# Patient Record
Sex: FEMALE | HISPANIC OR LATINO | Employment: FULL TIME | URBAN - NONMETROPOLITAN AREA
[De-identification: names, ages, dates, MRNs, and addresses within clinical notes are randomized per-mention and may not be internally consistent; named-entity substitution may affect disease eponyms.]

---

## 2019-01-24 ENCOUNTER — APPOINTMENT (OUTPATIENT)
Dept: LAB | Facility: HOSPITAL | Age: 29
End: 2019-01-24
Attending: OBSTETRICS & GYNECOLOGY

## 2019-01-24 ENCOUNTER — TRANSCRIBE ORDERS (OUTPATIENT)
Dept: LAB | Facility: HOSPITAL | Age: 29
End: 2019-01-24

## 2019-01-24 DIAGNOSIS — Z34.90 PREGNANCY, UNSPECIFIED GESTATIONAL AGE: Primary | ICD-10-CM

## 2019-01-24 DIAGNOSIS — O24.919 DIABETES MELLITUS DURING PREGNANCY, ANTEPARTUM, UNSPECIFIED DIABETES MELLITUS TYPE: ICD-10-CM

## 2019-01-24 LAB
ABO GROUP BLD: NORMAL
BASOPHILS # BLD AUTO: 0.04 10*3/MM3 (ref 0–0.2)
BASOPHILS NFR BLD AUTO: 0.3 % (ref 0–2)
BLD GP AB SCN SERPL QL: NEGATIVE
DEPRECATED RDW RBC AUTO: 40.7 FL (ref 40–54)
EOSINOPHIL # BLD AUTO: 0.15 10*3/MM3 (ref 0–0.7)
EOSINOPHIL NFR BLD AUTO: 1.2 % (ref 0–4)
ERYTHROCYTE [DISTWIDTH] IN BLOOD BY AUTOMATED COUNT: 13.2 % (ref 12–15)
HBA1C MFR BLD: 5.1 %
HBV SURFACE AG SERPL QL IA: NEGATIVE
HCG INTACT+B SERPL-ACNC: 3017.1 MIU/ML (ref 0–5)
HCT VFR BLD AUTO: 42.4 % (ref 37–47)
HCV AB SER DONR QL: NEGATIVE
HCV S/C RATIO: 0.03 (ref 0–0.99)
HGB BLD-MCNC: 13.9 G/DL (ref 12–16)
HIV1+2 AB SER QL: NEGATIVE
IMM GRANULOCYTES # BLD AUTO: 0.04 10*3/MM3 (ref 0–0.03)
IMM GRANULOCYTES NFR BLD AUTO: 0.3 % (ref 0–5)
LYMPHOCYTES # BLD AUTO: 3.75 10*3/MM3 (ref 0.72–4.86)
LYMPHOCYTES NFR BLD AUTO: 30.5 % (ref 15–45)
MCH RBC QN AUTO: 27.6 PG (ref 28–32)
MCHC RBC AUTO-ENTMCNC: 32.8 G/DL (ref 33–36)
MCV RBC AUTO: 84.1 FL (ref 82–98)
MONOCYTES # BLD AUTO: 0.73 10*3/MM3 (ref 0.19–1.3)
MONOCYTES NFR BLD AUTO: 5.9 % (ref 4–12)
NEUTROPHILS # BLD AUTO: 7.57 10*3/MM3 (ref 1.87–8.4)
NEUTROPHILS NFR BLD AUTO: 61.8 % (ref 39–78)
NRBC BLD AUTO-RTO: 0 /100 WBC (ref 0–0)
PLATELET # BLD AUTO: 275 10*3/MM3 (ref 130–400)
PMV BLD AUTO: 10.9 FL (ref 6–12)
PROGEST SERPL-MCNC: 27.6 NG/ML
RBC # BLD AUTO: 5.04 10*6/MM3 (ref 4.2–5.4)
RH BLD: POSITIVE
WBC NRBC COR # BLD: 12.28 10*3/MM3 (ref 4.8–10.8)

## 2019-01-24 PROCEDURE — 84702 CHORIONIC GONADOTROPIN TEST: CPT | Performed by: OBSTETRICS & GYNECOLOGY

## 2019-01-24 PROCEDURE — 87591 N.GONORRHOEAE DNA AMP PROB: CPT | Performed by: OBSTETRICS & GYNECOLOGY

## 2019-01-24 PROCEDURE — G0432 EIA HIV-1/HIV-2 SCREEN: HCPCS | Performed by: OBSTETRICS & GYNECOLOGY

## 2019-01-24 PROCEDURE — 84144 ASSAY OF PROGESTERONE: CPT | Performed by: OBSTETRICS & GYNECOLOGY

## 2019-01-24 PROCEDURE — 36415 COLL VENOUS BLD VENIPUNCTURE: CPT

## 2019-01-24 PROCEDURE — 86803 HEPATITIS C AB TEST: CPT | Performed by: OBSTETRICS & GYNECOLOGY

## 2019-01-24 PROCEDURE — G0123 SCREEN CERV/VAG THIN LAYER: HCPCS | Performed by: OBSTETRICS & GYNECOLOGY

## 2019-01-24 PROCEDURE — 87491 CHLMYD TRACH DNA AMP PROBE: CPT | Performed by: OBSTETRICS & GYNECOLOGY

## 2019-01-24 PROCEDURE — 83036 HEMOGLOBIN GLYCOSYLATED A1C: CPT | Performed by: OBSTETRICS & GYNECOLOGY

## 2019-01-25 ENCOUNTER — LAB REQUISITION (OUTPATIENT)
Dept: LAB | Facility: HOSPITAL | Age: 29
End: 2019-01-25

## 2019-01-25 DIAGNOSIS — Z12.72 ENCOUNTER FOR SCREENING FOR MALIGNANT NEOPLASM OF VAGINA: ICD-10-CM

## 2019-01-26 LAB
RPR SER QL: NON REACTIVE
RUBV IGG SERPL IA-ACNC: 30.7 INDEX

## 2019-01-31 LAB
GEN CATEG CVX/VAG CYTO-IMP: NORMAL
LAB AP CASE REPORT: NORMAL
LAB AP GYN ADDITIONAL INFORMATION: NORMAL
LAB AP GYN OTHER FINDINGS: NORMAL
PATH INTERP SPEC-IMP: NORMAL
STAT OF ADQ CVX/VAG CYTO-IMP: NORMAL

## 2019-03-03 ENCOUNTER — HOSPITAL ENCOUNTER (EMERGENCY)
Facility: HOSPITAL | Age: 29
Discharge: HOME | End: 2019-03-03
Admitting: EMERGENCY MEDICINE

## 2019-03-03 ENCOUNTER — APPOINTMENT (OUTPATIENT)
Dept: ULTRASOUND IMAGING | Facility: HOSPITAL | Age: 29
End: 2019-03-03

## 2019-03-03 VITALS
HEIGHT: 60 IN | BODY MASS INDEX: 40.17 KG/M2 | TEMPERATURE: 99.9 F | HEART RATE: 107 BPM | SYSTOLIC BLOOD PRESSURE: 110 MMHG | DIASTOLIC BLOOD PRESSURE: 60 MMHG | RESPIRATION RATE: 14 BRPM | WEIGHT: 204.6 LBS | OXYGEN SATURATION: 98 %

## 2019-03-03 DIAGNOSIS — R10.9 ABDOMINAL PAIN DURING PREGNANCY IN FIRST TRIMESTER: Primary | ICD-10-CM

## 2019-03-03 DIAGNOSIS — J02.0 STREP PHARYNGITIS: ICD-10-CM

## 2019-03-03 DIAGNOSIS — O26.891 ABDOMINAL PAIN DURING PREGNANCY IN FIRST TRIMESTER: Primary | ICD-10-CM

## 2019-03-03 LAB
ALBUMIN SERPL-MCNC: 4.4 G/DL (ref 3.5–5)
ALBUMIN/GLOB SERPL: 1.3 G/DL (ref 1.1–2.5)
ALP SERPL-CCNC: 105 U/L (ref 24–120)
ALT SERPL W P-5'-P-CCNC: 24 U/L (ref 0–54)
ANION GAP SERPL CALCULATED.3IONS-SCNC: 10 MMOL/L (ref 4–13)
AST SERPL-CCNC: 26 U/L (ref 7–45)
BASOPHILS # BLD AUTO: 0.05 10*3/MM3 (ref 0–0.2)
BASOPHILS NFR BLD AUTO: 0.2 % (ref 0–2)
BILIRUB SERPL-MCNC: 0.4 MG/DL (ref 0.1–1)
BILIRUB UR QL STRIP: NEGATIVE
BUN BLD-MCNC: 6 MG/DL (ref 5–21)
BUN/CREAT SERPL: 17.1 (ref 7–25)
CALCIUM SPEC-SCNC: 9.2 MG/DL (ref 8.4–10.4)
CHLORIDE SERPL-SCNC: 99 MMOL/L (ref 98–110)
CLARITY UR: ABNORMAL
CO2 SERPL-SCNC: 23 MMOL/L (ref 24–31)
COLOR UR: YELLOW
CREAT BLD-MCNC: 0.35 MG/DL (ref 0.5–1.4)
DEPRECATED RDW RBC AUTO: 38.6 FL (ref 40–54)
EOSINOPHIL # BLD AUTO: 0 10*3/MM3 (ref 0–0.7)
EOSINOPHIL NFR BLD AUTO: 0 % (ref 0–4)
ERYTHROCYTE [DISTWIDTH] IN BLOOD BY AUTOMATED COUNT: 13.3 % (ref 12–15)
FLUAV AG NPH QL: NEGATIVE
FLUBV AG NPH QL IA: NEGATIVE
GFR SERPL CREATININE-BSD FRML MDRD: >150 ML/MIN/1.73
GFR SERPL CREATININE-BSD FRML MDRD: >150 ML/MIN/1.73
GLOBULIN UR ELPH-MCNC: 3.5 GM/DL
GLUCOSE BLD-MCNC: 91 MG/DL (ref 70–100)
GLUCOSE UR STRIP-MCNC: NEGATIVE MG/DL
HCT VFR BLD AUTO: 38.5 % (ref 37–47)
HGB BLD-MCNC: 13 G/DL (ref 12–16)
HGB UR QL STRIP.AUTO: NEGATIVE
IMM GRANULOCYTES # BLD AUTO: 0.1 10*3/MM3 (ref 0–0.05)
IMM GRANULOCYTES NFR BLD AUTO: 0.5 % (ref 0–5)
KETONES UR QL STRIP: ABNORMAL
LEUKOCYTE ESTERASE UR QL STRIP.AUTO: NEGATIVE
LYMPHOCYTES # BLD AUTO: 1.37 10*3/MM3 (ref 0.72–4.86)
LYMPHOCYTES NFR BLD AUTO: 6.8 % (ref 15–45)
MCH RBC QN AUTO: 27.2 PG (ref 28–32)
MCHC RBC AUTO-ENTMCNC: 33.8 G/DL (ref 33–36)
MCV RBC AUTO: 80.5 FL (ref 82–98)
MONOCYTES # BLD AUTO: 0.81 10*3/MM3 (ref 0.19–1.3)
MONOCYTES NFR BLD AUTO: 4 % (ref 4–12)
NEUTROPHILS # BLD AUTO: 17.94 10*3/MM3 (ref 1.87–8.4)
NEUTROPHILS NFR BLD AUTO: 88.5 % (ref 39–78)
NITRITE UR QL STRIP: NEGATIVE
NRBC BLD AUTO-RTO: 0 /100 WBC (ref 0–0)
PH UR STRIP.AUTO: <=5 [PH] (ref 5–8)
PLATELET # BLD AUTO: 211 10*3/MM3 (ref 130–400)
PMV BLD AUTO: 10.7 FL (ref 6–12)
POTASSIUM BLD-SCNC: 3.8 MMOL/L (ref 3.5–5.3)
PROT SERPL-MCNC: 7.9 G/DL (ref 6.3–8.7)
PROT UR QL STRIP: NEGATIVE
RBC # BLD AUTO: 4.78 10*6/MM3 (ref 4.2–5.4)
S PYO AG THROAT QL: POSITIVE
SODIUM BLD-SCNC: 132 MMOL/L (ref 135–145)
SP GR UR STRIP: 1.02 (ref 1–1.03)
UROBILINOGEN UR QL STRIP: ABNORMAL
WBC NRBC COR # BLD: 20.27 10*3/MM3 (ref 4.8–10.8)

## 2019-03-03 PROCEDURE — 87804 INFLUENZA ASSAY W/OPTIC: CPT | Performed by: NURSE PRACTITIONER

## 2019-03-03 PROCEDURE — 87086 URINE CULTURE/COLONY COUNT: CPT | Performed by: NURSE PRACTITIONER

## 2019-03-03 PROCEDURE — 96372 THER/PROPH/DIAG INJ SC/IM: CPT

## 2019-03-03 PROCEDURE — 81003 URINALYSIS AUTO W/O SCOPE: CPT | Performed by: NURSE PRACTITIONER

## 2019-03-03 PROCEDURE — 99283 EMERGENCY DEPT VISIT LOW MDM: CPT

## 2019-03-03 PROCEDURE — 87880 STREP A ASSAY W/OPTIC: CPT | Performed by: NURSE PRACTITIONER

## 2019-03-03 PROCEDURE — 85025 COMPLETE CBC W/AUTO DIFF WBC: CPT | Performed by: NURSE PRACTITIONER

## 2019-03-03 PROCEDURE — 25010000002 CEFTRIAXONE PER 250 MG: Performed by: NURSE PRACTITIONER

## 2019-03-03 PROCEDURE — 80053 COMPREHEN METABOLIC PANEL: CPT | Performed by: NURSE PRACTITIONER

## 2019-03-03 PROCEDURE — 76801 OB US < 14 WKS SINGLE FETUS: CPT

## 2019-03-03 RX ORDER — ACETAMINOPHEN 500 MG
1000 TABLET ORAL ONCE
Status: COMPLETED | OUTPATIENT
Start: 2019-03-03 | End: 2019-03-03

## 2019-03-03 RX ORDER — FAMOTIDINE 20 MG/1
20 TABLET, FILM COATED ORAL NIGHTLY
Qty: 5 TABLET | Refills: 0 | Status: SHIPPED | OUTPATIENT
Start: 2019-03-03 | End: 2019-03-03

## 2019-03-03 RX ORDER — METHYLPREDNISOLONE 4 MG/1
TABLET ORAL
Qty: 1 EACH | Refills: 0 | Status: SHIPPED | OUTPATIENT
Start: 2019-03-03 | End: 2019-03-03

## 2019-03-03 RX ORDER — CEFDINIR 300 MG/1
300 CAPSULE ORAL 2 TIMES DAILY
Qty: 14 CAPSULE | Refills: 0 | Status: SHIPPED | OUTPATIENT
Start: 2019-03-03 | End: 2019-03-10

## 2019-03-03 RX ADMIN — CEFTRIAXONE SODIUM 1000 MG: 1 INJECTION, POWDER, FOR SOLUTION INTRAMUSCULAR; INTRAVENOUS at 14:40

## 2019-03-03 RX ADMIN — ACETAMINOPHEN 1000 MG: 500 TABLET, FILM COATED ORAL at 14:41

## 2019-03-03 NOTE — ED PROVIDER NOTES
Subjective   Patient is a 28-year-old  female that presents to the ER today with complaint of headache, body aches, sore throat, and abdominal pain.  Patient reports that last evening she developed a mild headache.  She denies any neck stiffness or pain.  She denies fever.  She states that she has had the chills intermittently since then.  The patient reports that this morning she had body aches and a sore throat.  Patient denies any difficulty swallowing her secretions.  She denies any respiratory distress or stridor.  Patient denies any earache or cough.  She denies any ill contacts.  Patient also reports that she is having suprapubic pain and cramping.  She reports that she is 11 weeks pregnant.  She is a  3 para 2 that follows with Dr. Rodgers.  The patient denies any vaginal bleeding or discharge, she denies dysuria.  Patient states that she has had abdominal pain throughout the pregnancy and was told that she needed to take it easy.  She is requesting an evaluation for her baby today as well.  She presents to the ER today for further evaluation.  A  phone service was used as the patient does not speak English.  Patient did give permission to do this.        History provided by:  Patient   used: Yes ( phone service)    Flu Symptoms   Presenting symptoms: headache, myalgias and sore throat    Presenting symptoms: no cough, no diarrhea, no fatigue, no fever, no nausea, no rhinorrhea, no shortness of breath and no vomiting    Severity:  Mild  Onset quality:  Sudden  Duration:  1 day  Progression:  Unchanged  Chronicity:  New  Relieved by:  Nothing  Worsened by:  Nothing  Ineffective treatments:  None tried  Associated symptoms: chills    Associated symptoms: no decreased appetite, no decrease in physical activity, no ear pain, no mental status change, no congestion, no neck stiffness and no syncope    Risk factors: pregnancy    Risk factors: not elderly, no  diabetes problem, no heart disease, no immunocompromised state, no kidney disease, no liver disease and no sick contacts    Abdominal Pain   Pain location:  Suprapubic  Pain quality: aching and cramping    Pain radiates to:  Does not radiate  Pain severity:  Mild  Onset quality:  Sudden  Duration:  1 day  Timing:  Constant  Progression:  Unchanged  Chronicity:  New  Context: not alcohol use, not awakening from sleep, not diet changes, not eating, not laxative use, not medication withdrawal, not previous surgeries, not recent illness, not recent sexual activity, not recent travel, not retching, not sick contacts, not suspicious food intake and not trauma    Relieved by:  Nothing  Worsened by:  Nothing  Ineffective treatments:  None tried  Associated symptoms: chills and sore throat    Associated symptoms: no anorexia, no belching, no chest pain, no constipation, no cough, no diarrhea, no dysuria, no fatigue, no fever, no flatus, no hematemesis, no hematochezia, no hematuria, no melena, no nausea, no shortness of breath, no vaginal bleeding, no vaginal discharge and no vomiting    Risk factors: pregnancy    Risk factors: no alcohol abuse, no aspirin use, not elderly, has not had multiple surgeries, no NSAID use, not obese and no recent hospitalization        Review of Systems   Constitutional: Positive for chills. Negative for decreased appetite, fatigue and fever.   HENT: Positive for sore throat. Negative for congestion, ear pain and rhinorrhea.    Respiratory: Negative for cough and shortness of breath.    Cardiovascular: Negative for chest pain.   Gastrointestinal: Positive for abdominal pain. Negative for anorexia, constipation, diarrhea, flatus, hematemesis, hematochezia, melena, nausea and vomiting.   Genitourinary: Negative for dysuria, hematuria, vaginal bleeding and vaginal discharge.   Musculoskeletal: Positive for myalgias. Negative for neck stiffness.   Neurological: Positive for headaches.   All other  systems reviewed and are negative.      History reviewed. No pertinent past medical history.    No Known Allergies    History reviewed. No pertinent surgical history.    History reviewed. No pertinent family history.    Social History     Socioeconomic History   • Marital status: Single     Spouse name: Not on file   • Number of children: Not on file   • Years of education: Not on file   • Highest education level: Not on file   Tobacco Use   • Smoking status: Never Smoker   Substance and Sexual Activity   • Alcohol use: No     Frequency: Never   • Drug use: No   • Sexual activity: Defer           Objective   Physical Exam   Constitutional: She is oriented to person, place, and time. She appears well-developed and well-nourished.   HENT:   Head: Normocephalic and atraumatic.   Mouth/Throat: Uvula is midline, oropharynx is clear and moist and mucous membranes are normal. Tonsils are 2+ on the right. Tonsils are 2+ on the left. Tonsillar exudate.   Eyes: Conjunctivae are normal. Pupils are equal, round, and reactive to light.   Cardiovascular: Normal rate, regular rhythm and normal heart sounds.   Pulmonary/Chest: Effort normal and breath sounds normal.   Abdominal: Soft. Bowel sounds are normal. There is tenderness in the suprapubic area.   Neurological: She is alert and oriented to person, place, and time.   Skin: Skin is warm and dry. Capillary refill takes less than 2 seconds.   Psychiatric: She has a normal mood and affect.   Nursing note and vitals reviewed.      Procedures           ED Course  ED Course as of Mar 03 1527   Sun Mar 03, 2019   1323 Nursing staff attempted to obtain FHT; difficulty locating, US ordered.   [LF]   1440 Pt case discussed with Dr. Dhillon who agrees with plan of care. CMP shows Na 132, stable renal and liver functions. CBC shows a WBC 20; pt is pregnant which can cause a leukocytosis and is also strept A positive. UA shows trace ketones, neg hematuria and infection. Flu swab negative.    [LF]   1442 OB US shows IUP 10 weeks 3 days with , no abnormalities seen.   [LF]   1442 I did discuss this with the pt in depth using translation services. The pt voices understanding of labs and radiology reports. Pt given Rocephin IM and Tylenol Po. Advised bedrest/pelvic rest until cleared by OBGYN. Advised to f/u with OBGYN tomorrow. Pt advised push PO fluids, return to the ER as needed. Pt will be DC home at this time in stable cond with family. All questions answered. Pt tolerating PO fluids without difficulty at discharge.   [LF]      ED Course User Index  [LF] Jennifer Brennan, APRN          US Ob < 14 Weeks Single or First Gestation   Final Result        Labs Reviewed   RAPID STREP A SCREEN - Abnormal; Notable for the following components:       Result Value    Strep A Ag Positive (*)     All other components within normal limits   COMPREHENSIVE METABOLIC PANEL - Abnormal; Notable for the following components:    Creatinine 0.35 (*)     Sodium 132 (*)     CO2 23.0 (*)     All other components within normal limits   URINALYSIS W/ CULTURE IF INDICATED - Abnormal; Notable for the following components:    Appearance, UA Cloudy (*)     Ketones, UA Trace (*)     All other components within normal limits    Narrative:     Urine microscopic not indicated.   CBC WITH AUTO DIFFERENTIAL - Abnormal; Notable for the following components:    WBC 20.27 (*)     MCV 80.5 (*)     MCH 27.2 (*)     RDW-SD 38.6 (*)     Neutrophil % 88.5 (*)     Lymphocyte % 6.8 (*)     Neutrophils, Absolute 17.94 (*)     Immature Grans, Absolute 0.10 (*)     All other components within normal limits   INFLUENZA ANTIGEN, RAPID - Normal    Narrative:     Recommend confirmation of negative results by viral culture or molecular assay.   URINE CULTURE   CBC AND DIFFERENTIAL    Narrative:     The following orders were created for panel order CBC & Differential.  Procedure                               Abnormality         Status                      ---------                               -----------         ------                     CBC Auto Differential[[id number]]        Abnormal            Final result                 Please view results for these tests on the individual orders.             MDM  Number of Diagnoses or Management Options  Abdominal pain during pregnancy in first trimester: new and requires workup  Strep pharyngitis: new and requires workup     Amount and/or Complexity of Data Reviewed  Clinical lab tests: ordered and reviewed  Tests in the radiology section of CPT®: ordered and reviewed  Discuss the patient with other providers: yes    Patient Progress  Patient progress: stable        Final diagnoses:   Abdominal pain during pregnancy in first trimester   Strep pharyngitis            Jennifer Brennan, APRN  03/03/19 1527

## 2019-03-05 LAB — BACTERIA SPEC AEROBE CULT: NORMAL

## 2019-04-03 ENCOUNTER — LAB (OUTPATIENT)
Dept: LAB | Facility: HOSPITAL | Age: 29
End: 2019-04-03

## 2019-04-03 ENCOUNTER — TRANSCRIBE ORDERS (OUTPATIENT)
Dept: LABOR AND DELIVERY | Facility: HOSPITAL | Age: 29
End: 2019-04-03

## 2019-04-03 DIAGNOSIS — Q05.9 SPINA BIFIDA, UNSPECIFIED HYDROCEPHALUS PRESENCE, UNSPECIFIED SPINAL REGION (HCC): Primary | ICD-10-CM

## 2019-04-03 PROCEDURE — 82677 ASSAY OF ESTRIOL: CPT | Performed by: OBSTETRICS & GYNECOLOGY

## 2019-04-03 PROCEDURE — 86336 INHIBIN A: CPT | Performed by: OBSTETRICS & GYNECOLOGY

## 2019-04-03 PROCEDURE — 36415 COLL VENOUS BLD VENIPUNCTURE: CPT

## 2019-04-03 PROCEDURE — 84702 CHORIONIC GONADOTROPIN TEST: CPT | Performed by: OBSTETRICS & GYNECOLOGY

## 2019-04-03 PROCEDURE — 82105 ALPHA-FETOPROTEIN SERUM: CPT | Performed by: OBSTETRICS & GYNECOLOGY

## 2019-04-05 LAB
2ND TRIMESTER 4 SCREEN SERPL-IMP: NORMAL
2ND TRIMESTER 4 SCREEN SERPL-IMP: NORMAL
AFP ADJ MOM SERPL: 1.86
AFP SERPL-MCNC: 39 NG/ML
AGE AT DELIVERY: 28.9 YR
FET TS 18 RISK FROM MAT AGE: NORMAL
FET TS 21 RISK FROM MAT AGE: 789
GA METHOD: NORMAL
GA: 15 WEEKS
HCG ADJ MOM SERPL: 0.54
HCG SERPL-ACNC: NORMAL MIU/ML
IDDM PATIENT QL: NO
INHIBIN A ADJ MOM SERPL: 0.67
INHIBIN A SERPL-MCNC: 103.11 PG/ML
LABORATORY COMMENT REPORT: NORMAL
MULTIPLE PREGNANCY: NO
NEURAL TUBE DEFECT RISK FETUS: 1109 %
RESULT: NORMAL
TS 18 RISK FETUS: NORMAL
TS 21 RISK FETUS: NORMAL
U ESTRIOL ADJ MOM SERPL: 1.39
U ESTRIOL SERPL-MCNC: 0.7 NG/ML

## 2019-07-10 ENCOUNTER — TRANSCRIBE ORDERS (OUTPATIENT)
Dept: ADMINISTRATIVE | Facility: HOSPITAL | Age: 29
End: 2019-07-10

## 2019-07-10 ENCOUNTER — APPOINTMENT (OUTPATIENT)
Dept: LAB | Facility: HOSPITAL | Age: 29
End: 2019-07-10

## 2019-07-10 DIAGNOSIS — O24.419 GESTATIONAL DIABETES MELLITUS (GDM), ANTEPARTUM, GESTATIONAL DIABETES METHOD OF CONTROL UNSPECIFIED: Primary | ICD-10-CM

## 2019-07-10 LAB
GLUCOSE 1H P 100 G GLC PO SERPL-MCNC: 166 MG/DL (ref 50–140)
GLUCOSE 2H P 100 G GLC PO SERPL-MCNC: 169 MG/DL (ref 50–140)
GLUCOSE 3H P 100 G GLC PO SERPL-MCNC: 105 MG/DL (ref 50–140)
GLUCOSE P FAST SERPL-MCNC: 74 MG/DL (ref 70–100)

## 2019-07-10 PROCEDURE — 36415 COLL VENOUS BLD VENIPUNCTURE: CPT | Performed by: OBSTETRICS & GYNECOLOGY

## 2019-07-10 PROCEDURE — 82952 GTT-ADDED SAMPLES: CPT | Performed by: OBSTETRICS & GYNECOLOGY

## 2019-07-10 PROCEDURE — 82951 GLUCOSE TOLERANCE TEST (GTT): CPT | Performed by: OBSTETRICS & GYNECOLOGY

## 2019-08-15 ENCOUNTER — LAB REQUISITION (OUTPATIENT)
Dept: LAB | Facility: HOSPITAL | Age: 29
End: 2019-08-15

## 2019-08-15 DIAGNOSIS — Z00.00 ENCOUNTER FOR GENERAL ADULT MEDICAL EXAMINATION WITHOUT ABNORMAL FINDINGS: ICD-10-CM

## 2019-08-15 PROCEDURE — 87081 CULTURE SCREEN ONLY: CPT | Performed by: OBSTETRICS & GYNECOLOGY

## 2019-08-18 LAB — BACTERIA SPEC AEROBE CULT: NORMAL

## 2019-08-30 ENCOUNTER — HOSPITAL ENCOUNTER (INPATIENT)
Facility: HOSPITAL | Age: 29
LOS: 3 days | Discharge: HOME | End: 2019-09-02
Attending: OBSTETRICS & GYNECOLOGY | Admitting: OBSTETRICS & GYNECOLOGY

## 2019-08-30 ENCOUNTER — HOSPITAL ENCOUNTER (OUTPATIENT)
Facility: HOSPITAL | Age: 29
Setting detail: OBSERVATION
Discharge: HOME | End: 2019-08-30
Attending: OBSTETRICS & GYNECOLOGY | Admitting: OBSTETRICS & GYNECOLOGY

## 2019-08-30 VITALS
SYSTOLIC BLOOD PRESSURE: 119 MMHG | HEART RATE: 81 BPM | TEMPERATURE: 98.1 F | WEIGHT: 216.2 LBS | DIASTOLIC BLOOD PRESSURE: 83 MMHG | HEIGHT: 59 IN | RESPIRATION RATE: 18 BRPM | BODY MASS INDEX: 43.59 KG/M2

## 2019-08-30 PROBLEM — Z34.90 PREGNANCY: Status: ACTIVE | Noted: 2019-08-30

## 2019-08-30 LAB
A1 MICROGLOB PLACENTAL VAG QL: NEGATIVE
BASOPHILS # BLD AUTO: 0.03 10*3/MM3 (ref 0–0.2)
BASOPHILS NFR BLD AUTO: 0.3 % (ref 0–1.5)
DEPRECATED RDW RBC AUTO: 39.6 FL (ref 37–54)
EOSINOPHIL # BLD AUTO: 0.04 10*3/MM3 (ref 0–0.4)
EOSINOPHIL NFR BLD AUTO: 0.4 % (ref 0.3–6.2)
ERYTHROCYTE [DISTWIDTH] IN BLOOD BY AUTOMATED COUNT: 13.8 % (ref 12.3–15.4)
GLUCOSE BLDC GLUCOMTR-MCNC: 85 MG/DL (ref 70–130)
HCT VFR BLD AUTO: 36.2 % (ref 34–46.6)
HGB BLD-MCNC: 12.3 G/DL (ref 12–15.9)
IMM GRANULOCYTES # BLD AUTO: 0.05 10*3/MM3 (ref 0–0.05)
IMM GRANULOCYTES NFR BLD AUTO: 0.5 % (ref 0–0.5)
LYMPHOCYTES # BLD AUTO: 2.99 10*3/MM3 (ref 0.7–3.1)
LYMPHOCYTES NFR BLD AUTO: 27.2 % (ref 19.6–45.3)
MCH RBC QN AUTO: 27.4 PG (ref 26.6–33)
MCHC RBC AUTO-ENTMCNC: 34 G/DL (ref 31.5–35.7)
MCV RBC AUTO: 80.6 FL (ref 79–97)
MONOCYTES # BLD AUTO: 0.67 10*3/MM3 (ref 0.1–0.9)
MONOCYTES NFR BLD AUTO: 6.1 % (ref 5–12)
NEUTROPHILS # BLD AUTO: 7.21 10*3/MM3 (ref 1.7–7)
NEUTROPHILS NFR BLD AUTO: 65.5 % (ref 42.7–76)
NRBC BLD AUTO-RTO: 0 /100 WBC (ref 0–0.2)
PLATELET # BLD AUTO: 231 10*3/MM3 (ref 140–450)
PMV BLD AUTO: 10.9 FL (ref 6–12)
RBC # BLD AUTO: 4.49 10*6/MM3 (ref 3.77–5.28)
WBC NRBC COR # BLD: 10.99 10*3/MM3 (ref 3.4–10.8)

## 2019-08-30 PROCEDURE — G0378 HOSPITAL OBSERVATION PER HR: HCPCS

## 2019-08-30 PROCEDURE — G0463 HOSPITAL OUTPT CLINIC VISIT: HCPCS

## 2019-08-30 PROCEDURE — 86900 BLOOD TYPING SEROLOGIC ABO: CPT | Performed by: OBSTETRICS & GYNECOLOGY

## 2019-08-30 PROCEDURE — 84112 EVAL AMNIOTIC FLUID PROTEIN: CPT | Performed by: OBSTETRICS & GYNECOLOGY

## 2019-08-30 PROCEDURE — 82962 GLUCOSE BLOOD TEST: CPT

## 2019-08-30 PROCEDURE — 85025 COMPLETE CBC W/AUTO DIFF WBC: CPT | Performed by: OBSTETRICS & GYNECOLOGY

## 2019-08-30 PROCEDURE — 86850 RBC ANTIBODY SCREEN: CPT | Performed by: OBSTETRICS & GYNECOLOGY

## 2019-08-30 PROCEDURE — 86901 BLOOD TYPING SEROLOGIC RH(D): CPT | Performed by: OBSTETRICS & GYNECOLOGY

## 2019-08-30 RX ORDER — SODIUM CHLORIDE 0.9 % (FLUSH) 0.9 %
10 SYRINGE (ML) INJECTION EVERY 12 HOURS SCHEDULED
Status: DISCONTINUED | OUTPATIENT
Start: 2019-08-31 | End: 2019-09-02 | Stop reason: HOSPADM

## 2019-08-30 RX ORDER — SODIUM CHLORIDE 0.9 % (FLUSH) 0.9 %
10 SYRINGE (ML) INJECTION AS NEEDED
Status: DISCONTINUED | OUTPATIENT
Start: 2019-08-30 | End: 2019-09-02 | Stop reason: HOSPADM

## 2019-08-30 RX ORDER — PRENATAL VIT/IRON FUM/FOLIC AC 27MG-0.8MG
TABLET ORAL DAILY
COMMUNITY
End: 2021-08-01

## 2019-08-30 RX ADMIN — SODIUM CHLORIDE, POTASSIUM CHLORIDE, SODIUM LACTATE AND CALCIUM CHLORIDE 1000 ML: 600; 310; 30; 20 INJECTION, SOLUTION INTRAVENOUS at 23:30

## 2019-08-31 ENCOUNTER — ANESTHESIA EVENT (OUTPATIENT)
Dept: LABOR AND DELIVERY | Facility: HOSPITAL | Age: 29
End: 2019-08-31

## 2019-08-31 ENCOUNTER — ANESTHESIA (OUTPATIENT)
Dept: LABOR AND DELIVERY | Facility: HOSPITAL | Age: 29
End: 2019-08-31

## 2019-08-31 LAB
ABO GROUP BLD: NORMAL
BLD GP AB SCN SERPL QL: NEGATIVE
GLUCOSE BLDC GLUCOMTR-MCNC: 100 MG/DL (ref 70–130)
GLUCOSE BLDC GLUCOMTR-MCNC: 81 MG/DL (ref 70–130)
GLUCOSE BLDC GLUCOMTR-MCNC: 94 MG/DL (ref 70–130)
GLUCOSE BLDC GLUCOMTR-MCNC: 99 MG/DL (ref 70–130)
RH BLD: POSITIVE
T&S EXPIRATION DATE: NORMAL

## 2019-08-31 PROCEDURE — 25010000002 ROPIVACAINE PER 1 MG: Performed by: NURSE ANESTHETIST, CERTIFIED REGISTERED

## 2019-08-31 PROCEDURE — 25010000002 FENTANYL CITRATE (PF) 250 MCG/5ML SOLUTION: Performed by: NURSE ANESTHETIST, CERTIFIED REGISTERED

## 2019-08-31 PROCEDURE — 88313 SPECIAL STAINS GROUP 2: CPT | Performed by: OBSTETRICS & GYNECOLOGY

## 2019-08-31 PROCEDURE — 82962 GLUCOSE BLOOD TEST: CPT

## 2019-08-31 PROCEDURE — 88307 TISSUE EXAM BY PATHOLOGIST: CPT | Performed by: OBSTETRICS & GYNECOLOGY

## 2019-08-31 PROCEDURE — 51702 INSERT TEMP BLADDER CATH: CPT

## 2019-08-31 PROCEDURE — C1755 CATHETER, INTRASPINAL: HCPCS | Performed by: NURSE ANESTHETIST, CERTIFIED REGISTERED

## 2019-08-31 RX ORDER — DOCUSATE SODIUM 100 MG/1
100 CAPSULE, LIQUID FILLED ORAL 2 TIMES DAILY
Status: DISCONTINUED | OUTPATIENT
Start: 2019-09-01 | End: 2019-09-02 | Stop reason: HOSPADM

## 2019-08-31 RX ORDER — PRENATAL VIT/IRON FUM/FOLIC AC 27MG-0.8MG
1 TABLET ORAL DAILY
Status: DISCONTINUED | OUTPATIENT
Start: 2019-08-31 | End: 2019-09-02 | Stop reason: HOSPADM

## 2019-08-31 RX ORDER — METHYLERGONOVINE MALEATE 0.2 MG/ML
200 INJECTION INTRAVENOUS ONCE AS NEEDED
Status: DISCONTINUED | OUTPATIENT
Start: 2019-08-31 | End: 2019-08-31 | Stop reason: HOSPADM

## 2019-08-31 RX ORDER — IBUPROFEN 600 MG/1
600 TABLET ORAL EVERY 8 HOURS PRN
Status: DISCONTINUED | OUTPATIENT
Start: 2019-08-31 | End: 2019-09-02 | Stop reason: HOSPADM

## 2019-08-31 RX ORDER — SODIUM CHLORIDE 0.9 % (FLUSH) 0.9 %
3 SYRINGE (ML) INJECTION EVERY 12 HOURS SCHEDULED
Status: DISCONTINUED | OUTPATIENT
Start: 2019-08-31 | End: 2019-08-31 | Stop reason: HOSPADM

## 2019-08-31 RX ORDER — ONDANSETRON 4 MG/1
4 TABLET, FILM COATED ORAL EVERY 8 HOURS PRN
Status: DISCONTINUED | OUTPATIENT
Start: 2019-08-31 | End: 2019-09-02 | Stop reason: HOSPADM

## 2019-08-31 RX ORDER — PROMETHAZINE HYDROCHLORIDE 12.5 MG/1
12.5 SUPPOSITORY RECTAL EVERY 6 HOURS PRN
Status: DISCONTINUED | OUTPATIENT
Start: 2019-08-31 | End: 2019-09-02 | Stop reason: HOSPADM

## 2019-08-31 RX ORDER — MORPHINE SULFATE 2 MG/ML
1 INJECTION, SOLUTION INTRAMUSCULAR; INTRAVENOUS EVERY 4 HOURS PRN
Status: DISCONTINUED | OUTPATIENT
Start: 2019-08-31 | End: 2019-09-02 | Stop reason: HOSPADM

## 2019-08-31 RX ORDER — MISOPROSTOL 200 UG/1
600 TABLET ORAL ONCE
Status: DISCONTINUED | OUTPATIENT
Start: 2019-08-31 | End: 2019-09-02 | Stop reason: HOSPADM

## 2019-08-31 RX ORDER — SODIUM CHLORIDE, SODIUM LACTATE, POTASSIUM CHLORIDE, CALCIUM CHLORIDE 600; 310; 30; 20 MG/100ML; MG/100ML; MG/100ML; MG/100ML
125 INJECTION, SOLUTION INTRAVENOUS CONTINUOUS
Status: DISCONTINUED | OUTPATIENT
Start: 2019-08-31 | End: 2019-09-02 | Stop reason: HOSPADM

## 2019-08-31 RX ORDER — MISOPROSTOL 200 UG/1
800 TABLET ORAL AS NEEDED
Status: DISCONTINUED | OUTPATIENT
Start: 2019-08-31 | End: 2019-08-31 | Stop reason: HOSPADM

## 2019-08-31 RX ORDER — BUTORPHANOL TARTRATE 1 MG/ML
1 INJECTION, SOLUTION INTRAMUSCULAR; INTRAVENOUS
Status: DISCONTINUED | OUTPATIENT
Start: 2019-08-31 | End: 2019-08-31 | Stop reason: HOSPADM

## 2019-08-31 RX ORDER — LIDOCAINE HYDROCHLORIDE AND EPINEPHRINE 15; 5 MG/ML; UG/ML
INJECTION, SOLUTION EPIDURAL AS NEEDED
Status: DISCONTINUED | OUTPATIENT
Start: 2019-08-31 | End: 2019-09-01 | Stop reason: SURG

## 2019-08-31 RX ORDER — ZOLPIDEM TARTRATE 5 MG/1
5 TABLET ORAL NIGHTLY PRN
Status: DISCONTINUED | OUTPATIENT
Start: 2019-08-31 | End: 2019-09-02 | Stop reason: HOSPADM

## 2019-08-31 RX ORDER — LIDOCAINE HYDROCHLORIDE 10 MG/ML
5 INJECTION, SOLUTION EPIDURAL; INFILTRATION; INTRACAUDAL; PERINEURAL AS NEEDED
Status: DISCONTINUED | OUTPATIENT
Start: 2019-08-31 | End: 2019-08-31 | Stop reason: HOSPADM

## 2019-08-31 RX ORDER — ONDANSETRON 4 MG/1
4 TABLET, FILM COATED ORAL EVERY 6 HOURS PRN
Status: DISCONTINUED | OUTPATIENT
Start: 2019-08-31 | End: 2019-08-31 | Stop reason: HOSPADM

## 2019-08-31 RX ORDER — CARBOPROST TROMETHAMINE 250 UG/ML
250 INJECTION, SOLUTION INTRAMUSCULAR AS NEEDED
Status: DISCONTINUED | OUTPATIENT
Start: 2019-08-31 | End: 2019-08-31 | Stop reason: HOSPADM

## 2019-08-31 RX ORDER — OXYTOCIN/0.9 % SODIUM CHLORIDE 30/500 ML
250 PLASTIC BAG, INJECTION (ML) INTRAVENOUS CONTINUOUS
Status: ACTIVE | OUTPATIENT
Start: 2019-08-31 | End: 2019-08-31

## 2019-08-31 RX ORDER — FENTANYL CITRATE 50 UG/ML
INJECTION, SOLUTION INTRAMUSCULAR; INTRAVENOUS AS NEEDED
Status: DISCONTINUED | OUTPATIENT
Start: 2019-08-31 | End: 2019-09-01 | Stop reason: SURG

## 2019-08-31 RX ORDER — PROMETHAZINE HYDROCHLORIDE 25 MG/ML
12.5 INJECTION, SOLUTION INTRAMUSCULAR; INTRAVENOUS EVERY 6 HOURS PRN
Status: DISCONTINUED | OUTPATIENT
Start: 2019-08-31 | End: 2019-09-02 | Stop reason: HOSPADM

## 2019-08-31 RX ORDER — CALCIUM CARBONATE 200(500)MG
2 TABLET,CHEWABLE ORAL 3 TIMES DAILY PRN
Status: DISCONTINUED | OUTPATIENT
Start: 2019-08-31 | End: 2019-09-02 | Stop reason: HOSPADM

## 2019-08-31 RX ORDER — BISACODYL 10 MG
10 SUPPOSITORY, RECTAL RECTAL DAILY PRN
Status: DISCONTINUED | OUTPATIENT
Start: 2019-09-01 | End: 2019-09-02 | Stop reason: HOSPADM

## 2019-08-31 RX ORDER — OXYTOCIN/0.9 % SODIUM CHLORIDE 30/500 ML
125 PLASTIC BAG, INJECTION (ML) INTRAVENOUS CONTINUOUS PRN
Status: COMPLETED | OUTPATIENT
Start: 2019-08-31 | End: 2019-08-31

## 2019-08-31 RX ORDER — SODIUM CHLORIDE 0.9 % (FLUSH) 0.9 %
10 SYRINGE (ML) INJECTION AS NEEDED
Status: DISCONTINUED | OUTPATIENT
Start: 2019-08-31 | End: 2019-08-31 | Stop reason: HOSPADM

## 2019-08-31 RX ORDER — OXYTOCIN/0.9 % SODIUM CHLORIDE 30/500 ML
2-30 PLASTIC BAG, INJECTION (ML) INTRAVENOUS
Status: DISCONTINUED | OUTPATIENT
Start: 2019-08-31 | End: 2019-08-31 | Stop reason: HOSPADM

## 2019-08-31 RX ORDER — CARBOPROST TROMETHAMINE 250 UG/ML
250 INJECTION, SOLUTION INTRAMUSCULAR ONCE
Status: DISCONTINUED | OUTPATIENT
Start: 2019-08-31 | End: 2019-09-02 | Stop reason: HOSPADM

## 2019-08-31 RX ORDER — ONDANSETRON 2 MG/ML
4 INJECTION INTRAMUSCULAR; INTRAVENOUS EVERY 6 HOURS PRN
Status: DISCONTINUED | OUTPATIENT
Start: 2019-08-31 | End: 2019-08-31 | Stop reason: HOSPADM

## 2019-08-31 RX ORDER — PROMETHAZINE HYDROCHLORIDE 25 MG/1
25 TABLET ORAL EVERY 6 HOURS PRN
Status: DISCONTINUED | OUTPATIENT
Start: 2019-08-31 | End: 2019-09-02 | Stop reason: HOSPADM

## 2019-08-31 RX ORDER — OXYTOCIN/0.9 % SODIUM CHLORIDE 30/500 ML
999 PLASTIC BAG, INJECTION (ML) INTRAVENOUS ONCE
Status: COMPLETED | OUTPATIENT
Start: 2019-08-31 | End: 2019-08-31

## 2019-08-31 RX ORDER — NALOXONE HCL 0.4 MG/ML
0.4 VIAL (ML) INJECTION
Status: DISCONTINUED | OUTPATIENT
Start: 2019-08-31 | End: 2019-09-02 | Stop reason: HOSPADM

## 2019-08-31 RX ORDER — SODIUM CHLORIDE 0.9 % (FLUSH) 0.9 %
1-10 SYRINGE (ML) INJECTION AS NEEDED
Status: DISCONTINUED | OUTPATIENT
Start: 2019-08-31 | End: 2019-09-02 | Stop reason: HOSPADM

## 2019-08-31 RX ORDER — METHYLERGONOVINE MALEATE 0.2 MG/ML
200 INJECTION INTRAVENOUS ONCE
Status: DISCONTINUED | OUTPATIENT
Start: 2019-08-31 | End: 2019-09-02 | Stop reason: HOSPADM

## 2019-08-31 RX ORDER — ONDANSETRON 2 MG/ML
4 INJECTION INTRAMUSCULAR; INTRAVENOUS EVERY 6 HOURS PRN
Status: DISCONTINUED | OUTPATIENT
Start: 2019-08-31 | End: 2019-09-02 | Stop reason: HOSPADM

## 2019-08-31 RX ADMIN — SODIUM CHLORIDE, POTASSIUM CHLORIDE, SODIUM LACTATE AND CALCIUM CHLORIDE 1000 ML: 600; 310; 30; 20 INJECTION, SOLUTION INTRAVENOUS at 01:56

## 2019-08-31 RX ADMIN — LIDOCAINE HYDROCHLORIDE AND EPINEPHRINE 3 ML: 15; 5 INJECTION, SOLUTION EPIDURAL at 02:45

## 2019-08-31 RX ADMIN — ROPIVACAINE HYDROCHLORIDE 2 ML: 2 INJECTION, SOLUTION EPIDURAL; INFILTRATION at 02:54

## 2019-08-31 RX ADMIN — OXYTOCIN-SODIUM CHLORIDE 0.9% IV SOLN 30 UNIT/500ML 125 ML/HR: 30-0.9/5 SOLUTION at 08:03

## 2019-08-31 RX ADMIN — ROPIVACAINE HYDROCHLORIDE 4 ML: 2 INJECTION, SOLUTION EPIDURAL; INFILTRATION at 02:49

## 2019-08-31 RX ADMIN — OXYTOCIN-SODIUM CHLORIDE 0.9% IV SOLN 30 UNIT/500ML 2 MILLI-UNITS/MIN: 30-0.9/5 SOLUTION at 05:58

## 2019-08-31 RX ADMIN — IBUPROFEN 600 MG: 600 TABLET ORAL at 12:30

## 2019-08-31 RX ADMIN — OXYTOCIN-SODIUM CHLORIDE 0.9% IV SOLN 30 UNIT/500ML 999 ML/HR: 30-0.9/5 SOLUTION at 07:05

## 2019-08-31 RX ADMIN — FENTANYL CITRATE 50 MCG: 50 INJECTION INTRAMUSCULAR; INTRAVENOUS at 02:49

## 2019-08-31 RX ADMIN — OXYTOCIN-SODIUM CHLORIDE 0.9% IV SOLN 30 UNIT/500ML 250 ML/HR: 30-0.9/5 SOLUTION at 07:30

## 2019-08-31 RX ADMIN — SODIUM CHLORIDE, POTASSIUM CHLORIDE, SODIUM LACTATE AND CALCIUM CHLORIDE 125 ML/HR: 600; 310; 30; 20 INJECTION, SOLUTION INTRAVENOUS at 01:57

## 2019-08-31 RX ADMIN — FENTANYL CITRATE 200 MCG: 50 INJECTION INTRAMUSCULAR; INTRAVENOUS at 02:55

## 2019-09-01 LAB
BASOPHILS # BLD AUTO: 0.03 10*3/MM3 (ref 0–0.2)
BASOPHILS NFR BLD AUTO: 0.3 % (ref 0–1.5)
DEPRECATED RDW RBC AUTO: 41.3 FL (ref 37–54)
EOSINOPHIL # BLD AUTO: 0.07 10*3/MM3 (ref 0–0.4)
EOSINOPHIL NFR BLD AUTO: 0.7 % (ref 0.3–6.2)
ERYTHROCYTE [DISTWIDTH] IN BLOOD BY AUTOMATED COUNT: 14.2 % (ref 12.3–15.4)
GLUCOSE BLDC GLUCOMTR-MCNC: 84 MG/DL (ref 70–130)
HCT VFR BLD AUTO: 31.7 % (ref 34–46.6)
HGB BLD-MCNC: 10.5 G/DL (ref 12–15.9)
IMM GRANULOCYTES # BLD AUTO: 0.03 10*3/MM3 (ref 0–0.05)
IMM GRANULOCYTES NFR BLD AUTO: 0.3 % (ref 0–0.5)
LYMPHOCYTES # BLD AUTO: 3.1 10*3/MM3 (ref 0.7–3.1)
LYMPHOCYTES NFR BLD AUTO: 30.7 % (ref 19.6–45.3)
MCH RBC QN AUTO: 27.1 PG (ref 26.6–33)
MCHC RBC AUTO-ENTMCNC: 33.1 G/DL (ref 31.5–35.7)
MCV RBC AUTO: 81.9 FL (ref 79–97)
MONOCYTES # BLD AUTO: 0.62 10*3/MM3 (ref 0.1–0.9)
MONOCYTES NFR BLD AUTO: 6.1 % (ref 5–12)
NEUTROPHILS # BLD AUTO: 6.26 10*3/MM3 (ref 1.7–7)
NEUTROPHILS NFR BLD AUTO: 61.9 % (ref 42.7–76)
NRBC BLD AUTO-RTO: 0 /100 WBC (ref 0–0.2)
PLATELET # BLD AUTO: 182 10*3/MM3 (ref 140–450)
PMV BLD AUTO: 11.1 FL (ref 6–12)
RBC # BLD AUTO: 3.87 10*6/MM3 (ref 3.77–5.28)
WBC NRBC COR # BLD: 10.11 10*3/MM3 (ref 3.4–10.8)

## 2019-09-01 PROCEDURE — 82962 GLUCOSE BLOOD TEST: CPT

## 2019-09-01 PROCEDURE — 85025 COMPLETE CBC W/AUTO DIFF WBC: CPT | Performed by: OBSTETRICS & GYNECOLOGY

## 2019-09-01 RX ADMIN — IBUPROFEN 600 MG: 600 TABLET ORAL at 23:45

## 2019-09-01 RX ADMIN — IBUPROFEN 600 MG: 600 TABLET ORAL at 09:45

## 2019-09-01 RX ADMIN — IBUPROFEN 600 MG: 600 TABLET ORAL at 00:38

## 2019-09-01 RX ADMIN — PRENATAL VIT W/ FE FUMARATE-FA TAB 27-0.8 MG 1 TABLET: 27-0.8 TAB at 09:38

## 2019-09-01 RX ADMIN — DOCUSATE SODIUM 100 MG: 100 CAPSULE, LIQUID FILLED ORAL at 09:38

## 2019-09-01 RX ADMIN — POLYETHYLENE GLYCOL 3350 17 G: 17 POWDER, FOR SOLUTION ORAL at 09:38

## 2019-09-01 NOTE — PLAN OF CARE
Problem: Patient Care Overview  Goal: Plan of Care Review   09/01/19 9571   Coping/Psychosocial   Plan of Care Reviewed With patient;spouse   Plan of Care Review   Progress improving   OTHER   Outcome Summary VSS. fundus and lochia wnl. Motrin x1 for pain today. EPDS 3. BF occasionally. Using language line to communicate.      Goal: Individualization and Mutuality  Outcome: Ongoing (interventions implemented as appropriate)    Goal: Discharge Needs Assessment  Outcome: Ongoing (interventions implemented as appropriate)    Goal: Interprofessional Rounds/Family Conf  Outcome: Ongoing (interventions implemented as appropriate)      Problem: Postpartum (Vaginal Delivery) (Adult,Obstetrics,Pediatric)  Goal: Signs and Symptoms of Listed Potential Problems Will be Absent, Minimized or Managed (Postpartum)  Outcome: Ongoing (interventions implemented as appropriate)      Problem: Breastfeeding (Adult,Obstetrics,Pediatric)  Goal: Signs and Symptoms of Listed Potential Problems Will be Absent, Minimized or Managed (Breastfeeding)  Outcome: Ongoing (interventions implemented as appropriate)

## 2019-09-01 NOTE — PLAN OF CARE
Problem: Patient Care Overview  Goal: Plan of Care Review  Outcome: Ongoing (interventions implemented as appropriate)   09/01/19 0300   Coping/Psychosocial   Plan of Care Reviewed With patient;significant other   Plan of Care Review   Progress improving   OTHER   Outcome Summary VSS, FFM1U, scant rubra. Up ad vannessa, voiding. PRN motrin x1 for pain, breasfeeding, pumping, and supplementing per choice. Self-care teaching reinforced, questions answered. Bonding with infant.        Problem: Postpartum (Vaginal Delivery) (Adult,Obstetrics,Pediatric)  Goal: Signs and Symptoms of Listed Potential Problems Will be Absent, Minimized or Managed (Postpartum)  Outcome: Ongoing (interventions implemented as appropriate)   09/01/19 0300   Goal/Outcome Evaluation   Problems Assessed (Postpartum Vaginal Delivery) all   Problems Present (Postpartum Vag Deliv) none       Problem: Breastfeeding (Adult,Obstetrics,Pediatric)  Goal: Signs and Symptoms of Listed Potential Problems Will be Absent, Minimized or Managed (Breastfeeding)  Outcome: Ongoing (interventions implemented as appropriate)   09/01/19 0300   Goal/Outcome Evaluation   Problems Assessed (Breastfeeding) all   Problems Present (Breastfeeding) none

## 2019-09-01 NOTE — PROGRESS NOTES
"[facility]  Vaginal Delivery Progress Note    Subjective   Postpartum Day 1: Vaginal Delivery    The patient feels well.  Her pain is well controlled with nonsteroidal anti-inflammatory drugs and opioid analgesics.   She is ambulating well.  Patient describes her bleeding as thin lochia.    Breastfeeding: infant latching.    Objective     Vital Signs Range for the last 24 hours  Temperature: Temp:  [97.9 °F (36.6 °C)-98.5 °F (36.9 °C)] 97.9 °F (36.6 °C)   Temp Source: Temp src: Temporal   BP: BP: (103-123)/(51-85) 123/65   Pulse: Heart Rate:  [71-94] 71   Respirations: Resp:  [16-18] 18   SPO2: SpO2:  [98 %] 98 %   O2 Amount (l/min):     O2 Devices Device (Oxygen Therapy): room air   Weight:       Admit Height:  Height: 149.9 cm (59\")      Physical Exam:  General:  no acute distresss.  Abdomen: abdomen is soft without significant tenderness, masses, organomegaly or guarding. Fundus: appropriate, firm, non tender  Breast: soft  Episiotomy: none       Lab results reviewed:  Yes     WBC WBC   Date Value Ref Range Status   2019 10.11 3.40 - 10.80 10*3/mm3 Final   2019 10.99 (H) 3.40 - 10.80 10*3/mm3 Final      HGB Hemoglobin   Date Value Ref Range Status   2019 10.5 (L) 12.0 - 15.9 g/dL Final   2019 12.3 12.0 - 15.9 g/dL Final      HCT Hematocrit   Date Value Ref Range Status   2019 31.7 (L) 34.0 - 46.6 % Final   2019 36.2 34.0 - 46.6 % Final      Platlets Platelets   Date Value Ref Range Status   2019 182 140 - 450 10*3/mm3 Final   2019 231 140 - 450 10*3/mm3 Final          Rubella:  No results found for: RUBELLAIGGIN Nurse Transcribed from prenatal record --  No components found for: EXTRUBELQUAL  Rh Status:    RH type   Date Value Ref Range Status   2019 Positive  Final     Immunizations: There is no immunization history for the selected administration types on file for this patient.    Assessment/Plan       Pregnancy     (normal spontaneous vaginal " delivery)      Zina Armando is Day 1  post-partum  Vaginal, Spontaneous  nuchal cord x1 .      Plan:  Continue current care.      Jose Daniel Rodgers MD  9/1/2019  7:17 AM

## 2019-09-01 NOTE — ANESTHESIA POSTPROCEDURE EVALUATION
Patient: Zina Armando    Procedure Summary     Date:  08/31/19 Room / Location:      Anesthesia Start:  0231 Anesthesia Stop:  0703    Procedure:  LABOR ANALGESIA Diagnosis:      Scheduled Providers:   Provider:  Domitila Mei CRNA    Anesthesia Type:  epidural ASA Status:  2          Anesthesia Type: epidural  Last vitals  BP   99/64 (09/01/19 0730)   Temp   98 °F (36.7 °C) (09/01/19 0730)   Pulse   83 (09/01/19 0730)   Resp   16 (09/01/19 0730)     SpO2   97 % (09/01/19 0730)     Post Anesthesia Care and Evaluation    Patient location during evaluation: floor  Patient participation: complete - patient participated  Level of consciousness: awake and alert  Pain score: 1  Pain management: adequate  Airway patency: patent  Anesthetic complications: No anesthetic complications    Cardiovascular status: acceptable  Respiratory status: room air  Hydration status: acceptable  Post Neuraxial Block status: Motor and sensory function returned to baseline and No signs or symptoms of PDPH  Comments: Post op visit with help of pt daughter as

## 2019-09-02 VITALS
SYSTOLIC BLOOD PRESSURE: 116 MMHG | RESPIRATION RATE: 16 BRPM | HEIGHT: 59 IN | DIASTOLIC BLOOD PRESSURE: 78 MMHG | OXYGEN SATURATION: 98 % | TEMPERATURE: 98.2 F | WEIGHT: 213.2 LBS | HEART RATE: 87 BPM | BODY MASS INDEX: 42.98 KG/M2

## 2019-09-02 RX ORDER — IBUPROFEN 600 MG/1
600 TABLET ORAL EVERY 8 HOURS PRN
Qty: 30 TABLET | Refills: 0 | Status: SHIPPED | OUTPATIENT
Start: 2019-09-02 | End: 2021-08-01

## 2019-09-02 RX ADMIN — IBUPROFEN 600 MG: 600 TABLET ORAL at 08:32

## 2019-09-02 RX ADMIN — POLYETHYLENE GLYCOL 3350 17 G: 17 POWDER, FOR SOLUTION ORAL at 09:23

## 2019-09-02 RX ADMIN — DOCUSATE SODIUM 100 MG: 100 CAPSULE, LIQUID FILLED ORAL at 09:23

## 2019-09-02 RX ADMIN — PRENATAL VIT W/ FE FUMARATE-FA TAB 27-0.8 MG 1 TABLET: 27-0.8 TAB at 09:23

## 2019-09-02 NOTE — DISCHARGE SUMMARY
Discharge Summary     Loy Armando  : 1990  MRN: [id number]  [facility]: [id number]    Date of Admission: 2019   Date of Discharge:  2019   Delivering Physician: Jose Daniel Rodgers        Admission Diagnosis: 1. Pregnancy [Z34.90]  2. Pregnancy [Z34.90]   Discharge Diagnosis: 1. Pregnancy at 36w3d - delivered       Procedures: 2019  - Vaginal, Spontaneous       Hospital Course  Patient is a 28 y.o.  who at 36w3d had a vaginal birth.  Her postpartum course was without complications.  On PPD #2 she was ready for discharge.  She had normal lochia and pain well controlled with oral medications.    Infant  female  fetus weighing 2180 g (4 lb 12.9 oz)   Apgars -  7  @ 1 minute /  9  @ 5 minutes.    Discharge labs  Lab Results   Component Value Date    WBC 10.11 2019    HGB 10.5 (L) 2019    HCT 31.7 (L) 2019     2019       Discharge Medications     Discharge Medications      ASK your doctor about these medications      Instructions Start Date   prenatal vitamin 27-0.8 27-0.8 MG tablet tablet   Oral, Daily             Discharge Disposition Home or Self Care   Condition on Discharge: good   Follow-up: 4 weeks with Dr Ana Maria Rodgers MD  2019

## 2019-09-02 NOTE — PLAN OF CARE
Problem: Patient Care Overview  Goal: Plan of Care Review  Outcome: Ongoing (interventions implemented as appropriate)   09/02/19 3144   Coping/Psychosocial   Plan of Care Reviewed With patient   Plan of Care Review   Progress improving   OTHER   Outcome Summary vss, voiding, ambulating, motrin for pain, BF and supplementing. Discharge teaching started.

## 2019-09-02 NOTE — NURSING NOTE
1200  Discharged per w/c with baby after id bands checked and matched teaching done per JOSE Caban rn

## 2019-09-02 NOTE — PROGRESS NOTES
"[facility]  Vaginal Delivery Progress Note    Subjective   Postpartum Day 2: Vaginal Delivery    The patient feels well.  Her pain is well controlled with nonsteroidal anti-inflammatory drugs and opioid analgesics.   She is ambulating well.  Patient describes her bleeding as thin lochia.    Breastfeeding: infant latching.    Objective     Vital Signs Range for the last 24 hours  Temperature: Temp:  [98.4 °F (36.9 °C)] 98.4 °F (36.9 °C)   Temp Source: Temp src: Temporal   BP: BP: (128)/(74) 128/74   Pulse: Heart Rate:  [84] 84   Respirations: Resp:  [18] 18   SPO2: SpO2:  [98 %] 98 %   O2 Amount (l/min):     O2 Devices Device (Oxygen Therapy): room air   Weight:       Admit Height:  Height: 149.9 cm (59\")      Physical Exam:  General:  no acute distresss.  Abdomen: Fundus: appropriate, firm, non tender  Breast: soft    Episiotomy:          Lab results reviewed:  Yes     WBC WBC   Date Value Ref Range Status   2019 10.11 3.40 - 10.80 10*3/mm3 Final   2019 10.99 (H) 3.40 - 10.80 10*3/mm3 Final      HGB Hemoglobin   Date Value Ref Range Status   2019 10.5 (L) 12.0 - 15.9 g/dL Final   2019 12.3 12.0 - 15.9 g/dL Final      HCT Hematocrit   Date Value Ref Range Status   2019 31.7 (L) 34.0 - 46.6 % Final   2019 36.2 34.0 - 46.6 % Final      Platlets Platelets   Date Value Ref Range Status   2019 182 140 - 450 10*3/mm3 Final   2019 231 140 - 450 10*3/mm3 Final          Rubella:  No results found for: RUBELLAIGGIN Nurse Transcribed from prenatal record --  No components found for: EXTRUBELQUAL  Rh Status:    RH type   Date Value Ref Range Status   2019 Positive  Final     Immunizations: There is no immunization history for the selected administration types on file for this patient.    Assessment/Plan       Pregnancy     (normal spontaneous vaginal delivery)      Zina Armando is Day 2  post-partum  Vaginal, Spontaneous  nuchal cord x1 .      Plan:  Discharge home with " standard precautions and return to clinic in 4-6 weeks.      Jose Daniel Rodgers MD  9/2/2019  7:42 AM

## 2019-09-09 LAB
CYTO UR: NORMAL
LAB AP CASE REPORT: NORMAL
LAB AP CLINICAL INFORMATION: NORMAL
PATH REPORT.FINAL DX SPEC: NORMAL
PATH REPORT.GROSS SPEC: NORMAL

## 2021-02-07 ENCOUNTER — OFFICE VISIT (OUTPATIENT)
Age: 31
End: 2021-02-07

## 2021-02-07 DIAGNOSIS — Z11.59 SCREENING FOR VIRAL DISEASE: Primary | ICD-10-CM

## 2021-02-07 PROCEDURE — 99999 PR OFFICE/OUTPT VISIT,PROCEDURE ONLY: CPT | Performed by: NURSE PRACTITIONER

## 2021-02-07 NOTE — LETTER
[address] [place] [address]  Phone: [phone]  Fax: [phone]    MATT Hathaway CNP        February 12, 2021    Maria Elena Kelley  [address] [place] [address]      Dear Gloria Knapp:    Our office has been trying to contact you in regards to your most recent test results. Please contact us at your earliest convenience. Thank you. If you have any questions or concerns, please don't hesitate to call.     Sincerely,        MATT Hathaway CNP

## 2021-02-09 LAB — SARS-COV-2, NAA: DETECTED

## 2021-11-13 ENCOUNTER — OFFICE VISIT (OUTPATIENT)
Dept: URGENT CARE | Age: 31
End: 2021-11-13

## 2021-11-13 VITALS
OXYGEN SATURATION: 97 % | RESPIRATION RATE: 20 BRPM | SYSTOLIC BLOOD PRESSURE: 113 MMHG | HEART RATE: 83 BPM | HEIGHT: 59 IN | WEIGHT: 193 LBS | DIASTOLIC BLOOD PRESSURE: 78 MMHG | TEMPERATURE: 98 F | BODY MASS INDEX: 38.91 KG/M2

## 2021-11-13 DIAGNOSIS — G56.03 BILATERAL CARPAL TUNNEL SYNDROME: Primary | ICD-10-CM

## 2021-11-13 PROCEDURE — 99203 OFFICE O/P NEW LOW 30 MIN: CPT | Performed by: NURSE PRACTITIONER

## 2021-11-13 ASSESSMENT — ENCOUNTER SYMPTOMS
SORE THROAT: 0
SHORTNESS OF BREATH: 0
WHEEZING: 0
CHEST TIGHTNESS: 0
COLOR CHANGE: 0
CONSTIPATION: 0
DIARRHEA: 0
EYES NEGATIVE: 1
NAUSEA: 0
VOMITING: 0

## 2021-11-13 NOTE — PROGRESS NOTES
[facility]  [address] [place] [address] [address] [id number]  Dept: [phone]  Dept Fax: [phone]  Loc: [phone]    Lucretia Trinh is a 32 y.o. female who presents today for her medical conditions/complaintsas noted below. Lucretia Trinh is c/o of Numbness (both hands)        HPI:     Hand Pain   Incident onset: 4 months. There was no injury mechanism. Pain location: bilateral wrists and hands. The quality of the pain is described as shooting (numbness, tingling). The pain is moderate. The pain has been fluctuating since the incident. Associated symptoms include muscle weakness, numbness and tingling. Pertinent negatives include no chest pain. Exacerbated by: works at [employer]. She has tried nothing for the symptoms. The treatment provided no relief. History reviewed. No pertinent past medical history. History reviewed. No pertinent surgical history. History reviewed. No pertinent family history. Social History     Tobacco Use    Smoking status: Never Smoker    Smokeless tobacco: Never Used   Substance Use Topics    Alcohol use: Never      No current outpatient medications on file. No current facility-administered medications for this visit. No Known Allergies    Health Maintenance   Topic Date Due    Hepatitis C screen  Never done    Varicella vaccine (1 of 2 - 2-dose childhood series) Never done    COVID-19 Vaccine (1) Never done    HIV screen  Never done    DTaP/Tdap/Td vaccine (1 - Tdap) Never done    Cervical cancer screen  Never done    Flu vaccine (1) Never done    Hepatitis A vaccine  Aged Out    Hepatitis B vaccine  Aged Out    Hib vaccine  Aged Out    Meningococcal (ACWY) vaccine  Aged Out    Pneumococcal 0-64 years Vaccine  Aged Out       Subjective:     Review of Systems   Constitutional: Negative for fever. HENT: Negative for congestion and sore throat. Eyes: Negative.     Respiratory: Negative for chest tightness, shortness of breath and wheezing. Cardiovascular: Negative for chest pain and palpitations. Gastrointestinal: Negative for constipation, diarrhea, nausea and vomiting. Endocrine: Negative. Genitourinary: Negative. Musculoskeletal: Positive for arthralgias. Skin: Negative. Negative for color change. Neurological: Positive for tingling and numbness. Negative for dizziness, speech difficulty and weakness. Psychiatric/Behavioral: Negative for confusion. All other systems reviewed and are negative. Objective:     Physical Exam  Vitals and nursing note reviewed. Constitutional:       General: She is not in acute distress. Appearance: Normal appearance. She is not ill-appearing or toxic-appearing. HENT:      Head: Normocephalic and atraumatic. Right Ear: External ear normal.      Left Ear: External ear normal.   Eyes:      Extraocular Movements: Extraocular movements intact. Conjunctiva/sclera: Conjunctivae normal.      Pupils: Pupils are equal, round, and reactive to light. Cardiovascular:      Rate and Rhythm: Normal rate. Pulmonary:      Effort: Pulmonary effort is normal.   Musculoskeletal:         General: No swelling or signs of injury. Right wrist: Tenderness present. No swelling. Normal range of motion. Normal pulse. Left wrist: Tenderness present. No swelling. Normal range of motion. Normal pulse. Right hand: No swelling or tenderness. Normal range of motion. Normal strength. Normal capillary refill. Left hand: No swelling or tenderness. Normal range of motion. Normal strength. Normal capillary refill. Comments: Numbness to tips of all fingers bilaterally. Failed Phalen test.      Skin:     General: Skin is warm and dry. Findings: No rash. Neurological:      General: No focal deficit present. Mental Status: She is alert and oriented to person, place, and time. Motor: No weakness.    Psychiatric:         Mood and Affect: Mood normal.       /78   Pulse 83   Temp 98 °F (36.7 °C)   Resp 20   Ht 4' 11\" (1.499 m)   Wt 193 lb (87.5 kg)   LMP 10/20/2021   SpO2 97%   BMI 38.98 kg/m²     Assessment:       Diagnosis Orders   1. Bilateral carpal tunnel syndrome         Plan:    No orders of the defined types were placed in this encounter. No follow-ups on file. No orders of the defined types were placed in this encounter. Patient given educational materials- see patient instructions. Discussed use, benefit, and side effects of prescribedmedications. All patient questions answered. Pt voiced understanding. Reviewedhealth maintenance. Instructed to continue current medications, diet and exercise. Patient agreed with treatment plan. Follow up as directed. Patient Instructions     1. Wear wrist braces at night, can also wear during the day. 2. Ibuprofen for pain control. 3. Establish with [facility] on [facility] for further investigation into dizziness and continued wrist pain. Patient Education        Síndrome del túnel carpiano: Instrucciones de cuidado  Carpal Tunnel Syndrome: Care Instructions  Instrucciones de cuidado     El síndrome del túnel carpiano es un problema nervioso. Puede causar hormigueo, entumecimiento, debilidad o [employer] dedos, el pulgar y la [place]. El nervio mediano y varios tejidos fibrosos, llamados tendones, atraviesan la divine por un espacio denominado túnel carpiano. El movimiento repetido de las chery al trabajar o practicar ciertos pasatiempos y deportes puede hacer presión sobre el nervio. El embarazo y ciertas afecciones médicas, vega la diabetes, la artritis y [place] tiroides hipoactiva, también pueden causar el síndrome del túnel carpiano. Para reducir los síntomas, usted podría limitar [facility] o [facility]. También puede tres otras medidas para sentirse mejor.  Si los síntomas son leves, es probable que pueda aliviar el dolor con 1 o 2 semanas de tratamiento en el [facility]. La cirugía es necesaria solo si otros tratamientos no funcionan. La atención de seguimiento es yelitza parte clave de delgado tratamiento y seguridad. Asegúrese de hacer y acudir a todas las citas, y llame a delgado médico si está teniendo problemas. También es yelitza buena idea saber los resultados de desiree exámenes y mantener yelitza lista de los medicamentos que marline. ¿Cómo puede cuidarse en el [facility]? · Si es posible, interrumpa o disminuya la actividad que causa los síntomas. Si no puede suspenderla, kody pausas frecuentes para descansar y estirarse o cambie la posición de las chery para hacer yelitza tarea. Trate de [facility], vega cuando [place] el ratón de yelitza computadora. · Trate de evitar doblar o rotar las muñecas. · Pregúntele a delgado médico si puede tres un analgésico (medicamento para el dolor) de venta cosme, vega acetaminofén (Tylenol), ibuprofeno (Advil, Motrin) o naproxeno (Aleve). Sea santos con los medicamentos. Julia y siga todas las instrucciones de la [place]. · Si delgado médico le receta corticosteroides para ayudar a aliviar el dolor y reducir la hinchazón, tómelos exactamente vega le fueron recetados. Llame a delgado médico si xavier estar teniendo problemas con delgado medicamento. · Colóquese hielo o yelitza compresa fría sobre la [place] 1 de 10 a 20 minutos cada vez para aliviar el dolor. Póngase un paño hector entre el hielo y la piel. · Si delgado médico o delgado fisioterapeuta o terapeuta ocupacional le indica que use yelitza tablilla (férula) para la [place] 1, [place] según las indicaciones para mantener la [place] 1 en yelitza posición neutra. [place] también reduce la presión sobre delgado nervio mediano. · Pregúntele a delgado médico si debería hacer sesiones de fisioterapia o de terapia ocupacional para aprender a hacer las tareas de [employer]. · [place] clases de yoga para estirar los músculos y fortalecer las chery y las Elaine. El yoga ha [employer] Net síntomas del aurora jaeger.   [address] síndrome del túnel carpiano  · Cuando trabaje en la computadora, [address] y las muñecas alineadas con los antebrazos. Mantenga los codos cerca de desiree costados. [place] un descanso con yelitza frecuencia de 10 a 15 minutos. · Trate de hacer los siguientes ejercicios:  ? Calentamiento: Gire la [place], Leanora Lose y de un lado a otro. Repita esto 4 veces. Estire [employer] dedos, relájelos y vuelva a estirarlos. Repita 4 veces. Estire el pulgar doblándolo levemente hacia atrás, manténgalo en yossi posición y relájelo. Repita 4 veces. ? Estiramiento con los [employer] en posición de orar: Comience colocando las mali de las chery juntas juaquin del [place], south debajo del [place]. Baje las chery lentamente hacia la línea de la cintura y manténgalas cerca del estómago con las mali juntas hasta que sienta un estiramiento leve a moderado debajo de los antebrazos. Mantenga la posición entre 10 y 21 segundos. Repita 4 veces. ? Estiramiento del flexor de la divine: Extienda el Sindy [place] frente a usted, con la meier [place] arriba. Doble la divine y apunte con la mano hacia el piso. Con la [employer], doble con suavidad la divine aún más hasta que sienta un estiramiento entre leve y moderado en el antebrazo. Mantenga la posición entre 10 y 21 segundos. Repita 4 veces. ? Estiramiento del extensor de la divine: Repita los pasos para el estiramiento del flexor de la divine nelly comience con la meier extendida Leanora Lose. · Apriete yelitza pelota de goma varias veces al día para mantener nigel las chery y los dedos. · Evite sostener objetos (vega un libro) en la misma posición portia mucho tiempo. Siempre que sea posible, utilice toda la mano para tres un objeto. Si [place] solo el pulgar y el dedo índice puede tensionar la [place] 1. · No fume. Puede empeorar esta afección ya que reduce el flujo de medina hacia el nervio El paso.  Si necesita ayuda para dejar de fumar, hable con delgado médico sobre programas y medicamentos para dejar de fumar. Estos pueden aumentar desiree probabilidades de dejar el hábito para siempre. ¿Cuándo debe pedir ayuda? Preste especial atención a los cambios de gonzales javier y asegúrese de comunicarse con gonzales médico si:    · El dolor u otros problemas no mejoran con los cuidados en el hogar.     · Desea más información sobre la fisioterapia o la terapia ocupacional.     · Tiene efectos secundarios producidos por los corticosteroides, tales vega:  ? Aumento de [place]. ? Cambios en el estado de ánimo. ? Problemas para dormir. ? Fácil formación de moretones.     · Tiene otros problemas con los medicamentos. ¿Dónde puede encontrar más información en inglés? Matilde Figueroa a [email or web address] e ingrese a gonzales cuenta de MyChart. Marlo Medico B700 en el April John \"Search Health Information\" para más información (en inglés) sobre \"Síndrome del aurora jaeger: Instrucciones de cuidado. \"     Si no tiene yelitza cuenta, kody bridger en el enlace \"Sign Up Now\". Revisado: 1 julio, 2021               Versión del contenido: 13.0  © 4378-9689 Healthwise, Incorporated. Las instrucciones de cuidado fueron adaptadas bajo licencia por [facility] ([facility]). Si usted tiene [place] afección médica o sobre estas instrucciones, siempre pregunte a gonzales profesional de javier. Healthwise, Incorporated niega toda garantía o responsabilidad por gonzales uso de esta información. Patient Education        Síndrome del aurora jaeger: Ejercicios  Carpal Tunnel Syndrome: Exercises  Instrucciones de cuidado  Aquí se presentan algunos ejemplos de ejercicios típicos de rehabilitación para tratar gonzales afección. Empiece cada ejercicio lentamente. Reduzca la intensidad del ejercicio si Arnoldo Shon a tener dolor. Gonzales médico o gonzales fisioterapeuta o terapeuta ocupacional le dirá cuándo puede comenzar con estos ejercicios y cuáles funcionarán mejor para usted.   Estiramientos de calentamiento  Nota: Cuando ya no tenga dolor o entumecimiento, puede hacer ejercicios para ayudar a prevenir que vuelva el síndrome del túnel carpiano. No kody ningún estiramiento o movimiento que sea incómodo o doloroso. Estiramientos de calentamiento  · Gire la divine hacia arriba, [place] y de un lado a otro. Repita 4 veces. · Estire los dedos separándolos. Relájelos y luego vuelva a estirarlos. Repita 4 veces. · Estire el pulgar doblándolo levemente hacia atrás, manténgalo en yossi posición y relájelo. Repita 4 veces. Cómo hacer los ejercicios  Estiramiento con los brazos en posición de orar    1. Comience colocando las mali de las chery juntas juaquin del [place], south debajo del [place]. 2. Baje las chery lentamente hacia la línea de la cintura y manténgalas cerca del estómago con las mali juntas hasta que sienta un estiramiento leve a moderado debajo de los antebrazos. 3. Sosténgalo por lo menos de 15 a 30 segundos. Repita de 2 a 4 veces. Estiramiento del flexor de la divine    1. Extienda el brazo juaquin de usted con la meier [place] arriba. 2. Doble la divine y apunte con la mano hacia el piso. 3. Con la otra mano, doble con suavidad la divine aún más hasta que sienta un estiramiento entre leve y moderado en el antebrazo. 4. Sosténgalo por lo menos de 15 a 30 segundos. Repita de 2 a 4 veces. Estiramiento del extensor de la divine    1. Repita los pasos del 1 al 4 del estiramiento anterior, nelly comience con la meier de la mano extendida [place]. La atención de seguimiento es yelitza parte clave de delgado tratamiento y seguridad. Asegúrese de hacer y acudir a todas las citas, y llame a delgado médico si está teniendo problemas. También es yelitza buena idea saber los resultados de desiree exámenes y mantener yelitza lista de los medicamentos que marline. ¿Dónde puede encontrar más información en inglés? Clois Fill a [email or web address] e ingrese a delgado cuenta de MyChart.  Hiro Valera X232 en el Gregory Kris \"Search Health Information\" para más información (en inglés) sobre \"Síndrome del túnel heide: Ejercicios. \"     Si no tiene yelizta cuenta, kody clic en el enlace \"Sign Up Now\". Revisado: 1 julio, 2021               Versión del contenido: 13.0  © 7137-5262 Healthwise, Incorporated. Las instrucciones de cuidado fueron adaptadas bajo licencia por [facility] ([facility]). Si usted tiene [place] afección médica o sobre estas instrucciones, siempre pregunte a delgado profesional de javier. Healthwise, Incorporated niega toda garantía o responsabilidad por delgado uso de esta información.                Electronically signed by MATT Berry CNP on 11/13/2021 at 1:57 PM

## 2021-11-13 NOTE — PATIENT INSTRUCTIONS
1. Wear wrist braces at night, can also wear during the day. 2. Ibuprofen for pain control. 3. Establish with [facility] on [facility] for further investigation into dizziness and continued wrist pain. Patient Education        Síndrome del aurora jaeger: Instrucciones de cuidado  Carpal Tunnel Syndrome: Care Instructions  Instrucciones de cuidado     El síndrome del aurora jaeger es un problema nervioso. Puede causar hormigueo, entumecimiento, debilidad o [employer] dedos, el pulgar y la [place]. El nervio mediano y varios tejidos fibrosos, llamados tendones, atraviesan la divine por un espacio denominado aurora jaeger. El movimiento repetido de las chery al trabajar o practicar ciertos pasatiempos y deportes puede hacer presión sobre el nervio. El embarazo y ciertas afecciones médicas, vega la diabetes, la artritis y Meggan Rummer tiroides hipoactiva, también pueden causar el síndrome del aurora jaeger. Para reducir los síntomas, usted podría limitar [facility] o [facility]. También puede tres otras medidas para sentirse mejor. Si los síntomas son leves, es probable que pueda aliviar el dolor con 1 o 2 semanas de tratamiento en el hogar. La cirugía es necesaria solo si otros tratamientos no funcionan. La atención de seguimiento es yelitza parte clave de delgado tratamiento y seguridad. Asegúrese de hacer y acudir a todas las citas, y llame a delgado médico si está teniendo problemas. También es yelitza buena idea saber los resultados de desiree exámenes y mantener yelitza lista de los medicamentos que marline. ¿Cómo puede cuidarse en el hogar? · Si es posible, interrumpa o disminuya la actividad que causa los síntomas. Si no puede suspenderla, kody pausas frecuentes para descansar y estirarse o cambie la posición de las chery para hacer yelizta tarea. Trate de [facility], vega cuando [place] el ratón de yelitza computadora. · Trate de evitar doblar o rotar las muñecas.   · Pregúntele a delgado médico si puede tres un analgésico (medicamento para el dolor) de venta cosme, vega acetaminofén (Tylenol), ibuprofeno (Advil, Motrin) o naproxeno (Aleve). Sea santos con los medicamentos. Julia y siga todas las instrucciones de la [place]. · Si delgado médico le receta corticosteroides para ayudar a aliviar el dolor y reducir la hinchazón, tómelos exactamente vega le fueron recetados. Llame a delgado médico si xavier estar teniendo problemas con delgado medicamento. · Colóquese hielo o yelitza compresa fría sobre la Ka[place] 1 de 10 a 20 minutos cada vez para aliviar el dolor. Póngase un paño hector entre el hielo y la piel. · Si delgado médico o delgado fisioterapeuta o terapeuta ocupacional le indica que use yelitza tablilla (férula) para la [place] 1, [place] según las indicaciones para mantener la Ka[place]ice 1 en yelitza posición neutra. [place] también reduce la presión sobre delgado nervio mediano. · Pregúntele a delgado médico si debería hacer sesiones de fisioterapia o de terapia ocupacional para aprender a hacer las tareas de [employer]. · [place] clases de yoga para estirar los músculos y fortalecer las chery y las Elaine. El yoga ha [employer] síntomas del túnel carpiano. Cómo prevenir el síndrome del túnel carpiano  · Cuando trabaje en la computadora, [address] y las muñecas alineadas con los antebrazos. Mantenga los codos cerca de desiree costados. [place] un descanso con yelitza frecuencia de 10 a 15 minutos. · Trate de hacer los siguientes ejercicios:  ? Calentamiento: Gire la [place], Igor Cai y de un lado a otro. Repita esto 4 veces. Estire [employer] dedos, relájelos y vuelva a estirarlos. Repita 4 veces. Estire el pulgar doblándolo levemente hacia atrás, manténgalo en yossi posición y relájelo. Repita 4 veces. ? Estiramiento con los [employer] en posición de orar: Comience colocando las mali de las chery juntas juaquin del [place], south debajo del [place].  Baje las chery lentamente hacia la línea de Sherrilee Lunch y manténgalas cerca del estómago con Suzy Oka más información (en inglés) sobre \"Síndrome del aurora jaeger: Instrucciones de cuidado. \"     Si no tiene yelitza cuenta, kody clic en el enlace \"Sign Up Now\". Revisado: 1 julio, 2021               Versión del contenido: 13.0  © 2483-1946 Healthwise, Incorporated. Las instrucciones de cuidado fueron adaptadas bajo licencia por [facility] CARE ([facility]). Si usted tiene [place] afección médica o sobre estas instrucciones, siempre pregunte a gonzales profesional de javier. Healthwise, Incorporated niega toda garantía o responsabilidad por gonzales uso de esta información. Patient Education        Síndrome del aurora jaeger: Ejercicios  Carpal Tunnel Syndrome: Exercises  Instrucciones de cuidado  Aquí se presentan algunos ejemplos de ejercicios típicos de rehabilitación para tratar gonzales afección. Empiece cada ejercicio lentamente. Reduzca la intensidad del ejercicio si Thomasene Fulling a tener dolor. Gonzales médico o gonzales fisioterapeuta o terapeuta ocupacional le dirá cuándo puede comenzar con estos ejercicios y cuáles funcionarán mejor para usted. Estiramientos de calentamiento  Nota: Cuando ya no tenga dolor o entumecimiento, puede hacer ejercicios para ayudar a prevenir que vuelva el síndrome del aurora jaeger. No kody ningún estiramiento o movimiento que sea incómodo o doloroso. Estiramientos de calentamiento  · Gire la divine hacia arriba, [place] y de un lado a otro. Repita 4 veces. · Estire los dedos separándolos. Relájelos y luego vuelva a estirarlos. Repita 4 veces. · Estire el pulgar doblándolo levemente hacia atrás, manténgalo en yossi posición y relájelo. Repita 4 veces. Cómo hacer los ejercicios  Estiramiento con los brazos en posición de orar    1. Comience colocando las mali de las chery juntas juaquin del [place], south debajo del [place].   2. Baje las chery lentamente hacia la línea de la cintura y manténgalas cerca del estómago con las mali juntas hasta que sienta un estiramiento leve a moderado debajo de los antebrazos. 3. Sosténgalo por lo menos de 15 a 30 segundos. Repita de 2 a 4 veces. Estiramiento del flexor de la divine    1. Extienda el brazo juaquin de usted con la meier [place] arriba. 2. Doble la divine y apunte con la mano hacia el piso. 3. Con la otra mano, doble con suavidad la divine aún más hasta que sienta un estiramiento entre leve y moderado en el antebrazo. 4. Sosténgalo por lo menos de 15 a 30 segundos. Repita de 2 a 4 veces. Estiramiento del extensor de la divine    1. Repita los pasos del 1 al 4 del estiramiento anterior, nelly comience con la meier de la mano extendida [place]. La atención de seguimiento es yelitza parte clave de delgado tratamiento y seguridad. Asegúrese de hacer y acudir a todas las citas, y llame a delgado médico si está teniendo problemas. También es yelitza buena idea saber los resultados de desiree exámenes y mantener yelitza lista de los medicamentos que marline. ¿Dónde puede encontrar más información en inglés? Jaja Mckeon a [email or web address] e ingrese a delgado cuenta de Alvarez Celeste B911 en el cartmi [employer] \"Search Health Information\" para más información (en inglés) sobre \"Síndrome del túnel heide: Ejercicios. \"     Si no tiene yelitza cuenta, kody bridger en el enlace \"Sign Up Now\". Revisado: 1 julio, 2021               Versión del contenido: 13.0  © 9938-2589 Healthwise, [place]. Las instrucciones de cuidado fueron adaptadas bajo licencia por [facility] CARE ([facility]). Si usted tiene [place] afección médica o sobre estas instrucciones, siempre pregunte a delgado profesional de javier. [facility], Incorporated niega toda garantía o responsabilidad por delgado uso de esta información.

## 2023-02-01 ENCOUNTER — APPOINTMENT (OUTPATIENT)
Dept: ULTRASOUND IMAGING | Facility: HOSPITAL | Age: 33
End: 2023-02-01
Payer: MEDICAID

## 2023-02-01 ENCOUNTER — HOSPITAL ENCOUNTER (EMERGENCY)
Facility: HOSPITAL | Age: 33
Discharge: HOME | End: 2023-02-01
Admitting: STUDENT IN AN ORGANIZED HEALTH CARE EDUCATION/TRAINING PROGRAM
Payer: MEDICAID

## 2023-02-01 VITALS
HEART RATE: 78 BPM | RESPIRATION RATE: 16 BRPM | DIASTOLIC BLOOD PRESSURE: 76 MMHG | HEIGHT: 59 IN | TEMPERATURE: 98.6 F | SYSTOLIC BLOOD PRESSURE: 115 MMHG | BODY MASS INDEX: 40.32 KG/M2 | OXYGEN SATURATION: 98 % | WEIGHT: 200 LBS

## 2023-02-01 DIAGNOSIS — N39.0 URINARY TRACT INFECTION WITH HEMATURIA, SITE UNSPECIFIED: Primary | ICD-10-CM

## 2023-02-01 DIAGNOSIS — N83.201 CYST OF RIGHT OVARY: ICD-10-CM

## 2023-02-01 DIAGNOSIS — R31.9 URINARY TRACT INFECTION WITH HEMATURIA, SITE UNSPECIFIED: Primary | ICD-10-CM

## 2023-02-01 LAB
ABO GROUP BLD: NORMAL
ALBUMIN SERPL-MCNC: 4.3 G/DL (ref 3.5–5.2)
ALBUMIN/GLOB SERPL: 1.2 G/DL
ALP SERPL-CCNC: 124 U/L (ref 39–117)
ALT SERPL W P-5'-P-CCNC: 20 U/L (ref 1–33)
ANION GAP SERPL CALCULATED.3IONS-SCNC: 12 MMOL/L (ref 5–15)
AST SERPL-CCNC: 17 U/L (ref 1–32)
BACTERIA UR QL AUTO: ABNORMAL /HPF
BASOPHILS # BLD AUTO: 0.04 10*3/MM3 (ref 0–0.2)
BASOPHILS NFR BLD AUTO: 0.4 % (ref 0–1.5)
BILIRUB SERPL-MCNC: <0.2 MG/DL (ref 0–1.2)
BILIRUB UR QL STRIP: NEGATIVE
BLD GP AB SCN SERPL QL: NEGATIVE
BUN SERPL-MCNC: 17 MG/DL (ref 6–20)
BUN/CREAT SERPL: 30.4 (ref 7–25)
CALCIUM SPEC-SCNC: 8.9 MG/DL (ref 8.6–10.5)
CHLORIDE SERPL-SCNC: 101 MMOL/L (ref 98–107)
CLARITY UR: CLEAR
CO2 SERPL-SCNC: 25 MMOL/L (ref 22–29)
COLOR UR: YELLOW
CREAT SERPL-MCNC: 0.56 MG/DL (ref 0.57–1)
DEPRECATED RDW RBC AUTO: 39.8 FL (ref 37–54)
EGFRCR SERPLBLD CKD-EPI 2021: 124.5 ML/MIN/1.73
EOSINOPHIL # BLD AUTO: 0.12 10*3/MM3 (ref 0–0.4)
EOSINOPHIL NFR BLD AUTO: 1.2 % (ref 0.3–6.2)
ERYTHROCYTE [DISTWIDTH] IN BLOOD BY AUTOMATED COUNT: 12.9 % (ref 12.3–15.4)
GLOBULIN UR ELPH-MCNC: 3.5 GM/DL
GLUCOSE SERPL-MCNC: 120 MG/DL (ref 65–99)
GLUCOSE UR STRIP-MCNC: NEGATIVE MG/DL
HCG INTACT+B SERPL-ACNC: 236.8 MIU/ML
HCT VFR BLD AUTO: 42.4 % (ref 34–46.6)
HGB BLD-MCNC: 13.6 G/DL (ref 12–15.9)
HGB UR QL STRIP.AUTO: ABNORMAL
HYALINE CASTS UR QL AUTO: ABNORMAL /LPF
IMM GRANULOCYTES # BLD AUTO: 0.04 10*3/MM3 (ref 0–0.05)
IMM GRANULOCYTES NFR BLD AUTO: 0.4 % (ref 0–0.5)
KETONES UR QL STRIP: ABNORMAL
LEUKOCYTE ESTERASE UR QL STRIP.AUTO: NEGATIVE
LYMPHOCYTES # BLD AUTO: 3.13 10*3/MM3 (ref 0.7–3.1)
LYMPHOCYTES NFR BLD AUTO: 32.3 % (ref 19.6–45.3)
MCH RBC QN AUTO: 27.4 PG (ref 26.6–33)
MCHC RBC AUTO-ENTMCNC: 32.1 G/DL (ref 31.5–35.7)
MCV RBC AUTO: 85.5 FL (ref 79–97)
MONOCYTES # BLD AUTO: 0.47 10*3/MM3 (ref 0.1–0.9)
MONOCYTES NFR BLD AUTO: 4.9 % (ref 5–12)
NEUTROPHILS NFR BLD AUTO: 5.88 10*3/MM3 (ref 1.7–7)
NEUTROPHILS NFR BLD AUTO: 60.8 % (ref 42.7–76)
NITRITE UR QL STRIP: NEGATIVE
NRBC BLD AUTO-RTO: 0 /100 WBC (ref 0–0.2)
NUMBER OF DOSES: NORMAL
PH UR STRIP.AUTO: 7 [PH] (ref 5–8)
PLATELET # BLD AUTO: 256 10*3/MM3 (ref 140–450)
PMV BLD AUTO: 10.7 FL (ref 6–12)
POTASSIUM SERPL-SCNC: 3.7 MMOL/L (ref 3.5–5.2)
PROT SERPL-MCNC: 7.8 G/DL (ref 6–8.5)
PROT UR QL STRIP: NEGATIVE
RBC # BLD AUTO: 4.96 10*6/MM3 (ref 3.77–5.28)
RBC # UR STRIP: ABNORMAL /HPF
REF LAB TEST METHOD: ABNORMAL
RH BLD: POSITIVE
SODIUM SERPL-SCNC: 138 MMOL/L (ref 136–145)
SP GR UR STRIP: 1.03 (ref 1–1.03)
SQUAMOUS #/AREA URNS HPF: ABNORMAL /HPF
UROBILINOGEN UR QL STRIP: ABNORMAL
WBC # UR STRIP: ABNORMAL /HPF
WBC NRBC COR # BLD: 9.68 10*3/MM3 (ref 3.4–10.8)

## 2023-02-01 PROCEDURE — 36415 COLL VENOUS BLD VENIPUNCTURE: CPT

## 2023-02-01 PROCEDURE — 76817 TRANSVAGINAL US OBSTETRIC: CPT

## 2023-02-01 PROCEDURE — 99283 EMERGENCY DEPT VISIT LOW MDM: CPT

## 2023-02-01 PROCEDURE — 84702 CHORIONIC GONADOTROPIN TEST: CPT

## 2023-02-01 PROCEDURE — 85025 COMPLETE CBC W/AUTO DIFF WBC: CPT

## 2023-02-01 PROCEDURE — 86901 BLOOD TYPING SEROLOGIC RH(D): CPT

## 2023-02-01 PROCEDURE — 86850 RBC ANTIBODY SCREEN: CPT

## 2023-02-01 PROCEDURE — 86900 BLOOD TYPING SEROLOGIC ABO: CPT

## 2023-02-01 PROCEDURE — 81001 URINALYSIS AUTO W/SCOPE: CPT

## 2023-02-01 PROCEDURE — 80053 COMPREHEN METABOLIC PANEL: CPT

## 2023-02-01 RX ORDER — NITROFURANTOIN 25; 75 MG/1; MG/1
100 CAPSULE ORAL 2 TIMES DAILY
Qty: 14 CAPSULE | Refills: 0 | Status: SHIPPED | OUTPATIENT
Start: 2023-02-01 | End: 2023-02-08

## 2023-02-02 NOTE — DISCHARGE INSTRUCTIONS
Your urine shows that you have a UTI, I am sending you an antibiotic for this.  Your ultrasound does not show an intrauterine pregnancy, however I would like you to follow-up with your OB/GYN in 2 days and have another blood pregnancy test drawn.  Please also follow-up with a primary care doctor in the coming days and return to the ED with any new, worsening, or persistent symptoms.  Please abstain from vaginal intercourse until you are cleared by your OB/GYN.

## 2023-02-02 NOTE — ED PROVIDER NOTES
Subjective   History of Present Illness  Patient is a 32-year-old female who presents to the ED today complaining of vaginal bleeding during pregnancy.  She reports she had a positive pregnancy test 2 days ago, believes she is about 6 weeks and 5 days pregnant.  Reports the bleeding is scant and only noted when she wipes with her toilet paper.  She is complaining of pelvic and back pain/cramping.  She has had 4 pregnancies, no miscarriages.  Dr. Rodgers is her OB/GYN.  Patient denies passing any clots, she denies any fever, body aches, or chills.  There is no dysuria or flank pain.  She denies any chest pain, shortness of breath, dizziness, syncope, or palpitations. She declines pelvic exam. History is significant for gestational DM. Differential diagnoses: miscarriage, ectopic pregnancy, UTI, ovarian cyst, and other.      History provided by:  Patient and relative      Review of Systems   Constitutional: Negative for chills, diaphoresis, fatigue and fever.   HENT: Negative.    Eyes: Negative.    Respiratory: Negative for cough, shortness of breath and wheezing.    Cardiovascular: Negative for chest pain, palpitations and leg swelling.   Gastrointestinal: Negative for abdominal distention, abdominal pain, blood in stool, constipation, diarrhea, nausea and vomiting.   Genitourinary: Positive for pelvic pain and vaginal bleeding. Negative for dysuria and flank pain.   Musculoskeletal: Positive for back pain.   Skin: Negative.    Neurological: Negative for dizziness, syncope, weakness, numbness and headaches.   Psychiatric/Behavioral: Negative.        Past Medical History:   Diagnosis Date   • Gestational diabetes        No Known Allergies    History reviewed. No pertinent surgical history.    History reviewed. No pertinent family history.    Social History     Socioeconomic History   • Marital status: Single   Tobacco Use   • Smoking status: Never   • Smokeless tobacco: Never   Vaping Use   • Vaping Use: Never used    Substance and Sexual Activity   • Alcohol use: No   • Drug use: No   • Sexual activity: Defer           Objective   Physical Exam  Vitals and nursing note reviewed.   Constitutional:       General: She is not in acute distress.     Appearance: Normal appearance. She is not ill-appearing, toxic-appearing or diaphoretic.   HENT:      Head: Normocephalic and atraumatic.      Right Ear: External ear normal.      Left Ear: External ear normal.      Nose: Nose normal.   Eyes:      Extraocular Movements: Extraocular movements intact.      Conjunctiva/sclera: Conjunctivae normal.      Pupils: Pupils are equal, round, and reactive to light.   Cardiovascular:      Rate and Rhythm: Normal rate and regular rhythm.      Pulses: Normal pulses.      Heart sounds: Normal heart sounds.   Pulmonary:      Effort: Pulmonary effort is normal. No respiratory distress.      Breath sounds: Normal breath sounds. No stridor. No wheezing, rhonchi or rales.   Abdominal:      General: Bowel sounds are normal. There is no distension.      Palpations: Abdomen is soft.      Tenderness: There is no abdominal tenderness. There is no guarding or rebound.   Genitourinary:     Comments: Pelvic and back pain reported  Musculoskeletal:      Cervical back: Normal range of motion.   Skin:     General: Skin is warm and dry.   Neurological:      Mental Status: She is alert and oriented to person, place, and time. Mental status is at baseline.      GCS: GCS eye subscore is 4. GCS verbal subscore is 5. GCS motor subscore is 6.   Psychiatric:         Mood and Affect: Mood normal.         Behavior: Behavior normal.         Thought Content: Thought content normal.         Judgment: Judgment normal.       Labs Reviewed   COMPREHENSIVE METABOLIC PANEL - Abnormal; Notable for the following components:       Result Value    Glucose 120 (*)     Creatinine 0.56 (*)     Alkaline Phosphatase 124 (*)     BUN/Creatinine Ratio 30.4 (*)     All other components within  normal limits    Narrative:     GFR Normal >60  Chronic Kidney Disease <60  Kidney Failure <15     URINALYSIS W/ CULTURE IF INDICATED - Abnormal; Notable for the following components:    Ketones, UA Trace (*)     Blood, UA Trace (*)     All other components within normal limits    Narrative:     In absence of clinical symptoms, the presence of pyuria, bacteria, and/or nitrites on the urinalysis result does not correlate with infection.   CBC WITH AUTO DIFFERENTIAL - Abnormal; Notable for the following components:    Monocyte % 4.9 (*)     Lymphocytes, Absolute 3.13 (*)     All other components within normal limits   URINALYSIS, MICROSCOPIC ONLY - Abnormal; Notable for the following components:    RBC, UA 6-12 (*)     WBC, UA 3-5 (*)     Bacteria, UA 2+ (*)     Squamous Epithelial Cells, UA 7-12 (*)     All other components within normal limits   HCG, QUANTITATIVE, PREGNANCY    Narrative:     HCG Ranges by Gestational Age    Females - non-pregnant premenopausal   </= 1mIU/mL HCG  Females - postmenopausal               </= 7mIU/mL HCG    3 Weeks         5.8 -    71.2 mIU/mL  4 Weeks         9.5 -     750 mIU/mL  5 Weeks         217 -   7,138 mIU/mL  6 Weeks         158 -  31,795 mIU/mL  7 Weeks       3,697 - 163,563 mIU/mL  8 Weeks      32,065 - 149,571 mIU/mL  9 Weeks      63,803 - 151,410 mIU/mL  10 Weeks     46,509 - 186,977 mIU/mL  12 Weeks     27,832 - 210,612 mIU/mL  14 Weeks     13,950 -  62,530 mIU/mL  15 Weeks     12,039 -  70,971 mIU/mL  16 Weeks      9,040 -  56,451 mIU/mL  17 Weeks      8,175 -  55,868 mIU/mL  18 Weeks      8,099 -  58,176 mIU/mL  Results may be falsely decreased if patient taking Biotin.      RHIG EVALUATION   DOSES OF RH IMMUNE GLOBULIN   CBC AND DIFFERENTIAL    Narrative:     The following orders were created for panel order CBC & Differential.  Procedure                               Abnormality         Status                     ---------                                -----------         ------                     CBC Auto Differential[[id number]]        Abnormal            Final result                 Please view results for these tests on the individual orders.       Procedures           ED Course  ED Course as of 02/04/23 0037 Wed Feb 01, 2023 2014 US Transvaginal Summary:  1. No intrauterine pregnancy is seen.  2. Small right ovarian cysts or follicles.  3. Serial hCG recommended. Follow-up ultrasound may be needed.     This report was finalized on 02/01/2023 20:05 by Dr. David Negron MD. [HE]   2104 I discussed the patient's lab and imaging results with her at the bedside at this time which includes UTI shown on UA - I am sending in an antibiotic for this, hCG quant is low positive, ultrasound shows no intrauterine pregnancy, there is a small right ovarian cyst.  I discussed with the patient that she will need to follow-up with her OB/GYN in the next 2 days to have a serial beta hCG drawn.  She should also follow-up with her primary care doctor in the coming days and return to the ED with any new, worsening, or persistent symptoms.  Patient voiced understanding and agrees with this plan of care.  Vital signs reassuring at this time.  I discussed this patient's case with Dr. Turner who agrees patient is safe for discharge. [HE]      ED Course User Index  [HE] Maggy Swift APRN                                           Medical Decision Making  Patient is a 32-year-old female who presents to the ED today complaining of vaginal bleeding during pregnancy. She reports she had a positive pregnancy test 2 days ago, believes she is about 6 weeks and 5 days pregnant.  Reports the bleeding is scant and only noted when she wipes with her toilet paper.  She is complaining of pelvic and back pain/cramping.  She has had 4 pregnancies, no miscarriages.  Dr. Rodgers is her OB/GYN.  Patient denies passing any clots, she denies any fever, body aches, or chills.  There is no dysuria or  flank pain.  She denies any chest pain, shortness of breath, dizziness, syncope, or palpitations. She declines pelvic exam. History is significant for gestational DM. Differential diagnoses: miscarriage, ectopic pregnancy, UTI, ovarian cyst, and other.      Hcg is 236.  UA reveals UTI.  Blood type O pos, no need for rhogam.  Hgb and WBC normal. Creatinine slightly decreased.   US reveals no intrauterine pregnancy is seen. Small right ovarian cysts or follicles noted.  I have discussed the pt's results with her at the bedside.   I will send her home on abx for UTI.   We discussed she will need to follow up with her OBGYN in 2 days for a repeat hcg level. No intercourse until cleared by OB. She will also want to follow up with her PCP in the coming days and return to the ED with any new, worsening, or persistent symptoms. She voiced understanding and agrees with plan of care. VS reassuring. I discussed the pt's case with Dr. Turner who agrees pt is safe for discharge.     Cyst of right ovary: acute illness or injury  Urinary tract infection with hematuria, site unspecified: acute illness or injury  Amount and/or Complexity of Data Reviewed  Labs: ordered.  Radiology: ordered.      Risk  Prescription drug management.          Final diagnoses:   Urinary tract infection with hematuria, site unspecified   Cyst of right ovary       ED Disposition  ED Disposition     ED Disposition   Discharge    Condition   Stable    Comment   --             PATIENT CONNECTION - [facility] [id number]  [phone]  In 3 days      Jose Daniel Rodgers MD  [address] [facility]   [address]  [facility] [address]  [phone]    In 2 days           Medication List      New Prescriptions    nitrofurantoin (macrocrystal-monohydrate) 100 MG capsule  Commonly known as: MACROBID  Take 1 capsule by mouth 2 (Two) Times a Day for 7 days.           Where to Get Your Medications      These medications were sent to [facility] -  [place], [place] - [address] - [phone]  - [phone] FX  [address], [place] [address]    Phone: [phone]   · nitrofurantoin (macrocrystal-monohydrate) 100 MG capsule          Maggy Swift, TERRENCE  02/04/23 0035       Maggy Swift, TERRENCE  02/04/23 0037

## 2023-02-03 ENCOUNTER — TELEPHONE (OUTPATIENT)
Dept: OBSTETRICS AND GYNECOLOGY | Facility: CLINIC | Age: 33
End: 2023-02-03

## 2023-02-03 NOTE — TELEPHONE ENCOUNTER
Pt came to office to request a sooner appt than 2/17/23.  Pt is reporting light spotting and no pain.  Pt seen in ER.   approx 5 weeks with no pregnancy visualized on ultrasound at this time.  hcg level 236.  Please advise if you want pt to be seen sooner than scheduled 1st OB visit. Pt blood type O positive.  Pt was advised to return to ER with worsening symptoms.  Pt voiced understanding.

## 2023-02-10 ENCOUNTER — OFFICE VISIT (OUTPATIENT)
Dept: OBSTETRICS AND GYNECOLOGY | Facility: CLINIC | Age: 33
End: 2023-02-10
Payer: MEDICAID

## 2023-02-10 VITALS
SYSTOLIC BLOOD PRESSURE: 128 MMHG | WEIGHT: 200 LBS | HEIGHT: 59 IN | DIASTOLIC BLOOD PRESSURE: 80 MMHG | BODY MASS INDEX: 40.32 KG/M2

## 2023-02-10 DIAGNOSIS — Z34.90 PREGNANCY AT EARLY STAGE: Primary | ICD-10-CM

## 2023-02-10 PROCEDURE — 99214 OFFICE O/P EST MOD 30 MIN: CPT | Performed by: ADVANCED PRACTICE MIDWIFE

## 2023-02-10 NOTE — PROGRESS NOTES
"Sosa Armando is a 32 y.o. female    History of Present Illness  Patient arrived for a gyne appointment to establish care with an ultrasound for confirmation of pregnancy after a positive pregnancy test followed by vaginal bleeding. She has her daughter with her and desires to have her interprete. Declined phone  services. Her positive pregnancy test at home was approximately 01/30/2023. LMP of 12/21/2022 for approximate gestation of 7 weeks. Menstrual cycles are regular from 25-31 days. She reports spotting last week but denies vaginal bleeding at this time. She does report she is having some random, mild cramping.         /80   Ht 149.9 cm (59\")   Wt 90.7 kg (200 lb)   LMP 12/21/2022 (Approximate)   BMI 40.40 kg/m²     Outpatient Encounter Medications as of 2/10/2023   Medication Sig Dispense Refill   • Prenatal MV-Min-Fe Fum-FA-DHA (PRENATAL 1 PO) Take  by mouth.       No facility-administered encounter medications on file as of 2/10/2023.       Surgical History  History reviewed. No pertinent surgical history.    Family History  History reviewed. No pertinent family history.    The following portions of the patient's history were reviewed and updated as appropriate: allergies, current medications, past family history, past medical history, past social history, past surgical history, and problem list.    Review of Systems   Constitutional: Positive for fatigue. Negative for chills and fever.   Respiratory: Negative for chest tightness and shortness of breath.    Cardiovascular: Negative for chest pain.   Gastrointestinal: Negative for abdominal pain, constipation, diarrhea, nausea and vomiting.   Genitourinary: Positive for breast pain and pelvic pain (mild cramping). Negative for difficulty urinating, dysuria, flank pain, frequency, pelvic pressure, urgency, vaginal bleeding, vaginal discharge and vaginal pain.   Musculoskeletal: Negative for gait problem.   Skin: Negative for " pallor and rash.   Allergic/Immunologic: Negative for environmental allergies, food allergies and immunocompromised state.   Neurological: Negative for dizziness, light-headedness and headache.   Hematological: Negative for adenopathy.   Psychiatric/Behavioral: Negative for dysphoric mood and depressed mood. The patient is not nervous/anxious.        Objective   Physical Exam  Vitals and nursing note reviewed.   Constitutional:       General: She is not in acute distress.     Appearance: Normal appearance. She is well-developed and well-groomed. She is morbidly obese. She is not ill-appearing or diaphoretic.   HENT:      Head: Normocephalic and atraumatic.      Right Ear: External ear normal.      Left Ear: External ear normal.   Eyes:      General: Lids are normal. No scleral icterus.        Right eye: No discharge.         Left eye: No discharge.      Extraocular Movements: Extraocular movements intact.      Conjunctiva/sclera: Conjunctivae normal.   Neck:      Trachea: Phonation normal.   Cardiovascular:      Rate and Rhythm: Normal rate and regular rhythm.      Heart sounds: Normal heart sounds. No murmur heard.  Pulmonary:      Effort: Pulmonary effort is normal. No accessory muscle usage or respiratory distress.      Breath sounds: Normal breath sounds and air entry. No wheezing.   Chest:      Chest wall: No tenderness.   Abdominal:      General: There is no distension.      Palpations: Abdomen is soft.      Tenderness: There is no abdominal tenderness. There is no right CVA tenderness, left CVA tenderness, guarding or rebound.   Musculoskeletal:         General: No tenderness. Normal range of motion.      Cervical back: Normal range of motion and neck supple. No rigidity or tenderness. No pain with movement. Normal range of motion.      Right lower leg: No edema.      Left lower leg: No edema.   Skin:     General: Skin is warm and dry.      Coloration: Skin is not ashen, cyanotic, jaundiced, mottled, pale or  obey.      Findings: No bruising, erythema, lesion or rash.   Neurological:      Mental Status: She is alert and oriented to person, place, and time.      Gait: Gait normal.   Psychiatric:         Attention and Perception: Attention and perception normal.         Mood and Affect: Mood and affect normal.         Speech: Speech normal.         Behavior: Behavior normal. Behavior is cooperative.         Thought Content: Thought content normal.         Cognition and Memory: Cognition and memory normal.         Judgment: Judgment normal.         Hcg 02/01/2023: 236.80 mIU/mL        TVUS today: Anteverted uterus with possible 4.3 mm gestational sac seen in the uterus. No yolk sac or fetal pole visualized. Endometrial lining measures 17.5 mm. 3.79 cm anterior uterine fibroid. Nabothian cyst seen in cervix. Normal appearing ovaries with no cysts or masses noted.         Chart reviewed for screenings  Last Pap Smear: 01/24/2019 NILM (ECTZ absent)    Last Mammogram: Not yet indicated. No family history of breast cancer noted.    Last Colonoscopy: Not yet indicated. No family history of colon cancer noted.   Last Bone Density Scan: Not yet indicated.      Assessment & Plan   Diagnoses and all orders for this visit:    1. Pregnancy at early stage (Primary)  - TVUS today and reviewed. Also noted 02/01/2023 Hcg level. Discussed early pregnancy versus early pregnancy loss/anembryonic pregnancy. Encouraged to continue with prenatal vitamin. Patient has reported vaginal bleeding/spotting since positive pregnancy test. Noted blood type in chart is O+, so Rhogam not indicated at this time. Discussed plan at this time is to draw blood type, antibody screen, and quantitative Hcg level. Discussed signs to report including vaginal bleeding or pelvic pain/cramping. Patient encouraged to go to the emergency department if bleeding is heavy or pain is severe. Will notify patient of labs and repeat Hcg if indicated.   -     ABO / Rh  -      Antibody Screen  -     HCG, B-subunit, Quantitative  - Return to the office in 1-2 weeks with ultrasound for viability, sooner with vaginal bleeding or pelvic pain, and as needed with any concerns.         This note has been signed electronically.    Nichol Nova DNP, APRN, CNM, RNC-OB  02/10/2023

## 2023-02-11 LAB
ABO GROUP BLD: NORMAL
BLD GP AB SCN SERPL QL: NEGATIVE
HCG INTACT+B SERPL-ACNC: 617 MIU/ML
RH BLD: POSITIVE

## 2023-02-13 DIAGNOSIS — Z34.90 EARLY STAGE OF PREGNANCY: ICD-10-CM

## 2023-02-13 DIAGNOSIS — O02.81 INAPPROPRIATE CHANGE IN QUANTITATIVE HCG IN EARLY PREGNANCY: Primary | ICD-10-CM

## 2023-02-16 ENCOUNTER — TELEPHONE (OUTPATIENT)
Dept: OBSTETRICS AND GYNECOLOGY | Facility: CLINIC | Age: 33
End: 2023-02-16

## 2023-02-16 DIAGNOSIS — O02.81 INAPPROPRIATE CHANGE IN QUANTITATIVE HCG IN EARLY PREGNANCY: Primary | ICD-10-CM

## 2023-02-16 DIAGNOSIS — Z34.90 EARLY STAGE OF PREGNANCY: ICD-10-CM

## 2023-02-16 LAB — HCG INTACT+B SERPL-ACNC: 703 MIU/ML

## 2023-02-16 NOTE — TELEPHONE ENCOUNTER
Called pt to update her.  No answer, I left a VM with instructions and asked pt to return my call to the office.

## 2023-02-16 NOTE — TELEPHONE ENCOUNTER
----- Message from TERRENCE Cunningham sent at 2/16/2023  8:38 AM CST -----  Patient's serum Hcg level is rising slowly. Please follow-up with the patient for bleeding or pain. If heavy bleeding or severe pain, she should be seen in the ER. Repeat serum Hcg ordered for tomorrow. She should also return this week with an ultrasound if experiencing pain or bleeding.

## 2023-02-18 ENCOUNTER — TELEPHONE (OUTPATIENT)
Dept: OBSTETRICS AND GYNECOLOGY | Facility: CLINIC | Age: 33
End: 2023-02-18

## 2023-02-18 LAB — HCG INTACT+B SERPL-ACNC: 992 MIU/ML

## 2023-02-18 NOTE — TELEPHONE ENCOUNTER
"Phoned patient to discuss Hcg level and follow-up with patient. She had her daughter as her . Serum Hcg is rising slowly, which may be suspicious for an extrauterine pregnancy. She denies pain at present, states she may have a \"light\" cramp every once in awhile and has had some random spotting. Denies pain or bleeding at present. Encouraged to go to the emergency department with pain or bleeding. Understanding verbalized. Also planned to follow-up with patient in the office with an ultrasound on Monday, but patient desires to come in on Tuesday. Will have staff schedule an appointment for her.     Nichol Nova, DNP, APRN, CNM, RNC-OB    "

## 2023-02-19 ENCOUNTER — HOSPITAL ENCOUNTER (EMERGENCY)
Facility: HOSPITAL | Age: 33
Discharge: HOME | End: 2023-02-19
Admitting: EMERGENCY MEDICINE
Payer: MEDICAID

## 2023-02-19 ENCOUNTER — APPOINTMENT (OUTPATIENT)
Dept: ULTRASOUND IMAGING | Facility: HOSPITAL | Age: 33
End: 2023-02-19
Payer: MEDICAID

## 2023-02-19 VITALS
HEIGHT: 59 IN | RESPIRATION RATE: 18 BRPM | OXYGEN SATURATION: 100 % | BODY MASS INDEX: 41.12 KG/M2 | DIASTOLIC BLOOD PRESSURE: 67 MMHG | HEART RATE: 72 BPM | TEMPERATURE: 98.3 F | WEIGHT: 204 LBS | SYSTOLIC BLOOD PRESSURE: 116 MMHG

## 2023-02-19 DIAGNOSIS — O20.9 VAGINAL BLEEDING AFFECTING EARLY PREGNANCY: Primary | ICD-10-CM

## 2023-02-19 DIAGNOSIS — R93.89 ABNORMAL ULTRASOUND: ICD-10-CM

## 2023-02-19 LAB
ALBUMIN SERPL-MCNC: 3.9 G/DL (ref 3.5–5.2)
ALBUMIN/GLOB SERPL: 1.2 G/DL
ALP SERPL-CCNC: 113 U/L (ref 39–117)
ALT SERPL W P-5'-P-CCNC: 18 U/L (ref 1–33)
ANION GAP SERPL CALCULATED.3IONS-SCNC: 8 MMOL/L (ref 5–15)
AST SERPL-CCNC: 16 U/L (ref 1–32)
BASOPHILS # BLD AUTO: 0.03 10*3/MM3 (ref 0–0.2)
BASOPHILS NFR BLD AUTO: 0.3 % (ref 0–1.5)
BILIRUB SERPL-MCNC: 0.2 MG/DL (ref 0–1.2)
BUN SERPL-MCNC: 13 MG/DL (ref 6–20)
BUN/CREAT SERPL: 25.5 (ref 7–25)
CALCIUM SPEC-SCNC: 9.1 MG/DL (ref 8.6–10.5)
CHLORIDE SERPL-SCNC: 105 MMOL/L (ref 98–107)
CO2 SERPL-SCNC: 25 MMOL/L (ref 22–29)
CREAT SERPL-MCNC: 0.51 MG/DL (ref 0.57–1)
DEPRECATED RDW RBC AUTO: 40.9 FL (ref 37–54)
EGFRCR SERPLBLD CKD-EPI 2021: 127.4 ML/MIN/1.73
EOSINOPHIL # BLD AUTO: 0.11 10*3/MM3 (ref 0–0.4)
EOSINOPHIL NFR BLD AUTO: 1.2 % (ref 0.3–6.2)
ERYTHROCYTE [DISTWIDTH] IN BLOOD BY AUTOMATED COUNT: 13.1 % (ref 12.3–15.4)
GLOBULIN UR ELPH-MCNC: 3.3 GM/DL
GLUCOSE SERPL-MCNC: 93 MG/DL (ref 65–99)
HCG INTACT+B SERPL-ACNC: 1265 MIU/ML
HCT VFR BLD AUTO: 41.8 % (ref 34–46.6)
HGB BLD-MCNC: 13.1 G/DL (ref 12–15.9)
IMM GRANULOCYTES # BLD AUTO: 0.02 10*3/MM3 (ref 0–0.05)
IMM GRANULOCYTES NFR BLD AUTO: 0.2 % (ref 0–0.5)
LYMPHOCYTES # BLD AUTO: 2.7 10*3/MM3 (ref 0.7–3.1)
LYMPHOCYTES NFR BLD AUTO: 28.5 % (ref 19.6–45.3)
MCH RBC QN AUTO: 27 PG (ref 26.6–33)
MCHC RBC AUTO-ENTMCNC: 31.3 G/DL (ref 31.5–35.7)
MCV RBC AUTO: 86 FL (ref 79–97)
MONOCYTES # BLD AUTO: 0.44 10*3/MM3 (ref 0.1–0.9)
MONOCYTES NFR BLD AUTO: 4.6 % (ref 5–12)
NEUTROPHILS NFR BLD AUTO: 6.18 10*3/MM3 (ref 1.7–7)
NEUTROPHILS NFR BLD AUTO: 65.2 % (ref 42.7–76)
NRBC BLD AUTO-RTO: 0 /100 WBC (ref 0–0.2)
PLATELET # BLD AUTO: 233 10*3/MM3 (ref 140–450)
PMV BLD AUTO: 10.4 FL (ref 6–12)
POTASSIUM SERPL-SCNC: 4.2 MMOL/L (ref 3.5–5.2)
PROT SERPL-MCNC: 7.2 G/DL (ref 6–8.5)
RBC # BLD AUTO: 4.86 10*6/MM3 (ref 3.77–5.28)
SODIUM SERPL-SCNC: 138 MMOL/L (ref 136–145)
WBC NRBC COR # BLD: 9.48 10*3/MM3 (ref 3.4–10.8)

## 2023-02-19 PROCEDURE — 99283 EMERGENCY DEPT VISIT LOW MDM: CPT

## 2023-02-19 PROCEDURE — 84702 CHORIONIC GONADOTROPIN TEST: CPT | Performed by: NURSE PRACTITIONER

## 2023-02-19 PROCEDURE — 80053 COMPREHEN METABOLIC PANEL: CPT | Performed by: NURSE PRACTITIONER

## 2023-02-19 PROCEDURE — 85025 COMPLETE CBC W/AUTO DIFF WBC: CPT | Performed by: NURSE PRACTITIONER

## 2023-02-19 PROCEDURE — 76817 TRANSVAGINAL US OBSTETRIC: CPT

## 2023-02-19 RX ORDER — SODIUM CHLORIDE 0.9 % (FLUSH) 0.9 %
10 SYRINGE (ML) INJECTION AS NEEDED
Status: DISCONTINUED | OUTPATIENT
Start: 2023-02-19 | End: 2023-02-19 | Stop reason: HOSPADM

## 2023-02-19 NOTE — ED PROVIDER NOTES
Subjective   History of Present Illness  Patient is a 32-year-old  female presents emergency department with vaginal bleeding.  She is pregnant but she is not for sure how far along she is.  Her last menstrual period was .  She has been seeing OB.  Evidently she has been having a slow rising hCG level and has been monitored closely.  She has had some intermittent vaginal bleeding throughout this pregnancy.  Her last hCG 2 days ago was 992.  She has an appointment to see TERRENCE Vicente on .  She was advised yesterday by their office if she had increased pain or bleeding over the weekend to come the emergency department.  Patient states that she has some increased bleeding today.  She denies any clotting.  She states she noticed the bleeding when she went to the bathroom.  She is  4 para 3.  Patient is Upper sorbian-speaking and her daughter is the .  She states she is having some cramping today.    History provided by:  Patient and relative   used: Yes (pt's daughter )        Review of Systems   Constitutional: Negative.    HENT: Negative.    Eyes: Negative.    Respiratory: Negative.    Cardiovascular: Negative.    Gastrointestinal: Negative.    Endocrine: Negative.    Genitourinary:        Patient is a 32-year-old  female presents emergency department with vaginal bleeding.  She is pregnant but she is not for sure how far along she is.  Her last menstrual period was .  She has been seeing OB.  Evidently she has been having a slow rising hCG level and has been monitored closely.  She has had some intermittent vaginal bleeding throughout this pregnancy.  Her last hCG 2 days ago was 992.  She has an appointment to see TERRENCE Vicente on .  She was advised yesterday by their office if she had increased pain or bleeding over the weekend to come the emergency department.  Patient states that she has some increased bleeding  "today.  She denies any clotting.  She states she noticed the bleeding when she went to the bathroom.  She is  4 para 3.  Patient is Burkinan-speaking and her daughter is the .  She states she is having some cramping today.   Musculoskeletal: Negative.    Skin: Negative.    Allergic/Immunologic: Negative.    Neurological: Negative.    Hematological: Negative.    Psychiatric/Behavioral: Negative.    All other systems reviewed and are negative.      Past Medical History:   Diagnosis Date   • Gestational diabetes        No Known Allergies    History reviewed. No pertinent surgical history.    History reviewed. No pertinent family history.    Social History     Socioeconomic History   • Marital status: Single   Tobacco Use   • Smoking status: Never   • Smokeless tobacco: Never   Vaping Use   • Vaping Use: Never used   Substance and Sexual Activity   • Alcohol use: No   • Drug use: No   • Sexual activity: Yes     Partners: Male     Birth control/protection: None       Prior to Admission medications    Medication Sig Start Date End Date Taking? Authorizing Provider   Prenatal MV-Min-Fe Fum-FA-DHA (PRENATAL 1 PO) Take  by mouth.    Provider, MD Flora       /67 (BP Location: Right arm, Patient Position: Sitting)   Pulse 72   Temp 98.3 °F (36.8 °C) (Oral)   Resp 18   Ht 149.9 cm (59\")   Wt 92.5 kg (204 lb)   LMP 2022 (Approximate)   SpO2 100%   BMI 41.20 kg/m²     Objective   Physical Exam  Vitals and nursing note reviewed.   Constitutional:       Appearance: She is well-developed.      Comments: Non toxic appearing. No acute distress. Daughter is interpretor as pt is Chilean-speaking    HENT:      Head: Normocephalic and atraumatic.   Eyes:      Conjunctiva/sclera: Conjunctivae normal.      Pupils: Pupils are equal, round, and reactive to light.   Neck:      Thyroid: No thyromegaly.      Trachea: No tracheal deviation.   Cardiovascular:      Rate and Rhythm: Normal rate and regular " rhythm.      Heart sounds: Normal heart sounds.   Pulmonary:      Effort: Pulmonary effort is normal. No respiratory distress.      Breath sounds: Normal breath sounds. No wheezing or rales.   Chest:      Chest wall: No tenderness.   Abdominal:      General: Bowel sounds are normal.      Palpations: Abdomen is soft.      Comments: Mild abd tenderness on palpation. No guarding or rebound,    Musculoskeletal:         General: Normal range of motion.      Cervical back: Normal range of motion and neck supple.   Skin:     General: Skin is warm and dry.   Neurological:      Mental Status: She is alert and oriented to person, place, and time.      Cranial Nerves: No cranial nerve deficit.      Deep Tendon Reflexes: Reflexes are normal and symmetric.   Psychiatric:         Behavior: Behavior normal.         Thought Content: Thought content normal.         Judgment: Judgment normal.         Procedures         Lab Results (last 24 hours)     Procedure Component Value Units Date/Time    CBC & Differential [[id number]]  (Abnormal) Collected: 02/19/23 1126    Specimen: Blood Updated: 02/19/23 1134    Narrative:      The following orders were created for panel order CBC & Differential.  Procedure                               Abnormality         Status                     ---------                               -----------         ------                     CBC Auto Differential[[id number]]        Abnormal            Final result                 Please view results for these tests on the individual orders.    Comprehensive Metabolic Panel [[id number]]  (Abnormal) Collected: 02/19/23 1126    Specimen: Blood Updated: 02/19/23 1154     Glucose 93 mg/dL      BUN 13 mg/dL      Creatinine 0.51 mg/dL      Sodium 138 mmol/L      Potassium 4.2 mmol/L      Chloride 105 mmol/L      CO2 25.0 mmol/L      Calcium 9.1 mg/dL      Total Protein 7.2 g/dL      Albumin 3.9 g/dL      ALT (SGPT) 18 U/L      AST (SGOT) 16 U/L      Alkaline Phosphatase  113 U/L      Total Bilirubin 0.2 mg/dL      Globulin 3.3 gm/dL      A/G Ratio 1.2 g/dL      BUN/Creatinine Ratio 25.5     Anion Gap 8.0 mmol/L      eGFR 127.4 mL/min/1.73     Narrative:      GFR Normal >60  Chronic Kidney Disease <60  Kidney Failure <15      hCG, Quantitative, Pregnancy [[id number]] Collected: 02/19/23 1126    Specimen: Blood Updated: 02/19/23 1200     HCG Quantitative 1,265.00 mIU/mL     Narrative:      HCG Ranges by Gestational Age    Females - non-pregnant premenopausal   </= 1mIU/mL HCG  Females - postmenopausal               </= 7mIU/mL HCG    3 Weeks         5.8 -    71.2 mIU/mL  4 Weeks         9.5 -     750 mIU/mL  5 Weeks         217 -   7,138 mIU/mL  6 Weeks         158 -  31,795 mIU/mL  7 Weeks       3,697 - 163,563 mIU/mL  8 Weeks      32,065 - 149,571 mIU/mL  9 Weeks      63,803 - 151,410 mIU/mL  10 Weeks     46,509 - 186,977 mIU/mL  12 Weeks     27,832 - 210,612 mIU/mL  14 Weeks     13,950 -  62,530 mIU/mL  15 Weeks     12,039 -  70,971 mIU/mL  16 Weeks      9,040 -  56,451 mIU/mL  17 Weeks      8,175 -  55,868 mIU/mL  18 Weeks      8,099 -  58,176 mIU/mL  Results may be falsely decreased if patient taking Biotin.      CBC Auto Differential [[id number]]  (Abnormal) Collected: 02/19/23 1126    Specimen: Blood Updated: 02/19/23 1134     WBC 9.48 10*3/mm3      RBC 4.86 10*6/mm3      Hemoglobin 13.1 g/dL      Hematocrit 41.8 %      MCV 86.0 fL      MCH 27.0 pg      MCHC 31.3 g/dL      RDW 13.1 %      RDW-SD 40.9 fl      MPV 10.4 fL      Platelets 233 10*3/mm3      Neutrophil % 65.2 %      Lymphocyte % 28.5 %      Monocyte % 4.6 %      Eosinophil % 1.2 %      Basophil % 0.3 %      Immature Grans % 0.2 %      Neutrophils, Absolute 6.18 10*3/mm3      Lymphocytes, Absolute 2.70 10*3/mm3      Monocytes, Absolute 0.44 10*3/mm3      Eosinophils, Absolute 0.11 10*3/mm3      Basophils, Absolute 0.03 10*3/mm3      Immature Grans, Absolute 0.02 10*3/mm3      nRBC 0.0 /100 WBC           US Ob  Transvaginal   Final Result   1. 5 mm heterogeneous cystic intrauterine structure which could reflect   a gestational sac although the sonographic appearance is abnormal.   Possible fetal pole measured at 3.5 mm without discernible fetal heart   tones. Considering the recent positive quantitative hCG's my primary   consideration is a failed early pregnancy. I would recommend correlation   for downtrending quantitative hCG.   This report was finalized on 02/19/2023 11:26 by Dr Ho Burleson, .          ED Course  ED Course as of 02/19/23 1727   Sun Feb 19, 2023   1323 IMPRESSION:  1. 5 mm heterogeneous cystic intrauterine structure which could reflect  a gestational sac although the sonographic appearance is abnormal.  Possible fetal pole measured at 3.5 mm without discernible fetal heart  tones. Considering the recent positive quantitative hCG's my primary  consideration is a failed early pregnancy. I would recommend correlation  for downtrending quantitative hCG.  This report was finalized on 02/19/2023 11:26 by Dr Ho Burleson, .        Specimen Collected: 02/19/23 11:20 CST         [CW]   1323 Patient's hCG today is 1265 2 days ago was 992.  Patient had a Rh done on February 1 which was O+ therefore she does not require RhoGAM.  Her ultrasound was a little abnormal indicated a cystic intrauterine structure which could reflect a gestational sac although the sonographic appearance is abnormal.  The possible fetal pole was measured at 3.5 mm without fetal heart tones.  Radiology recommended correlation for downtrending quantitative hCG and follow-up ultrasound.  Patient has an appointment with TERRENCE Vicente this week.  These instructions and results were discussed with the daughter who interpreted the results to the patient.  Asked if there was any questions that could be answered before their discharge.  They advised they had verbalized understanding and would follow-up with TERRENCE Vicente this week.   Patient be discharged shortly in stable condition. [CW]      ED Course User Index  [CW] NicoleAlma APRN        Medical Decision Making  Patient is a 32-year-old  female presents emergency department with vaginal bleeding.  She is pregnant but she is not for sure how far along she is.  Her last menstrual period was .  She has been seeing OB.  Evidently she has been having a slow rising hCG level and has been monitored closely.  She has had some intermittent vaginal bleeding throughout this pregnancy.  Her last hCG 2 days ago was 992.  She has an appointment to see TERRENCE Vicente on .  She was advised yesterday by their office if she had increased pain or bleeding over the weekend to come the emergency department.  Patient states that she has some increased bleeding today.  She denies any clotting.  She states she noticed the bleeding when she went to the bathroom.  She is  4 para 3.  Patient is Nepali-speaking and her daughter is the .  She states she is having some cramping today  Course of treatment in the ED: Labs Reviewed  COMPREHENSIVE METABOLIC PANEL - Abnormal; Notable for the following components:     Creatinine                    0.51 (*)               BUN/Creatinine Ratio          25.5 (*)            All other components within normal limits         Narrative: GFR Normal >60                  Chronic Kidney Disease <60                  Kidney Failure <15                    CBC WITH AUTO DIFFERENTIAL - Abnormal; Notable for the following components:     MCHC                          31.3 (*)               Monocyte %                    4.6 (*)             All other components within normal limits  HCG, QUANTITATIVE, PREGNANCY         Narrative: HCG Ranges by Gestational Age                                    Females - non-pregnant premenopausal   </= 1mIU/mL HCG                  Females - postmenopausal               </= 7mIU/mL HCG                                     3 Weeks         5.8 -    71.2 mIU/mL                  4 Weeks         9.5 -     750 mIU/mL                  5 Weeks         217 -   7,138 mIU/mL                  6 Weeks         158 -  31,795 mIU/mL                  7 Weeks       3,697 - 163,563 mIU/mL                  8 Weeks      32,065 - 149,571 mIU/mL                  9 Weeks      63,803 - 151,410 mIU/mL                  10 Weeks     46,509 - 186,977 mIU/mL                  12 Weeks     27,832 - 210,612 mIU/mL                  14 Weeks     13,950 -  62,530 mIU/mL                  15 Weeks     12,039 -  70,971 mIU/mL                  16 Weeks      9,040 -  56,451 mIU/mL                  17 Weeks      8,175 -  55,868 mIU/mL                  18 Weeks      8,099 -  58,176 mIU/mL                  Results may be falsely decreased if patient taking Biotin.                    CBC AND DIFFERENTIAL  US Ob Transvaginal   Final Result    1. 5 mm heterogeneous cystic intrauterine structure which could reflect    a gestational sac although the sonographic appearance is abnormal.    Possible fetal pole measured at 3.5 mm without discernible fetal heart    tones. Considering the recent positive quantitative hCG's my primary    consideration is a failed early pregnancy. I would recommend correlation    for downtrending quantitative hCG.    This report was finalized on 02/19/2023 11:26 by Dr Ho Burleson, .     Reviewed these findings with the patient and her daughter who is the  for her.  Advised that her hCG level had increased since her last one 2 days ago.  Advised that her OB ultrasound was abnormal and there was no fetal heart tones although there was a fetal sac in the uterus.  She has an appointment with OB tomorrow for follow-up.  Advised him to follow-up with OB tomorrow as scheduled return the emergency department for pad increased bleeding or pain  Differential dx: miscarriage, ectopic pregnancy,     Abnormal ultrasound: acute illness or  injury  Vaginal bleeding affecting early pregnancy: acute illness or injury  Amount and/or Complexity of Data Reviewed  Independent Historian:      Details: daughter- interpretor - pt is [place]araguan speaking does not speak english   Labs: ordered. Decision-making details documented in ED Course.  Radiology: ordered. Decision-making details documented in ED Course.           Final diagnoses:   Vaginal bleeding affecting early pregnancy   Abnormal ultrasound          Alma Rivera, APRN  02/19/23 0050

## 2023-02-22 ENCOUNTER — OFFICE VISIT (OUTPATIENT)
Dept: OBSTETRICS AND GYNECOLOGY | Facility: CLINIC | Age: 33
End: 2023-02-22
Payer: MEDICAID

## 2023-02-22 VITALS
DIASTOLIC BLOOD PRESSURE: 70 MMHG | SYSTOLIC BLOOD PRESSURE: 108 MMHG | BODY MASS INDEX: 40.52 KG/M2 | HEIGHT: 59 IN | WEIGHT: 201 LBS

## 2023-02-22 DIAGNOSIS — O20.9 VAGINAL BLEEDING AFFECTING EARLY PREGNANCY: ICD-10-CM

## 2023-02-22 DIAGNOSIS — O02.81 INAPPROPRIATE CHANGE IN QUANTITATIVE HCG IN EARLY PREGNANCY: Primary | ICD-10-CM

## 2023-02-22 PROCEDURE — 99214 OFFICE O/P EST MOD 30 MIN: CPT | Performed by: ADVANCED PRACTICE MIDWIFE

## 2023-02-22 NOTE — PROGRESS NOTES
"Sosa Armando is a 32 y.o. female    History of Present Illness  Telephone  services utilized during this visit (ID: [id number]). Patient arrived for follow-up of viability with an ultrasound. She has experienced bleeding in early pregnancy and was seen through the emergency department on 03/19/2023. She started to bleed more on Tuesday. She describes the bleeding as mild, just enough as to where she needs to wear a pad and notices it when she wipes. Sometimes she has more cramping, but then sometimes she has no cramping. She denies cramping at present.        /70   Ht 149.9 cm (59.02\")   Wt 91.2 kg (201 lb)   LMP 12/21/2022 (Approximate)   BMI 40.58 kg/m²     Outpatient Encounter Medications as of 2/22/2023   Medication Sig Dispense Refill   • Prenatal MV-Min-Fe Fum-FA-DHA (PRENATAL 1 PO) Take  by mouth.       No facility-administered encounter medications on file as of 2/22/2023.       Surgical History  No past surgical history on file.    Family History  No family history on file.    The following portions of the patient's history were reviewed and updated as appropriate: allergies, current medications, past family history, past medical history, past social history, past surgical history, and problem list.    Review of Systems   Constitutional: Negative for chills, fatigue and fever.   Respiratory: Negative for chest tightness and shortness of breath.    Cardiovascular: Negative for chest pain.   Gastrointestinal: Negative for abdominal pain, constipation, diarrhea and vomiting.   Genitourinary: Positive for breast pain (tenderness), pelvic pain (episodes of cramping) and vaginal bleeding (mild). Negative for difficulty urinating, dysuria, frequency, vaginal discharge and vaginal pain.   Musculoskeletal: Negative for gait problem.   Skin: Negative for pallor and rash.   Neurological: Negative for dizziness, light-headedness and headache.   Psychiatric/Behavioral: Negative for " dysphoric mood and depressed mood. The patient is not nervous/anxious.        Objective   Physical Exam  Vitals and nursing note reviewed.   Constitutional:       General: She is not in acute distress.     Appearance: Normal appearance. She is well-developed and well-groomed. She is morbidly obese. She is not ill-appearing or diaphoretic.   HENT:      Head: Normocephalic and atraumatic.      Right Ear: External ear normal.      Left Ear: External ear normal.   Eyes:      General: Lids are normal. No scleral icterus.        Right eye: No discharge.         Left eye: No discharge.      Extraocular Movements: Extraocular movements intact.      Conjunctiva/sclera: Conjunctivae normal.   Neck:      Trachea: Phonation normal.   Cardiovascular:      Rate and Rhythm: Normal rate and regular rhythm.      Heart sounds: Normal heart sounds. No murmur heard.  Pulmonary:      Effort: Pulmonary effort is normal. No accessory muscle usage or respiratory distress.      Breath sounds: Normal breath sounds and air entry. No wheezing.   Chest:      Chest wall: No tenderness.   Abdominal:      General: There is no distension.      Palpations: Abdomen is soft.      Tenderness: There is no abdominal tenderness. There is no right CVA tenderness, left CVA tenderness, guarding or rebound.   Musculoskeletal:         General: No tenderness. Normal range of motion.      Cervical back: Normal range of motion and neck supple. No rigidity or tenderness. No pain with movement. Normal range of motion.      Right lower leg: No edema.      Left lower leg: No edema.   Skin:     General: Skin is warm and dry.      Coloration: Skin is not ashen, cyanotic, jaundiced, mottled, pale or sallow.      Findings: No bruising, erythema, lesion or rash.   Neurological:      Mental Status: She is alert and oriented to person, place, and time.      Gait: Gait normal.   Psychiatric:         Attention and Perception: Attention and perception normal.         Mood and  Affect: Mood and affect normal.         Speech: Speech normal.         Behavior: Behavior normal. Behavior is cooperative.         Thought Content: Thought content normal.         Cognition and Memory: Cognition and memory normal.         Judgment: Judgment normal.         HC2023: 1265 mIU/mL  2023: 992 mIU/mL  02/15/2023:703 mIU/mL  02/10/2023: 617 mIU/mL  2023: 236.80 mIU/mL        TVUS today: There appears to be a 10 mm gestational sac within the endometrium at the uterine fundus.  The endometrium is somewhat thickened at 13 mm.  There is no evidence of yolk sac or fetal pole.  Normal-appearing ovaries.  No free fluid.     TVUS 2023: 5 mm heterogeneous cystic intrauterine structure, which could reflect a gestational sac. Possible fetal pole measuring 3.5 mm without discernible fetal heart tones.     TVUS 02/10/2023: Anteverted uterus with possible 4.3 mm gestational sac seen in the uterus. No yolk sac or fetal pole visualized. Endometrial lining measures 17.5 mm. 3.79 cm anterior uterine fibroid. Nabothian cyst seen in cervix. Normal appearing ovaries with no cysts or masses noted.     TVUS 2023: No intrauterine pregnancy noted. Complex appearing cyst or follicle measuring 19 x 18 mm.    Chart reviewed for screenings  Last Pap Smear: 2019 NILM (ECTZ absent)    Last Mammogram: Not yet indicated. No family history of breast cancer noted.    Last Colonoscopy: Not yet indicated. No family history of colon cancer noted.   Last Bone Density Scan: Not yet indicated.       Assessment & Plan   Diagnoses and all orders for this visit:    1. Inappropriate change in quantitative hCG in early pregnancy (Primary)  - TVUS today and reviewed. Also reviewed with Dr. Jade for collaboration with care. Discussed with patient the slowly increasing Hcg level and subtle changes in ultrasound findings possibly suggestive of early pregnancy loss. Patient is Rh positive so Rhogam not  indicated. Questions encouraged and answered. Support offered. Discussed signs to report or come to the emergency department, including heavy vaginal bleeding or increased pelvic pain or cramping. Will repeat serum Hcg level today. Plan for patient to return in 1-2 weeks with an ultrasound for surveillance.  -     HCG, B-subunit, Quantitative    2. Vaginal bleeding affecting early pregnancy  - Reviewed signs to report or come to the hospital, including heavy vaginal bleeding or increased pelvic pain or cramping. Understanding verbalized.  -     HCG, B-subunit, Quantitative        This note has been signed electronically.    Nichol Nova, DNP, APRN, CNM, RNC-OB  02/22/2023

## 2023-02-23 LAB — HCG INTACT+B SERPL-ACNC: NORMAL MIU/ML

## 2023-02-25 ENCOUNTER — HOSPITAL ENCOUNTER (EMERGENCY)
Facility: HOSPITAL | Age: 33
Discharge: HOME | End: 2023-02-25
Attending: STUDENT IN AN ORGANIZED HEALTH CARE EDUCATION/TRAINING PROGRAM | Admitting: STUDENT IN AN ORGANIZED HEALTH CARE EDUCATION/TRAINING PROGRAM
Payer: MEDICAID

## 2023-02-25 ENCOUNTER — APPOINTMENT (OUTPATIENT)
Dept: ULTRASOUND IMAGING | Facility: HOSPITAL | Age: 33
End: 2023-02-25
Payer: MEDICAID

## 2023-02-25 VITALS
HEIGHT: 59 IN | OXYGEN SATURATION: 100 % | BODY MASS INDEX: 41.12 KG/M2 | WEIGHT: 204 LBS | HEART RATE: 79 BPM | RESPIRATION RATE: 18 BRPM | SYSTOLIC BLOOD PRESSURE: 102 MMHG | DIASTOLIC BLOOD PRESSURE: 76 MMHG | TEMPERATURE: 97.7 F

## 2023-02-25 DIAGNOSIS — O03.4 INEVITABLE ABORTION: Primary | ICD-10-CM

## 2023-02-25 LAB
ALBUMIN SERPL-MCNC: 4.1 G/DL (ref 3.5–5.2)
ALBUMIN/GLOB SERPL: 1.2 G/DL
ALP SERPL-CCNC: 91 U/L (ref 39–117)
ALT SERPL W P-5'-P-CCNC: 22 U/L (ref 1–33)
ANION GAP SERPL CALCULATED.3IONS-SCNC: 12 MMOL/L (ref 5–15)
AST SERPL-CCNC: 22 U/L (ref 1–32)
BASOPHILS # BLD AUTO: 0.04 10*3/MM3 (ref 0–0.2)
BASOPHILS NFR BLD AUTO: 0.3 % (ref 0–1.5)
BILIRUB SERPL-MCNC: 0.3 MG/DL (ref 0–1.2)
BUN SERPL-MCNC: 9 MG/DL (ref 6–20)
BUN/CREAT SERPL: 20.5 (ref 7–25)
CALCIUM SPEC-SCNC: 9.2 MG/DL (ref 8.6–10.5)
CHLORIDE SERPL-SCNC: 101 MMOL/L (ref 98–107)
CO2 SERPL-SCNC: 22 MMOL/L (ref 22–29)
CREAT SERPL-MCNC: 0.44 MG/DL (ref 0.57–1)
DEPRECATED RDW RBC AUTO: 41.4 FL (ref 37–54)
EGFRCR SERPLBLD CKD-EPI 2021: 132 ML/MIN/1.73
EOSINOPHIL # BLD AUTO: 0.12 10*3/MM3 (ref 0–0.4)
EOSINOPHIL NFR BLD AUTO: 1 % (ref 0.3–6.2)
ERYTHROCYTE [DISTWIDTH] IN BLOOD BY AUTOMATED COUNT: 13.3 % (ref 12.3–15.4)
GLOBULIN UR ELPH-MCNC: 3.4 GM/DL
GLUCOSE SERPL-MCNC: 103 MG/DL (ref 65–99)
HCG INTACT+B SERPL-ACNC: 971.4 MIU/ML
HCT VFR BLD AUTO: 39.2 % (ref 34–46.6)
HGB BLD-MCNC: 12.4 G/DL (ref 12–15.9)
IMM GRANULOCYTES # BLD AUTO: 0.05 10*3/MM3 (ref 0–0.05)
IMM GRANULOCYTES NFR BLD AUTO: 0.4 % (ref 0–0.5)
LYMPHOCYTES # BLD AUTO: 3.62 10*3/MM3 (ref 0.7–3.1)
LYMPHOCYTES NFR BLD AUTO: 30.9 % (ref 19.6–45.3)
MCH RBC QN AUTO: 27.2 PG (ref 26.6–33)
MCHC RBC AUTO-ENTMCNC: 31.6 G/DL (ref 31.5–35.7)
MCV RBC AUTO: 86 FL (ref 79–97)
MONOCYTES # BLD AUTO: 0.72 10*3/MM3 (ref 0.1–0.9)
MONOCYTES NFR BLD AUTO: 6.2 % (ref 5–12)
NEUTROPHILS NFR BLD AUTO: 61.2 % (ref 42.7–76)
NEUTROPHILS NFR BLD AUTO: 7.15 10*3/MM3 (ref 1.7–7)
NRBC BLD AUTO-RTO: 0 /100 WBC (ref 0–0.2)
PLATELET # BLD AUTO: 244 10*3/MM3 (ref 140–450)
PMV BLD AUTO: 10.3 FL (ref 6–12)
POTASSIUM SERPL-SCNC: 3.5 MMOL/L (ref 3.5–5.2)
PROT SERPL-MCNC: 7.5 G/DL (ref 6–8.5)
RBC # BLD AUTO: 4.56 10*6/MM3 (ref 3.77–5.28)
SODIUM SERPL-SCNC: 135 MMOL/L (ref 136–145)
WBC NRBC COR # BLD: 11.7 10*3/MM3 (ref 3.4–10.8)

## 2023-02-25 PROCEDURE — 84702 CHORIONIC GONADOTROPIN TEST: CPT | Performed by: STUDENT IN AN ORGANIZED HEALTH CARE EDUCATION/TRAINING PROGRAM

## 2023-02-25 PROCEDURE — 80053 COMPREHEN METABOLIC PANEL: CPT | Performed by: STUDENT IN AN ORGANIZED HEALTH CARE EDUCATION/TRAINING PROGRAM

## 2023-02-25 PROCEDURE — 85025 COMPLETE CBC W/AUTO DIFF WBC: CPT | Performed by: STUDENT IN AN ORGANIZED HEALTH CARE EDUCATION/TRAINING PROGRAM

## 2023-02-25 PROCEDURE — 99282 EMERGENCY DEPT VISIT SF MDM: CPT

## 2023-02-25 PROCEDURE — 36415 COLL VENOUS BLD VENIPUNCTURE: CPT

## 2023-02-25 PROCEDURE — 76817 TRANSVAGINAL US OBSTETRIC: CPT

## 2023-02-27 ENCOUNTER — TELEPHONE (OUTPATIENT)
Dept: OBSTETRICS AND GYNECOLOGY | Facility: CLINIC | Age: 33
End: 2023-02-27
Payer: MEDICAID

## 2023-02-27 NOTE — TELEPHONE ENCOUNTER
Pt came into office today needing an appointment.  called.  name: Mildred #[id number]. Pt reports that she had heavy vaginal bleeding Friday afternoon and went to ER, Er dr told her that she had miscarriage and to come by office Monday to make an appointment. Pt reports less bleeding today, just has some tenderness in stomach that is manageable with tylenol. I walked with pt to  and still had  on line to help assist with making appointment. Pt scheduled with Nichol Nova tomorrow with US starting at 3 pm. Pt agrees to appt times. Pt voiced that she is ok and will see us tomorrow. I walked pt out of office.

## 2023-02-28 ENCOUNTER — OFFICE VISIT (OUTPATIENT)
Dept: OBSTETRICS AND GYNECOLOGY | Facility: CLINIC | Age: 33
End: 2023-02-28
Payer: MEDICAID

## 2023-02-28 VITALS
SYSTOLIC BLOOD PRESSURE: 124 MMHG | HEIGHT: 59 IN | DIASTOLIC BLOOD PRESSURE: 78 MMHG | BODY MASS INDEX: 41.12 KG/M2 | WEIGHT: 204 LBS

## 2023-02-28 DIAGNOSIS — O03.9 COMPLETE SPONTANEOUS ABORTION: Primary | ICD-10-CM

## 2023-02-28 PROCEDURE — 99214 OFFICE O/P EST MOD 30 MIN: CPT | Performed by: ADVANCED PRACTICE MIDWIFE

## 2023-02-28 NOTE — PROGRESS NOTES
"Sosa Armando is a 32 y.o. female    History of Present Illness  Patient arrived with her daughter for a gyne appointment with an ultrasound for surveillance of a missed .  Services (Jesús #[id number]) utilized for this visit. She was seen in the emergency department for increased bleeding and cramping on 2023. She rated cramping yesterday as being an 8 on a 0/10 scale. Today, she rates the cramping at a 1 on a 0/10 scale. She relates only scant bleeding at this time.         /78   Ht 149.9 cm (59\")   Wt 92.5 kg (204 lb)   Breastfeeding No   BMI 41.20 kg/m²     Outpatient Encounter Medications as of 2023   Medication Sig Dispense Refill   • Prenatal MV-Min-Fe Fum-FA-DHA (PRENATAL 1 PO) Take  by mouth.       No facility-administered encounter medications on file as of 2023.       Surgical History  No past surgical history on file.    Family History  No family history on file.    The following portions of the patient's history were reviewed and updated as appropriate: allergies, current medications, past family history, past medical history, past social history, past surgical history, and problem list.    Review of Systems   Constitutional: Positive for fatigue. Negative for chills and fever.   Respiratory: Negative for chest tightness and shortness of breath.    Cardiovascular: Negative for chest pain.   Gastrointestinal: Negative for abdominal pain, nausea and vomiting.   Genitourinary: Positive for pelvic pain (cramping) and vaginal bleeding (scant). Negative for difficulty urinating and dysuria.   Musculoskeletal: Negative for gait problem.   Skin: Negative for pallor and rash.   Neurological: Negative for dizziness, light-headedness and headache.   Psychiatric/Behavioral: Negative for dysphoric mood and depressed mood. The patient is not nervous/anxious.        Objective   Physical Exam  Vitals and nursing note reviewed.   Constitutional:       General: She " is not in acute distress.     Appearance: Normal appearance. She is well-developed and well-groomed. She is morbidly obese. She is not ill-appearing or diaphoretic.   HENT:      Head: Normocephalic and atraumatic.      Right Ear: External ear normal.      Left Ear: External ear normal.   Eyes:      General: Lids are normal. No scleral icterus.        Right eye: No discharge.         Left eye: No discharge.      Extraocular Movements: Extraocular movements intact.      Conjunctiva/sclera: Conjunctivae normal.   Neck:      Trachea: Phonation normal.   Cardiovascular:      Rate and Rhythm: Normal rate and regular rhythm.      Heart sounds: Normal heart sounds. No murmur heard.  Pulmonary:      Effort: Pulmonary effort is normal. No accessory muscle usage or respiratory distress.      Breath sounds: Normal breath sounds and air entry. No wheezing.   Chest:      Chest wall: No tenderness.   Abdominal:      General: There is no distension.      Palpations: Abdomen is soft.      Tenderness: There is no abdominal tenderness. There is no right CVA tenderness, left CVA tenderness, guarding or rebound.   Musculoskeletal:         General: No tenderness. Normal range of motion.      Cervical back: Normal range of motion and neck supple. No rigidity or tenderness. No pain with movement. Normal range of motion.      Right lower leg: No edema.      Left lower leg: No edema.   Skin:     General: Skin is warm and dry.      Coloration: Skin is not ashen, cyanotic, jaundiced, mottled, pale or sallow.      Findings: No bruising, erythema, lesion or rash.   Neurological:      Mental Status: She is alert and oriented to person, place, and time.      Gait: Gait normal.   Psychiatric:         Attention and Perception: Attention and perception normal.         Mood and Affect: Mood and affect normal.         Speech: Speech normal.         Behavior: Behavior normal. Behavior is cooperative.         Thought Content: Thought content normal.          Cognition and Memory: Cognition and memory normal.         Judgment: Judgment normal.         HC2023: 971.40 mIU/mL  2023: 1616 mIU/mL  2023: 1265 mIU/mL  2023: 992 mIU/mL  02/15/2023:703 mIU/mL  02/10/2023: 617 mIU/mL  2023: 236.80 mIU/mL       TVUS today: Uterus measuring 8.26 cm in length. Endometrial lining measures 4.4 mm. There is no gestational sac visualized. There is no free fluid. Two small simple cysts (2.25 cm) arising from right ovary.     TVUS 2023: Tiny oval fluid collection within the lower uterine segment with centrally increased echogenicity, may represent a small irregular gestational sac, no distinct fetal pole or yolk sac is identified.      TVUS 2023: There appears to be a 10 mm gestational sac within the endometrium at the uterine fundus.  The endometrium is somewhat thickened at 13 mm.  There is no evidence of yolk sac or fetal pole. Normal-appearing ovaries.  No free fluid.      TVUS 2023: 5 mm heterogeneous cystic intrauterine structure, which could reflect a gestational sac. Possible fetal pole measuring 3.5 mm without discernible fetal heart tones.      TVUS 02/10/2023: Anteverted uterus with possible 4.3 mm gestational sac seen in the uterus. No yolk sac or fetal pole visualized. Endometrial lining measures 17.5 mm. 3.79 cm anterior uterine fibroid. Nabothian cyst seen in cervix. Normal appearing ovaries with no cysts or masses noted.      TVUS 2023: No intrauterine pregnancy noted. Complex appearing cyst or follicle measuring 19 x 18 mm.     Chart reviewed for screenings  Last Pap Smear: 2019 NILM (ECTZ absent)    Last Mammogram: Not yet indicated. No family history of breast cancer noted.    Last Colonoscopy: Not yet indicated. No family history of colon cancer noted.   Last Bone Density Scan: Not yet indicated.         Assessment & Plan   Diagnoses and all orders for this visit:    1. Complete spontaneous   (Primary)  - TVUS today and reviewed. Also requested Dr. Correa review the ultrasound for collaboration of care/confirmation of findings. Support offered to patient. Discussed findings from previous ultrasounds and today's ultrasound and also serum Hcg levels. Discussed measures of support, including use of NSAIDs and heat for discomfort. Discussed signs to report, including heavy bleeding, uncontrolled pain, or signs of infection. Education on plan of care for pelvic rest and encouraged to wait at least two cycles prior to future attempt to conceive. Patient does plan to attempt future pregnancy and agrees to start a prenatal vitamin and/or folic acid while trying to conceive. Discussed plan for repeat Hcg level today and monitoring levels to negative, either through repeat Hcg levels or by patient completing home pregnancy tests. Miscarriage education included in the AVS.  -     HCG, B-subunit, Quantitative  - Return to the office in 6 weeks for follow-up after complete spontaneous  and as needed with any concerns.          This note has been signed electronically.    Nichol Nova, RAFA, APRN, CNM, RNC-OB  2023

## 2023-03-01 LAB — HCG INTACT+B SERPL-ACNC: 97.8 MIU/ML

## 2023-03-02 NOTE — ED PROVIDER NOTES
Subjective   History of Present Illness  Patient states that she is approximately 2 weeks pregnant.  States that she has been having bleeding for the past few days.  States she has had ultrasounds recently that showed a small sac but no active heartbeat.  States that she is concerned she is having miscarriage.  States that she has passed 2 large clots in the past few hours.  States she does not have any dysuria or hematuria hematochezia.  She states that she is not having any fevers or chills.  Denies any discharge.  Denies any dysuria.    Encounter was performed with the use of a certified .        Review of Systems   All other systems reviewed and are negative.      Past Medical History:   Diagnosis Date   • Gestational diabetes        No Known Allergies    History reviewed. No pertinent surgical history.    History reviewed. No pertinent family history.    Social History     Socioeconomic History   • Marital status: Single   Tobacco Use   • Smoking status: Never   • Smokeless tobacco: Never   Vaping Use   • Vaping Use: Never used   Substance and Sexual Activity   • Alcohol use: No   • Drug use: No   • Sexual activity: Yes     Partners: Male     Birth control/protection: None           Objective   Physical Exam  Vitals and nursing note reviewed.   Constitutional:       General: She is not in acute distress.     Appearance: Normal appearance. She is not toxic-appearing or diaphoretic.   HENT:      Head: Normocephalic and atraumatic.      Nose: Nose normal.   Eyes:      General:         Right eye: No discharge.         Left eye: No discharge.      Extraocular Movements: Extraocular movements intact.      Conjunctiva/sclera: Conjunctivae normal.   Cardiovascular:      Rate and Rhythm: Normal rate.   Pulmonary:      Effort: Pulmonary effort is normal. No respiratory distress.      Breath sounds: No rhonchi.   Abdominal:      General: Abdomen is flat.      Tenderness: There is no abdominal  tenderness.   Musculoskeletal:         General: Normal range of motion.      Cervical back: Normal range of motion.   Skin:     General: Skin is warm.   Neurological:      General: No focal deficit present.      Mental Status: She is alert and oriented to person, place, and time. Mental status is at baseline.   Psychiatric:         Mood and Affect: Mood normal.         Behavior: Behavior normal.         Thought Content: Thought content normal.         Judgment: Judgment normal.         Procedures           ED Course                                           Medical Decision Making  Patient's ultrasound does show likely interval .  She is Rh+. I went over the workup and results so far with the patient.     I told her that she is to follow-up with her primary care provider or OB/GYN or return to the ER to have a repeat ultrasound and hCG level drawn or return if things worsen in the meantime.     I answered all her questions regarding the emergency department evaluation, diagnosis, and treatment plan in plain and simple language that she was able to understand.     I told her that there is always some diagnostic uncertainty in the ER and the fact that her symptoms may change or reveal themselves further after being discharged. Because of this, I told her that it is VERY IMPORTANT that she follows up (by calling to set up an appointment) with her primary care doctor within the next few days or as soon as reasonably possible so that she can be re-evaluated for improvement in her symptoms or for any other questions.     I also gave her common sense return precautions and told her to return to the emergency department within 24 - 48hrs if she has any new, worsening, or concerning symptoms.    she verbalized understanding of the discharge instructions and agreed with them.     she was discharged in stable condition and was observed ambulating out of the ER.      Inevitable : complicated acute illness or  injury  Amount and/or Complexity of Data Reviewed  Labs: ordered.  Radiology: ordered.          Final diagnoses:   Inevitable        ED Disposition  ED Disposition     ED Disposition   Discharge    Condition   Stable    Comment   --             Provider, No Known  [facility]  [place] [address]  [phone]    Call in 1 day  As needed, If symptoms worsen         Medication List      No changes were made to your prescriptions during this visit.          Sendy Ramachandran MD  [phone]

## 2023-04-14 ENCOUNTER — OFFICE VISIT (OUTPATIENT)
Dept: OBSTETRICS AND GYNECOLOGY | Facility: CLINIC | Age: 33
End: 2023-04-14

## 2023-04-14 VITALS
BODY MASS INDEX: 40.72 KG/M2 | SYSTOLIC BLOOD PRESSURE: 120 MMHG | HEIGHT: 59 IN | WEIGHT: 202 LBS | DIASTOLIC BLOOD PRESSURE: 74 MMHG

## 2023-04-14 DIAGNOSIS — O03.9 FOLLOW-UP VISIT AFTER MISCARRIAGE: Primary | ICD-10-CM

## 2023-04-14 DIAGNOSIS — Z51.89 FOLLOW-UP VISIT AFTER MISCARRIAGE: Primary | ICD-10-CM

## 2023-04-14 DIAGNOSIS — Z31.69 ENCOUNTER FOR PRECONCEPTION CONSULTATION: ICD-10-CM

## 2023-04-14 PROCEDURE — 99213 OFFICE O/P EST LOW 20 MIN: CPT | Performed by: ADVANCED PRACTICE MIDWIFE

## 2023-04-14 NOTE — PROGRESS NOTES
"Sosa Armando is a 32 y.o. female    History of Present Illness  Patient arrived for a gyne appointment for follow-up after a miscarriage.   services utilized for this visit.  was Megan #[id number].  Patient denies any GYN related concerns at this time.  She has resumed her menses as of March 28, 2023.  She experienced 4 days of bleeding, which was described as a little heavier than previous cycles.  Also relates she had little cramping with this left onset of menses.        /74   Ht 149.9 cm (59\")   Wt 91.6 kg (202 lb)   LMP 03/28/2023 (Approximate)   BMI 40.80 kg/m²     Outpatient Encounter Medications as of 4/14/2023   Medication Sig Dispense Refill   • Prenatal MV-Min-Fe Fum-FA-DHA (PRENATAL 1 PO) Take  by mouth.       No facility-administered encounter medications on file as of 4/14/2023.       Surgical History  History reviewed. No pertinent surgical history.    Family History  History reviewed. No pertinent family history.    The following portions of the patient's history were reviewed and updated as appropriate: allergies, current medications, past family history, past medical history, past social history, past surgical history, and problem list.    Review of Systems   Constitutional: Negative for chills, fatigue and fever.   Respiratory: Negative for chest tightness and shortness of breath.    Cardiovascular: Negative for chest pain and leg swelling.   Gastrointestinal: Negative for abdominal pain, nausea and vomiting.   Endocrine: Negative for cold intolerance and heat intolerance.   Genitourinary: Negative for difficulty urinating, dysuria, flank pain, frequency, hematuria, menstrual problem, pelvic pain, pelvic pressure, urgency, urinary incontinence, vaginal bleeding, vaginal discharge and vaginal pain.   Musculoskeletal: Negative for gait problem.   Skin: Negative for pallor, rash and skin lesions.   Allergic/Immunologic: Negative for environmental " allergies, food allergies and immunocompromised state.   Neurological: Negative for dizziness, light-headedness and headache.   Hematological: Negative for adenopathy. Does not bruise/bleed easily.   Psychiatric/Behavioral: Negative for dysphoric mood, self-injury, suicidal ideas and depressed mood. The patient is not nervous/anxious.        Objective   Physical Exam  Vitals and nursing note reviewed.   Constitutional:       General: She is not in acute distress.     Appearance: Normal appearance. She is well-developed and well-groomed. She is morbidly obese. She is not ill-appearing or diaphoretic.   HENT:      Head: Normocephalic and atraumatic.      Right Ear: External ear normal.      Left Ear: External ear normal.   Eyes:      General: Lids are normal. No scleral icterus.        Right eye: No discharge.         Left eye: No discharge.      Extraocular Movements: Extraocular movements intact.      Conjunctiva/sclera: Conjunctivae normal.   Neck:      Trachea: Phonation normal.   Cardiovascular:      Rate and Rhythm: Normal rate and regular rhythm.      Heart sounds: Normal heart sounds. No murmur heard.  Pulmonary:      Effort: Pulmonary effort is normal. No accessory muscle usage or respiratory distress.      Breath sounds: Normal breath sounds and air entry. No wheezing.   Chest:      Chest wall: No tenderness.   Abdominal:      General: There is no distension.      Palpations: Abdomen is soft.      Tenderness: There is no abdominal tenderness. There is no right CVA tenderness, left CVA tenderness, guarding or rebound.   Musculoskeletal:         General: No tenderness. Normal range of motion.      Cervical back: Normal range of motion and neck supple. No rigidity or tenderness. No pain with movement. Normal range of motion.      Right lower leg: No edema.      Left lower leg: No edema.   Skin:     General: Skin is warm and dry.      Coloration: Skin is not ashen, cyanotic, jaundiced, mottled, pale or sallow.       Findings: No bruising, erythema, lesion or rash.   Neurological:      Mental Status: She is alert and oriented to person, place, and time.      Gait: Gait normal.   Psychiatric:         Attention and Perception: Attention and perception normal.         Mood and Affect: Mood and affect normal.         Speech: Speech normal.         Behavior: Behavior normal. Behavior is cooperative.         Thought Content: Thought content normal.         Cognition and Memory: Cognition and memory normal.         Judgment: Judgment normal.          Chart reviewed for screenings  Last Pap Smear: 01/24/2019 NILM (ECTZ absent)    Last Mammogram: Not yet indicated. No family history of breast cancer noted.    Last Colonoscopy: Not yet indicated. No family history of colon cancer noted.   Last Bone Density Scan: Not yet indicated.          Assessment & Plan   Diagnoses and all orders for this visit:    1. Follow-up visit after miscarriage (Primary)  - General physical exam. Discussed healthy lifestyle measures, including healthy diet, adequate hydration, exercise, and sleep hygiene measures.    - PHQ-2 screening today, score of 0. Discussed on healthy lifestyle measures to improve or support mental well-being, including taking time for self, accessing healthy support system, relaxation measures, rest as indicated, exercise, warm epsom salt bath or shower, aromatherapy, massage therapy, and counseling.    - Return to office 3 months for an well woman exam and as needed with concerns.     2. Encounter for preconception consultation  - Discussed healthy lifestyle, including promoting a healthy BMI, healthy dietary selections, portion control, adequate hydration, regular exercise, and healthy support system. Promoted personal well-being physically and mentally/emotionally. Discussed taking a prenatal vitamin at the onset of attempts to conceive. Discussed also folic acid for the prevention of neural tube defects.  At least 0.4 mg should  be taken preconceptionally to reduce the risk.  Preparing for Pregnancy video included in the AVS.  - Return to the office with positive pregnancy test or with concerns as needed.        This note has been signed electronically.    Nichol Nova DNP, APRN, CNM, RNC-OB  04/14/2023

## 2023-04-16 PROBLEM — Z34.90 PREGNANCY: Status: RESOLVED | Noted: 2019-08-30 | Resolved: 2023-04-16

## 2023-11-08 ENCOUNTER — OFFICE VISIT (OUTPATIENT)
Dept: OBSTETRICS AND GYNECOLOGY | Facility: CLINIC | Age: 33
End: 2023-11-08
Payer: MEDICAID

## 2023-11-08 VITALS
SYSTOLIC BLOOD PRESSURE: 110 MMHG | BODY MASS INDEX: 42.74 KG/M2 | HEIGHT: 59 IN | WEIGHT: 212 LBS | DIASTOLIC BLOOD PRESSURE: 68 MMHG

## 2023-11-08 DIAGNOSIS — Z11.3 SCREEN FOR STD (SEXUALLY TRANSMITTED DISEASE): ICD-10-CM

## 2023-11-08 DIAGNOSIS — Z31.61 COUNSELING ABOUT NATURAL FAMILY PLANNING: ICD-10-CM

## 2023-11-08 DIAGNOSIS — Z01.419 ENCOUNTER FOR GYNECOLOGICAL EXAMINATION: Primary | ICD-10-CM

## 2023-11-08 PROCEDURE — 87661 TRICHOMONAS VAGINALIS AMPLIF: CPT | Performed by: NURSE PRACTITIONER

## 2023-11-08 PROCEDURE — 87491 CHLMYD TRACH DNA AMP PROBE: CPT | Performed by: NURSE PRACTITIONER

## 2023-11-08 PROCEDURE — G0123 SCREEN CERV/VAG THIN LAYER: HCPCS | Performed by: NURSE PRACTITIONER

## 2023-11-08 PROCEDURE — 87591 N.GONORRHOEAE DNA AMP PROB: CPT | Performed by: NURSE PRACTITIONER

## 2023-11-08 PROCEDURE — 87624 HPV HI-RISK TYP POOLED RSLT: CPT | Performed by: NURSE PRACTITIONER

## 2023-11-08 NOTE — PATIENT INSTRUCTIONS

## 2023-11-08 NOTE — PROGRESS NOTES
"Chief Complaint  Annual Exam (Pt is here for an annual exam. /Last pap 1/24/19 negative/Pt has no complaints today/Interpretor #[id number])    Sosa Armando presents to [facility]  History of Present Illness  She asks why she has not become pregnant since miscarriage.         Review of Systems   Constitutional:  Negative for activity change, appetite change, fatigue and fever.   HENT:  Negative for congestion, sore throat and trouble swallowing.    Eyes:  Negative for pain, discharge and visual disturbance.   Respiratory:  Negative for apnea, shortness of breath and wheezing.    Cardiovascular:  Negative for chest pain, palpitations and leg swelling.   Gastrointestinal:  Negative for abdominal pain, constipation and diarrhea.   Genitourinary:  Negative for frequency, pelvic pain, urgency and vaginal discharge.        Periods restarted in April     Musculoskeletal:  Negative for back pain and gait problem.   Skin:  Negative for color change and rash.   Neurological:  Negative for dizziness, weakness and numbness.   Psychiatric/Behavioral:  Negative for confusion and sleep disturbance.         Objective   Vital Signs:   /68   Ht 149.9 cm (59\")   Wt 96.2 kg (212 lb)   BMI 42.82 kg/m²     Physical Exam  Vitals and nursing note reviewed. Exam conducted with a chaperone present.   Constitutional:       General: She is not in acute distress.     Appearance: She is well-developed. She is not diaphoretic.   HENT:      Head: Normocephalic.      Right Ear: External ear normal.      Left Ear: External ear normal.      Nose: Nose normal.   Eyes:      General: No scleral icterus.        Right eye: No discharge.         Left eye: No discharge.      Conjunctiva/sclera: Conjunctivae normal.      Pupils: Pupils are equal, round, and reactive to light.   Neck:      Thyroid: No thyromegaly.      Vascular: No carotid bruit.      Trachea: No tracheal deviation.   Cardiovascular:      Rate " and Rhythm: Normal rate and regular rhythm.      Heart sounds: Normal heart sounds. No murmur heard.  Pulmonary:      Effort: Pulmonary effort is normal. No respiratory distress.      Breath sounds: Normal breath sounds. No wheezing.   Chest:   Breasts:     Breasts are symmetrical.      Right: Normal. No swelling, bleeding, inverted nipple, mass, nipple discharge, skin change or tenderness.      Left: Normal. No swelling, bleeding, inverted nipple, mass, nipple discharge, skin change or tenderness.   Abdominal:      General: There is no distension.      Palpations: Abdomen is soft. There is no mass.      Tenderness: There is no abdominal tenderness. There is no right CVA tenderness, left CVA tenderness or guarding.      Hernia: No hernia is present. There is no hernia in the left inguinal area or right inguinal area.   Genitourinary:     General: Normal vulva.      Exam position: Lithotomy position.      Labia:         Right: No rash, tenderness, lesion or injury.         Left: No rash, tenderness, lesion or injury.       Vagina: Normal. No signs of injury and foreign body. No vaginal discharge, erythema, tenderness or bleeding.      Cervix: Normal.      Uterus: Normal. Not enlarged, not fixed and not tender.       Adnexa: Right adnexa normal and left adnexa normal.        Right: No mass, tenderness or fullness.          Left: No mass, tenderness or fullness.        Rectum: Normal. No mass.      Comments:   BSU normal  Urethral meatus  Normal  Perineum  Normal  Musculoskeletal:         General: No tenderness. Normal range of motion.      Cervical back: Normal range of motion and neck supple.   Lymphadenopathy:      Head:      Right side of head: No submental, submandibular, tonsillar, preauricular, posterior auricular or occipital adenopathy.      Left side of head: No submental, submandibular, tonsillar, preauricular, posterior auricular or occipital adenopathy.      Cervical: No cervical adenopathy.      Right  cervical: No superficial, deep or posterior cervical adenopathy.     Left cervical: No superficial, deep or posterior cervical adenopathy.      Upper Body:      Right upper body: No supraclavicular, axillary or pectoral adenopathy.      Left upper body: No supraclavicular, axillary or pectoral adenopathy.      Lower Body: No right inguinal adenopathy. No left inguinal adenopathy.   Skin:     General: Skin is warm and dry.      Findings: No bruising, erythema or rash.   Neurological:      Mental Status: She is alert and oriented to person, place, and time.      Coordination: Coordination normal.   Psychiatric:         Mood and Affect: Mood normal.         Behavior: Behavior normal.         Thought Content: Thought content normal.         Judgment: Judgment normal.         Result Review :                     Assessment and Plan      Well woman GYN exam.   Pap smear done per ASCCP guidelines.     Discussed STD prevention and testing.   Chlamydia/Gonorrhea/Trichomonas added to pap.   RPR, Hep panel and HIV ordered.     Pt desires pregnancy.   Labs ordered.     Educational material provided related to breast self awareness, exercising to stay healthy, calcium/vitamin D, and Kegel exercises.       Diagnoses and all orders for this visit:    1. Encounter for gynecological examination (Primary)  -     Liquid-based Pap Smear, Screening  -     Comprehensive Metabolic Panel  -     Estradiol  -     Follicle Stimulating Hormone  -     Insulin, Total  -     Luteinizing Hormone  -     Progesterone  -     Testosterone  -     DHEA-Sulfate  -     Cortisol  -     Hemoglobin A1c  -     T4, Free  -     TSH  -     HIV-1 / O / 2 Ag / Antibody  -     Hepatitis Panel, Acute  -     RPR  -     Trichomonas vaginalis, PCR - ThinPrep Vial, Cervix  -     HPV DNA Probe, Direct - ThinPrep Vial, Cervix  -     Chlamydia trachomatis, Neisseria gonorrhoeae, PCR - ThinPrep Vial, Cervix    2. Screen for STD (sexually transmitted disease)    3. Counseling  about natural family planning          Class 3 Severe Obesity (BMI >=40). Obesity-related health conditions include the following: none. Obesity is unchanged. BMI is is above average; no BMI management plan is appropriate. We discussed increasing exercise.       Follow Up   Return in 4 weeks (on 12/6/2023).    Patient was given instructions and counseling regarding her condition or for health maintenance advice. Please see specific information pulled into the AVS if appropriate.

## 2023-11-09 LAB
C TRACH RRNA CVX QL NAA+PROBE: NOT DETECTED
GEN CATEG CVX/VAG CYTO-IMP: NORMAL
HPV I/H RISK 4 DNA CVX QL PROBE+SIG AMP: NOT DETECTED
LAB AP CASE REPORT: NORMAL
LAB AP GYN ADDITIONAL INFORMATION: NORMAL
Lab: NORMAL
N GONORRHOEA RRNA SPEC QL NAA+PROBE: NOT DETECTED
PATH INTERP SPEC-IMP: NORMAL
STAT OF ADQ CVX/VAG CYTO-IMP: NORMAL
TRICHOMONAS VAGINALIS PCR: NOT DETECTED

## 2023-11-18 LAB
ALBUMIN SERPL-MCNC: 4 G/DL (ref 3.9–4.9)
ALBUMIN/GLOB SERPL: 1.3 {RATIO} (ref 1.2–2.2)
ALP SERPL-CCNC: 102 IU/L (ref 44–121)
ALT SERPL-CCNC: 31 IU/L (ref 0–32)
AST SERPL-CCNC: 23 IU/L (ref 0–40)
BILIRUB SERPL-MCNC: 0.2 MG/DL (ref 0–1.2)
BUN SERPL-MCNC: 11 MG/DL (ref 6–20)
BUN/CREAT SERPL: 17 (ref 9–23)
CALCIUM SERPL-MCNC: 9.2 MG/DL (ref 8.7–10.2)
CHLORIDE SERPL-SCNC: 101 MMOL/L (ref 96–106)
CO2 SERPL-SCNC: 23 MMOL/L (ref 20–29)
CORTIS SERPL-MCNC: 10.2 UG/DL (ref 6.2–19.4)
CREAT SERPL-MCNC: 0.64 MG/DL (ref 0.57–1)
DHEA-S SERPL-MCNC: 268 UG/DL (ref 84.8–378)
EGFRCR SERPLBLD CKD-EPI 2021: 120 ML/MIN/1.73
ESTRADIOL SERPL-MCNC: 100 PG/ML
FSH SERPL-ACNC: 4.9 MIU/ML
GLOBULIN SER CALC-MCNC: 3.1 G/DL (ref 1.5–4.5)
GLUCOSE SERPL-MCNC: 86 MG/DL (ref 70–99)
HAV IGM SERPL QL IA: NEGATIVE
HBA1C MFR BLD: 5.8 % (ref 4.8–5.6)
HBV CORE IGM SERPL QL IA: NEGATIVE
HBV SURFACE AG SERPL QL IA: NEGATIVE
HCV AB SERPL QL IA: NORMAL
HCV IGG SERPL QL IA: NON REACTIVE
HIV 1+2 AB+HIV1 P24 AG SERPL QL IA: NON REACTIVE
INSULIN SERPL-ACNC: 35.3 UIU/ML (ref 2.6–24.9)
LH SERPL-ACNC: 5.5 MIU/ML
POTASSIUM SERPL-SCNC: 4.3 MMOL/L (ref 3.5–5.2)
PROGEST SERPL-MCNC: 0.3 NG/ML
PROT SERPL-MCNC: 7.1 G/DL (ref 6–8.5)
RPR SER QL: NON REACTIVE
SODIUM SERPL-SCNC: 138 MMOL/L (ref 134–144)
T4 FREE SERPL-MCNC: 0.91 NG/DL (ref 0.82–1.77)
TESTOST SERPL-MCNC: 48 NG/DL (ref 8–60)
TSH SERPL DL<=0.005 MIU/L-ACNC: 1.63 UIU/ML (ref 0.45–4.5)

## 2023-12-15 ENCOUNTER — OFFICE VISIT (OUTPATIENT)
Dept: OBSTETRICS AND GYNECOLOGY | Facility: CLINIC | Age: 33
End: 2023-12-15

## 2023-12-15 VITALS
HEIGHT: 59 IN | DIASTOLIC BLOOD PRESSURE: 78 MMHG | WEIGHT: 210 LBS | BODY MASS INDEX: 42.33 KG/M2 | SYSTOLIC BLOOD PRESSURE: 122 MMHG

## 2023-12-15 DIAGNOSIS — Z71.2 ENCOUNTER TO DISCUSS TEST RESULTS: Primary | ICD-10-CM

## 2023-12-15 DIAGNOSIS — Z76.89 ENCOUNTER TO ESTABLISH CARE: ICD-10-CM

## 2023-12-15 NOTE — PROGRESS NOTES
"Chief Complaint  Gynecologic Exam (Pt is here for a four week follow up and to discuss lab results.  Pt has no complaints today.  Interpretor ID: [id number]/)    Sosa Armando presents to [facility]  Gynecologic Exam  The patient's pertinent negatives include no pelvic pain. Pertinent negatives include no frequency.     The patient presents for follow-up appointment to discuss her lab results. She is accompanied by an .    Her goal is to become pregnant. She does not have a primary care provider.    Review of Systems   Genitourinary:  Negative for dyspareunia, frequency and pelvic pain.         Objective   Vital Signs:   /78   Ht 149.9 cm (59\")   Wt 95.3 kg (210 lb)   BMI 42.41 kg/m²     Physical Exam  Pulmonary:      Effort: Pulmonary effort is normal.   Neurological:      Mental Status: She is alert.         Result Review :                       Assessment and Plan      Reviewed results with elevated Hgb A1C and insulin level.   Pt opts to have referral to [facility].   Advised pt it would be best to be in good health with controlled blood sugar prior to pregnancy.   Discussed menses and ovulation.       Diagnoses and all orders for this visit:    1. Encounter to discuss test results (Primary)    2. Encounter to establish care  -     Cancel: Ambulatory Referral to Family Practice  -     Ambulatory Referral to Family Practice                  Follow Up   Return in about 3 months (around 3/15/2024).    Patient was given instructions and counseling regarding her condition or for health maintenance advice. Please see specific information pulled into the AVS if appropriate.     Transcribed from ambient dictation for TERRENCE Jensen by Kelvin Lara.  12/15/23   18:25 CST    Patient or patient representative verbalized consent to the visit recording.  I have personally performed the services described in this document as transcribed by the above " individual, and it is both accurate and complete.  Staci Amanda, APRN  12/27/2023  22:54 CST

## 2023-12-28 NOTE — PATIENT INSTRUCTIONS

## 2024-03-15 ENCOUNTER — OFFICE VISIT (OUTPATIENT)
Dept: OBSTETRICS AND GYNECOLOGY | Age: 34
End: 2024-03-15
Payer: MEDICAID

## 2024-03-15 VITALS
WEIGHT: 218 LBS | HEIGHT: 59 IN | SYSTOLIC BLOOD PRESSURE: 116 MMHG | BODY MASS INDEX: 43.95 KG/M2 | DIASTOLIC BLOOD PRESSURE: 84 MMHG

## 2024-03-15 DIAGNOSIS — N92.6 MISSED PERIOD: Primary | ICD-10-CM

## 2024-03-15 DIAGNOSIS — Z32.01 POSITIVE PREGNANCY TEST: ICD-10-CM

## 2024-03-15 NOTE — PROGRESS NOTES
"Chief Complaint  Amenorrhea (Pt is here for a possible pregnancy.  Last period was Feb 7th, pt had a positive home pregnancy test on Tues or Wed this week.  Denies any bleeding, does have a little bit of stomach pain. //Interpretor ID: [id number])    Sosa Armando presents to [facility]  History of Present Illness  The patient presents for evaluation of multiple medical concerns. She is accompanied by a .    The patient's urine pregnancy test is positive.  The patient recently had a miscarriage.  She denies any vaginal bleeding.  The patient has no questions today.  The patient is 5 weeks and 2 days gestation based on last menstrual cycle.    Review of Systems   Constitutional:  Negative for activity change and fever.   Genitourinary:  Negative for pelvic pain and vaginal bleeding.         Objective   Vital Signs:   /84   Ht 149.9 cm (59\")   Wt 98.9 kg (218 lb)   BMI 44.03 kg/m²     Physical Exam  Cardiovascular:      Rate and Rhythm: Normal rate.   Pulmonary:      Effort: Pulmonary effort is normal.   Neurological:      Mental Status: She is alert and oriented to person, place, and time.         Result Review :   The following data was reviewed by: TERRENCE Jensen on 03/15/2024:    Data reviewed : Radiologic studies transvaginal US    Narrative & Impression   Indication: Amenorrhea, positive pregnancy test  No comparison made  Interpretation: The uterus is of normal size and structure, 9.5 cm in length.  The endometrium measures 15.6 mm.  There is no evidence of intrauterine pregnancy on today's exam.  The ovaries appear normal without cysts or masses.  There is no free fluid.     Electronically signed by Felix Leong MD, 03/18/24, 7:22 AM CDT.                 Assessment and Plan      Early pregnancy.   Pt has hx of SAB  Will obtain hCG quant and will repeat.     Diagnoses and all orders for this visit:    1. Missed period " (Primary)  -     POC Pregnancy, Urine  -     HCG, B-subunit, Quantitative  -     ABO / Rh  -     Antibody Screen    2. Positive pregnancy test  -     HCG, B-subunit, Quantitative  -     ABO / Rh  -     Antibody Screen                  Follow Up   Return in about 2 weeks (around 3/29/2024) for New OB with US.    Patient was given instructions and counseling regarding her condition or for health maintenance advice. Please see specific information pulled into the AVS if appropriate.

## 2024-03-16 LAB
ABO GROUP BLD: NORMAL
BLD GP AB SCN SERPL QL: NEGATIVE
HCG INTACT+B SERPL-ACNC: 68 MIU/ML
RH BLD: POSITIVE

## 2024-03-20 DIAGNOSIS — Z32.01 POSITIVE PREGNANCY TEST: Primary | ICD-10-CM

## 2024-03-23 LAB — HCG INTACT+B SERPL-ACNC: 260 MIU/ML

## 2024-04-02 ENCOUNTER — OFFICE VISIT (OUTPATIENT)
Age: 34
End: 2024-04-02
Payer: MEDICAID

## 2024-04-02 VITALS
WEIGHT: 219 LBS | DIASTOLIC BLOOD PRESSURE: 74 MMHG | SYSTOLIC BLOOD PRESSURE: 126 MMHG | BODY MASS INDEX: 44.15 KG/M2 | HEIGHT: 59 IN

## 2024-04-02 DIAGNOSIS — N92.6 MISSED PERIOD: Primary | ICD-10-CM

## 2024-04-02 DIAGNOSIS — O36.80X0 PREGNANCY WITH INCONCLUSIVE FETAL VIABILITY, SINGLE OR UNSPECIFIED FETUS: ICD-10-CM

## 2024-04-02 LAB
B-HCG UR QL: POSITIVE
EXPIRATION DATE: ABNORMAL
INTERNAL NEGATIVE CONTROL: NEGATIVE
INTERNAL POSITIVE CONTROL: POSITIVE
Lab: ABNORMAL

## 2024-04-02 RX ORDER — PRENATAL VIT NO.126/IRON/FOLIC 28MG-0.8MG
TABLET ORAL DAILY
COMMUNITY

## 2024-04-02 NOTE — PROGRESS NOTES
"Sosa Armando is a 33 y.o. female    History of Present Illness  Patient arrived for GYN appointment for conformation of pregnancy after a positive home pregnancy test. She reports LMP as 2/7/24 for a possible estimated gestational age of 7/6. She is feeling pain in her right side on occasions. Describes the pain like when she is going to start her period and notices the planning when \"I walk too much.\" Denies vaginal bleeding.         /74   Ht 149.9 cm (59\")   Wt 99.3 kg (219 lb)   LMP 02/07/2024   BMI 44.23 kg/m²     Outpatient Encounter Medications as of 4/2/2024   Medication Sig Dispense Refill    prenatal vitamin (prenatal, CLASSIC, vitamin) tablet Take  by mouth Daily.       No facility-administered encounter medications on file as of 4/2/2024.       Surgical History  History reviewed. No pertinent surgical history.    Family History  History reviewed. No pertinent family history.    The following portions of the patient's history were reviewed and updated as appropriate: allergies, current medications, past family history, past medical history, past social history, past surgical history, and problem list.    Review of Systems   Constitutional:  Negative for fatigue.   Respiratory:  Negative for chest tightness and shortness of breath.    Cardiovascular:  Negative for chest pain, palpitations and leg swelling.   Gastrointestinal:  Negative for constipation, diarrhea, nausea and vomiting.   Endocrine: Negative for cold intolerance and heat intolerance.   Genitourinary:  Positive for amenorrhea and pelvic pain. Negative for breast pain, difficulty urinating, dysuria, frequency, urgency, vaginal bleeding and vaginal discharge.   Musculoskeletal:  Negative for arthralgias, back pain and myalgias.   Skin:  Negative for pallor and rash.   Allergic/Immunologic: Negative for environmental allergies and food allergies.   Neurological:  Positive for headache (sometimes). Negative for dizziness. "   Psychiatric/Behavioral:  Negative for self-injury, suicidal ideas and depressed mood. The patient is not nervous/anxious.        Objective   Physical Exam  Vitals and nursing note reviewed.   Constitutional:       General: She is not in acute distress.     Appearance: Normal appearance. She is well-developed and well-groomed. She is obese. She is not ill-appearing or diaphoretic.   HENT:      Head: Normocephalic and atraumatic.   Eyes:      General: Lids are normal. No scleral icterus.  Neck:      Trachea: Phonation normal.   Cardiovascular:      Rate and Rhythm: Normal rate and regular rhythm.      Heart sounds: Normal heart sounds. No murmur heard.  Pulmonary:      Effort: Pulmonary effort is normal. No accessory muscle usage or respiratory distress.      Breath sounds: Normal breath sounds.   Abdominal:      Palpations: Abdomen is soft.      Tenderness: There is no abdominal tenderness. There is no right CVA tenderness or left CVA tenderness.   Musculoskeletal:         General: No tenderness. Normal range of motion.      Cervical back: Normal range of motion and neck supple. No rigidity or tenderness. No pain with movement. Normal range of motion.      Right lower leg: No edema.      Left lower leg: No edema.   Lymphadenopathy:      Cervical: No cervical adenopathy.   Skin:     General: Skin is warm and dry.      Coloration: Skin is not cyanotic, jaundiced or pale.      Findings: No lesion or rash.   Neurological:      Mental Status: She is alert and oriented to person, place, and time.      Gait: Gait normal.   Psychiatric:         Attention and Perception: Attention normal.         Mood and Affect: Mood and affect normal.         Speech: Speech normal.         Behavior: Behavior is cooperative.           U/S today: Uterus of appropriate size with thickened endometrium. Possible intrauterine gestational sac or fluid collection measures 6.1 cm for estimated gestational age of 5 weeks, 2 days. Right ovary  appears larger than left ovary.     U/S 03/15/2024: Uterus of normal size and structure, 9.5 cm in length. Endometrial lining measures 15.6 mm. No evidence of IUP on today's exam. Normal-appearing ovaries. No free fluid.         Hc2024: 328 mIU/mL  2024: 260 mIU/mL  2024: 200 mIU/mL  03/15/2024: 68 mIU/mL        Chart reviewed for screenings  Last Pap Smear: 23 negative ECTZ present. HPV not detected.   Last Mammogram: Screening to begin at age 40, no family history of breast cancer noted.   Last Colonoscopy: Screening to begin at age 45, no family history of colon cancer noted.    Last Bone Density Scan: Screening to begin by 5 years after the onset of menopause.          Assessment & Plan   Diagnoses and all orders for this visit:    1. Missed period (Primary)  Urine pregnancy test today and reviewed: positive. Ultrasound today and reviewed.   -     POC Pregnancy, Urine    2. Pregnancy with inconclusive fetal viability, single or unspecified fetus  Ultrasound today and reviewed. Discussed findings of early pregnancy vs anembryonic pregnancy vs ectopic pregnancy. Plan for serial Hcg levels and also for follow-up with repeat ultrasound later this week. Discussed signs to report, including vaginal bleeding and unmanaged or increased pelvic pain. Questions answered to patient satisfaction. Preferred for her daughter to be the  during today's assessment.   -     HCG, B-subunit, Quantitative  -     ABO / Rh  -     Antibody Screen  -     CBC & Differential         Return to the office in 2-3 days for a follow-up visit with an ultrasound for surveillance of pregnancy with inconclusive viability and right adnexal pain and as needed with concerns.         This note has been signed electronically.    Nichol Nova, DNP, APRN, CNM, RNC-OB  2024

## 2024-04-03 LAB
ABO GROUP BLD: NORMAL
BASOPHILS # BLD AUTO: 0.04 10*3/MM3 (ref 0–0.2)
BASOPHILS NFR BLD AUTO: 0.4 % (ref 0–1.5)
BLD GP AB SCN SERPL QL: NEGATIVE
EOSINOPHIL # BLD AUTO: 0.14 10*3/MM3 (ref 0–0.4)
EOSINOPHIL NFR BLD AUTO: 1.3 % (ref 0.3–6.2)
ERYTHROCYTE [DISTWIDTH] IN BLOOD BY AUTOMATED COUNT: 13.2 % (ref 12.3–15.4)
HCG INTACT+B SERPL-ACNC: 583 MIU/ML
HCT VFR BLD AUTO: 40.8 % (ref 34–46.6)
HGB BLD-MCNC: 12.9 G/DL (ref 12–15.9)
IMM GRANULOCYTES # BLD AUTO: 0.06 10*3/MM3 (ref 0–0.05)
IMM GRANULOCYTES NFR BLD AUTO: 0.6 % (ref 0–0.5)
LYMPHOCYTES # BLD AUTO: 3.35 10*3/MM3 (ref 0.7–3.1)
LYMPHOCYTES NFR BLD AUTO: 31.1 % (ref 19.6–45.3)
MCH RBC QN AUTO: 26.5 PG (ref 26.6–33)
MCHC RBC AUTO-ENTMCNC: 31.6 G/DL (ref 31.5–35.7)
MCV RBC AUTO: 84 FL (ref 79–97)
MONOCYTES # BLD AUTO: 0.69 10*3/MM3 (ref 0.1–0.9)
MONOCYTES NFR BLD AUTO: 6.4 % (ref 5–12)
NEUTROPHILS # BLD AUTO: 6.48 10*3/MM3 (ref 1.7–7)
NEUTROPHILS NFR BLD AUTO: 60.2 % (ref 42.7–76)
NRBC BLD AUTO-RTO: 0 /100 WBC (ref 0–0.2)
PLATELET # BLD AUTO: 314 10*3/MM3 (ref 140–450)
RBC # BLD AUTO: 4.86 10*6/MM3 (ref 3.77–5.28)
RH BLD: POSITIVE
WBC # BLD AUTO: 10.76 10*3/MM3 (ref 3.4–10.8)

## 2024-04-04 ENCOUNTER — OFFICE VISIT (OUTPATIENT)
Age: 34
End: 2024-04-04
Payer: MEDICAID

## 2024-04-04 VITALS
DIASTOLIC BLOOD PRESSURE: 74 MMHG | BODY MASS INDEX: 43.55 KG/M2 | WEIGHT: 216 LBS | HEIGHT: 59 IN | SYSTOLIC BLOOD PRESSURE: 122 MMHG

## 2024-04-04 DIAGNOSIS — O02.1 MISSED ABORTION: ICD-10-CM

## 2024-04-04 DIAGNOSIS — O02.0 ANEMBRYONIC PREGNANCY: Primary | ICD-10-CM

## 2024-04-04 NOTE — PROGRESS NOTES
"Sosa Armando is a 33 y.o. female    History of Present Illness  Patient arrived for a follow-up gyne visit with an ultrasound to determine viability of early pregnancy. She has her daughter here to act as  and declines phone  service. Denies vaginal bleeding. Reports pain as more to the middle of lower abdomen or upper pelvic area. It is intermittent and tolerable. She notices the discomfort more when she is experiencing constipation. Denies vaginal bleeding, fever, chills, dysuria.         /74   Ht 149.9 cm (59\")   Wt 98 kg (216 lb)   LMP 02/07/2024   BMI 43.63 kg/m²     Outpatient Encounter Medications as of 4/4/2024   Medication Sig Dispense Refill    prenatal vitamin (prenatal, CLASSIC, vitamin) tablet Take  by mouth Daily.       No facility-administered encounter medications on file as of 4/4/2024.       Surgical History  History reviewed. No pertinent surgical history.    Family History  History reviewed. No pertinent family history.    The following portions of the patient's history were reviewed and updated as appropriate: allergies, current medications, past family history, past medical history, past social history, past surgical history, and problem list.    Review of Systems   Constitutional:  Positive for fatigue. Negative for chills and fever.   Respiratory:  Negative for chest tightness and shortness of breath.    Cardiovascular:  Negative for chest pain and leg swelling.   Gastrointestinal:  Negative for abdominal pain, constipation, diarrhea, nausea and vomiting.   Genitourinary:  Positive for amenorrhea and pelvic pain. Negative for breast pain, difficulty urinating, dysuria, flank pain, frequency, menstrual problem, pelvic pressure, urgency, urinary incontinence, vaginal bleeding, vaginal discharge and vaginal pain.   Musculoskeletal:  Negative for gait problem.   Skin:  Negative for pallor and rash.   Allergic/Immunologic: Negative for environmental " allergies, food allergies and immunocompromised state.   Neurological:  Negative for dizziness and headache.   Hematological:  Negative for adenopathy.   Psychiatric/Behavioral:  Negative for dysphoric mood, self-injury, suicidal ideas and depressed mood. The patient is not nervous/anxious.        Objective   Physical Exam  Vitals and nursing note reviewed.   Constitutional:       General: She is not in acute distress.     Appearance: Normal appearance. She is well-developed and well-groomed. She is morbidly obese. She is not ill-appearing or diaphoretic.   HENT:      Head: Normocephalic and atraumatic.   Eyes:      General: Lids are normal. No scleral icterus.  Neck:      Trachea: Phonation normal.   Cardiovascular:      Rate and Rhythm: Normal rate and regular rhythm.      Heart sounds: Normal heart sounds. No murmur heard.  Pulmonary:      Effort: Pulmonary effort is normal. No accessory muscle usage or respiratory distress.      Breath sounds: Normal breath sounds.   Abdominal:      Palpations: Abdomen is soft.      Tenderness: There is no abdominal tenderness. There is no right CVA tenderness or left CVA tenderness.   Musculoskeletal:         General: No tenderness. Normal range of motion.      Cervical back: Normal range of motion and neck supple. No rigidity or tenderness. No pain with movement. Normal range of motion.      Right lower leg: No edema.      Left lower leg: No edema.   Lymphadenopathy:      Cervical: No cervical adenopathy.   Skin:     General: Skin is warm and dry.      Coloration: Skin is not cyanotic, jaundiced or pale.      Findings: No lesion or rash.   Neurological:      Mental Status: She is alert and oriented to person, place, and time.      Gait: Gait normal.   Psychiatric:         Attention and Perception: Attention normal.         Mood and Affect: Mood and affect normal.         Speech: Speech normal.         Behavior: Behavior is cooperative.           U/S today: Anteverted uterus  measures 9.97 cm in length. Endometrial lining measures 24.5 mm. Intrauterine fluid collection still noted and measuring 6.3 mm in diameter. No yolk sac or fetal pole evidenced. Normal-appearing left ovary. Right ovary with 2 simple cysts (1.9 x 1.4 cm and 1.2 x 1.2 cm).    U/S 2024: The uterus appears normal size and contains a small fluid collection within the endometrium which may represent gestational sac. There is no yolk sac or fetal pole visible. Gestational sac diameter is 6.1 mm.. The ovaries appear normal without cysts or masses. There is no free fluid.     U/S 03/15/2024: Uterus of normal size and structure, 9.5 cm in length. Endometrial lining measures 15.6 mm. No evidence of IUP on today's exam. Normal-appearing ovaries. No free fluid.      Hc2024: 583 mIU/mL  2024: 328 mIU/mL  2024: 260 mIU/mL  2024: 200 mIU/mL  03/15/2024: 68 mIU/mL     Chart reviewed for screenings  Last Pap Smear: 23 negative ECTZ present. HPV not detected.   Last Mammogram: Screening to begin at age 40, no family history of breast cancer noted.   Last Colonoscopy: Screening to begin at age 45, no family history of colon cancer noted.    Last Bone Density Scan: Screening to begin by 5 years after the onset of menopause.      Assessment & Plan   Diagnoses and all orders for this visit:    1. Anembryonic pregnancy (Primary)  Ultrasound today and reviewed with Dr. Leong, along with patient concerns, physical assessment findings, and labs for collaboration with care. Plan to include offering methotrexate and then also possibility of methotrexate after D&C if patient would choose surgical intervention dependent upon findings.     Early pregnancy loss management options discussed including expectant management, medical management with Methotrexate, and surgical management. Each discussed in detail including time frames and rates for completion without other intervention, follow-up. Complications  of each discussed including possible need for surgical intervention following expectant or medical management. Explained that she would have a consultation with a gynecologist if she chose surgical intervention. Also discussed the possibility of the need for Methotrexate after surgical management dependent upon pathology findings. Risks of bleeding, pain, and infection discussed. Discussed with patient, she can discuss options with her family, grieve the loss, and notify provider of her choice. She expressed understanding of the above but plans to proceed with expectant management. Her blood type is O+, so rhogam is not indicated. Discussed plan to continue to monitor Hcg level at least twice weekly and follow-up with ultrasounds at least weekly. Anembryonic Pregnancy, Methotrexate Treatment for an Ectopic Pregnancy, Recurrent Pregnancy Loss, and Managing pregnancy loss.     2. Missed          Return to the office in 1 week for a gyne visit with an ultrasound for follow-up of anembryonic pregnancy/missed  and as needed with concerns.         This note has been signed electronically.    Nichol Nova, RAFA, APRN, CNM, RNC-OB  2024

## 2024-04-05 DIAGNOSIS — O36.80X0 PREGNANCY WITH INCONCLUSIVE FETAL VIABILITY, SINGLE OR UNSPECIFIED FETUS: Primary | ICD-10-CM

## 2024-04-06 LAB — HCG INTACT+B SERPL-ACNC: 625 MIU/ML

## 2024-04-09 ENCOUNTER — OFFICE VISIT (OUTPATIENT)
Age: 34
End: 2024-04-09
Payer: MEDICAID

## 2024-04-09 VITALS
SYSTOLIC BLOOD PRESSURE: 118 MMHG | BODY MASS INDEX: 43.95 KG/M2 | HEIGHT: 59 IN | DIASTOLIC BLOOD PRESSURE: 76 MMHG | WEIGHT: 218 LBS

## 2024-04-09 DIAGNOSIS — O02.0 ANEMBRYONIC PREGNANCY: Primary | ICD-10-CM

## 2024-04-09 DIAGNOSIS — O02.1 MISSED ABORTION: ICD-10-CM

## 2024-04-09 NOTE — PROGRESS NOTES
"Sosa Armando is a 33 y.o. female    History of Present Illness  Patient arrived for gyne appointment for surveillance of an early pregnancy test, which appears to be an anembryonic pregnancy versus ectopic pregnancy. She denies vaginal bleeding at this time. She has some random, mild cramping, more to just right of midline when it occurs.         /76 (BP Location: Left arm, Patient Position: Sitting, Cuff Size: Large Adult)   Ht 149.9 cm (59\")   Wt 98.9 kg (218 lb)   LMP 02/07/2024   Breastfeeding No   BMI 44.03 kg/m²     Outpatient Encounter Medications as of 4/9/2024   Medication Sig Dispense Refill    prenatal vitamin (prenatal, CLASSIC, vitamin) tablet Take  by mouth Daily.       No facility-administered encounter medications on file as of 4/9/2024.       Surgical History  History reviewed. No pertinent surgical history.    Family History  History reviewed. No pertinent family history.    The following portions of the patient's history were reviewed and updated as appropriate: allergies, current medications, past family history, past medical history, past social history, past surgical history, and problem list.    Review of Systems   Constitutional:  Negative for fatigue.   Respiratory:  Negative for chest tightness.    Cardiovascular:  Positive for leg swelling (feet and leg swelling after walking). Negative for chest pain.   Gastrointestinal:  Negative for abdominal pain, nausea and vomiting.   Endocrine: Negative for cold intolerance and heat intolerance.   Genitourinary:  Positive for breast pain and pelvic pain (occasional - mild). Negative for breast discharge, breast lump, difficulty urinating, dysuria, frequency, urgency, vaginal bleeding and vaginal discharge.   Musculoskeletal:  Positive for joint swelling (feet and ankles with walking). Negative for arthralgias, back pain and myalgias.   Skin:  Negative for rash and wound.   Allergic/Immunologic: Negative for environmental " allergies.   Neurological:  Negative for dizziness and headache.   Hematological:  Negative for adenopathy.   Psychiatric/Behavioral:  Negative for self-injury, suicidal ideas and depressed mood. The patient is not nervous/anxious.        Objective   Physical Exam  Vitals and nursing note reviewed.   Constitutional:       General: She is not in acute distress.     Appearance: Normal appearance. She is well-developed and well-groomed. She is morbidly obese. She is not ill-appearing or diaphoretic.   HENT:      Head: Normocephalic and atraumatic.   Eyes:      General: Lids are normal. No scleral icterus.  Neck:      Trachea: Phonation normal.   Cardiovascular:      Rate and Rhythm: Normal rate and regular rhythm.      Heart sounds: Normal heart sounds. No murmur heard.  Pulmonary:      Effort: Pulmonary effort is normal. No accessory muscle usage or respiratory distress.      Breath sounds: Normal breath sounds.   Abdominal:      Palpations: Abdomen is soft.      Tenderness: There is no abdominal tenderness. There is no right CVA tenderness or left CVA tenderness.   Musculoskeletal:         General: No tenderness. Normal range of motion.      Cervical back: Normal range of motion and neck supple. No rigidity or tenderness. No pain with movement. Normal range of motion.      Right lower leg: No edema.      Left lower leg: No edema.   Lymphadenopathy:      Cervical: No cervical adenopathy.   Skin:     General: Skin is warm and dry.      Coloration: Skin is not cyanotic, jaundiced or pale.      Findings: No lesion or rash.   Neurological:      Mental Status: She is alert and oriented to person, place, and time.      Gait: Gait normal.   Psychiatric:         Attention and Perception: Attention normal.         Mood and Affect: Mood and affect normal.         Speech: Speech normal.         Behavior: Behavior is cooperative.         U/S TVUS and TAUS today: Uterus measures 9.72 cm in length. Endometrial lining measures 22.2  mm. Intrauterine fluid collection appears the same only slight decreased in size with a 6.2 mm diameter. No yolk sac or fetal pole evidenced. Normal-appearing left ovary. Right ovary with cyst measures 2.2 x 1.3 cm.     U/S 2024: Anteverted uterus measures 9.97 cm in length. Endometrial lining measures 24.5 mm. Intrauterine fluid collection still noted and measuring 6.3 mm in diameter. No yolk sac or fetal pole evidenced. Normal-appearing left ovary. Right ovary with 2 simple cysts (1.9 x 1.4 cm and 1.2 x 1.2 cm).     U/S 2024: The uterus appears normal size and contains a small fluid collection within the endometrium which may represent gestational sac. There is no yolk sac or fetal pole visible. Gestational sac diameter is 6.1 mm.. The ovaries appear normal without cysts or masses. There is no free fluid.      U/S 03/15/2024: Uterus of normal size and structure, 9.5 cm in length. Endometrial lining measures 15.6 mm. No evidence of IUP on today's exam. Normal-appearing ovaries. No free fluid.      Hc2024: 625 mIU/mL  2024: 583 mIU/mL  2024: 328 mIU/mL  2024: 260 mIU/mL  2024: 200 mIU/mL  03/15/2024: 68 mIU/mL         Chart reviewed for screenings  Last Pap Smear: 23 negative ECTZ present. HPV not detected.   Last Mammogram: Screening to begin at age 40, no family history of breast cancer noted.   Last Colonoscopy: Screening to begin at age 45, no family history of colon cancer noted.    Last Bone Density Scan: Screening to begin by 5 years after the onset of menopause.     Assessment & Plan   Diagnoses and all orders for this visit:    1. Anembryonic pregnancy (Primary)  Ultrasound today and reviewed. Also reviewed Hcg labs. Discussed the fetus has not formed and this is a non-viable pregnancy. Verbalized understanding. Discussed again the measures for managing an early pregnancy loss, the risks of the measures, and follow-up. She desires to continue with expectant  management. Will plan to continue with following Hcg levels, preferably twice a week, and a weekly ultrasound and visit with provider for follow-up. Also she may notify provider if she would choose another management option. Support offered. Reviewed signs to report or come to the emergency department, including sudden, severe pain, fever, or heavy bleeding. Anembryonic Pregnancy and Methotrexate Treatment for an Ectopic Pregnancy and Pregnancy Loss: Surgical Treatment and Procedures video included in the AVS.  -     HCG, B-subunit, Quantitative ([facility] Only)    2. Missed   -     HCG, B-subunit, Quantitative ([facility] Only)         Return to the office either Friday or next Tuesday for a gyne visit for surveillance of missed  with an ultrasound and as needed with concerns.        This note has been signed electronically.    Nichol Nova DNP, APRN, CNM, RNC-OB  2024

## 2024-04-10 LAB — HCG INTACT+B SERPL-ACNC: 538 MIU/ML

## 2024-04-12 ENCOUNTER — OFFICE VISIT (OUTPATIENT)
Age: 34
End: 2024-04-12
Payer: MEDICAID

## 2024-04-12 VITALS
BODY MASS INDEX: 43.95 KG/M2 | DIASTOLIC BLOOD PRESSURE: 78 MMHG | SYSTOLIC BLOOD PRESSURE: 126 MMHG | HEIGHT: 59 IN | WEIGHT: 218 LBS

## 2024-04-12 DIAGNOSIS — Z75.8 TELEPHONE LANGUAGE INTERPRETER SERVICE REQUIRED: ICD-10-CM

## 2024-04-12 DIAGNOSIS — O02.1 MISSED ABORTION: Primary | ICD-10-CM

## 2024-04-12 DIAGNOSIS — O02.0 ANEMBRYONIC PREGNANCY: ICD-10-CM

## 2024-04-12 NOTE — PROGRESS NOTES
"Sosa Armando is a 33 y.o. female    History of Present Illness  Patient arrived for a gyne visit with an ultrasound for surveillance of an anembryonic pregnancy or missed . She denies pelvic pain or cramping at this time. She did have some slight vaginal bleeding yesterday, but she has noticed none today.         /78   Ht 149.9 cm (59\")   Wt 98.9 kg (218 lb)   LMP 2024   BMI 44.03 kg/m²     Outpatient Encounter Medications as of 2024   Medication Sig Dispense Refill    prenatal vitamin (prenatal, CLASSIC, vitamin) tablet Take  by mouth Daily.       No facility-administered encounter medications on file as of 2024.       Surgical History  History reviewed. No pertinent surgical history.    Family History  History reviewed. No pertinent family history.    The following portions of the patient's history were reviewed and updated as appropriate: allergies, current medications, past family history, past medical history, past social history, past surgical history, and problem list.    Review of Systems   Constitutional:  Positive for fatigue. Negative for chills and fever.   Respiratory:  Negative for chest tightness and shortness of breath.    Cardiovascular:  Negative for chest pain and leg swelling.   Gastrointestinal:  Negative for abdominal pain, nausea and vomiting.   Genitourinary:  Positive for pelvic pain (occasional) and vaginal bleeding (yesterday but not today). Negative for breast pain, difficulty urinating, dysuria, flank pain, frequency, menstrual problem, pelvic pressure, urgency, urinary incontinence and vaginal discharge.   Musculoskeletal:  Negative for arthralgias, back pain and myalgias.   Skin:  Negative for pallor and rash.   Allergic/Immunologic: Negative for environmental allergies.   Neurological:  Negative for dizziness and headache.   Hematological:  Negative for adenopathy.   Psychiatric/Behavioral:  Negative for dysphoric mood, self-injury, " suicidal ideas and depressed mood. The patient is not nervous/anxious.        Objective   Physical Exam  Vitals and nursing note reviewed.   Constitutional:       General: She is not in acute distress.     Appearance: Normal appearance. She is well-developed and well-groomed. She is morbidly obese. She is not ill-appearing or diaphoretic.   HENT:      Head: Normocephalic and atraumatic.   Eyes:      General: Lids are normal. No scleral icterus.  Neck:      Trachea: Phonation normal.   Cardiovascular:      Rate and Rhythm: Normal rate and regular rhythm.      Heart sounds: Normal heart sounds. No murmur heard.  Pulmonary:      Effort: Pulmonary effort is normal. No accessory muscle usage or respiratory distress.      Breath sounds: Normal breath sounds.   Abdominal:      Palpations: Abdomen is soft.      Tenderness: There is no abdominal tenderness. There is no right CVA tenderness or left CVA tenderness.   Musculoskeletal:         General: No tenderness. Normal range of motion.      Cervical back: Normal range of motion and neck supple. No rigidity or tenderness. No pain with movement. Normal range of motion.      Right lower leg: No edema.      Left lower leg: No edema.   Lymphadenopathy:      Cervical: No cervical adenopathy.   Skin:     General: Skin is warm and dry.      Coloration: Skin is not cyanotic, jaundiced or pale.      Findings: No lesion or rash.   Neurological:      Mental Status: She is alert and oriented to person, place, and time.      Gait: Gait normal.   Psychiatric:         Attention and Perception: Attention normal.         Mood and Affect: Mood and affect normal.         Speech: Speech normal.         Behavior: Behavior is cooperative.             U/S TVUS and TAUS today: Uterus with thickened endometrium with an intrauterine fluid collection measuring 7.4 mm in diameter. No yolk sack or fetal pole evidenced. Normal-appearing ovaries.     U/S TVUS and TAUS 4/12/2024: Uterus measures 9.72 cm in  length. Endometrial lining measures 22.2 mm. Intrauterine fluid collection appears the same only slight decreased in size with a 6.2 mm diameter. No yolk sac or fetal pole evidenced. Normal-appearing left ovary. Right ovary with cyst measures 2.2 x 1.3 cm.      U/S 2024: Anteverted uterus measures 9.97 cm in length. Endometrial lining measures 24.5 mm. Intrauterine fluid collection still noted and measuring 6.3 mm in diameter. No yolk sac or fetal pole evidenced. Normal-appearing left ovary. Right ovary with 2 simple cysts (1.9 x 1.4 cm and 1.2 x 1.2 cm).     U/S 2024: The uterus appears normal size and contains a small fluid collection within the endometrium which may represent gestational sac. There is no yolk sac or fetal pole visible. Gestational sac diameter is 6.1 mm.. The ovaries appear normal without cysts or masses. There is no free fluid.      U/S 03/15/2024: Uterus of normal size and structure, 9.5 cm in length. Endometrial lining measures 15.6 mm. No evidence of IUP on today's exam. Normal-appearing ovaries. No free fluid.          Hc2024: 538 mIU/mL  2024: 625 mIU/mL  2024: 583 mIU/mL  2024: 328 mIU/mL  2024: 260 mIU/mL  2024: 200 mIU/mL  03/15/2024: 68 mIU/mL           Chart reviewed for screenings  Last Pap Smear: 23 negative ECTZ present. HPV not detected.   Last Mammogram: Screening to begin at age 40, no family history of breast cancer noted.   Last Colonoscopy: Screening to begin at age 45, no family history of colon cancer noted.    Last Bone Density Scan: Screening to begin by 5 years after the onset of menopause.    Assessment & Plan   Diagnoses and all orders for this visit:    1. Missed  (Primary)  Ultrasound today and reviewed, along with serial Hcg labs. Patient has experienced slight bleeding and last Hcg decreased from previous results. Reviewed this is a non-viable pregnancy. Support offered. Reviewed the measures of  management for an early pregnancy loss, and she desires to continue with expectant management. She will have a repeat Hcg level drawn today. Discussed signs to report or come to the emergency department such as signs of infection, heavy bleeding, or severe pain. Discussed also a plan for follow-up 6 weeks after pregnancy loss to include labs related to repeated miscarriage.  -     HCG, B-subunit, Quantitative    2. Anembryonic pregnancy  -     HCG, B-subunit, Quantitative    3. Telephone  service required         Return to the office in 1 week for a gyne visit with an ultrasound for anembryonic pregnancy/missed  and as needed with concerns.         This note has been signed electronically.    Nichol Nova, RAFA, APRN, CNM, RNC-OB  2024

## 2024-04-13 LAB — HCG INTACT+B SERPL-ACNC: 386 MIU/ML

## 2024-04-18 ENCOUNTER — OFFICE VISIT (OUTPATIENT)
Age: 34
End: 2024-04-18
Payer: MEDICAID

## 2024-04-18 VITALS
DIASTOLIC BLOOD PRESSURE: 74 MMHG | BODY MASS INDEX: 44.15 KG/M2 | WEIGHT: 219 LBS | SYSTOLIC BLOOD PRESSURE: 110 MMHG | HEIGHT: 59 IN

## 2024-04-18 DIAGNOSIS — O03.9 SPONTANEOUS MISCARRIAGE: Primary | ICD-10-CM

## 2024-04-18 DIAGNOSIS — Z75.8 TELEPHONE LANGUAGE INTERPRETER SERVICE REQUIRED: ICD-10-CM

## 2024-04-18 NOTE — PROGRESS NOTES
"Sosa Armando is a 33 y.o. female    History of Present Illness  Patient arrived for gyne visit with an ultrasound for surveillance of missed  being managed through expectant management. This last Saturday, she experienced some bleeding and thought she also noticed tissue. She presently denies bleeding or pelvic pain.         /74   Ht 149.9 cm (59\")   Wt 99.3 kg (219 lb)   BMI 44.23 kg/m²     Outpatient Encounter Medications as of 2024   Medication Sig Dispense Refill    prenatal vitamin (prenatal, CLASSIC, vitamin) tablet Take  by mouth Daily.       No facility-administered encounter medications on file as of 2024.       Surgical History  History reviewed. No pertinent surgical history.    Family History  History reviewed. No pertinent family history.    The following portions of the patient's history were reviewed and updated as appropriate: allergies, current medications, past family history, past medical history, past social history, past surgical history, and problem list.    Review of Systems   Constitutional:  Positive for fatigue. Negative for chills and fever.   Eyes:  Negative for visual disturbance.   Respiratory:  Negative for chest tightness and shortness of breath.    Cardiovascular:  Negative for chest pain and leg swelling.   Gastrointestinal:  Negative for abdominal pain, constipation, diarrhea and nausea.   Endocrine: Negative for cold intolerance and heat intolerance.   Genitourinary:  Negative for breast pain, difficulty urinating, dysuria, flank pain, frequency, menstrual problem, pelvic pain, pelvic pressure, urgency, urinary incontinence, vaginal bleeding and vaginal discharge.   Musculoskeletal:  Negative for arthralgias, back pain and myalgias.   Skin:  Negative for pallor and rash.   Allergic/Immunologic: Negative for environmental allergies.   Neurological:  Negative for dizziness and headache.   Hematological:  Negative for adenopathy. "   Psychiatric/Behavioral:  Positive for dysphoric mood (related to repeat miscarriage). Negative for self-injury, suicidal ideas and depressed mood. The patient is not nervous/anxious.        Objective   Physical Exam  Vitals and nursing note reviewed.   Constitutional:       General: She is not in acute distress.     Appearance: Normal appearance. She is well-developed and well-groomed. She is morbidly obese. She is not ill-appearing or diaphoretic.   HENT:      Head: Normocephalic and atraumatic.   Eyes:      General: Lids are normal. No scleral icterus.  Neck:      Trachea: Phonation normal.   Cardiovascular:      Rate and Rhythm: Normal rate and regular rhythm.      Heart sounds: Normal heart sounds. No murmur heard.  Pulmonary:      Effort: Pulmonary effort is normal. No accessory muscle usage or respiratory distress.      Breath sounds: Normal breath sounds.   Abdominal:      Palpations: Abdomen is soft.      Tenderness: There is no abdominal tenderness. There is no right CVA tenderness or left CVA tenderness.   Musculoskeletal:         General: No tenderness. Normal range of motion.      Cervical back: Normal range of motion and neck supple. No rigidity or tenderness. No pain with movement. Normal range of motion.      Right lower leg: No edema.      Left lower leg: No edema.   Lymphadenopathy:      Cervical: No cervical adenopathy.   Skin:     General: Skin is warm and dry.      Coloration: Skin is not cyanotic, jaundiced or pale.      Findings: No lesion or rash.   Neurological:      Mental Status: She is alert and oriented to person, place, and time.      Gait: Gait normal.   Psychiatric:         Attention and Perception: Attention normal.         Mood and Affect: Mood and affect normal.         Speech: Speech normal.         Behavior: Behavior is cooperative.           U/S today: Enlarged anteverted uterus measures 10.05 cm in length. Endometrial lining measures 3.5 mm. Previously seen intrauterine fluid  collection/gestational sac no longer visualized. Normal-appearing ovaries.       Hc2024: 386 mIU/mL  2024: 538 mIU/mL   2024: 625 mIU/mL  2024: 583 mIU/mL  2024: 328 mIU/mL  2024: 260 mIU/mL  2024: 200 mIU/mL  03/15/2024: 68 mIU/mL         Chart reviewed for screenings  Last Pap Smear: 23 negative ECTZ present. HPV not detected.   Last Mammogram: Screening to begin at age 40, no family history of breast cancer noted.   Last Colonoscopy: Screening to begin at age 45, no family history of colon cancer noted.    Last Bone Density Scan: Screening to begin by 5 years after the onset of menopause.         Assessment & Plan   Diagnoses and all orders for this visit:    1. Spontaneous miscarriage (Primary)  TVUS today and reviewed. Support offered to patient. Hcg level is decreasing and will plan to continue to monitor. Discussed post-miscarriage self-care, signs to report, and plan for follow-up in 6 weeks. With repeat loss and desire to conceive will plan for miscarriage panel with follow-up. Discussed with patient waiting to resume until after she has 1-2 regular menstrual cycles.   -     HCG, B-subunit, Quantitative    2. Telephone  service required        Return to the office in 6 weeks for a gyne visit with an ultrasound for s/p miscarriage and recurrent miscarriages and as needed with concerns.         This note has been signed electronically.    Nichol Nova, DNP, APRN, CNM, RNC-OB  2024

## 2024-04-19 LAB — HCG INTACT+B SERPL-ACNC: 5 MIU/ML

## 2024-05-06 ENCOUNTER — APPOINTMENT (OUTPATIENT)
Dept: CT IMAGING | Facility: HOSPITAL | Age: 34
DRG: 070 | End: 2024-05-06
Payer: MEDICAID

## 2024-05-06 ENCOUNTER — HOSPITAL ENCOUNTER (INPATIENT)
Facility: HOSPITAL | Age: 34
LOS: 1 days | Discharge: HOME | DRG: 070 | End: 2024-05-07
Attending: NEUROLOGICAL SURGERY | Admitting: NEUROLOGICAL SURGERY
Payer: MEDICAID

## 2024-05-06 ENCOUNTER — APPOINTMENT (OUTPATIENT)
Dept: GENERAL RADIOLOGY | Facility: HOSPITAL | Age: 34
DRG: 070 | End: 2024-05-06
Payer: MEDICAID

## 2024-05-06 ENCOUNTER — APPOINTMENT (OUTPATIENT)
Dept: MRI IMAGING | Facility: HOSPITAL | Age: 34
DRG: 070 | End: 2024-05-06
Payer: MEDICAID

## 2024-05-06 DIAGNOSIS — R42 LIGHTHEADEDNESS: Primary | ICD-10-CM

## 2024-05-06 DIAGNOSIS — R51.9 ACUTE NONINTRACTABLE HEADACHE, UNSPECIFIED HEADACHE TYPE: ICD-10-CM

## 2024-05-06 DIAGNOSIS — Z74.09 IMPAIRED MOBILITY: ICD-10-CM

## 2024-05-06 DIAGNOSIS — G93.89 CEREBELLAR MASS: ICD-10-CM

## 2024-05-06 LAB
ALBUMIN SERPL-MCNC: 4.2 G/DL (ref 3.5–5.2)
ALBUMIN/GLOB SERPL: 1.1 G/DL
ALP SERPL-CCNC: 117 U/L (ref 39–117)
ALT SERPL W P-5'-P-CCNC: 31 U/L (ref 1–33)
ANION GAP SERPL CALCULATED.3IONS-SCNC: 10 MMOL/L (ref 5–15)
APTT PPP: 27.7 SECONDS (ref 24.5–36)
AST SERPL-CCNC: 26 U/L (ref 1–32)
B-HCG UR QL: NEGATIVE
BASOPHILS # BLD AUTO: 0.03 10*3/MM3 (ref 0–0.2)
BASOPHILS NFR BLD AUTO: 0.3 % (ref 0–1.5)
BILIRUB SERPL-MCNC: 0.2 MG/DL (ref 0–1.2)
BILIRUB UR QL STRIP: NEGATIVE
BUN SERPL-MCNC: 11 MG/DL (ref 6–20)
BUN/CREAT SERPL: 19.6 (ref 7–25)
CALCIUM SPEC-SCNC: 9.7 MG/DL (ref 8.6–10.5)
CHLORIDE SERPL-SCNC: 101 MMOL/L (ref 98–107)
CLARITY UR: CLEAR
CO2 SERPL-SCNC: 28 MMOL/L (ref 22–29)
COLOR UR: YELLOW
CREAT SERPL-MCNC: 0.56 MG/DL (ref 0.57–1)
DEPRECATED RDW RBC AUTO: 39.4 FL (ref 37–54)
EGFRCR SERPLBLD CKD-EPI 2021: 123.8 ML/MIN/1.73
EOSINOPHIL # BLD AUTO: 0.12 10*3/MM3 (ref 0–0.4)
EOSINOPHIL NFR BLD AUTO: 1.3 % (ref 0.3–6.2)
ERYTHROCYTE [DISTWIDTH] IN BLOOD BY AUTOMATED COUNT: 12.8 % (ref 12.3–15.4)
GLOBULIN UR ELPH-MCNC: 3.7 GM/DL
GLUCOSE SERPL-MCNC: 100 MG/DL (ref 65–99)
GLUCOSE UR STRIP-MCNC: NEGATIVE MG/DL
HCG SERPL QL: NEGATIVE
HCT VFR BLD AUTO: 42 % (ref 34–46.6)
HGB BLD-MCNC: 13.4 G/DL (ref 12–15.9)
HGB UR QL STRIP.AUTO: NEGATIVE
IMM GRANULOCYTES # BLD AUTO: 0.03 10*3/MM3 (ref 0–0.05)
IMM GRANULOCYTES NFR BLD AUTO: 0.3 % (ref 0–0.5)
INR PPP: 0.93 (ref 0.91–1.09)
KETONES UR QL STRIP: NEGATIVE
LEUKOCYTE ESTERASE UR QL STRIP.AUTO: NEGATIVE
LYMPHOCYTES # BLD AUTO: 3.19 10*3/MM3 (ref 0.7–3.1)
LYMPHOCYTES NFR BLD AUTO: 33.3 % (ref 19.6–45.3)
MAGNESIUM SERPL-MCNC: 1.9 MG/DL (ref 1.6–2.6)
MCH RBC QN AUTO: 27 PG (ref 26.6–33)
MCHC RBC AUTO-ENTMCNC: 31.9 G/DL (ref 31.5–35.7)
MCV RBC AUTO: 84.5 FL (ref 79–97)
MONOCYTES # BLD AUTO: 0.61 10*3/MM3 (ref 0.1–0.9)
MONOCYTES NFR BLD AUTO: 6.4 % (ref 5–12)
NEUTROPHILS NFR BLD AUTO: 5.6 10*3/MM3 (ref 1.7–7)
NEUTROPHILS NFR BLD AUTO: 58.4 % (ref 42.7–76)
NITRITE UR QL STRIP: NEGATIVE
NRBC BLD AUTO-RTO: 0 /100 WBC (ref 0–0.2)
PH UR STRIP.AUTO: 6.5 [PH] (ref 5–8)
PLATELET # BLD AUTO: 232 10*3/MM3 (ref 140–450)
PMV BLD AUTO: 10.4 FL (ref 6–12)
POTASSIUM SERPL-SCNC: 4.2 MMOL/L (ref 3.5–5.2)
PROT SERPL-MCNC: 7.9 G/DL (ref 6–8.5)
PROT UR QL STRIP: NEGATIVE
PROTHROMBIN TIME: 12.9 SECONDS (ref 11.8–14.8)
RBC # BLD AUTO: 4.97 10*6/MM3 (ref 3.77–5.28)
SODIUM SERPL-SCNC: 139 MMOL/L (ref 136–145)
SP GR UR STRIP: 1.02 (ref 1–1.03)
UROBILINOGEN UR QL STRIP: NORMAL
WBC NRBC COR # BLD AUTO: 9.58 10*3/MM3 (ref 3.4–10.8)

## 2024-05-06 PROCEDURE — 93005 ELECTROCARDIOGRAM TRACING: CPT | Performed by: NEUROLOGICAL SURGERY

## 2024-05-06 PROCEDURE — A9577 INJ MULTIHANCE: HCPCS

## 2024-05-06 PROCEDURE — 83735 ASSAY OF MAGNESIUM: CPT

## 2024-05-06 PROCEDURE — 85610 PROTHROMBIN TIME: CPT

## 2024-05-06 PROCEDURE — 0 GADOBENATE DIMEGLUMINE 529 MG/ML SOLUTION

## 2024-05-06 PROCEDURE — 81025 URINE PREGNANCY TEST: CPT | Performed by: NEUROLOGICAL SURGERY

## 2024-05-06 PROCEDURE — 71045 X-RAY EXAM CHEST 1 VIEW: CPT

## 2024-05-06 PROCEDURE — 70553 MRI BRAIN STEM W/O & W/DYE: CPT

## 2024-05-06 PROCEDURE — 99255 IP/OBS CONSLTJ NEW/EST HI 80: CPT | Performed by: NEUROLOGICAL SURGERY

## 2024-05-06 PROCEDURE — 99285 EMERGENCY DEPT VISIT HI MDM: CPT

## 2024-05-06 PROCEDURE — 93005 ELECTROCARDIOGRAM TRACING: CPT

## 2024-05-06 PROCEDURE — 70450 CT HEAD/BRAIN W/O DYE: CPT

## 2024-05-06 PROCEDURE — 85025 COMPLETE CBC W/AUTO DIFF WBC: CPT

## 2024-05-06 PROCEDURE — 81003 URINALYSIS AUTO W/O SCOPE: CPT

## 2024-05-06 PROCEDURE — 84703 CHORIONIC GONADOTROPIN ASSAY: CPT

## 2024-05-06 PROCEDURE — 85730 THROMBOPLASTIN TIME PARTIAL: CPT

## 2024-05-06 PROCEDURE — 80053 COMPREHEN METABOLIC PANEL: CPT

## 2024-05-06 RX ORDER — POLYETHYLENE GLYCOL 3350 17 G/17G
17 POWDER, FOR SOLUTION ORAL DAILY PRN
Status: DISCONTINUED | OUTPATIENT
Start: 2024-05-06 | End: 2024-05-07 | Stop reason: HOSPADM

## 2024-05-06 RX ORDER — SODIUM CHLORIDE AND POTASSIUM CHLORIDE 150; 900 MG/100ML; MG/100ML
100 INJECTION, SOLUTION INTRAVENOUS CONTINUOUS
Status: DISCONTINUED | OUTPATIENT
Start: 2024-05-06 | End: 2024-05-07 | Stop reason: HOSPADM

## 2024-05-06 RX ORDER — AMOXICILLIN 250 MG
2 CAPSULE ORAL 2 TIMES DAILY
Status: DISCONTINUED | OUTPATIENT
Start: 2024-05-06 | End: 2024-05-07 | Stop reason: HOSPADM

## 2024-05-06 RX ORDER — BISACODYL 5 MG/1
5 TABLET, DELAYED RELEASE ORAL DAILY PRN
Status: DISCONTINUED | OUTPATIENT
Start: 2024-05-06 | End: 2024-05-07 | Stop reason: HOSPADM

## 2024-05-06 RX ORDER — SODIUM CHLORIDE 0.9 % (FLUSH) 0.9 %
10 SYRINGE (ML) INJECTION EVERY 12 HOURS SCHEDULED
Status: DISCONTINUED | OUTPATIENT
Start: 2024-05-06 | End: 2024-05-07 | Stop reason: HOSPADM

## 2024-05-06 RX ORDER — BISACODYL 10 MG
10 SUPPOSITORY, RECTAL RECTAL DAILY PRN
Status: DISCONTINUED | OUTPATIENT
Start: 2024-05-06 | End: 2024-05-07 | Stop reason: HOSPADM

## 2024-05-06 RX ORDER — ONDANSETRON 2 MG/ML
4 INJECTION INTRAMUSCULAR; INTRAVENOUS EVERY 6 HOURS PRN
Status: DISCONTINUED | OUTPATIENT
Start: 2024-05-06 | End: 2024-05-07 | Stop reason: HOSPADM

## 2024-05-06 RX ORDER — DEXAMETHASONE 4 MG/1
4 TABLET ORAL EVERY 6 HOURS SCHEDULED
Status: DISCONTINUED | OUTPATIENT
Start: 2024-05-07 | End: 2024-05-07 | Stop reason: HOSPADM

## 2024-05-06 RX ORDER — IBUPROFEN 600 MG/1
1 TABLET ORAL
Status: DISCONTINUED | OUTPATIENT
Start: 2024-05-06 | End: 2024-05-07

## 2024-05-06 RX ORDER — FAMOTIDINE 20 MG/1
40 TABLET, FILM COATED ORAL DAILY
Status: DISCONTINUED | OUTPATIENT
Start: 2024-05-07 | End: 2024-05-07 | Stop reason: HOSPADM

## 2024-05-06 RX ORDER — SODIUM CHLORIDE 9 MG/ML
40 INJECTION, SOLUTION INTRAVENOUS AS NEEDED
Status: DISCONTINUED | OUTPATIENT
Start: 2024-05-06 | End: 2024-05-07 | Stop reason: HOSPADM

## 2024-05-06 RX ORDER — NICOTINE POLACRILEX 4 MG
15 LOZENGE BUCCAL
Status: DISCONTINUED | OUTPATIENT
Start: 2024-05-06 | End: 2024-05-07 | Stop reason: SDUPTHER

## 2024-05-06 RX ORDER — SODIUM CHLORIDE 0.9 % (FLUSH) 0.9 %
10 SYRINGE (ML) INJECTION AS NEEDED
Status: DISCONTINUED | OUTPATIENT
Start: 2024-05-06 | End: 2024-05-07 | Stop reason: HOSPADM

## 2024-05-06 RX ORDER — INSULIN LISPRO 100 [IU]/ML
1-200 INJECTION, SOLUTION INTRAVENOUS; SUBCUTANEOUS AS NEEDED
Status: DISCONTINUED | OUTPATIENT
Start: 2024-05-06 | End: 2024-05-07

## 2024-05-06 RX ORDER — ONDANSETRON 4 MG/1
4 TABLET, ORALLY DISINTEGRATING ORAL EVERY 6 HOURS PRN
Status: DISCONTINUED | OUTPATIENT
Start: 2024-05-06 | End: 2024-05-07 | Stop reason: HOSPADM

## 2024-05-06 RX ORDER — DEXTROSE MONOHYDRATE 25 G/50ML
10-50 INJECTION, SOLUTION INTRAVENOUS
Status: DISCONTINUED | OUTPATIENT
Start: 2024-05-06 | End: 2024-05-07 | Stop reason: SDUPTHER

## 2024-05-06 RX ORDER — INSULIN LISPRO 100 [IU]/ML
1-200 INJECTION, SOLUTION INTRAVENOUS; SUBCUTANEOUS
Status: DISCONTINUED | OUTPATIENT
Start: 2024-05-07 | End: 2024-05-07

## 2024-05-06 RX ORDER — BUTALBITAL, ACETAMINOPHEN AND CAFFEINE 50; 325; 40 MG/1; MG/1; MG/1
2 TABLET ORAL EVERY 4 HOURS PRN
Status: DISCONTINUED | OUTPATIENT
Start: 2024-05-06 | End: 2024-05-07 | Stop reason: HOSPADM

## 2024-05-06 RX ORDER — MANNITOL 20 G/100ML
100 INJECTION, SOLUTION INTRAVENOUS ONCE
Status: COMPLETED | OUTPATIENT
Start: 2024-05-06 | End: 2024-05-07

## 2024-05-06 RX ORDER — PRENATAL VIT/IRON FUM/FOLIC AC 27MG-0.8MG
1 TABLET ORAL DAILY
Status: DISCONTINUED | OUTPATIENT
Start: 2024-05-07 | End: 2024-05-07 | Stop reason: HOSPADM

## 2024-05-06 RX ORDER — ACETAMINOPHEN 500 MG
1000 TABLET ORAL 3 TIMES DAILY
Status: DISCONTINUED | OUTPATIENT
Start: 2024-05-06 | End: 2024-05-07 | Stop reason: HOSPADM

## 2024-05-06 RX ORDER — DEXAMETHASONE SODIUM PHOSPHATE 10 MG/ML
10 INJECTION INTRAMUSCULAR; INTRAVENOUS ONCE
Status: COMPLETED | OUTPATIENT
Start: 2024-05-06 | End: 2024-05-07

## 2024-05-06 RX ORDER — ACETAZOLAMIDE 250 MG/1
250 TABLET ORAL 2 TIMES DAILY
Status: DISCONTINUED | OUTPATIENT
Start: 2024-05-06 | End: 2024-05-07 | Stop reason: HOSPADM

## 2024-05-06 RX ADMIN — GADOBENATE DIMEGLUMINE 20 ML: 529 INJECTION, SOLUTION INTRAVENOUS at 20:17

## 2024-05-06 RX ADMIN — Medication 10 ML: at 23:57

## 2024-05-06 RX ADMIN — ACETAZOLAMIDE 250 MG: 250 TABLET ORAL at 23:54

## 2024-05-06 NOTE — ED PROVIDER NOTES
Subjective   History of Present Illness  Patient is a 33-year-old female who presents emergency department with complaints of headache and dizziness.  Patient states that she was at the clinic earlier for her headache and dizziness.  She states that the symptoms come and go.  States that it has been ongoing for approximately 10 weeks.  States that it is worse whenever she is coughing or standing.  She states that her headache is on both sides all over.  Patient is complaining of associated nausea.  Denies any abdominal pain.  States that she has had a recent  on .  She denies any vaginal bleeding or discharge at this time.  Patient is neurologically intact on exam.        Review of Systems   Neurological:  Positive for dizziness and headaches.   All other systems reviewed and are negative.      Past Medical History:   Diagnosis Date    Miscarriage 2024    Miscarriage 2023       No Known Allergies    History reviewed. No pertinent surgical history.    History reviewed. No pertinent family history.    Social History     Socioeconomic History    Marital status: Single   Tobacco Use    Smoking status: Never    Smokeless tobacco: Never   Vaping Use    Vaping status: Never Used   Substance and Sexual Activity    Alcohol use: No    Drug use: No    Sexual activity: Yes     Partners: Male     Birth control/protection: None           Objective   Physical Exam  Vitals and nursing note reviewed.   Constitutional:       General: She is not in acute distress.     Appearance: She is well-developed and normal weight. She is not ill-appearing or toxic-appearing.   HENT:      Head: Normocephalic and atraumatic.      Nose: Nose normal.      Mouth/Throat:      Mouth: Mucous membranes are moist.   Eyes:      Extraocular Movements: Extraocular movements intact.      Conjunctiva/sclera: Conjunctivae normal.      Pupils: Pupils are equal, round, and reactive to light.   Cardiovascular:      Rate and Rhythm: Normal  rate and regular rhythm.      Pulses: Normal pulses.      Heart sounds: Normal heart sounds.   Pulmonary:      Effort: Pulmonary effort is normal.      Breath sounds: Normal breath sounds.   Abdominal:      General: Abdomen is flat. Bowel sounds are normal. There is no distension.      Palpations: Abdomen is soft.      Tenderness: There is no abdominal tenderness.   Musculoskeletal:         General: Normal range of motion.      Cervical back: Normal range of motion and neck supple.   Skin:     General: Skin is warm and dry.   Neurological:      General: No focal deficit present.      Mental Status: She is alert and oriented to person, place, and time. Mental status is at baseline.      GCS: GCS eye subscore is 4. GCS verbal subscore is 5. GCS motor subscore is 6.      Cranial Nerves: Cranial nerves 2-12 are intact.      Sensory: Sensation is intact.      Motor: Motor function is intact.      Coordination: Coordination is intact.      Gait: Gait is intact.   Psychiatric:         Mood and Affect: Mood normal.         Behavior: Behavior normal.         Thought Content: Thought content normal.         Judgment: Judgment normal.       Labs Reviewed   COMPREHENSIVE METABOLIC PANEL - Abnormal; Notable for the following components:       Result Value    Glucose 100 (*)     Creatinine 0.56 (*)     All other components within normal limits    Narrative:     GFR Normal >60  Chronic Kidney Disease <60  Kidney Failure <15     CBC WITH AUTO DIFFERENTIAL - Abnormal; Notable for the following components:    Lymphocytes, Absolute 3.19 (*)     All other components within normal limits   PROTIME-INR - Normal   APTT - Normal   HCG, SERUM, QUALITATIVE - Normal   URINALYSIS W/ CULTURE IF INDICATED - Normal    Narrative:     In absence of clinical symptoms, the presence of pyuria, bacteria, and/or nitrites on the urinalysis result does not correlate with infection.  Urine microscopic not indicated.   MAGNESIUM - Normal   PREGNANCY, URINE  - Normal   JOSÉ MIGUEL   ANTICARDIOLIPIN AB, IGG/M, QN   ANTITHROMBIN III   BETA-2 GLYCOPROTEIN ANTIBODIES   D-DIMER, QUANTITATIVE   FACTOR 5 ACTIVITY   FACTOR 5 LEIDEN   HOMOCYSTEINE   LUPUS ANTICOAGULANT   PROTEIN C FUNTIONAL (ACTIVITY)   PROTEIN S FUNCTIONAL   PROTEIN S ANTIGEN, FREE   PROTEIN S ANTIGEN, TOTAL   PHOSPHATIDYLSERINE ANTIBODIES   FACTOR II, DNA ANALYSIS   POCT GLUCOSE FINGERSTICK   POCT GLUCOSE FINGERSTICK   POCT GLUCOSE FINGERSTICK   POCT GLUCOSE FINGERSTICK   TYPE AND SCREEN   CBC AND DIFFERENTIAL    Narrative:     The following orders were created for panel order CBC & Differential.  Procedure                               Abnormality         Status                     ---------                               -----------         ------                     CBC Auto Differential[[id number]]        Abnormal            Final result                 Please view results for these tests on the individual orders.      MRI Brain With & Without Contrast   Final Result         1. Area of restricted diffusion with associated hemorrhage without   contrast enhancement at the midline of the cerebellum involving the   vermis likely hemorrhagic infarct.   2. There is associated mass effect on the inferior fourth ventricle with   mild communicating hydrocephalus.       This report was signed and finalized on 5/6/2024 9:23 PM by Freddy Dacosta.          CT Head Without Contrast   Final Result       1. Area of hypodensity likely edema at the midline of the cerebellum   involving the vermis with associated hyperdensity likely due to   hemorrhage. This may represent a hemorrhagic cerebellar infarct or   possible hemorrhagic mass. MR head without and with contrast is   recommended.   2. Mass effect on the median aperture and prominent lateral ventricles   for the patient's age and lack of atrophy. Cerebellar tonsils appear to   be normal in location.       This report was signed and finalized on 5/6/2024 6:53 PM by Freddy    Praneeth.          XR Chest 1 View    (Results Pending)        Procedures           ED Course  ED Course as of 05/07/24 0143   Mon May 06, 2024   1902 Call placed to neurosurgery at this time. [KR]   1902 CT scan revealed Area of hypodensity likely edema at the midline of the cerebellum involving the vermis with associated hyperdensity likely due to hemorrhage. This may represent a hemorrhagic cerebellar infarct or possible hemorrhagic mass. MR head without and with contrast is recommended. Mass effect on the median aperture and prominent lateral ventricles for the patient's age and lack of atrophy. Cerebellar tonsils appear to be normal in location. [KR]   1905 Spoke with Dr. Alexander with neurosurgery.  He stated to get a stat MRI of the brain with and without contrast.  Stated to call him back with the results once that is finished. [KR]   1955 Case signed out to TERRENCE Peters. MRI pending. [KR]      ED Course User Index  [KR] Jacqueline Clay APRN                          Total (NIH Stroke Scale): 0                  Medical Decision Making  Care of this patient was initiated by TERRENCE Phillips.  Care of the patient was handed off to me pending MRI results and final disposition.    Dr. Alexander, neurosurgeon on-call evaluated MRI imaging and has placed admission orders.  Patient was in agreement with plans of admission to the hospital for further evaluation and treatment.  Dr. Alexander's service to assume primary care of the patient and the patient was admitted to his service in stable condition.    Dragon disclaimer:  Parts of this note may be an electronic transcription/translation of spoken language to printed text using the Dragon dictation system.       Amount and/or Complexity of Data Reviewed  Labs: ordered.  Radiology: ordered.  ECG/medicine tests: ordered.    Risk  Prescription drug management.  Decision regarding hospitalization.        Final diagnoses:   Lightheadedness   Acute nonintractable  headache, unspecified headache type   Cerebellar mass       ED Disposition  ED Disposition       ED Disposition   Decision to Admit    Condition   --    Comment   Level of Care: Med/Surg [1]   Diagnosis: Cerebellar mass [[id number]]   Certification: I Certify That Inpatient Hospital Services Are Medically Necessary For Greater Than 2 Midnights                 No follow-up provider specified.       Medication List      No changes were made to your prescriptions during this visit.            Suzanne Adamson, APRN  05/07/24 0143

## 2024-05-06 NOTE — ED PROVIDER NOTES
"MEDICAL SCREENING    Reason for Visit: Patient is a 33-year-old female that presents to the emergency department from [facility] due to new onset headache, lightheadedness with position change, and intermittent blurred vision.  Patient had a miscarriage approximately 2 weeks ago at 9 weeks gestation.  She states that the symptoms began following recent miscarriage.  States headache is located to bilateral temples and she experiences extreme pain, nausea, and blurred vision with position changes to this area. She states she has been \"feeling like her balance is off\". Denies any abdominal pain, vomiting, diarrhea, shortness of breath, chest pain, or fevers. Denies any slurred speech, unilateral weakness or difficulty walking. Sees Katie Nova here for OBGYN.     Patient initially seen in triage.  The patient was advised further evaluation and diagnostic testing will be needed, some of the treatment and testing will be initiated in the lobby in order to begin the process.  The patient will be returned to the waiting area for the time being and will  be reassessed by a subsequent ED provider.  The patient will be brought back to the treatment area in as timely manner as possible.            Suzanne Adamson, APRN  05/06/24 3126    "

## 2024-05-07 VITALS
HEIGHT: 59 IN | TEMPERATURE: 98 F | BODY MASS INDEX: 42.33 KG/M2 | RESPIRATION RATE: 16 BRPM | HEART RATE: 85 BPM | WEIGHT: 210 LBS | OXYGEN SATURATION: 95 % | SYSTOLIC BLOOD PRESSURE: 114 MMHG | DIASTOLIC BLOOD PRESSURE: 67 MMHG

## 2024-05-07 LAB
ABO GROUP BLD: NORMAL
AT III PPP CHRO-ACNC: 124 % (ref 84–123)
BLD GP AB SCN SERPL QL: NEGATIVE
D DIMER PPP FEU-MCNC: 0.27 MCGFEU/ML (ref 0–0.5)
GLUCOSE BLDC GLUCOMTR-MCNC: 124 MG/DL (ref 70–130)
GLUCOSE BLDC GLUCOMTR-MCNC: 143 MG/DL (ref 70–130)
GLUCOSE BLDC GLUCOMTR-MCNC: 157 MG/DL (ref 70–130)
HCYS SERPL-MCNC: 4.6 UMOL/L (ref 0–15)
QT INTERVAL: 364 MS
QT INTERVAL: 372 MS
QTC INTERVAL: 422 MS
QTC INTERVAL: 429 MS
RH BLD: POSITIVE
T&S EXPIRATION DATE: NORMAL

## 2024-05-07 PROCEDURE — 86147 CARDIOLIPIN ANTIBODY EA IG: CPT | Performed by: NEUROLOGICAL SURGERY

## 2024-05-07 PROCEDURE — 85379 FIBRIN DEGRADATION QUANT: CPT | Performed by: NEUROLOGICAL SURGERY

## 2024-05-07 PROCEDURE — 81240 F2 GENE: CPT | Performed by: NEUROLOGICAL SURGERY

## 2024-05-07 PROCEDURE — 81241 F5 GENE: CPT | Performed by: NEUROLOGICAL SURGERY

## 2024-05-07 PROCEDURE — 85732 THROMBOPLASTIN TIME PARTIAL: CPT | Performed by: NEUROLOGICAL SURGERY

## 2024-05-07 PROCEDURE — 85303 CLOT INHIBIT PROT C ACTIVITY: CPT | Performed by: NEUROLOGICAL SURGERY

## 2024-05-07 PROCEDURE — 85613 RUSSELL VIPER VENOM DILUTED: CPT | Performed by: NEUROLOGICAL SURGERY

## 2024-05-07 PROCEDURE — 85300 ANTITHROMBIN III ACTIVITY: CPT | Performed by: NEUROLOGICAL SURGERY

## 2024-05-07 PROCEDURE — 86146 BETA-2 GLYCOPROTEIN ANTIBODY: CPT | Performed by: NEUROLOGICAL SURGERY

## 2024-05-07 PROCEDURE — 86900 BLOOD TYPING SEROLOGIC ABO: CPT | Performed by: NEUROLOGICAL SURGERY

## 2024-05-07 PROCEDURE — 85306 CLOT INHIBIT PROT S FREE: CPT | Performed by: NEUROLOGICAL SURGERY

## 2024-05-07 PROCEDURE — 97116 GAIT TRAINING THERAPY: CPT

## 2024-05-07 PROCEDURE — 85220 BLOOC CLOT FACTOR V TEST: CPT | Performed by: NEUROLOGICAL SURGERY

## 2024-05-07 PROCEDURE — 97165 OT EVAL LOW COMPLEX 30 MIN: CPT

## 2024-05-07 PROCEDURE — 85305 CLOT INHIBIT PROT S TOTAL: CPT | Performed by: NEUROLOGICAL SURGERY

## 2024-05-07 PROCEDURE — 97110 THERAPEUTIC EXERCISES: CPT

## 2024-05-07 PROCEDURE — 97161 PT EVAL LOW COMPLEX 20 MIN: CPT | Performed by: PHYSICAL THERAPIST

## 2024-05-07 PROCEDURE — 85670 THROMBIN TIME PLASMA: CPT | Performed by: NEUROLOGICAL SURGERY

## 2024-05-07 PROCEDURE — 99239 HOSP IP/OBS DSCHRG MGMT >30: CPT | Performed by: NURSE PRACTITIONER

## 2024-05-07 PROCEDURE — 82948 REAGENT STRIP/BLOOD GLUCOSE: CPT

## 2024-05-07 PROCEDURE — 85705 THROMBOPLASTIN INHIBITION: CPT | Performed by: NEUROLOGICAL SURGERY

## 2024-05-07 PROCEDURE — 83090 ASSAY OF HOMOCYSTEINE: CPT | Performed by: NEUROLOGICAL SURGERY

## 2024-05-07 PROCEDURE — 25010000002 SODIUM CHLORIDE 0.9 % WITH KCL 20 MEQ 20-0.9 MEQ/L-% SOLUTION: Performed by: NEUROLOGICAL SURGERY

## 2024-05-07 PROCEDURE — 86038 ANTINUCLEAR ANTIBODIES: CPT | Performed by: NEUROLOGICAL SURGERY

## 2024-05-07 PROCEDURE — 86148 ANTI-PHOSPHOLIPID ANTIBODY: CPT | Performed by: NEUROLOGICAL SURGERY

## 2024-05-07 PROCEDURE — 86901 BLOOD TYPING SEROLOGIC RH(D): CPT | Performed by: NEUROLOGICAL SURGERY

## 2024-05-07 PROCEDURE — 63710000001 DEXAMETHASONE PER 0.25 MG: Performed by: NEUROLOGICAL SURGERY

## 2024-05-07 PROCEDURE — 86850 RBC ANTIBODY SCREEN: CPT | Performed by: NEUROLOGICAL SURGERY

## 2024-05-07 PROCEDURE — 25010000002 DEXAMETHASONE PER 1 MG: Performed by: NEUROLOGICAL SURGERY

## 2024-05-07 RX ORDER — NICOTINE POLACRILEX 4 MG
15 LOZENGE BUCCAL
Status: DISCONTINUED | OUTPATIENT
Start: 2024-05-07 | End: 2024-05-07 | Stop reason: HOSPADM

## 2024-05-07 RX ORDER — INSULIN LISPRO 100 [IU]/ML
3-14 INJECTION, SOLUTION INTRAVENOUS; SUBCUTANEOUS
Status: DISCONTINUED | OUTPATIENT
Start: 2024-05-07 | End: 2024-05-07 | Stop reason: HOSPADM

## 2024-05-07 RX ORDER — PANTOPRAZOLE SODIUM 40 MG/1
40 TABLET, DELAYED RELEASE ORAL DAILY
Qty: 30 TABLET | Refills: 0 | Status: SHIPPED | OUTPATIENT
Start: 2024-05-07

## 2024-05-07 RX ORDER — DEXTROSE MONOHYDRATE 25 G/50ML
25 INJECTION, SOLUTION INTRAVENOUS
Status: DISCONTINUED | OUTPATIENT
Start: 2024-05-07 | End: 2024-05-07 | Stop reason: HOSPADM

## 2024-05-07 RX ORDER — ACETAZOLAMIDE 250 MG/1
250 TABLET ORAL 2 TIMES DAILY
Qty: 60 TABLET | Refills: 0 | Status: SHIPPED | OUTPATIENT
Start: 2024-05-07

## 2024-05-07 RX ORDER — DEXAMETHASONE 4 MG/1
4 TABLET ORAL EVERY 6 HOURS SCHEDULED
Qty: 40 TABLET | Refills: 0 | Status: SHIPPED | OUTPATIENT
Start: 2024-05-07

## 2024-05-07 RX ORDER — IBUPROFEN 600 MG/1
1 TABLET ORAL
Status: DISCONTINUED | OUTPATIENT
Start: 2024-05-07 | End: 2024-05-07 | Stop reason: HOSPADM

## 2024-05-07 RX ORDER — BUTALBITAL, ACETAMINOPHEN AND CAFFEINE 50; 325; 40 MG/1; MG/1; MG/1
1 TABLET ORAL EVERY 6 HOURS PRN
Qty: 24 TABLET | Refills: 0 | Status: SHIPPED | OUTPATIENT
Start: 2024-05-07 | End: 2024-05-13

## 2024-05-07 RX ADMIN — DEXAMETHASONE 4 MG: 4 TABLET ORAL at 12:13

## 2024-05-07 RX ADMIN — PRENATAL VIT W/ FE FUMARATE-FA TAB 27-0.8 MG 1 TABLET: 27-0.8 TAB at 10:24

## 2024-05-07 RX ADMIN — DEXAMETHASONE 4 MG: 4 TABLET ORAL at 17:39

## 2024-05-07 RX ADMIN — ACETAMINOPHEN 1000 MG: 500 TABLET, FILM COATED ORAL at 17:39

## 2024-05-07 RX ADMIN — DEXAMETHASONE 4 MG: 4 TABLET ORAL at 05:07

## 2024-05-07 RX ADMIN — DEXAMETHASONE SODIUM PHOSPHATE 10 MG: 10 INJECTION INTRAMUSCULAR; INTRAVENOUS at 00:05

## 2024-05-07 RX ADMIN — FAMOTIDINE 40 MG: 20 TABLET, FILM COATED ORAL at 10:24

## 2024-05-07 RX ADMIN — POTASSIUM CHLORIDE AND SODIUM CHLORIDE 100 ML/HR: 900; 150 INJECTION, SOLUTION INTRAVENOUS at 05:07

## 2024-05-07 RX ADMIN — ACETAMINOPHEN 1000 MG: 500 TABLET, FILM COATED ORAL at 10:24

## 2024-05-07 RX ADMIN — MANNITOL 100 G: 20 INJECTION, SOLUTION INTRAVENOUS at 00:09

## 2024-05-07 RX ADMIN — DOCUSATE SODIUM AND SENNOSIDES 2 TABLET: 8.6; 5 TABLET, FILM COATED ORAL at 10:24

## 2024-05-07 RX ADMIN — ACETAZOLAMIDE 250 MG: 250 TABLET ORAL at 10:24

## 2024-05-07 RX ADMIN — ACETAMINOPHEN 1000 MG: 500 TABLET, FILM COATED ORAL at 00:00

## 2024-05-07 NOTE — NURSING NOTE
Pt. c/o IV hurting. Tried once, went fine but put did not tolerate and wanted me to take it out. Refused to get the second time. Mannitol is running slow. Pt also gets continuous IV fluid, can hang it once mannitol is done since they are not compatible.

## 2024-05-07 NOTE — THERAPY EVALUATION
Patient Name: Zina Armando  : 1990    MRN: [id number]                              Today's Date: 2024       Admit Date: 2024    Visit Dx:     ICD-10-CM ICD-9-CM   1. Lightheadedness  R42 780.4   2. Acute nonintractable headache, unspecified headache type  R51.9 784.0   3. Cerebellar mass  G93.89 348.89   4. Impaired mobility [Z74.09]  Z74.09 799.89     Patient Active Problem List   Diagnosis    Cerebellar mass     Past Medical History:   Diagnosis Date    Miscarriage 2024    Miscarriage 2023     History reviewed. No pertinent surgical history.   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 ventricle, mild hydrocephalus, cerebellar mass vs stroke, dizziness with standing and 2 wk hx HA  -MS     Mode of Treatment physical therapy;co-treatment  -MS       Row Name 24 1146          General Information    Patient Profile Reviewed yes  -MS     Prior Level of Function independent:;all household mobility;community mobility;ADL's;driving;shopping  -MS     Existing Precautions/Restrictions fall  -MS     Barriers to Rehab language barrier  -MS       Row Name 24 1146          Living Environment    People in Home spouse;child(katerin), dependent  17 yo daughter  -MS       Row Name 24 1146          Home Main Entrance    Number of Stairs, Main Entrance three  -MS     Stair Railings, Main Entrance railings on both sides of stairs  -MS       Row Name 24 1146          Stairs Within Home, Primary    Number of Stairs, Within Home, Primary none  -MS       Row Name 24 1146          Cognition    Orientation Status (Cognition) oriented x 4  -MS       Row Name 24 1146          Safety Issues, Functional Mobility    Impairments Affecting Function (Mobility) balance  -MS               User Key  (r) = Recorded By, (t) = Taken By, (c) = Cosigned By      Initials  Name Provider Type    Angeline Garvin R, PT, DPT, NCS Physical Therapist                   Mobility       Row Name 05/07/24 1146          Bed Mobility    Bed Mobility supine-sit  -MS     Supine-Sit Larimer (Bed Mobility) modified independence  -MS     Assistive Device (Bed Mobility) bed rails;head of bed elevated  -MS       Row Name 05/07/24 1146          Sit-Stand Transfer    Sit-Stand Lar[place] (Transfers) independent  -MS       Row Name 05/07/24 1146          Gait/Stairs (Locomotion)    Larimer Level (Gait) standby assist  -MS     Distance in Feet (Gait) 200  -MS     Comment, (Gait/Stairs) pt ambulated forward, backward, and side to side. She ambulated with head turns and with reading off signs. She demonstrated a slow cadance, but no reports of dizziness at this time  -MS               User Key  (r) = Recorded By, (t) = Taken By, (c) = Cosigned By      Initials Name Provider Type    Angeline Garvin R, PT, DPT, NCS Physical Therapist                   Obj/Interventions       Row Name 05/07/24 1146          Range of Motion Comprehensive    General Range of Motion bilateral upper extremity ROM WFL;bilateral lower extremity ROM WFL  -MS       Row Name 05/07/24 1146          Strength Comprehensive (MMT)    General Manual Muscle Testing (MMT) Assessment no strength deficits identified  -MS       Row Name 05/07/24 1146          Motor Skills    Motor Skills coordination  -MS     Coordination WNL  -MS       Row Name 05/07/24 1146          Balance    Balance Assessment sitting static balance;sitting dynamic balance;standing static balance;standing dynamic balance  -MS     Static Sitting Balance independent  -MS     Dynamic Sitting Balance independent  -MS     Position, Sitting Balance unsupported;sitting edge of bed  -MS     Static Standing Balance contact guard  -MS     Dynamic Standing Balance contact guard  -MS     Position/Device Used, Standing Balance unsupported  -MS     Comment, Balance romberg pt  demonstrated increased sway to the R, romberg with eyes closed: increase sway to the R. tandem: increased sway with near LOB of balance to R. unable to perform tandem with eyes closed  -MS       Row Name 05/07/24 1146          Sensory Assessment (Somatosensory)    Sensory Assessment (Somatosensory) sensation intact  -MS               User Key  (r) = Recorded By, (t) = Taken By, (c) = Cosigned By      Initials Name Provider Type    MS Jerry Angeline R, PT, DPT, NCS Physical Therapist                   Goals/Plan       Row Name 05/07/24 1146          Gait Training Goal 1 (PT)    Activity/Assistive Device (Gait Training Goal 1, PT) gait (walking locomotion);decrease fall risk;improve balance and speed;increase endurance/gait distance  -MS     Rogers Level (Gait Training Goal 1, PT) independent  -MS     Distance (Gait Training Goal 1, PT) 100ft pt will navigate around and over objects safely  -MS     Time Frame (Gait Training Goal 1, PT) long term goal (LTG);by discharge  -MS     Progress/Outcome (Gait Training Goal 1, PT) new goal  -MS       Row Name 05/07/24 1146          Problem Specific Goal 1 (PT)    Problem Specific Goal 1 (PT) The patient will be able to stand romberg with eyes closed for 30s maintaining midline  -MS     Time Frame (Problem Specific Goal 1, PT) long-term goal (LTG);by discharge  -MS     Progress/Outcome (Problem Specific Goal 1, PT) new goal  -MS       Row Name 05/07/24 1146          Therapy Assessment/Plan (PT)    Planned Therapy Interventions (PT) balance training;gait training;neuromuscular re-education  -MS               User Key  (r) = Recorded By, (t) = Taken By, (c) = Cosigned By      Initials Name Provider Type    MS JerryAngeline, PT, DPT, NCS Physical Therapist                   Clinical Impression       Row Name 05/07/24 1146          Pain    Pretreatment Pain Rating 3/10  -MS     Posttreatment Pain Rating 3/10  -MS     Pain Location - head  -MS       Row Name 05/07/24 1146           Plan of Care Review    Plan of Care Reviewed With patient;daughter  -MS     Progress no change  -MS     Outcome Evaluation The patient presents alert and oriented x4 sitting up in bed. She demonstrates no focal neurological deficits in strength, sensation, or coordination. She does demonstrate difficulty with high level static standing balance. She did not report any issues with dizziness during the evaluation though she does report mild head pain. PT will continue to work with her on high level balance exercises while she is here. Recommend discharge home with assist.  -MS       Row Name 05/07/24 1146          Therapy Assessment/Plan (PT)    Patient/Family Therapy Goals Statement (PT) go home  -MS     Rehab Potential (PT) good, to achieve stated therapy goals  -MS     Criteria for Skilled Interventions Met (PT) yes;meets criteria;skilled treatment is necessary  -MS     Therapy Frequency (PT) 2 times/day  -MS     Predicted Duration of Therapy Intervention (PT) until discharge  -MS       Row Name 05/07/24 1146          Positioning and Restraints    Post Treatment Position bed  -MS     In Bed sitting EOB;call light within reach;encouraged to call for assist;with family/caregiver;side rails up x2  -MS               User Key  (r) = Recorded By, (t) = Taken By, (c) = Cosigned By      Initials Name Provider Type    MS Rosalino Angeline R, PT, DPT, NCS Physical Therapist                   Outcome Measures       Row Name 05/07/24 1146 05/07/24 1024       How much help from another person do you currently need...    Turning from your back to your side while in flat bed without using bedrails? 4  -MS 4  -AM    Moving from lying on back to sitting on the side of a flat bed without bedrails? 4  -MS 4  -AM    Moving to and from a bed to a chair (including a wheelchair)? 4  -MS 4  -AM    Standing up from a chair using your arms (e.g., wheelchair, bedside chair)? 4  -MS 4  -AM    Climbing 3-5 steps with a railing? 4  -MS 4  -AM     To walk in hospital room? 4  -MS 4  -AM    AM-PAC 6 Clicks Score (PT) 24  -MS 24  -AM    Highest Level of Mobility Goal 8 --> Walked 250 feet or more  -MS 8 --> Walked 250 feet or more  -AM      Row Name 05/07/24 1146          Functional Assessment    Outcome Measure Options AM-PAC 6 Clicks Basic Mobility (PT)  -MS               User Key  (r) = Recorded By, (t) = Taken By, (c) = Cosigned By      Initials Name Provider Type    Angeline Garvin ANNETTA, PT, DPT, NCS Physical Therapist    Michaela Alejo RN Registered Nurse                                   PT Recommendation and Plan  Planned Therapy Interventions (PT): balance training, gait training, neuromuscular re-education  Plan of Care Reviewed With: patient, daughter  Progress: no change  Outcome Evaluation: The patient presents alert and oriented x4 sitting up in bed. She demonstrates no focal neurological deficits in strength, sensation, or coordination. She does demonstrate difficulty with high level static standing balance. She did not report any issues with dizziness during the evaluation though she does report mild head pain. PT will continue to work with her on high level balance exercises while she is here. Recommend discharge home with assist.     Time Calculation:         PT Charges       Row Name 05/07/24 1146             Time Calculation    Start Time 1105  -MS      Stop Time 1146  -MS      Time Calculation (min) 41 min  -MS      PT Received On 05/07/24  -MS      PT Goal Re-Cert Due Date 05/17/24  -MS         Untimed Charges    PT Eval/Re-eval Minutes 41  -MS         Total Minutes    Untimed Charges Total Minutes 41  -MS       Total Minutes 41  -MS                User Key  (r) = Recorded By, (t) = Taken By, (c) = Cosigned By      Initials Name Provider Type    Angeline Garvin ANNETTA, PT, DPT, NCS Physical Therapist                      PT G-Codes  Outcome Measure Options: AM-PAC 6 Clicks Basic Mobility (PT)  AM-PAC 6 Clicks Score (PT): 24  PT  Discharge Summary  Anticipated Discharge Disposition (PT): home with assist    Angeline Jerry, PT, DPT, NCS  5/7/2024

## 2024-05-07 NOTE — ED NOTES
"MEDICAL SCREENING    Reason for Visit: Patient is a 33-year-old female that presents to the emergency department from [facility] due to new onset headache, lightheadedness with position change, and intermittent blurred vision.  Patient had a miscarriage approximately 2 weeks ago at 9 weeks gestation.  She states that the symptoms began following recent miscarriage.  States headache is located to bilateral temples and she experiences extreme pain, nausea, and blurred vision with position changes to this area. She states she has been \"feeling like her balance is off\". Denies any abdominal pain, vomiting, diarrhea, shortness of breath, chest pain, or fevers. Denies any slurred speech, unilateral weakness or difficulty walking. Sees Katie Nova here for OBGYN.     Patient initially seen in triage.  The patient was advised further evaluation and diagnostic testing will be needed, some of the treatment and testing will be initiated in the lobby in order to begin the process.  The patient will be returned to the waiting area for the time being and will  be reassessed by a subsequent ED provider.  The patient will be brought back to the treatment area in as timely manner as possible.       Suzanne Adamson, APRN  05/07/24 0138    "

## 2024-05-07 NOTE — THERAPY TREATMENT NOTE
Acute Care - Physical Therapy Treatment Note  [facility]     Patient Name: Zina Armando  : 1990  MRN: [id number]  Today's Date: 2024      Visit Dx:     ICD-10-CM ICD-9-CM   1. Lightheadedness  R42 780.4   2. Acute nonintractable headache, unspecified headache type  R51.9 784.0   3. Cerebellar mass  G93.89 348.89   4. Impaired mobility [Z74.09]  Z74.09 799.89     Patient Active Problem List   Diagnosis    Cerebellar mass     Past Medical History:   Diagnosis Date    Miscarriage 2024    Miscarriage 2023     History reviewed. No pertinent surgical history.  PT Assessment (Last 12 Hours)       PT Evaluation and Treatment       Row Name 24 1519          Physical Therapy Time and Intention    Subjective Information no complaints  -AE     Document Type therapy note (daily note)  -AE     Mode of Treatment physical therapy  -AE       Row Name 24 1519          General Information    Existing Precautions/Restrictions fall  -AE       Row Name 24 1519          Pain    Pretreatment Pain Rating 0/10 - no pain  -AE     Posttreatment Pain Rating 0/10 - no pain  -AE       Row Name 24 1519          Bed Mobility    Supine-Sit Sharp (Bed Mobility) independent  -AE       Row Name 24 1519          Sit-Stand Transfer    Sit-Stand Sharp (Transfers) independent  -AE       Row Name 24 1519          Stand-Sit Transfer    Stand-Sit Sharp (Transfers) independent  -AE       Row Name 24 1519          Gait/Stairs (Locomotion)    Sharp Level (Gait) supervision  -AE     Distance in Feet (Gait) 200  -AE     Deviations/Abnormal Patterns (Gait) kaleigh decreased  NO LOB  -AE       Row Name 24 1519          Balance    Static Sitting Balance independent  -AE     Dynamic Sitting Balance independent  -AE     Dynamic Standing Balance standby assist  -AE     Additional Documentation --  Pt was able to step over boxes in room with no LOB.She also completed side  stepping L and R SBA.  -AE       Row Name 05/07/24 1519          Knee (Therapeutic Exercise)    Knee (Therapeutic Exercise) AROM (active range of motion)  mini squats x 20 reps SBA  -AE       Row Name 05/07/24 1519          Ankle (Therapeutic Exercise)    Ankle (Therapeutic Exercise) AROM (active range of motion)  Heel raises SBA  -AE     Ankle AROM (Therapeutic Exercise) 20 repititions  -AE       Row Name 05/07/24 1519          Positioning and Restraints    Pre-Treatment Position in bed  -AE     Post Treatment Position bed  -AE     In Bed fowlers;call light within reach  -AE               User Key  (r) = Recorded By, (t) = Taken By, (c) = Cosigned By      Initials Name Provider Type    AE Viktoria Gonzalez PTA Physical Therapist Assistant                      PT Recommendation and Plan             Time Calculation:    PT Charges       Row Name 05/07/24 1545 05/07/24 1146          Time Calculation    Start Time 1519  -AE 1105  -MS     Stop Time 1542  -AE 1146  -MS     Time Calculation (min) 23 min  -AE 41 min  -MS     PT Received On 05/07/24  -AE 05/07/24  -MS     PT Goal Re-Cert Due Date 05/17/24  -AE 05/17/24  -MS        Time Calculation- PT    Total Timed Code Minutes- PT 23 minute(s)  -AE --        Timed Charges    38416 - PT Therapeutic Exercise Minutes 8  -AE --     71132 - Gait Training Minutes  15  -AE --        Untimed Charges    PT Eval/Re-eval Minutes -- 41  -MS        Total Minutes    Timed Charges Total Minutes 23  -AE --     Untimed Charges Total Minutes -- 41  -MS      Total Minutes 23  -AE 41  -MS               User Key  (r) = Recorded By, (t) = Taken By, (c) = Cosigned By      Initials Name Provider Type    AE Viktoria Gonzalez, PTA Physical Therapist Assistant    Angeline Garvin, PT, DPT, NCS Physical Therapist                  Therapy Charges for Today       Code Description Service Date Service Provider Modifiers Qty    86137208605 HC GAIT TRAINING EA 15 MIN 5/7/2024 Viktoria Gonzalez, PTA GP 1     [id number]  PT THER PROC EA 15 MIN 5/7/2024 Viktoria Gonzalez, PTA GP 1            PT G-Codes  Outcome Measure Options: AM-PAC 6 Clicks Basic Mobility (PT)  AM-PAC 6 Clicks Score (PT): 24  AM-PAC 6 Clicks Score (OT): 24    Viktoria Gonzalez PTA  5/7/2024

## 2024-05-07 NOTE — ED NOTES
Nursing report ED to floor  Zina Armando  33 y.o.  female    HPI:   Chief Complaint   Patient presents with    Headache       Admitting doctor:   No admitting provider for patient encounter.    Consulting provider(s):  Consults       No orders found from 4/7/2024 to 5/7/2024.             Admitting diagnosis:   There were no encounter diagnoses.    Code status:   Current Code Status       Date Active Code Status Order ID Comments User Context       5/6/2024 2050 CPR (Attempt to Resuscitate) [id number]  Rohith Alexander MD ED        Question Answer    Code Status (Patient has no pulse and is not breathing) CPR (Attempt to Resuscitate)    Medical Interventions (Patient has pulse or is breathing) Full Support    Level Of Support Discussed With Patient                    Allergies:   Patient has no known allergies.    Intake and Output  No intake or output data in the 24 hours ending 05/06/24 2155    Weight:       05/06/24  1641   Weight: 95.3 kg (210 lb)       Most recent vitals:   Vitals:    05/06/24 2113 05/06/24 2114 05/06/24 2115 05/06/24 2131   BP: (!) 125/32 145/93 148/93 119/79   BP Location:    Right arm   Patient Position: Lying Sitting Standing Sitting   Pulse: 78 78 78 79   Resp:    18   Temp:    98.6 °F (37 °C)   TempSrc:    Oral   SpO2:    99%   Weight:       Height:         Oxygen Therapy: .    Active LDAs/IV Access:   Lines, Drains & Airways       Active LDAs       Name Placement date Placement time Site Days    Peripheral IV 05/06/24 1706 Left Antecubital 05/06/24  1706  Antecubital  less than 1                    Labs (abnormal labs have a star):   Labs Reviewed   COMPREHENSIVE METABOLIC PANEL - Abnormal; Notable for the following components:       Result Value    Glucose 100 (*)     Creatinine 0.56 (*)     All other components within normal limits    Narrative:     GFR Normal >60  Chronic Kidney Disease <60  Kidney Failure <15     CBC WITH AUTO DIFFERENTIAL - Abnormal; Notable for the following  components:    Lymphocytes, Absolute 3.19 (*)     All other components within normal limits   PROTIME-INR - Normal   APTT - Normal   HCG, SERUM, QUALITATIVE - Normal   URINALYSIS W/ CULTURE IF INDICATED - Normal    Narrative:     In absence of clinical symptoms, the presence of pyuria, bacteria, and/or nitrites on the urinalysis result does not correlate with infection.  Urine microscopic not indicated.   MAGNESIUM - Normal   CBC AND DIFFERENTIAL    Narrative:     The following orders were created for panel order CBC & Differential.  Procedure                               Abnormality         Status                     ---------                               -----------         ------                     CBC Auto Differential[[id number]]        Abnormal            Final result                 Please view results for these tests on the individual orders.       Meds given in ED:   Medications   sodium chloride 0.9 % flush 10 mL (has no administration in time range)   gadobenate dimeglumine (MULTIHANCE) injection 20 mL (20 mL Intravenous Given 5/6/24 2017)           NIH Stroke Scale:  Interval: baseline    Isolation/Infection(s):  No active isolations   No active infections     COVID Testing  Collected .  Resulted .    Nursing report ED to floor:  Mental status: .  Ambulatory status: .  Precautions: .    ED nurse phone extentsion- ..

## 2024-05-07 NOTE — PROGRESS NOTES
NEUROSURGERY DAILY PROGRESS NOTE    ASSESSMENT:   Zina Armando is a 33 y.o. non-English-speaking  female with a significant comorbidity miscarriages x 2.  She presents with a new problem of 2-week onset of headache. Physical exam findings of neurologically intact.  Their imaging shows midline posterior fossa mass with compression of the fourth ventricle and mild hydrocephalus.     Past Medical History:   Diagnosis Date    Miscarriage 04/2024    Miscarriage 02/2023     Active Hospital Problems    Diagnosis     **Cerebellar mass      HPI: Ms. Armando appears to be resting comfortably with family at bedside.  No complaints at present.  Her headache is currently resolved.  She endorses dizziness with standing, otherwise denies visual disturbances, nausea, vomiting, clumsiness, difficulty with gait/balance, or unilateral weakness.     services used during this encounter.   #[id number]    PLAN:   Neuro: Stable.  Neuroexam intact/at baseline    MRI of the brain reviewed.  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     Continue neuro exams per policy.  Call for decline   Continue Diamox   Continue oral steroids   Tentative plan for DC home today    CV: No acute issues  Pulm: No acute issues.  : No acute issues  FEN: Tolerating regular diet.  GI: PPI for steroids  ENDO: Accu-Chek and treat per policy  ID: No acute issues  Heme:  DVT prophylaxis with SCDs  Pain: No complaints at present.  Dispo: PT/OT   Case management consulted    CHIEF COMPLAINT:   2-week onset of headache    Subjective  Symptoms stable.  Doing well    Temp:  [97.5 °F (36.4 °C)-98.6 °F (37 °C)] 97.8 °F (36.6 °C)  Heart Rate:  [55-82] 82  Resp:  [16-18] 16  BP: (106-148)/(32-93) 128/84    Objective:  Vital signs: (most recent): Blood pressure 128/84, pulse 82, temperature 97.8 °F (36.6 °C), temperature source Oral, resp. rate 16, height 149.9 cm  "(59\"), weight 95.3 kg (210 lb), last menstrual period 04/13/2024, SpO2 94%, not currently breastfeeding.      Neurologic Exam     Mental Status   Oriented to person, place, and time.   Attention: normal. Concentration: normal.   Speech: speech is normal   Level of consciousness: alert    Cranial Nerves     CN II   Visual fields full to confrontation.     CN III, IV, VI   Pupils are equal, round, and reactive to light.  Extraocular motions are normal.     CN V   Facial sensation intact.     CN VII   Facial expression full, symmetric.     CN VIII   CN VIII normal.     CN IX, X   CN IX normal.     CN XI   CN XI normal.     Motor Exam   Right arm tone: normal  Left arm tone: normal  Right leg tone: normal  Left leg tone: normal    Strength   Right deltoid: 5/5  Left deltoid: 5/5  Right biceps: 5/5  Left biceps: 5/5  Right triceps: 5/5  Left triceps: 5/5  Right wrist extension: 5/5  Left wrist extension: 5/5  Right iliopsoas: 5/5  Left iliopsoas: 5/5  Right quadriceps: 5/5  Left quadriceps: 5/5  Right anterior tibial: 5/5  Left anterior tibial: 5/5  Right gastroc: 5/5  Left gastroc: 5/5  No pronator drift or dysmetria  Right EHL 5/5  Left EHL 5/5       Sensory Exam   Right arm light touch: normal  Left arm light touch: normal  Right leg light touch: normal  Left leg light touch: normal    Gait, Coordination, and Reflexes     Coordination   Finger to nose coordination: normal  Heel to shin coordination: normal    Tremor   Resting tremor: absent  Intention tremor: absent  Action tremor: absent    Reflexes   Right brachioradialis: 2+  Left brachioradialis: 2+  Right biceps: 2+  Left biceps: 2+  Right triceps: 2+  Left triceps: 2+  Right patellar: 2+  Left patellar: 2+  Right achilles: 2+  Left achilles: 2+  Right plantar: normal  Left plantar: normal  Right Crockett: absent  Left Crockett: absent  Right ankle clonus: absent  Left ankle clonus: absent  Right pendular knee jerk: absent  Left pendular knee jerk: " absent    Drains: * No LDAs found *    Imaging Results (Last 24 Hours)       Procedure Component Value Units Date/Time    XR Chest 1 View [[id number]] Collected: 05/07/24 0653     Updated: 05/07/24 0658    Narrative:      EXAM/TECHNIQUE: XR CHEST 1 VW-     INDICATION: preop     COMPARISON: None available.     FINDINGS:     Lung volumes are low. Cardiac silhouette is within normal limits. No  pleural effusion or visible pneumothorax. No focal consolidation. No  acute osseous finding.       Impression:      No acute findings.     This report was signed and finalized on 5/7/2024 6:55 AM by Dr. Rosalino Goodrich MD.       MRI Brain With & Without Contrast [[id number]] Collected: 05/06/24 2106     Updated: 05/06/24 2126    Narrative:      MRI BRAIN W WO CONTRAST- 5/6/2024 6:41 PM     HISTORY: acute hemorrhagic infarct vs mass?     COMPARISON: CT head 5/6/2024        TECHNIQUE: Multisequence, multiplanar MRI of the brain with and without  contrast.        FINDINGS:     There is restricted diffusion with associated mass effect and edema at  the midline of the cerebellum involving the vermis. There is no contrast  enhancement. There is mass effect on the inferior fourth ventricle and  median aperture with resulting enlargement of the ventricles consistent  with mild communicating hydrocephalus. There is no midline shift.  Basilar cisterns are preserved. Cerebellar tonsils are displaced  inferiorly.     There is T1 hyperintensity and T2 hypointensity consistent with  hemorrhage also noted on the noncontrast enhanced CT. No definite  filling defects are seen in the dural venous sinuses. No additional  abnormality is seen.          Impression:           1. Area of restricted diffusion with associated hemorrhage without  contrast enhancement at the midline of the cerebellum involving the  vermis likely hemorrhagic infarct.  2. There is associated mass effect on the inferior fourth ventricle with  mild communicating  hydrocephalus.     This report was signed and finalized on 5/6/2024 9:23 PM by Freddy Dacosta.       CT Head Without Contrast [[id number]] Collected: 05/06/24 1837     Updated: 05/06/24 1856    Narrative:      EXAM: CT HEAD WO CONTRAST-      DATE: 5/6/2024 5:25 PM     HISTORY: severe pain with position change to BL temple area       COMPARISON: None available.     DOSE LENGTH PRODUCT: 752.44 mGy.cm  Automated exposure control was also  utilized to decrease patient radiation dose.     TECHNIQUE: Unenhanced CT images obtained from vertex to skull base with  multiplanar reformats.     FINDINGS:  Hypodensity and mass effect is noted at the midline of the cerebellum  involving the vermis. There is a linear area of hyperdensity worrisome  for hemorrhage. There is no significant mass effect on the fourth  ventricle. However there is mass effect on the median aperture. Lateral  ventricles are prominent for the patient's age. The basilar cisterns are  preserved.        The visualized paranasal sinuses and mastoid air cells are clear.      The scalp soft tissues are unremarkable.     The visualized orbits and globes are unremarkable.       Impression:         1. Area of hypodensity likely edema at the midline of the cerebellum  involving the vermis with associated hyperdensity likely due to  hemorrhage. This may represent a hemorrhagic cerebellar infarct or  possible hemorrhagic mass. MR head without and with contrast is  recommended.  2. Mass effect on the median aperture and prominent lateral ventricles  for the patient's age and lack of atrophy. Cerebellar tonsils appear to  be normal in location.     This report was signed and finalized on 5/6/2024 6:53 PM by Freddy Dacosta.             Lab Results (last 24 hours)       Procedure Component Value Units Date/Time    Antithrombin III [[id number]]  (Abnormal) Collected: 05/07/24 0616    Specimen: Blood Updated: 05/07/24 0655     Antithrombin Activity 124 %     D-dimer,  "Quantitative [[id number]]  (Normal) Collected: 05/07/24 0616    Specimen: Blood Updated: 05/07/24 0655     D-Dimer, Quantitative 0.27 MCGFEU/mL     Narrative:      According to the assay 's published package insert, a normal (<0.50 MCGFEU/mL) D-dimer result in conjunction with a non-high clinical probability assessment, excludes deep vein thrombosis (DVT) and pulmonary embolism (PE) with high sensitivity.    D-dimer values increase with age and this can make VTE exclusion of an older population difficult. To address this, the [employer], based on best available evidence and recent guidelines, recommends that clinicians use age-adjusted D-dimer thresholds in patients greater than 50 years of age with: a) a low probability of PE who do not meet all Pulmonary Embolism Rule Out Criteria, or b) in those with intermediate probability of PE.   The formula for an age-adjusted D-dimer cut-off is \"age/100\".  For example, a 60 year old patient would have an age-adjusted cut-off of 0.60 MCGFEU/mL and an 80 year old 0.80 MCGFEU/mL.    Lupus Anticoagulant [[id number]] Collected: 05/07/24 0616    Specimen: Blood Updated: 05/07/24 0635    Factor 5 Activity [[id number]] Collected: 05/07/24 0616    Specimen: Blood Updated: 05/07/24 0635    Phosphatidylserine Antibodies [[id number]] Collected: 05/07/24 0616    Specimen: Blood Updated: 05/07/24 0635    JOSÉ MIGUEL [[id number]] Collected: 05/07/24 0616    Specimen: Blood Updated: 05/07/24 0635    Anticardiolipin Antibody, IgG / M, Qn [[id number]] Collected: 05/07/24 0616    Specimen: Blood Updated: 05/07/24 0635    Factor II, DNA Analysis [[id number]] Collected: 05/07/24 0616    Specimen: Blood Updated: 05/07/24 0635    Factor 5 Leiden [[id number]] Collected: 05/07/24 0616    Specimen: Blood Updated: 05/07/24 0635    Beta-2 Glycoprotein Antibodies [[id number]] Collected: 05/07/24 0616    Specimen: Blood Updated: 05/07/24 0635    Homocysteine [[id number]] Collected: " 05/07/24 0616    Specimen: Blood Updated: 05/07/24 0635    Protein S Functional [[id number]] Collected: 05/07/24 0616    Specimen: Blood Updated: 05/07/24 0634    Protein S Antigen, Free [[id number]] Collected: 05/07/24 0616    Specimen: Blood Updated: 05/07/24 0634    Protein S Antigen, Total [[id number]] Collected: 05/07/24 0616    Specimen: Blood Updated: 05/07/24 0634    Protein C Activity [[id number]] Collected: 05/07/24 0616    Specimen: Blood Updated: 05/07/24 0634    Pregnancy, Urine - Urine, Clean Catch [[id number]]  (Normal) Collected: 05/06/24 1834    Specimen: Urine, Clean Catch Updated: 05/06/24 2245     HCG, Urine QL Negative    Urinalysis With Culture If Indicated - Urine, Clean Catch [[id number]]  (Normal) Collected: 05/06/24 1834    Specimen: Urine, Clean Catch Updated: 05/06/24 1844     Color, UA Yellow     Appearance, UA Clear     pH, UA 6.5     Specific Gravity, UA 1.020     Glucose, UA Negative     Ketones, UA Negative     Bilirubin, UA Negative     Blood, UA Negative     Protein, UA Negative     Leuk Esterase, UA Negative     Nitrite, UA Negative     Urobilinogen, UA 0.2 E.U./dL    Narrative:      In absence of clinical symptoms, the presence of pyuria, bacteria, and/or nitrites on the urinalysis result does not correlate with infection.  Urine microscopic not indicated.    Comprehensive Metabolic Panel [[id number]]  (Abnormal) Collected: 05/06/24 1702    Specimen: Blood Updated: 05/06/24 1733     Glucose 100 mg/dL      BUN 11 mg/dL      Creatinine 0.56 mg/dL      Sodium 139 mmol/L      Potassium 4.2 mmol/L      Comment: Slight hemolysis detected by analyzer. Result may be falsely elevated.        Chloride 101 mmol/L      CO2 28.0 mmol/L      Calcium 9.7 mg/dL      Total Protein 7.9 g/dL      Albumin 4.2 g/dL      ALT (SGPT) 31 U/L      AST (SGOT) 26 U/L      Comment: Slight hemolysis detected by analyzer. Result may be falsely elevated.        Alkaline Phosphatase 117 U/L      Total Bilirubin 0.2  mg/dL      Globulin 3.7 gm/dL      A/G Ratio 1.1 g/dL      BUN/Creatinine Ratio 19.6     Anion Gap 10.0 mmol/L      eGFR 123.8 mL/min/1.73     Narrative:      GFR Normal >60  Chronic Kidney Disease <60  Kidney Failure <15      Magnesium [[id number]]  (Normal) Collected: 05/06/24 1702    Specimen: Blood Updated: 05/06/24 1724     Magnesium 1.9 mg/dL     hCG, Serum, Qualitative [[id number]]  (Normal) Collected: 05/06/24 1702    Specimen: Blood Updated: 05/06/24 1724     HCG Qualitative Negative    Protime-INR [[id number]]  (Normal) Collected: 05/06/24 1702    Specimen: Blood Updated: 05/06/24 1719     Protime 12.9 Seconds      INR 0.93    aPTT [[id number]]  (Normal) Collected: 05/06/24 1702    Specimen: Blood Updated: 05/06/24 1719     PTT 27.7 seconds     CBC & Differential [[id number]]  (Abnormal) Collected: 05/06/24 1702    Specimen: Blood Updated: 05/06/24 1710    Narrative:      The following orders were created for panel order CBC & Differential.  Procedure                               Abnormality         Status                     ---------                               -----------         ------                     CBC Auto Differential[[id number]]        Abnormal            Final result                 Please view results for these tests on the individual orders.    CBC Auto Differential [[id number]]  (Abnormal) Collected: 05/06/24 1702    Specimen: Blood Updated: 05/06/24 1710     WBC 9.58 10*3/mm3      RBC 4.97 10*6/mm3      Hemoglobin 13.4 g/dL      Hematocrit 42.0 %      MCV 84.5 fL      MCH 27.0 pg      MCHC 31.9 g/dL      RDW 12.8 %      RDW-SD 39.4 fl      MPV 10.4 fL      Platelets 232 10*3/mm3      Neutrophil % 58.4 %      Lymphocyte % 33.3 %      Monocyte % 6.4 %      Eosinophil % 1.3 %      Basophil % 0.3 %      Immature Grans % 0.3 %      Neutrophils, Absolute 5.60 10*3/mm3      Lymphocytes, Absolute 3.19 10*3/mm3      Monocytes, Absolute 0.61 10*3/mm3      Eosinophils, Absolute 0.12 10*3/mm3       Basophils, Absolute 0.03 10*3/mm3      Immature Grans, Absolute 0.03 10*3/mm3      nRBC 0.0 /100 WBC           [id number]  Dominguez Narvaez, APRN

## 2024-05-07 NOTE — PLAN OF CARE
Goal Outcome Evaluation:  Plan of Care Reviewed With: patient, daughter        Progress: no change  Outcome Evaluation: The patient presents alert and oriented x4 sitting up in bed. She demonstrates no focal neurological deficits in strength, sensation, or coordination. She does demonstrate difficulty with high level static standing balance. She did not report any issues with dizziness during the evaluation though she does report mild head pain. PT will continue to work with her on high level balance exercises while she is here. Recommend discharge home with assist.      Anticipated Discharge Disposition (PT): home with assist               #[id number]

## 2024-05-07 NOTE — CASE MANAGEMENT/SOCIAL WORK
Discharge Planning Assessment  [facility]     Patient Name: Zina Armando  MRN: [id number]  Today's Date: 5/7/2024    Admit Date: 5/6/2024        Discharge Needs Assessment       Row Name 05/07/24 1029       Living Environment    People in Home spouse    Current Living Arrangements home    Potentially Unsafe Housing Conditions none    Primary Care Provided by self    Family Caregiver if Needed spouse    Quality of Family Relationships helpful;involved    Able to Return to Prior Arrangements yes       Resource/Environmental Concerns    Resource/Environmental Concerns financial    Financial Concerns insurance, none    Transportation Concerns none       Transition Planning    Patient/Family Anticipates Transition to home    Patient/Family Anticipated Services at Transition none    Transportation Anticipated family or friend will provide       Discharge Needs Assessment    Readmission Within the Last 30 Days no previous admission in last 30 days    Equipment Currently Used at Home none    Concerns to be Addressed financial/insurance    Anticipated Changes Related to Illness none    Equipment Needed After Discharge none    Discharge Coordination/Progress PT resides at home with spouse and plans to dc to the same. PT has no insurance/payer source. PT is in need of surgery in a week or so. SW has made a referral to [facility] and has left a message requesting a return call. SW will await return call.                   Discharge Plan    No documentation.                 Continued Care and Services - Admitted Since 5/6/2024    No active coordination exists for this encounter.          Demographic Summary    No documentation.                  Functional Status    No documentation.                  Psychosocial    No documentation.                  Abuse/Neglect    No documentation.                  Legal    No documentation.                  Substance Abuse    No documentation.                  Patient Forms    No  documentation.                     Charito Reeves, CSW

## 2024-05-07 NOTE — DISCHARGE SUMMARY
DISCHARGE SUMMARY FROM HOSPITAL     Date of Discharge:  5/7/2024    Presenting Diagnosis/History of Present Illness  Cerebellar mass [G93.89]    Medical History   Patient Active Problem List   Diagnosis    Cerebellar mass     Discharge Diagnosis/ Active Hospital Problems:   Active Hospital Problems    Diagnosis     **Cerebellar mass      Hospital Course  Patient is a 33 y.o.  non-English-speaking  female with a significant comorbidity miscarriages x 2.  She presents with a new problem of 2-week onset of headache. Physical exam findings of neurologically intact.  Their imaging shows midline posterior fossa mass with compression of the fourth ventricle and mild hydrocephalus.      was admitted to the hospital for for close neurologic monitoring, airway observation, and for pain control.  Since being admitted her neurological exam has remained unchanged.  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   She has been able to ambulate, tolerate oral diet, oral pain medication, void, and felt a significant improvement in her headache and dizziness.   She has been evaluated by physical therapy and occupational therapy and deemed safe for discharge home with family.  Conservative management education was performed at bedside which include, but not limited to wound care instructions (if applicable), physical activity modifications/limitations, tapering narcotic pain medication(s), and indications for contacting the neurosurgical office vs PCP vs the emergency department.  Arrangements for follow-up should be provided prior to hospital discharge.  Additional information provided in hospital discharge instructions.    Procedures Performed  None       Consults:   Consults       No orders found for last 30 day(s).          Pertinent Test Results:   XR Chest 1 View    Result Date: 5/7/2024  No  "acute findings.  This report was signed and finalized on 5/7/2024 6:55 AM by Dr. Rosalino Goodrich MD.      MRI Brain With & Without Contrast    Result Date: 5/6/2024     1. Area of restricted diffusion with associated hemorrhage without contrast enhancement at the midline of the cerebellum involving the vermis likely hemorrhagic infarct. 2. There is associated mass effect on the inferior fourth ventricle with mild communicating hydrocephalus.  This report was signed and finalized on 5/6/2024 9:23 PM by Freddy Dacosta.      CT Head Without Contrast    Result Date: 5/6/2024   1. Area of hypodensity likely edema at the midline of the cerebellum involving the vermis with associated hyperdensity likely due to hemorrhage. This may represent a hemorrhagic cerebellar infarct or possible hemorrhagic mass. MR head without and with contrast is recommended. 2. Mass effect on the median aperture and prominent lateral ventricles for the patient's age and lack of atrophy. Cerebellar tonsils appear to be normal in location.  This report was signed and finalized on 5/6/2024 6:53 PM by Freddy Dacosta.       CBC:   Results from last 7 days   Lab Units 05/06/24  1702   WBC 10*3/mm3 9.58   HEMOGLOBIN g/dL 13.4   HEMATOCRIT % 42.0   PLATELETS 10*3/mm3 232     BMP:  Results from last 7 days   Lab Units 05/06/24  1702   SODIUM mmol/L 139   POTASSIUM mmol/L 4.2   CHLORIDE mmol/L 101   CO2 mmol/L 28.0   BUN mg/dL 11   CREATININE mg/dL 0.56*   GLUCOSE mg/dL 100*   CALCIUM mg/dL 9.7   ALT (SGPT) U/L 31     Culture Results: No results found for: \"BLOODCX\", \"URINECX\", \"WOUNDCX\", \"MRSACX\", \"RESPCX\", \"STOOLCX\"    Condition on Discharge: Stable    Vital Signs  Temp:  [97.5 °F (36.4 °C)-98.6 °F (37 °C)] 98 °F (36.7 °C)  Heart Rate:  [55-85] 85  Resp:  [16-18] 16  BP: (106-148)/(32-93) 114/67    Physical Exam:   Physical Exam  Vitals and nursing note reviewed.   Constitutional:       General: She is not in acute distress.     Appearance: Normal " appearance. She is well-developed and well-groomed. She is morbidly obese. She is not ill-appearing, toxic-appearing or diaphoretic.      Comments: BMI 42.43   HENT:      Head: Normocephalic and atraumatic.      Right Ear: Hearing normal.      Left Ear: Hearing normal.   Eyes:      Extraocular Movements: EOM normal.      Conjunctiva/sclera: Conjunctivae normal.      Pupils: Pupils are equal, round, and reactive to light.   Neck:      Trachea: Trachea normal.   Cardiovascular:      Rate and Rhythm: Normal rate and regular rhythm.   Pulmonary:      Effort: Pulmonary effort is normal. No tachypnea, bradypnea, accessory muscle usage or respiratory distress.   Abdominal:      Palpations: Abdomen is soft.   Musculoskeletal:      Cervical back: Full passive range of motion without pain and neck supple.   Skin:     General: Skin is warm and dry.   Neurological:      Mental Status: She is alert and oriented to person, place, and time.      GCS: GCS eye subscore is 4. GCS verbal subscore is 5. GCS motor subscore is 6.      Coordination: Finger-Nose-Finger Test and Heel to Shin Test normal.      Deep Tendon Reflexes:      Reflex Scores:       Tricep reflexes are 2+ on the right side and 2+ on the left side.       Bicep reflexes are 2+ on the right side and 2+ on the left side.       Brachioradialis reflexes are 2+ on the right side and 2+ on the left side.       Patellar reflexes are 2+ on the right side and 2+ on the left side.       Achilles reflexes are 2+ on the right side and 2+ on the left side.  Psychiatric:         Speech: Speech normal.         Behavior: Behavior normal. Behavior is cooperative.        Neurologic Exam     Mental Status   Oriented to person, place, and time.   Attention: normal. Concentration: normal.   Speech: speech is normal   Level of consciousness: alert    Cranial Nerves     CN II   Visual fields full to confrontation.     CN III, IV, VI   Pupils are equal, round, and reactive to  light.  Extraocular motions are normal.     CN V   Facial sensation intact.     CN VII   Facial expression full, symmetric.     CN VIII   CN VIII normal.     CN IX, X   CN IX normal.     CN XI   CN XI normal.     Motor Exam   Right arm tone: normal  Left arm tone: normal  Right leg tone: normal  Left leg tone: normal    Strength   Right deltoid: 5/5  Left deltoid: 5/5  Right biceps: 5/5  Left biceps: 5/5  Right triceps: 5/5  Left triceps: 5/5  Right wrist extension: 5/5  Left wrist extension: 5/5  Right iliopsoas: 5/5  Left iliopsoas: 5/5  Right quadriceps: 5/5  Left quadriceps: 5/5  Right anterior tibial: 5/5  Left anterior tibial: 5/5  Right gastroc: 5/5  Left gastroc: 5/5  No pronator drift or dysmetria  Right EHL 5/5  Left EHL 5/5       Sensory Exam   Right arm light touch: normal  Left arm light touch: normal  Right leg light touch: normal  Left leg light touch: normal    Gait, Coordination, and Reflexes     Coordination   Finger to nose coordination: normal  Heel to shin coordination: normal    Tremor   Resting tremor: absent  Intention tremor: absent  Action tremor: absent    Reflexes   Right brachioradialis: 2+  Left brachioradialis: 2+  Right biceps: 2+  Left biceps: 2+  Right triceps: 2+  Left triceps: 2+  Right patellar: 2+  Left patellar: 2+  Right achilles: 2+  Left achilles: 2+  Right plantar: normal  Left plantar: normal  Right Crockett: absent  Left Crockett: absent  Right ankle clonus: absent  Left ankle clonus: absent  Right pendular knee jerk: absent  Left pendular knee jerk: absent    Discharge Disposition  Home or Self Care    Discharge Medications     Discharge Medications        New Medications        Instructions Start Date   acetaZOLAMIDE 250 MG tablet  Commonly known as: DIAMOX   250 mg, Oral, 2 Times Daily      butalbital-acetaminophen-caffeine -40 MG per tablet  Commonly known as: FIORICET, ESGIC   1 tablet, Oral, Every 6 Hours PRN      dexAMETHasone 4 MG tablet  Commonly known as:  DECADRON   4 mg, Oral, Every 6 Hours Scheduled      pantoprazole 40 MG EC tablet  Commonly known as: Protonix   40 mg, Oral, Daily             Stop These Medications      aspirin-acetaminophen-caffeine 250-250-65 MG per tablet  Commonly known as: EXCEDRIN MIGRAINE     prenatal (CLASSIC) vitamin 28-0.8 MG tablet tablet  Generic drug: prenatal vitamin            Discharge Diet:   Diet Instructions       Advance Diet As Tolerated -Target Diet: Regular diet.  Advance as tolerated      Target Diet: Regular diet.  Advance as tolerated           Activity at Discharge:   Activity Instructions       Activity as Tolerated      Driving Restrictions      Type of Restriction: Driving    Driving Restrictions: No Driving While Taking Narcotics    Gradually Increase Activity Until at Pre-Hospitalization Level      Lifting Restrictions      Type of Restriction: Lifting    Lifting Restrictions: Lifting Restriction (Indicate Limit)    Weight Limit (Pounds): 8    Length of Lifting Restriction: 2-3 WEEKS           Follow-up Appointments  Future Appointments   Date Time Provider Department Center   5/30/2024  9:00 AM [facility]   5/30/2024  9:30 AM Nichol Nova APRN [facility]     Additional Instructions for the Follow-ups that You Need to Schedule       Call MD With Problems / Concerns   As directed      Instructions: CALL WITH ANY NEW OR ADDITIONAL CONCERNS.    Order Comments: Instructions: CALL WITH ANY NEW OR ADDITIONAL CONCERNS.               Test Results Pending at Discharge  Pending Labs       Order Current Status    JOSÉ MIGUEL In process    Anticardiolipin Antibody, IgG / M, Qn In process    Beta-2 Glycoprotein Antibodies In process    Factor 5 Activity In process    Factor 5 Leiden In process    Factor II, DNA Analysis In process    Lupus Anticoagulant In process    Phosphatidylserine Antibodies In process    Protein C Activity In process    Protein S Antigen, Free In process    Protein S  Antigen, Total In process    Protein S Functional In process          Dominguez Narvaez, APRN  05/07/24  16:34 CDT    Time: Discharge 35 min

## 2024-05-07 NOTE — CASE MANAGEMENT/SOCIAL WORK
Continued Stay Note   [facility]     Patient Name: Zina Armando  MRN: [id number]  Today's Date: 5/7/2024    Admit Date: 5/6/2024        Discharge Plan       Row Name 05/07/24 1601       Plan    Plan Comments SW has been able to speak with Genny with [employer]. She has assessed PT and she will only be eligible for emergency medicaid. This process will take several weeks to approve and only pays after the fact. Genny will fax forms to Dr. Alexander and/or TERRENCE Narvaez, these forms will need to be completed and returned to [employer] so the application can be completed. PT may dc home when medically ready.    Final Discharge Disposition Code 01 - home or self-care                   Discharge Codes    No documentation.                       STEPHANE Mcgill

## 2024-05-07 NOTE — THERAPY DISCHARGE NOTE
Acute Care - Occupational Therapy Discharge  [facility]    Patient Name: Zina Armando  : 1990    MRN: [id number]                              Today's Date: 2024       Admit Date: 2024    Visit Dx:     ICD-10-CM ICD-9-CM   1. Lightheadedness  R42 780.4   2. Acute nonintractable headache, unspecified headache type  R51.9 784.0   3. Cerebellar mass  G93.89 348.89   4. Impaired mobility [Z74.09]  Z74.09 799.89     Patient Active Problem List   Diagnosis    Cerebellar mass     Past Medical History:   Diagnosis Date    Miscarriage 2024    Miscarriage 2023     History reviewed. No pertinent surgical history.   General Information       Row Name 24 1106          OT Time and Intention    Document Type evaluation  cc: 2 week onset of headache. Imaging revealed midline posterior fossa mass with compression of the fourth ventricle and mild hydrocephalus.  -LS     Mode of Treatment occupational therapy  -       Row Name 24 1106          General Information    Patient Profile Reviewed yes  -LS     Prior Level of Function independent:;ADL's;all household mobility;community mobility;home management;cooking;cleaning;driving;shopping  -     Existing Precautions/Restrictions fall  -LS       Row Name 24 1106          Living Environment    People in Home spouse;child(katerin), adult  -       Row Name 24 1106          Home Main Entrance    Number of Stairs, Main Entrance three  -LS     Stair Railings, Main Entrance railings on both sides of stairs  -       Row Name 24 1106          Stairs Within Home, Primary    Number of Stairs, Within Home, Primary none  -LS       Row Name 24 1106          Cognition    Orientation Status (Cognition) oriented x 4  -       Row Name 24 1106          Safety Issues, Functional Mobility    Impairments Affecting Function (Mobility) balance  -LS               User Key  (r) = Recorded By, (t) = Taken By, (c) = Cosigned By      Initials Name  Provider Type     Maura Bingham OTR/L Occupational Therapist                   Mobility/ADL's       Row Name 05/07/24 1106          Bed Mobility    Bed Mobility supine-sit  -     Supine-Sit [place] (Bed Mobility) modified independence  -     Assistive Device (Bed Mobility) bed rails;head of bed elevated  -       Row Name 05/07/24 1106          Transfers    Transfers --  -       Row Name 05/07/24 1106          Sit-Stand Transfer    Sit-Stand [place] (Transfers) independent  -       Row Name 05/07/24 1106          Stand-Sit Transfer    Stand-Sit [place] (Transfers) independent  -       Row Name 05/07/24 1106          Functional Mobility    Functional Mobility- Ind. Level contact guard assist  -     Patient was able to Ambulate yes  -       Row Name 05/07/24 1106          Activities of Daily Living    BADL Assessment/Intervention upper body dressing;lower body dressing;toileting  -       Row Name 05/07/24 1106          Upper Body Dressing Assessment/Training    [place] Level (Upper Body Dressing) upper body dressing skills;independent  -       Row Name 05/07/24 1106          Lower Body Dressing Assessment/Training    [place] Level (Lower Body Dressing) lower body dressing skills;independent  -       Row Name 05/07/24 1106          Toileting Assessment/Training    [place] Level (Toileting) toileting skills;independent  -               User Key  (r) = Recorded By, (t) = Taken By, (c) = Cosigned By      Initials Name Provider Type     Maura Bingham OTR/L Occupational Therapist                   Obj/Interventions       Row Name 05/07/24 1106          Sensory Assessment (Somatosensory)    Sensory Assessment (Somatosensory) UE sensation intact  -       Row Name 05/07/24 1106          Vision Assessment/Intervention    Visual Impairment/Limitations WFL  -       Row Name 05/07/24 1106          Range of Motion Comprehensive    General Range of Motion bilateral upper  extremity ROM WFL  -LS       Row Name 05/07/24 1106          Strength Comprehensive (MMT)    Comment, General Manual Muscle Testing (MMT) Assessment BUE strength grossly 5/5  -LS       Row Name 05/07/24 1106          Motor Skills    Motor Skills coordination  -LS     Coordination WNL  -LS       Row Name 05/07/24 1106          Balance    Balance Assessment sitting static balance;sitting dynamic balance;standing static balance;standing dynamic balance  -LS     Static Sitting Balance independent  -LS     Dynamic Sitting Balance independent  -LS     Position, Sitting Balance unsupported;sitting edge of bed  -LS     Static Standing Balance contact guard  -LS     Dynamic Standing Balance contact guard  -LS     Position/Device Used, Standing Balance unsupported  -LS               User Key  (r) = Recorded By, (t) = Taken By, (c) = Cosigned By      Initials Name Provider Type    Maura Marx OTR/L Occupational Therapist                   Goals/Plan    No documentation.                  Clinical Impression       Row Name 05/07/24 1106          Pain Assessment    Pretreatment Pain Rating 3/10  -LS     Posttreatment Pain Rating 3/10  -LS     Pain Location - head  -LS     Pain Intervention(s) Repositioned;Ambulation/increased activity  -       Row Name 05/07/24 1106          Plan of Care Review    Plan of Care Reviewed With patient;daughter  -     Progress no change  -LS     Outcome Evaluation OT eval completed. Pt sitting up in bed upon therapist arrival; A&Ox4; c/o 3/10 head pain;  number [id number]. Pt reports I with all BADLs/IADLs at baseline. Today, Pt performed supine>sit utilizing bedrail with HOB elevated with SBA. Pt performed sit<>stand I. Pt ambulated community distance in hallway utilizing no AD with CGA. BUE strength and coordination WNL. Pt has been up ad vannessa in room performing BADLs I. Skilled OT intervention not indicated at this time. Recommend home with assist at discharge. OT to sign off.  -LS        Row Name 05/07/24 1106          Therapy Assessment/Plan (OT)    Criteria for Skilled Therapeutic Interventions Met (OT) no;does not meet criteria for skilled intervention  -LS     Therapy Frequency (OT) evaluation only  -LS       Row Name 05/07/24 1106          Therapy Plan Review/Discharge Plan (OT)    Anticipated Discharge Disposition (OT) home with assist  -LS       Row Name 05/07/24 1106          Positioning and Restraints    Pre-Treatment Position in bed  -LS     Post Treatment Position bed  -LS     In Bed sitting EOB;call light within reach;encouraged to call for assist;with family/caregiver;side rails up x2  -LS               User Key  (r) = Recorded By, (t) = Taken By, (c) = Cosigned By      Initials Name Provider Type    LS Maura Bingham, OTR/L Occupational Therapist                   Outcome Measures       Row Name 05/07/24 1106          How much help from another is currently needed...    Putting on and taking off regular lower body clothing? 4  -LS     Bathing (including washing, rinsing, and drying) 4  -LS     Toileting (which includes using toilet bed pan or urinal) 4  -LS     Putting on and taking off regular upper body clothing 4  -LS     Taking care of personal grooming (such as brushing teeth) 4  -LS     Eating meals 4  -LS     AM-PAC 6 Clicks Score (OT) 24  -LS       Row Name 05/07/24 1146 05/07/24 1024       How much help from another person do you currently need...    Turning from your back to your side while in flat bed without using bedrails? 4  -MS 4  -AM    Moving from lying on back to sitting on the side of a flat bed without bedrails? 4  -MS 4  -AM    Moving to and from a bed to a chair (including a wheelchair)? 4  -MS 4  -AM    Standing up from a chair using your arms (e.g., wheelchair, bedside chair)? 4  -MS 4  -AM    Climbing 3-5 steps with a railing? 4  -MS 4  -AM    To walk in hospital room? 4  -MS 4  -AM    AM-PAC 6 Clicks Score (PT) 24  -MS 24  -AM    Highest Level of Mobility  Goal 8 --> Walked 250 feet or more  -MS 8 --> Walked 250 feet or more  -AM      Row Name 05/07/24 1146 05/07/24 1106       Functional Assessment    Outcome Measure Options AM-PAC 6 Clicks Basic Mobility (PT)  -MS AM-PAC 6 Clicks Daily Activity (OT)  -LS              User Key  (r) = Recorded By, (t) = Taken By, (c) = Cosigned By      Initials Name Provider Type    Angeline Garvin, PT, DPT, NCS Physical Therapist    Michaela Alejo, RN Registered Nurse    Maura Marx, OTR/L Occupational Therapist                    OT Recommendation and Plan  Therapy Frequency (OT): evaluation only  Plan of Care Review  Plan of Care Reviewed With: patient, daughter  Progress: no change  Outcome Evaluation: OT eval completed. Pt sitting up in bed upon therapist arrival; A&Ox4; c/o 3/10 head pain;  number [id number]. Pt reports I with all BADLs/IADLs at baseline. Today, Pt performed supine>sit utilizing bedrail with HOB elevated with SBA. Pt performed sit<>stand I. Pt ambulated community distance in hallway utilizing no AD with CGA. BUE strength and coordination WNL. Pt has been up ad vannessa in room performing BADLs I. Skilled OT intervention not indicated at this time. Recommend home with assist at discharge. OT to sign off.  Plan of Care Reviewed With: patient, daughter  Outcome Evaluation: OT eval completed. Pt sitting up in bed upon therapist arrival; A&Ox4; c/o 3/10 head pain;  number [id number]. Pt reports I with all BADLs/IADLs at baseline. Today, Pt performed supine>sit utilizing bedrail with HOB elevated with SBA. Pt performed sit<>stand I. Pt ambulated community distance in hallway utilizing no AD with CGA. BUE strength and coordination WNL. Pt has been up ad vannessa in room performing BADLs I. Skilled OT intervention not indicated at this time. Recommend home with assist at discharge. OT to sign off.     Time Calculation:         Time Calculation- OT       Row Name 05/07/24 1106             Time  Calculation- OT    OT Start Time 1106  +10 minutes chart review  -LS      OT Stop Time 1141  -LS      OT Time Calculation (min) 35 min  -      OT Non-Billable Time (min) 45 min  -      OT Received On 05/07/24  -                User Key  (r) = Recorded By, (t) = Taken By, (c) = Cosigned By      Initials Name Provider Type     Maura Bingham, OTR/L Occupational Therapist                  Therapy Charges for Today       Code Description Service Date Service Provider Modifiers Qty    36752288179 HC OT EVAL LOW COMPLEXITY 3 5/7/2024 Maura Bingham OTR/L GO 1               OT Discharge Summary  Anticipated Discharge Disposition (OT): home with assist  Reason for Discharge: Independent  Outcomes Achieved: Other (Eval only)  Discharge Destination: other (comment) (remains admitted to [facility])    Maura Bingham OTR/RENE  5/7/2024

## 2024-05-07 NOTE — PLAN OF CARE
Goal Outcome Evaluation:  Plan of Care Reviewed With: patient        Progress: no change  Outcome Evaluation: Pt being dc home with family. Dc instructions provided to pt via [place]ian interperter #[id number]. Educated pt on the extreme importance to take medications as prescribed and return to the ER for worsening symptoms. Up ad vannessa. Voiding per BRP. Stable upon d/c.

## 2024-05-07 NOTE — PLAN OF CARE
Goal Outcome Evaluation:              Outcome Evaluation: ALOX4. RA. Up at vannessa. no c/o pain. NIH 0. swallow study passed on ER. Daughter present at bed side. Pt does not speak english. Daughter is helping to translate.

## 2024-05-07 NOTE — PLAN OF CARE
Goal Outcome Evaluation:  Plan of Care Reviewed With: patient, daughter        Progress: no change  Outcome Evaluation: OT eval completed. Pt sitting up in bed upon therapist arrival; A&Ox4; c/o 3/10 head pain;  number [id number]. Pt reports I with all BADLs/IADLs at baseline. Today, Pt performed supine>sit utilizing bedrail with HOB elevated with SBA. Pt performed sit<>stand I. Pt ambulated community distance in hallway utilizing no AD with CGA. BUE strength and coordination WNL. Pt has been up ad vannessa in room performing BADLs I. Skilled OT intervention not indicated at this time. Recommend home with assist at discharge. OT to sign off.      Anticipated Discharge Disposition (OT): home with assist

## 2024-05-07 NOTE — H&P
NEUROSURGERY INITIAL HOSPITAL ENCOUNTER    Assessment/Plan:   Zina Armando is a 33 y.o. female with a significant comorbidity miscarriages x 2.  She presents with a new problem of 2-week onset of headache. Physical exam findings of neurologically intact.  Their imaging shows midline posterior fossa mass with compression of the fourth ventricle and mild hydrocephalus.    Differential Diagnosis:   Posterior fossa mass differential diagnosis includes stroke versus low-grade primary neoplasm  Mild obstructive hydrocephalus    Medical Decision Making:  Zina, her daughter and I discussed her history presenting illness, physical exam and imaging findings.  Her imaging is most concerning for stroke although we cannot fully exclude low-grade neoplasm.  She does have a history of miscarriages which could indicate hypercoagulability disorder.  At this point I think the most prudent course of action would be to control hydrocephalus with Diamox and reduce swelling if at all possible.  Will consult neurology and have them weigh in on the imaging.      If the patient does well overnight and and neurology is in agreement I would plan on sending her home on Diamox and Decadron.  I would like to follow her in close outpatient follow-up with repeat MRI of the brain in 1 week to monitor obstructive hydrocephalus.    Recommendations:  -Admit to floor  -Neuroexams every 4 hours  -Pregnancy test  -Hypercoagulability workup  -Consult neurology  -Plan for repeat MRI in 1 week      I discussed the patient's findings and my recommendations with patient, family, nursing staff, and consulting provider    Thank you very much for this interesting consult.     Medical Decision Making (2/3)  Problem Points (2,3,4 or more)  Threat to life or body ex. Abrupt change in neurological exam (High)  Data Points (2,3,4 or more)  CATEGORY 1  INDEPENDENT INTERPRETATION Imaging = 2  ORDERED: Imaging (X-ray,CT, MRI) = 1.  ORDERED Lab ordered =  5  Independent Historian required  CATEGORY 2  Independent interpretation of test performed by another physician/NPP (Cat 2)  CATEGORY 3  Risk (Low, Mod, High)  DX: Threat to life or bodily function (High)    E/M = MDM 2 out of 3   or  Time  New Level 5 - 17238 = High + (Same as Above but 2 of 3) + High Risk   or   80 minutes  ___________________________________________________________________    Reason for consult: Posterior fossa mass  Consult Requested by: ER    Chief Complaint:   Chief Complaint   Patient presents with    Headache       HPI: Zina Armando is a 33 y.o. female with a significant comorbidity miscarriage in February 2023 and another miscarriage in April.  No other history of hypercoagulability.  Patient states that she was in her normal state of health until 2 weeks ago.  She had a rather acute onset of headache.  Headache is intermittent in nature.  She noticed that whenever she got up quickly, had activity, or bent over she would have a throbbing headache.  This would often resolve in seconds to minutes.  She has noticed some changes in her vision occasionally feeling like she has film over her eyes.  Again this is intermittent in nature.  Some nausea but no vomiting.  No history of weight loss.  She denies any gait ataxia or difficulty walking.    Due to persistent headache she went to urgent care was then transferred to the emergency room for further imaging.  CT scan of her head was performed which showed a large midline hypointensity concerning for stroke.  Subsequent MRI showed limited contrast-enhancement with some hemorrhagic component within this area.  And only mild ventricular prominence.  Neurosurgery was consulted for further evaluation.    Review of Systems   Constitutional: Negative.    Eyes:  Positive for visual disturbance.   Respiratory: Negative.     Cardiovascular: Negative.    Gastrointestinal:  Positive for nausea.   Endocrine: Negative.    Genitourinary: Negative.     Musculoskeletal: Negative.    Skin: Negative.    Allergic/Immunologic: Negative.    Neurological:  Positive for headaches.   Hematological: Negative.    Psychiatric/Behavioral: Negative.          Past Medical History:  has a past medical history of Miscarriage (04/2024) and Miscarriage (02/2023).    Past Surgical History:  has no past surgical history on file..      Family History: family history is not on file. No relevant family history of cancer or hypercoagulability..    Social History:  reports that she has never smoked. She has never used smokeless tobacco. She reports that she does not drink alcohol and does not use drugs.    Allergies: Patient has no known allergies.    Home Medications:   Current Facility-Administered Medications:     acetaminophen (TYLENOL) tablet 1,000 mg, 1,000 mg, Oral, TID, Rohith Alexander MD    acetaZOLAMIDE (DIAMOX) tablet 250 mg, 250 mg, Oral, BID, Rohith Alexander MD    sennosides-docusate (PERICOLACE) 8.6-50 MG per tablet 2 tablet, 2 tablet, Oral, BID **AND** polyethylene glycol (MIRALAX) packet 17 g, 17 g, Oral, Daily PRN **AND** bisacodyl (DULCOLAX) EC tablet 5 mg, 5 mg, Oral, Daily PRN **AND** bisacodyl (DULCOLAX) suppository 10 mg, 10 mg, Rectal, Daily PRN, Rohith Alexander MD    butalbital-acetaminophen-caffeine (FIORICET, ESGIC) -40 MG per tablet 2 tablet, 2 tablet, Oral, Q4H PRN, Rohith Alexander MD    dexAMETHasone (DECADRON) injection 10 mg, 10 mg, Intravenous, Once, Rohith Alexander MD    [START ON 5/7/2024] dexAMETHasone (DECADRON) tablet 4 mg, 4 mg, Oral, Q6H, Rohith Alexander MD    dextrose (D50W) (25 g/50 mL) IV injection 10-50 mL, 10-50 mL, Intravenous, Q15 Min PRN, Rohith Alexander MD    dextrose (GLUTOSE) oral gel 15 g, 15 g, Oral, Q15 Min PRN, Rohith Alexander MD    [START ON 5/7/2024] famotidine (PEPCID) tablet 40 mg, 40 mg, Oral, Daily, Rohith Alexander MD    glucagon (GLUCAGEN) injection 1  mg, 1 mg, Intramuscular, Q15 Min PRN, Rohith Alexander MD    insulin glargine (LANTUS, SEMGLEE) injection 1-200 Units, 1-200 Units, Subcutaneous, Nightly - Glucommander, Rohith Alexander MD    [START ON 5/7/2024] insulin lispro (humaLOG) injection 1-200 Units, 1-200 Units, Subcutaneous, 4x Daily With Meals & Nightly, Rohith Alexander MD    insulin lispro (humaLOG) injection 1-200 Units, 1-200 Units, Subcutaneous, PRN, Rohith Alexander MD    mannitol 20 % infusion 100 g, 100 g, Intravenous, Once, Rohith Alexander MD    ondansetron ODT (ZOFRAN-ODT) disintegrating tablet 4 mg, 4 mg, Oral, Q6H PRN **OR** ondansetron (ZOFRAN) injection 4 mg, 4 mg, Intravenous, Q6H PRN, Rohith Alexander MD    [START ON 5/7/2024] prenatal vitamin tablet 1 tablet, 1 tablet, Oral, Daily, Rohith Alexander MD    [COMPLETED] Insert Peripheral IV, , , Once **AND** sodium chloride 0.9 % flush 10 mL, 10 mL, Intravenous, PRN, Rohith Alexander MD    sodium chloride 0.9 % flush 10 mL, 10 mL, Intravenous, Q12H, Rohith Alexander MD    sodium chloride 0.9 % flush 10 mL, 10 mL, Intravenous, PRN, Rohith Alexander MD    sodium chloride 0.9 % infusion 40 mL, 40 mL, Intravenous, PRN, Rohith Alexander MD    sodium chloride 0.9 % with KCl 20 mEq/L infusion, 100 mL/hr, Intravenous, Continuous, Rohith Alexander MD    Inpatient Medications: Scheduled Meds:acetaminophen, 1,000 mg, Oral, TID  acetaZOLAMIDE, 250 mg, Oral, BID  dexAMETHasone, 10 mg, Intravenous, Once  [START ON 5/7/2024] dexAMETHasone, 4 mg, Oral, Q6H  [START ON 5/7/2024] famotidine, 40 mg, Oral, Daily  insulin glargine, 1-200 Units, Subcutaneous, Nightly - Glucommander  [START ON 5/7/2024] insulin lispro, 1-200 Units, Subcutaneous, 4x Daily With Meals & Nightly  mannitol, 100 g, Intravenous, Once  [START ON 5/7/2024] prenatal vitamin, 1 tablet, Oral, Daily  senna-docusate sodium, 2 tablet, Oral, BID  sodium chloride,  10 mL, Intravenous, Q12H      Continuous Infusions:sodium chloride 0.9 % with KCl 20 mEq, 100 mL/hr      PRN Meds:.  senna-docusate sodium **AND** polyethylene glycol **AND** bisacodyl **AND** bisacodyl    butalbital-acetaminophen-caffeine    dextrose    dextrose    glucagon (human recombinant)    insulin lispro    ondansetron ODT **OR** ondansetron    [COMPLETED] Insert Peripheral IV **AND** sodium chloride    sodium chloride    sodium chloride  Home Medications:   Prior to Admission medications    Medication Sig Start Date End Date Taking? Authorizing Provider   prenatal vitamin (prenatal, CLASSIC, vitamin) tablet Take  by mouth Daily.    Provider, MD Flora        Vital Signs  Temp:  [97.9 °F (36.6 °C)-98.6 °F (37 °C)] 97.9 °F (36.6 °C)  Heart Rate:  [55-80] 55  Resp:  [17-18] 18  BP: (106-148)/(32-93) 120/80    Physical Exam  Physical Exam  Eyes:      Extraocular Movements: EOM normal.      Pupils: Pupils are equal, round, and reactive to light.   Neurological:      Mental Status: She is oriented to person, place, and time.      Coordination: Finger-Nose-Finger Test and Heel to Shin Test normal.      Gait: Gait is intact. Tandem walk normal.      Deep Tendon Reflexes:      Reflex Scores:       Tricep reflexes are 2+ on the right side and 2+ on the left side.       Bicep reflexes are 2+ on the right side and 2+ on the left side.       Brachioradialis reflexes are 2+ on the right side and 2+ on the left side.       Patellar reflexes are 2+ on the right side and 2+ on the left side.       Achilles reflexes are 2+ on the right side and 2+ on the left side.  Psychiatric:         Speech: Speech normal.         Neurologic Exam     Mental Status   Oriented to person, place, and time.   Registration: recalls 3 of 3 objects. Recall at 5 minutes: recalls 3 of 3 objects.   Attention: normal. Concentration: normal.   Speech: speech is normal   Level of consciousness: alert  Knowledge: consistent with education.      Cranial Nerves     CN II   Visual acuity: normal    CN III, IV, VI   Pupils are equal, round, and reactive to light.  Extraocular motions are normal.   Diplopia: none    CN V   Facial sensation intact.   Right corneal reflex: normal  Left corneal reflex: normal    CN VII   Right facial weakness: none  Left facial weakness: none    CN VIII   Hearing: intact    CN IX, X   Palate: symmetric  Right gag reflex: normal  Left gag reflex: normal    CN XI   Right trapezius strength: normal  Left trapezius strength: normal    CN XII   Tongue deviation: none    Motor Exam   Right arm tone: normal  Left arm tone: normal  Right arm pronator drift: absent  Left arm pronator drift: absent  Right leg tone: normal  Left leg tone: normal    Strength   Right deltoid: 5/5  Left deltoid: 5/5  Right biceps: 5/5  Left biceps: 5/5  Right triceps: 5/5  Left triceps: 5/5  Right interossei: 5/5  Left interossei: 5/5  Right iliopsoas: 5/5  Left iliopsoas: 5/5  Right quadriceps: 5/5  Left quadriceps: 5/5  Right anterior tibial: 5/5  Left anterior tibial: 5/5  Right gastroc: 5/5  Left gastroc: 5/5Right EHL 5/5   Left EHL 5/5     Sensory Exam   Light touch normal.   Proprioception normal.     Gait, Coordination, and Reflexes     Gait  Gait: normal    Coordination   Finger to nose coordination: normal  Heel to shin coordination: normal  Tandem walking coordination: normal    Reflexes   Right brachioradialis: 2+  Left brachioradialis: 2+  Right biceps: 2+  Left biceps: 2+  Right triceps: 2+  Left triceps: 2+  Right patellar: 2+  Left patellar: 2+  Right achilles: 2+  Left achilles: 2+  Right plantar: normal  Left plantar: normal  Right Crockett: absent  Left Crockett: absent  Right ankle clonus: absent  Left ankle clonus: absent      Results Review:   Independent review and interpretation of imaging  Imaging Results (Last 24 Hours)       Procedure Component Value Units Date/Time    MRI Brain With & Without Contrast [[id number]] Collected: 05/06/24  2106     Updated: 05/06/24 2126    Narrative:      MRI BRAIN W WO CONTRAST- 5/6/2024 6:41 PM     HISTORY: acute hemorrhagic infarct vs mass?     COMPARISON: CT head 5/6/2024        TECHNIQUE: Multisequence, multiplanar MRI of the brain with and without  contrast.        FINDINGS:     There is restricted diffusion with associated mass effect and edema at  the midline of the cerebellum involving the vermis. There is no contrast  enhancement. There is mass effect on the inferior fourth ventricle and  median aperture with resulting enlargement of the ventricles consistent  with mild communicating hydrocephalus. There is no midline shift.  Basilar cisterns are preserved. Cerebellar tonsils are displaced  inferiorly.     There is T1 hyperintensity and T2 hypointensity consistent with  hemorrhage also noted on the noncontrast enhanced CT. No definite  filling defects are seen in the dural venous sinuses. No additional  abnormality is seen.          Impression:           1. Area of restricted diffusion with associated hemorrhage without  contrast enhancement at the midline of the cerebellum involving the  vermis likely hemorrhagic infarct.  2. There is associated mass effect on the inferior fourth ventricle with  mild communicating hydrocephalus.     This report was signed and finalized on 5/6/2024 9:23 PM by Freddy Dacosta.       CT Head Without Contrast [[id number]] Collected: 05/06/24 1837     Updated: 05/06/24 1856    Narrative:      EXAM: CT HEAD WO CONTRAST-      DATE: 5/6/2024 5:25 PM     HISTORY: severe pain with position change to BL temple area       COMPARISON: None available.     DOSE LENGTH PRODUCT: 752.44 mGy.cm  Automated exposure control was also  utilized to decrease patient radiation dose.     TECHNIQUE: Unenhanced CT images obtained from vertex to skull base with  multiplanar reformats.     FINDINGS:  Hypodensity and mass effect is noted at the midline of the cerebellum  involving the vermis. There is a  linear area of hyperdensity worrisome  for hemorrhage. There is no significant mass effect on the fourth  ventricle. However there is mass effect on the median aperture. Lateral  ventricles are prominent for the patient's age. The basilar cisterns are  preserved.        The visualized paranasal sinuses and mastoid air cells are clear.      The scalp soft tissues are unremarkable.     The visualized orbits and globes are unremarkable.       Impression:         1. Area of hypodensity likely edema at the midline of the cerebellum  involving the vermis with associated hyperdensity likely due to  hemorrhage. This may represent a hemorrhagic cerebellar infarct or  possible hemorrhagic mass. MR head without and with contrast is  recommended.  2. Mass effect on the median aperture and prominent lateral ventricles  for the patient's age and lack of atrophy. Cerebellar tonsils appear to  be normal in location.     This report was signed and finalized on 5/6/2024 6:53 PM by Freddy Dacosta.             MRI brain:      MRI spine:   CT Head:      CT c-spine:  CT t-spine:  CT l-spine:  X-ray:    I reviewed the patient's new clinical results.  Lab Results (last 24 hours)       Procedure Component Value Units Date/Time    Pregnancy, Urine - Urine, Clean Catch [[id number]]  (Normal) Collected: 05/06/24 1834    Specimen: Urine, Clean Catch Updated: 05/06/24 2245     HCG, Urine QL Negative    Urinalysis With Culture If Indicated - Urine, Clean Catch [[id number]]  (Normal) Collected: 05/06/24 1834    Specimen: Urine, Clean Catch Updated: 05/06/24 1844     Color, UA Yellow     Appearance, UA Clear     pH, UA 6.5     Specific Gravity, UA 1.020     Glucose, UA Negative     Ketones, UA Negative     Bilirubin, UA Negative     Blood, UA Negative     Protein, UA Negative     Leuk Esterase, UA Negative     Nitrite, UA Negative     Urobilinogen, UA 0.2 E.U./dL    Narrative:      In absence of clinical symptoms, the presence of pyuria, bacteria,  and/or nitrites on the urinalysis result does not correlate with infection.  Urine microscopic not indicated.    Comprehensive Metabolic Panel [[id number]]  (Abnormal) Collected: 05/06/24 1702    Specimen: Blood Updated: 05/06/24 1733     Glucose 100 mg/dL      BUN 11 mg/dL      Creatinine 0.56 mg/dL      Sodium 139 mmol/L      Potassium 4.2 mmol/L      Comment: Slight hemolysis detected by analyzer. Result may be falsely elevated.        Chloride 101 mmol/L      CO2 28.0 mmol/L      Calcium 9.7 mg/dL      Total Protein 7.9 g/dL      Albumin 4.2 g/dL      ALT (SGPT) 31 U/L      AST (SGOT) 26 U/L      Comment: Slight hemolysis detected by analyzer. Result may be falsely elevated.        Alkaline Phosphatase 117 U/L      Total Bilirubin 0.2 mg/dL      Globulin 3.7 gm/dL      A/G Ratio 1.1 g/dL      BUN/Creatinine Ratio 19.6     Anion Gap 10.0 mmol/L      eGFR 123.8 mL/min/1.73     Narrative:      GFR Normal >60  Chronic Kidney Disease <60  Kidney Failure <15      Magnesium [[id number]]  (Normal) Collected: 05/06/24 1702    Specimen: Blood Updated: 05/06/24 1724     Magnesium 1.9 mg/dL     hCG, Serum, Qualitative [[id number]]  (Normal) Collected: 05/06/24 1702    Specimen: Blood Updated: 05/06/24 1724     HCG Qualitative Negative    Protime-INR [[id number]]  (Normal) Collected: 05/06/24 1702    Specimen: Blood Updated: 05/06/24 1719     Protime 12.9 Seconds      INR 0.93    aPTT [[id number]]  (Normal) Collected: 05/06/24 1702    Specimen: Blood Updated: 05/06/24 1719     PTT 27.7 seconds     CBC & Differential [[id number]]  (Abnormal) Collected: 05/06/24 1702    Specimen: Blood Updated: 05/06/24 1710    Narrative:      The following orders were created for panel order CBC & Differential.  Procedure                               Abnormality         Status                     ---------                               -----------         ------                     CBC Auto Differential[[id number]]        Abnormal            Final  result                 Please view results for these tests on the individual orders.    CBC Auto Differential [[id number]]  (Abnormal) Collected: 05/06/24 1702    Specimen: Blood Updated: 05/06/24 1710     WBC 9.58 10*3/mm3      RBC 4.97 10*6/mm3      Hemoglobin 13.4 g/dL      Hematocrit 42.0 %      MCV 84.5 fL      MCH 27.0 pg      MCHC 31.9 g/dL      RDW 12.8 %      RDW-SD 39.4 fl      MPV 10.4 fL      Platelets 232 10*3/mm3      Neutrophil % 58.4 %      Lymphocyte % 33.3 %      Monocyte % 6.4 %      Eosinophil % 1.3 %      Basophil % 0.3 %      Immature Grans % 0.3 %      Neutrophils, Absolute 5.60 10*3/mm3      Lymphocytes, Absolute 3.19 10*3/mm3      Monocytes, Absolute 0.61 10*3/mm3      Eosinophils, Absolute 0.12 10*3/mm3      Basophils, Absolute 0.03 10*3/mm3      Immature Grans, Absolute 0.03 10*3/mm3      nRBC 0.0 /100 WBC             Rohith Alexander MD

## 2024-05-07 NOTE — DISCHARGE INSTRUCTIONS
[facility] Neurosurgery    Dear Patient,  You recently were admitted to the hospital for intracranial mass and are now ready to go home. These written instructions are intended to help you to recover quickly.  If you have ANY QUESTIONS about your condition prior to discharge please ask Dr. Alexander. In particular, if you have concerns about going home discuss them now. We do not want you to go until you are completely comfortable leaving the hospital.   If you have ANY QUESTIONS about your condition after you go home call your doctor. The number is [phone] which is answered 24 hours a day. During regular working hours a  will connect you to your doctor, one of his partners, or one of our nurses. At night or on weekends the answering service will connect you with the physicianon call. DO NOT HESITATE to call. We want to help you with any problems.     Seizures  Anyone who has brain injury has a small risk of seizures. Seizures may involve involuntary shaking of the arms and legs, loss of consciousness, loss of urinary or bowel control. They typically last a few minutes, then the patient returns to normal. Seizures are frightening to watch, but usually resolve without long-term problems. Your doctor will often attempt to prevent seizures after surgery by putting you on anti-seizure medicine like Dilantin or Keppra. Sometimes seizures occur even when these medicines are used  What to do if a seizure occurs:  If a seizure occurs and the patient does not return to normal in 10 minutes, call your Dr. Alexander or go to the local emergency room.   If multiple seizures occur, call 911.     Neurological Deficit  Neurological deficits are problems with brain function like speech difficulty, weakness, numbness, imbalance, etc. These deficits may be present before or after brain injury. Prior to discharge Dr. Alexander will make sure that all treatment needed to help you recover from such deficits has been  instituted. He will also make sure that these deficits are stable or improving. After you go home, if you think any of your brain problems are getting worse, not better, it may be a sign of bleeding, infection, or other problems. Call Dr. Alexander. He will order tests and prescribe treatment as needed.    Deep vein thrombosis/ pulmonary embolus  Some patients who are admitted to the hospital develop blood clots in the veins of the legs. These are caused by decreased walking and laying in bed.  These clots can cause pain or swelling in the legs, or may cause no obvious problem. They can break free from the legs and travel to the lungs causing shortness of breath and/or chest pain. If you develop pain or swelling in your legs after surgery, call your doctor. If you develop breathing problems or chest pain after surgery, call 911.    Hydrocephalus  Your brain manufactures about one quart of clear fluid (called cerebrospinal fluid or CSF) every day. Normally this fluid is reabsorbed into the blood stream. After brain injury the flow of fluid and the reabsorption process may be impaired. This leads to a buildup of water on the brain that is called hydrocephalus. Hydrocephalus can cause headache, somnolence, difficulty thinking, imbalance and loss of urinary control. If you think that the patient may have hydrocephalus, call your doctor. She/He will order a brain scan. If it shows water buildup a simple operation called a shunt may be needed to fix the problem.    How to contact your doctor  The neurosurgeon, Dr. Alexander, and her/his team did your surgery and, therefore, are likely to know more about your condition than any other physicians. We are immediately available to help you with any problems after surgery. Please call us for any concerns at the following numbers:   Doctor’s office [phone] (answered 24 hours a day)   [facility]'s  [phone] (alternative emergency number for on-call  neurosurgeon)     Specific instructions:  Diet: no restrictions, eat a heart healthy diet.   Activity: as tolerated, no heavy lifting or strenuous exercise for at least 2 weeks.    Medication  It is important to take your medication EXACTLY as prescribed. Some patients are reluctant to take pain medication. It is perfectly fine to take pain medication for several weeks after injury. We want to eliminate pain whenever possible. Many pain medications can cause nausea (sick to your stomach), constipation (inability to poop), or itching. Nausea may be minimized by taking the medication with food. Constipation can be relieved by taking stool softeners and/ or laxatives that you can purchase over the counter as needed. Some medications, like steroids or seizure medications, should not be stopped abruptly. They need to be weaned off over time. For instructions on weaning schedules call your doctor. Sometimes patients develop an allergy to a medication that was started during hospitalization. Most frequently an allergy will cause an itchy rash. Call your doctor if you think you might be having an allergic reaction.    If you are near completion of your pain medications and feel that you require additional medications for pain, please notify the neurosurgical team 2-3 days before running out of pain medications.?    Decadron (dexamethasone): You are being prescribed a steroid to reduce brain swelling called decadron. Please take Pepcid or Zantac while on decadron to prevent gastric irritation and a possible bleeding stomach ulcer. Decadron can also cause your blood glucose (blood sugar) to be elevated. If you normally have problems with high blood glucose or diabetes than please continue to check your levels regularly or follow up with your primary care physician. Decadron can cause rapid weight gain, muscle loss or weakness, depression, and pancreatitis (acute onset of severe stomach pain with nausea and vomitting).     You  have been placed on a steroid taper. This medication is intended to help alleviate swelling within your brain. It is important that you take the medication as prescribed in the taper. While you are on steroid medications it is important that you are on an anti-acid (e.g. pepcid) to prevent a GI ulcer. Steroids tend to raise your blood sugar levels. Therefore, you should follow-up with your primary care physician within one week following discharge to ensure that your glucose levels are not elevated to a harmful extent.    Pain Medication(s) if applicable  It is important to take your medication EXACTLY as prescribed. Some patients are reluctant to take pain medication. It is perfectly fine to take pain medication for several weeks after surgery. We want to eliminate pain whenever possible. Many pain medications can cause nausea (sick to your stomach), constipation (inability to poop), or itching. Nausea may be minimized by taking the medication with food. Constipation can be relieved by taking stool softeners and/ or laxatives that you can purchase over the counter as needed.    It is important to realize that no pain after surgery is an unrealistic expectation.  Pain medication will never reduce your pain score to zero.  The goal of pain medicine is to reduce your pain to the point you can move, take care of yourself, and participate in therapy.  Make sure to work with your caregiver to determine what is an adequate level of pain control to promote healthy movement and then take your medication to reach this goal.      You recently were admitted to the hospital for intracranial mass and are now ready to go home.    Use of opioids immediately following surgery is often necessary.  Pain after surgery results in decreased quality of life, surgical complications, and prolonged rehabilitation.  Thus in certain situations, the benefits of a limited course of opioids may outweigh the risk.      However, multiple studies  have found that patients of all ages frequently take fewer opioid pills than the amount prescribed after common surgeries.  In some cases they do not take any of the prescribed medications at all.  This results in excess opioid pills that are accessible to others, raise a concern for misuse, can be stolen by family members, can result in later addiction, or can often be used in overdose.  Therefore we are going to make every effort to only prescribe as much pain medication as necessary to limit the amount of pills that are left over after you recover.      First-line treatment should include nonopioid analgesics.  Examples of these would be Tylenol or nonsteroidal anti-inflammatories such as ibuprofen or Aleve.  - Tylenol 1000 mg every six hours as needed    Second-line treatment. If the above first-line treatments are insufficient to control your pain you have also been provided a small dose of opioids.  In order to avoid addiction you should take the lowest amount possible.    Type II: Opioid prescription for 7 days or less (every 6 hours).  May consider an additional 7-day course (every 8 hours)    EXAMPLE IF APPLICABLE:  Please taper your pain medications to avoid long term addiction.  Week 1: Take your pain medication no more than ever 6 hours as needed for severe pain.  Week 2: Take your pain medication no more than every 8 hours as needed for severe pain.  Week 3: Take your pain medication no more than every 12 hours as needed for unresolved pain.    These recommendations are based on ...  CDC guidelines for control and prevention of postsurgical pain,   [place] opioid prescribing engagement network (OPEN),   Marily kellogg in [facility] medical directors group prescribing opioids for postoperative pain-supplemental guidance (2018)    Follow up:   You should call as soon as possible for follow up with Dr. Alexander in the neurosurgery clinic ([phone]) in 4-6 weeks.

## 2024-05-08 ENCOUNTER — PREP FOR SURGERY (OUTPATIENT)
Dept: OTHER | Facility: HOSPITAL | Age: 34
End: 2024-05-08
Payer: MEDICAID

## 2024-05-08 DIAGNOSIS — G93.89 CEREBELLAR MASS: Primary | ICD-10-CM

## 2024-05-08 LAB
ANA SER QL: NEGATIVE
CARDIOLIPIN IGG SER IA-ACNC: <9 GPL U/ML (ref 0–14)
CARDIOLIPIN IGM SER IA-ACNC: <9 MPL U/ML (ref 0–12)
F5 GENE MUT ANL BLD/T: NORMAL
FACTOR II, DNA ANALYSIS: NORMAL

## 2024-05-08 NOTE — THERAPY DISCHARGE NOTE
Acute Care - Physical Therapy Discharge Summary  [facility]       Patient Name: Zian Armando  : 1990  MRN: [id number]    Today's Date: 2024                 Admit Date: 2024      PT Recommendation and Plan    Visit Dx:    ICD-10-CM ICD-9-CM   1. Lightheadedness  R42 780.4   2. Acute nonintractable headache, unspecified headache type  R51.9 784.0   3. Cerebellar mass  G93.89 348.89   4. Impaired mobility [Z74.09]  Z74.09 799.89                PT Rehab Goals       Row Name 24 0900             Gait Training Goal 1 (PT)    Activity/Assistive Device (Gait Training Goal 1, PT) gait (walking locomotion);decrease fall risk;improve balance and speed;increase endurance/gait distance  -AB      Day Level (Gait Training Goal 1, PT) independent  -AB      Distance (Gait Training Goal 1, PT) 100ft pt will navigate around and over objects safely  -AB      Time Frame (Gait Training Goal 1, PT) long term goal (LTG);by discharge  -AB      Progress/Outcome (Gait Training Goal 1, PT) goal not met  -AB         Problem Specific Goal 1 (PT)    Problem Specific Goal 1 (PT) The patient will be able to stand romberg with eyes closed for 30s maintaining midline  -AB      Time Frame (Problem Specific Goal 1, PT) long-term goal (LTG);by discharge  -AB      Progress/Outcome (Problem Specific Goal 1, PT) goal not met  -AB                User Key  (r) = Recorded By, (t) = Taken By, (c) = Cosigned By      Initials Name Provider Type Discipline    Lety Contreras PTA Physical Therapist Assistant PT                        PT Discharge Summary  Anticipated Discharge Disposition (PT): home with assist  Reason for Discharge: Discharge from facility  Outcomes Achieved: Refer to plan of care for updates on goals achieved  Discharge Destination: Home with assist      Lety Cheatham PTA   2024

## 2024-05-09 LAB
APTT SCREEN TO CONFIRM RATIO: 1.25 RATIO (ref 0–1.34)
B2 GLYCOPROT1 IGA SER-ACNC: <9 GPI IGA UNITS (ref 0–25)
B2 GLYCOPROT1 IGG SER-ACNC: <9 GPI IGG UNITS (ref 0–20)
B2 GLYCOPROT1 IGM SER-ACNC: <9 GPI IGM UNITS (ref 0–32)
CONFIRM APTT/NORMAL: 42.8 SEC (ref 0–47.6)
FACT V ACT/NOR PPP: 118 % (ref 70–150)
LA 2 SCREEN W REFLEX-IMP: NORMAL
PROT C ACT/NOR PPP: 139 % (ref 73–180)
PROT S ACT/NOR PPP: 111 % (ref 63–140)
PROT S AG ACT/NOR PPP IA: 101 % (ref 60–150)
PROT S FREE AG ACT/NOR PPP IA: 117 % (ref 61–136)
PS IGA SER-ACNC: 2 APS UNITS (ref 0–19)
PS IGG SER-ACNC: 10 UNITS (ref 0–30)
PS IGM SER-ACNC: <10 UNITS (ref 0–30)
SCREEN APTT: 39.3 SEC (ref 0–43.5)
SCREEN DRVVT: 45.6 SEC (ref 0–47)
THROMBIN TIME: 16.8 SEC (ref 0–23)

## 2024-05-13 ENCOUNTER — HOSPITAL ENCOUNTER (OUTPATIENT)
Dept: MRI IMAGING | Facility: HOSPITAL | Age: 34
Discharge: HOME | End: 2024-05-13
Admitting: NURSE PRACTITIONER
Payer: MEDICAID

## 2024-05-13 DIAGNOSIS — G93.89 CEREBELLAR MASS: ICD-10-CM

## 2024-05-13 PROCEDURE — 70553 MRI BRAIN STEM W/O & W/DYE: CPT

## 2024-05-13 PROCEDURE — A9577 INJ MULTIHANCE: HCPCS | Performed by: NURSE PRACTITIONER

## 2024-05-13 PROCEDURE — 0 GADOBENATE DIMEGLUMINE 529 MG/ML SOLUTION: Performed by: NURSE PRACTITIONER

## 2024-05-13 RX ADMIN — GADOBENATE DIMEGLUMINE 19 ML: 529 INJECTION, SOLUTION INTRAVENOUS at 11:03

## 2024-05-14 ENCOUNTER — TELEPHONE (OUTPATIENT)
Dept: NEUROSURGERY | Facility: CLINIC | Age: 34
End: 2024-05-14
Payer: MEDICAID

## 2024-05-14 LAB
QT INTERVAL: 364 MS
QTC INTERVAL: 422 MS

## 2024-05-14 NOTE — TELEPHONE ENCOUNTER
Called pt to notify of surgery arrival time change to 5:00 am on Thursday 5/16. Reminded pt to have nothing to eat or drink after midnight and to make sure she had submitted the income paperwork for her insurance approval. She stated she had submitted the documents.     Pt voiced understanding and called ended. Used  services to to relay information to pt.

## 2024-05-15 NOTE — SIGNIFICANT NOTE
Ho / #[id number] utilized   Home Rx;   Protonix; instructed to take, w/ sip of water the DOS-05/16/2024.

## 2024-05-15 NOTE — PAT
Ho / #[id number] utilized for PAT call Wed. 5/15/24 @ [facility].  Home Rx;   Protonix (pantoprazole); instructed to take, w/ sip of water the DOS-5/16/24.

## 2024-05-16 ENCOUNTER — OFFICE VISIT (OUTPATIENT)
Dept: NEUROSURGERY | Facility: CLINIC | Age: 34
End: 2024-05-16
Payer: MEDICAID

## 2024-05-16 VITALS — HEIGHT: 59 IN | WEIGHT: 210 LBS | BODY MASS INDEX: 42.33 KG/M2

## 2024-05-16 DIAGNOSIS — G93.89 CEREBELLAR MASS: Primary | ICD-10-CM

## 2024-05-16 DIAGNOSIS — E66.01 CLASS 3 SEVERE OBESITY DUE TO EXCESS CALORIES WITH SERIOUS COMORBIDITY AND BODY MASS INDEX (BMI) OF 40.0 TO 44.9 IN ADULT: ICD-10-CM

## 2024-05-16 NOTE — TELEPHONE ENCOUNTER
Placed a call to inform pt her surgery has been cancelled and she will need to come on up to the office around 11am for an appt. Ended call with pt understanding and acknowledgement.     Va  Angelina #[id number] start time:0830-0834.

## 2024-05-16 NOTE — PATIENT INSTRUCTIONS
"DASH Eating Plan  DASH stands for Dietary Approaches to Stop Hypertension. The DASH eating plan is a healthy eating plan that has been shown to:  Lower high blood pressure (hypertension).  Reduce your risk for type 2 diabetes, heart disease, and stroke.  Help with weight loss.  What are tips for following this plan?  Reading food labels  Check food labels for the amount of salt (sodium) per serving. Choose foods with less than 5 percent of the Daily Value (DV) of sodium. In general, foods with less than 300 milligrams (mg) of sodium per serving fit into this eating plan.  To find whole grains, look for the word \"whole\" as the first word in the ingredient list.  Shopping  Buy products labeled as \"low-sodium\" or \"no salt added.\"  Buy fresh foods. Avoid canned foods and pre-made or frozen meals.  Cooking  Try not to add salt when you cook. Use salt-free seasonings or herbs instead of table salt or sea salt. Check with your health care provider or pharmacist before using salt substitutes.  Do not bishop foods. Cook foods in healthy ways, such as baking, boiling, grilling, roasting, or broiling.  Cook using oils that are good for your heart. These include olive, canola, avocado, soybean, and sunflower oil.  Meal planning    Eat a balanced diet. This should include:  4 or more servings of fruits and 4 or more servings of vegetables each day. Try to fill half of your plate with fruits and vegetables.  6-8 servings of whole grains each day.  6 or less servings of lean meat, poultry, or fish each day. 1 oz is 1 serving. A 3 oz (85 g) serving of meat is about the same size as the palm of your hand. One egg is 1 oz (28 g).  2-3 servings of low-fat dairy each day. One serving is 1 cup (237 mL).  1 serving of nuts, seeds, or beans 5 times each week.  2-3 servings of heart-healthy fats. Healthy fats called omega-3 fatty acids are found in foods such as walnuts, flaxseeds, fortified milks, and eggs. These fats are also found in " cold-water fish, such as sardines, salmon, and mackerel.  Limit how much you eat of:  Canned or prepackaged foods.  Food that is high in trans fat, such as fried foods.  Food that is high in saturated fat, such as fatty meat.  Desserts and other sweets, sugary drinks, and other foods with added sugar.  Full-fat dairy products.  Do not salt foods before eating.  Do not eat more than 4 egg yolks a week.  Try to eat at least 2 vegetarian meals a week.  Eat more home-cooked food and less restaurant, buffet, and fast food.  Lifestyle  When eating at a restaurant, ask if your food can be made with less salt or no salt.  If you drink alcohol:  Limit how much you have to:  0-1 drink a day if you are female.  0-2 drinks a day if you are male.  Know how much alcohol is in your drink. In the [place] one drink is one 12 oz bottle of beer (355 mL), one 5 oz glass of wine (148 mL), or one 1½ oz glass of hard liquor (44 mL).  General information  Avoid eating more than 2,300 mg of salt a day. If you have hypertension, you may need to reduce your sodium intake to 1,500 mg a day.  Work with your provider to stay at a healthy body weight or lose weight. Ask what the best weight range is for you.  On most days of the week, get at least 30 minutes of exercise that causes your heart to beat faster. This may include walking, swimming, or biking.  Work with your provider or dietitian to adjust your eating plan to meet your specific calorie needs.  What foods should I eat?  Fruits  All fresh, dried, or frozen fruit. Canned fruits that are in their natural juice and do not have sugar added to them.  Vegetables  Fresh or frozen vegetables that are raw, steamed, roasted, or grilled. Low-sodium or reduced-sodium tomato and vegetable juice. Low-sodium or reduced-sodium tomato sauce and tomato paste. Low-sodium or reduced-sodium canned vegetables.  Grains  Whole-grain or whole-wheat bread. Whole-grain or whole-wheat pasta. Brown rice. Oatmeal.  Quinoa. Bulgur. Whole-grain and low-sodium cereals. Abi bread. Low-fat, low-sodium crackers. Whole-wheat flour tortillas.  Meats and other proteins  Skinless chicken or turkey. Ground chicken or turkey. Pork with fat trimmed off. Fish and seafood. Egg whites. Dried beans, peas, or lentils. Unsalted nuts, nut butters, and seeds. Unsalted canned beans. Lean cuts of beef with fat trimmed off. Low-sodium, lean precooked or cured meat, such as sausages or meat loaves.  Dairy  Low-fat (1%) or fat-free (skim) milk. Reduced-fat, low-fat, or fat-free cheeses. Nonfat, low-sodium ricotta or cottage cheese. Low-fat or nonfat yogurt. Low-fat, low-sodium cheese.  Fats and oils  Soft margarine without trans fats. Vegetable oil. Reduced-fat, low-fat, or light mayonnaise and salad dressings (reduced-sodium). Canola, safflower, olive, avocado, soybean, and sunflower oils. Avocado.  Seasonings and condiments  Herbs. Spices. Seasoning mixes without salt.  Other foods  Unsalted popcorn and pretzels. Fat-free sweets.  The items listed above may not be all the foods and drinks you can have. Talk to a dietitian to learn more.  What foods should I avoid?  Fruits  Canned fruit in a light or heavy syrup. Fried fruit. Fruit in cream or butter sauce.  Vegetables  Creamed or fried vegetables. Vegetables in a cheese sauce. Regular canned vegetables that are not marked as low-sodium or reduced-sodium. Regular canned tomato sauce and paste that are not marked as low-sodium or reduced-sodium. Regular tomato and vegetable juices that are not marked as low-sodium or reduced-sodium. Pickles. Olives.  Grains  Baked goods made with fat, such as croissants, muffins, or some breads. Dry pasta or rice meal packs.  Meats and other proteins  Fatty cuts of meat. Ribs. Fried meat. Plaza. Bologna, salami, and other precooked or cured meats, such as sausages or meat loaves, that are not lean and low in sodium. Fat from the back of a pig (fatback). Viry.  Salted nuts and seeds. Canned beans with added salt. Canned or smoked fish. Whole eggs or egg yolks. Chicken or turkey with skin.  Dairy  Whole or 2% milk, cream, and half-and-half. Whole or full-fat cream cheese. Whole-fat or sweetened yogurt. Full-fat cheese. Nondairy creamers. Whipped toppings. Processed cheese and cheese spreads.  Fats and oils  Butter. Stick margarine. Lard. Shortening. Ghee. Plaza fat. Tropical oils, such as coconut, palm kernel, or palm oil.  Seasonings and condiments  Onion salt, garlic salt, seasoned salt, table salt, and sea salt. Worcestershire sauce. Tartar sauce. Barbecue sauce. Teriyaki sauce. Soy sauce, including reduced-sodium soy sauce. Steak sauce. Canned and packaged gravies. Fish sauce. Oyster sauce. Cocktail sauce. Store-bought horseradish. Ketchup. Mustard. Meat flavorings and tenderizers. Bouillon cubes. Hot sauces. Pre-made or packaged marinades. Pre-made or packaged taco seasonings. Relishes. Regular salad dressings.  Other foods  Salted popcorn and pretzels.  The items listed above may not be all the foods and drinks you should avoid. Talk to a dietitian to learn more.  Where to find more information  National Heart, Lung, and Blood [place] (NHLBI): nhlbi.nih.gov  American Heart Association (AHA): heart.org  Academy of Nutrition and Dietetics: eatright.org  National Kidney Foundation (NKF): kidney.org  This information is not intended to replace advice given to you by your health care provider. Make sure you discuss any questions you have with your health care provider.  Document Revised: 01/04/2024 Document Reviewed: 01/04/2024  Elsevier Patient Education © 2024 [employer]

## 2024-05-16 NOTE — H&P (VIEW-ONLY)
Chief complaint:   Chief Complaint   Patient presents with    Mengioma     Pt is here for followup.     Subjective      services used during this encounter.   #[id number].    HPI:   Previous encounter: Hospital encounter 5/6/2024    Interval History: Zina Armando is a 33 y.o. 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  She originally presented with complaints of headache and dizziness.  She was discharged home with a prescription for Decadron which she has continued to date.  No complaints of headaches at present.  She endorses occasional dizziness, as well as an intermittent nausea and an intermittent issues with her balance.  She denies falls.  She additionally denies seizure-like activity, visual disturbances, difficulty with words or word finding, facial asymmetry or dysesthesias, and unilateral weakness.  She currently rates the severity of her symptoms 4/10.    ROS  Review of Systems   Constitutional: Negative.    HENT: Negative.     Eyes: Negative.  Negative for visual disturbance.   Respiratory: Negative.     Cardiovascular: Negative.    Gastrointestinal: Negative.  Positive for nausea. Negative for vomiting.   Endocrine: Negative.    Genitourinary: Negative.    Musculoskeletal: Negative.    Skin: Negative.    Allergic/Immunologic: Negative.    Neurological: Negative.  Positive for dizziness and headaches. Negative for tremors, seizures, syncope, facial asymmetry, speech difficulty, weakness, light-headedness and numbness.   Hematological: Negative.    Psychiatric/Behavioral: Negative.     All other systems reviewed and are negative.    PFSH:  Past Medical History:   Diagnosis Date    Cerebellar mass 05/2024    Miscarriage 04/2024    Miscarriage 02/2023    Personal history of COVID-19 2021     Past Surgical History:  "  Procedure Laterality Date    NO PAST SURGERIES         Objective      Current Outpatient Medications   Medication Sig Dispense Refill    acetaZOLAMIDE (DIAMOX) 250 MG tablet Take 1 tablet by mouth 2 (Two) Times a Day. 60 tablet 0    dexAMETHasone (DECADRON) 4 MG tablet Take 1 tablet by mouth Every 6 (Six) Hours. 40 tablet 0    pantoprazole (Protonix) 40 MG EC tablet Take 1 tablet by mouth Daily. (Patient taking differently: Take 1 tablet by mouth 2 (Two) Times a Day.) 30 tablet 0     No current facility-administered medications for this visit.     Vital Signs  Ht 149.9 cm (59\")   Wt 95.3 kg (210 lb)   LMP 04/13/2024 (Approximate)   BMI 42.41 kg/m²   Physical Exam  Vitals and nursing note reviewed.   Constitutional:       General: She is not in acute distress.     Appearance: Normal appearance. She is well-developed and well-groomed. She is morbidly obese. She is not ill-appearing, toxic-appearing or diaphoretic.      Comments: BMI 42.41   HENT:      Head: Normocephalic and atraumatic.      Right Ear: Hearing normal.      Left Ear: Hearing normal.   Eyes:      Extraocular Movements: EOM normal.      Conjunctiva/sclera: Conjunctivae normal.      Pupils: Pupils are equal, round, and reactive to light.   Neck:      Trachea: Trachea normal.   Cardiovascular:      Rate and Rhythm: Normal rate and regular rhythm.   Pulmonary:      Effort: Pulmonary effort is normal. No tachypnea, bradypnea, accessory muscle usage or respiratory distress.   Abdominal:      Palpations: Abdomen is soft.   Musculoskeletal:      Cervical back: Full passive range of motion without pain and neck supple.   Skin:     General: Skin is warm and dry.   Neurological:      Mental Status: She is alert and oriented to person, place, and time.      GCS: GCS eye subscore is 4. GCS verbal subscore is 5. GCS motor subscore is 6.      Gait: Gait is intact.      Deep Tendon Reflexes:      Reflex Scores:       Tricep reflexes are 2+ on the right side and 2+ " on the left side.       Bicep reflexes are 2+ on the right side and 2+ on the left side.       Brachioradialis reflexes are 2+ on the right side and 2+ on the left side.       Patellar reflexes are 2+ on the right side and 2+ on the left side.       Achilles reflexes are 2+ on the right side and 2+ on the left side.  Psychiatric:         Speech: Speech normal.         Behavior: Behavior normal. Behavior is cooperative.       Neurologic Exam     Mental Status   Oriented to person, place, and time.   Attention: normal. Concentration: normal.   Speech: speech is normal   Level of consciousness: alert    Cranial Nerves     CN II   Visual fields full to confrontation.     CN III, IV, VI   Pupils are equal, round, and reactive to light.  Extraocular motions are normal.     CN V   Facial sensation intact.     CN VII   Facial expression full, symmetric.     CN VIII   CN VIII normal.     CN IX, X   CN IX normal.     CN XI   CN XI normal.     Motor Exam   Right arm tone: normal  Left arm tone: normal  Right arm pronator drift: absent  Left arm pronator drift: absent  Right leg tone: normal  Left leg tone: normal    Strength   Right deltoid: 5/5  Left deltoid: 5/5  Right biceps: 5/5  Left biceps: 5/5  Right triceps: 5/5  Left triceps: 5/5  Right wrist extension: 5/5  Left wrist extension: 5/5  Right iliopsoas: 5/5  Left iliopsoas: 5/5  Right quadriceps: 5/5  Left quadriceps: 5/5  Right anterior tibial: 5/5  Left anterior tibial: 5/5  Right gastroc: 5/5  Left gastroc: 5/5  No dysmetria  Right EHL 5/5  Left EHL 5/5       Sensory Exam   Right arm light touch: normal  Left arm light touch: normal  Right leg light touch: normal  Left leg light touch: normal    Gait, Coordination, and Reflexes     Gait  Gait: normal    Tremor   Resting tremor: absent  Intention tremor: absent  Action tremor: absent    Reflexes   Right brachioradialis: 2+  Left brachioradialis: 2+  Right biceps: 2+  Left biceps: 2+  Right triceps: 2+  Left triceps:  2+  Right patellar: 2+  Left patellar: 2+  Right achilles: 2+  Left achilles: 2+  Right plantar: normal  Left plantar: normal  Right Crockett: absent  Left Crockett: absent  Right ankle clonus: absent  Left ankle clonus: absent  Right pendular knee jerk: absent  Left pendular knee jerk: absent  (12 bullet pts)    Results Review:   5/6/2024      MRI Brain With & Without Contrast    Result Date: 5/13/2024   1.  3.4 cm extra-axial nonenhancing mass in the fourth ventricle, appearance favoring epidermoid cyst. There is mass effect on the cerebellum and brainstem with minimal FLAIR hyperintensity in the left cerebellar hemisphere and dorsal medulla. No obstructive hydrocephalus.  This report was signed and finalized on 5/13/2024 1:12 PM by Dr Ho Burleson.      XR Chest 1 View    Result Date: 5/7/2024  No acute findings.  This report was signed and finalized on 5/7/2024 6:55 AM by Dr. Rosalino Goodrich MD.      MRI Brain With & Without Contrast    Result Date: 5/6/2024     1. Area of restricted diffusion with associated hemorrhage without contrast enhancement at the midline of the cerebellum involving the vermis likely hemorrhagic infarct. 2. There is associated mass effect on the inferior fourth ventricle with mild communicating hydrocephalus.  This report was signed and finalized on 5/6/2024 9:23 PM by Freddy Dacosta.      CT Head Without Contrast    Result Date: 5/6/2024   1. Area of hypodensity likely edema at the midline of the cerebellum involving the vermis with associated hyperdensity likely due to hemorrhage. This may represent a hemorrhagic cerebellar infarct or possible hemorrhagic mass. MR head without and with contrast is recommended. 2. Mass effect on the median aperture and prominent lateral ventricles for the patient's age and lack of atrophy. Cerebellar tonsils appear to be normal in location.  This report was signed and finalized on 5/6/2024 6:53 PM by Freddy Dacosta.         Assessment/Plan:   Zina  Prabha is a 33 y.o. non-English-speaking  female with a significant comorbidity miscarriages x 2.  She presents today for continued evaluation with complaints of intermittent dizziness, nausea, and gait/balance dysfunction.  Physical exam findings of neurologically intact.  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    Recommendations:  Cerebellar mass  Ms. Armando is done fairly well since we last saw her.  Her symptoms are relatively stable and have not worsened with continued use of oral steroids.  She is currently scheduled for a suboccipital craniotomy for tumor tomorrow, 5/17/2024.  She is scheduled to present to the hospital at 0500.  N.p.o. after midnight.  Her imaging and physical exam findings were discussed, as well as the risks, benefits, and possible complications of conservative management vs craniotomy for resection of mass were briefly reviewed, to include, but not limited to infection, bleeding, damage to local structures, CSF leak, seizures, hydrocephalus,weakness, numbness, paralysis, or loss of bowel, and bladder function, stroke, coma, MI, or death.  After careful consideration, the patient has elected to proceed with suboccipital craniotomy for tumor.  She will be reevaluated preoperatively.  Any additional questions and or concerns may be addressed at that time.  Will have her return for reassessment postoperatively.    Diagnoses and all orders for this visit:    1. Cerebellar mass (Primary)    2. Class 3 severe obesity due to excess calories with serious comorbidity and body mass index (BMI) of 40.0 to 44.9 in adult      Return for FOLLOWUP AFTER SURGERY.    Thank you, for allowing me to continue to participate in the care of this patient.    Sincerely,  TERRENCE Gutiérrez

## 2024-05-16 NOTE — PROGRESS NOTES
Chief complaint:   Chief Complaint   Patient presents with    Mengioma     Pt is here for followup.     Subjective      services used during this encounter.   #[id number].    HPI:   Previous encounter: Hospital encounter 5/6/2024    Interval History: Zina Armando is a 33 y.o. 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  She originally presented with complaints of headache and dizziness.  She was discharged home with a prescription for Decadron which she has continued to date.  No complaints of headaches at present.  She endorses occasional dizziness, as well as an intermittent nausea and an intermittent issues with her balance.  She denies falls.  She additionally denies seizure-like activity, visual disturbances, difficulty with words or word finding, facial asymmetry or dysesthesias, and unilateral weakness.  She currently rates the severity of her symptoms 4/10.    ROS  Review of Systems   Constitutional: Negative.    HENT: Negative.     Eyes: Negative.  Negative for visual disturbance.   Respiratory: Negative.     Cardiovascular: Negative.    Gastrointestinal: Negative.  Positive for nausea. Negative for vomiting.   Endocrine: Negative.    Genitourinary: Negative.    Musculoskeletal: Negative.    Skin: Negative.    Allergic/Immunologic: Negative.    Neurological: Negative.  Positive for dizziness and headaches. Negative for tremors, seizures, syncope, facial asymmetry, speech difficulty, weakness, light-headedness and numbness.   Hematological: Negative.    Psychiatric/Behavioral: Negative.     All other systems reviewed and are negative.    PFSH:  Past Medical History:   Diagnosis Date    Cerebellar mass 05/2024    Miscarriage 04/2024    Miscarriage 02/2023    Personal history of COVID-19 2021     Past Surgical History:  "  Procedure Laterality Date    NO PAST SURGERIES         Objective      Current Outpatient Medications   Medication Sig Dispense Refill    acetaZOLAMIDE (DIAMOX) 250 MG tablet Take 1 tablet by mouth 2 (Two) Times a Day. 60 tablet 0    dexAMETHasone (DECADRON) 4 MG tablet Take 1 tablet by mouth Every 6 (Six) Hours. 40 tablet 0    pantoprazole (Protonix) 40 MG EC tablet Take 1 tablet by mouth Daily. (Patient taking differently: Take 1 tablet by mouth 2 (Two) Times a Day.) 30 tablet 0     No current facility-administered medications for this visit.     Vital Signs  Ht 149.9 cm (59\")   Wt 95.3 kg (210 lb)   LMP 04/13/2024 (Approximate)   BMI 42.41 kg/m²   Physical Exam  Vitals and nursing note reviewed.   Constitutional:       General: She is not in acute distress.     Appearance: Normal appearance. She is well-developed and well-groomed. She is morbidly obese. She is not ill-appearing, toxic-appearing or diaphoretic.      Comments: BMI 42.41   HENT:      Head: Normocephalic and atraumatic.      Right Ear: Hearing normal.      Left Ear: Hearing normal.   Eyes:      Extraocular Movements: EOM normal.      Conjunctiva/sclera: Conjunctivae normal.      Pupils: Pupils are equal, round, and reactive to light.   Neck:      Trachea: Trachea normal.   Cardiovascular:      Rate and Rhythm: Normal rate and regular rhythm.   Pulmonary:      Effort: Pulmonary effort is normal. No tachypnea, bradypnea, accessory muscle usage or respiratory distress.   Abdominal:      Palpations: Abdomen is soft.   Musculoskeletal:      Cervical back: Full passive range of motion without pain and neck supple.   Skin:     General: Skin is warm and dry.   Neurological:      Mental Status: She is alert and oriented to person, place, and time.      GCS: GCS eye subscore is 4. GCS verbal subscore is 5. GCS motor subscore is 6.      Gait: Gait is intact.      Deep Tendon Reflexes:      Reflex Scores:       Tricep reflexes are 2+ on the right side and 2+ " on the left side.       Bicep reflexes are 2+ on the right side and 2+ on the left side.       Brachioradialis reflexes are 2+ on the right side and 2+ on the left side.       Patellar reflexes are 2+ on the right side and 2+ on the left side.       Achilles reflexes are 2+ on the right side and 2+ on the left side.  Psychiatric:         Speech: Speech normal.         Behavior: Behavior normal. Behavior is cooperative.       Neurologic Exam     Mental Status   Oriented to person, place, and time.   Attention: normal. Concentration: normal.   Speech: speech is normal   Level of consciousness: alert    Cranial Nerves     CN II   Visual fields full to confrontation.     CN III, IV, VI   Pupils are equal, round, and reactive to light.  Extraocular motions are normal.     CN V   Facial sensation intact.     CN VII   Facial expression full, symmetric.     CN VIII   CN VIII normal.     CN IX, X   CN IX normal.     CN XI   CN XI normal.     Motor Exam   Right arm tone: normal  Left arm tone: normal  Right arm pronator drift: absent  Left arm pronator drift: absent  Right leg tone: normal  Left leg tone: normal    Strength   Right deltoid: 5/5  Left deltoid: 5/5  Right biceps: 5/5  Left biceps: 5/5  Right triceps: 5/5  Left triceps: 5/5  Right wrist extension: 5/5  Left wrist extension: 5/5  Right iliopsoas: 5/5  Left iliopsoas: 5/5  Right quadriceps: 5/5  Left quadriceps: 5/5  Right anterior tibial: 5/5  Left anterior tibial: 5/5  Right gastroc: 5/5  Left gastroc: 5/5  No dysmetria  Right EHL 5/5  Left EHL 5/5       Sensory Exam   Right arm light touch: normal  Left arm light touch: normal  Right leg light touch: normal  Left leg light touch: normal    Gait, Coordination, and Reflexes     Gait  Gait: normal    Tremor   Resting tremor: absent  Intention tremor: absent  Action tremor: absent    Reflexes   Right brachioradialis: 2+  Left brachioradialis: 2+  Right biceps: 2+  Left biceps: 2+  Right triceps: 2+  Left triceps:  2+  Right patellar: 2+  Left patellar: 2+  Right achilles: 2+  Left achilles: 2+  Right plantar: normal  Left plantar: normal  Right Crockett: absent  Left Crockett: absent  Right ankle clonus: absent  Left ankle clonus: absent  Right pendular knee jerk: absent  Left pendular knee jerk: absent  (12 bullet pts)    Results Review:   5/6/2024      MRI Brain With & Without Contrast    Result Date: 5/13/2024   1.  3.4 cm extra-axial nonenhancing mass in the fourth ventricle, appearance favoring epidermoid cyst. There is mass effect on the cerebellum and brainstem with minimal FLAIR hyperintensity in the left cerebellar hemisphere and dorsal medulla. No obstructive hydrocephalus.  This report was signed and finalized on 5/13/2024 1:12 PM by Dr Ho Burleson.      XR Chest 1 View    Result Date: 5/7/2024  No acute findings.  This report was signed and finalized on 5/7/2024 6:55 AM by Dr. Rosalino Goodrich MD.      MRI Brain With & Without Contrast    Result Date: 5/6/2024     1. Area of restricted diffusion with associated hemorrhage without contrast enhancement at the midline of the cerebellum involving the vermis likely hemorrhagic infarct. 2. There is associated mass effect on the inferior fourth ventricle with mild communicating hydrocephalus.  This report was signed and finalized on 5/6/2024 9:23 PM by Freddy Dacosta.      CT Head Without Contrast    Result Date: 5/6/2024   1. Area of hypodensity likely edema at the midline of the cerebellum involving the vermis with associated hyperdensity likely due to hemorrhage. This may represent a hemorrhagic cerebellar infarct or possible hemorrhagic mass. MR head without and with contrast is recommended. 2. Mass effect on the median aperture and prominent lateral ventricles for the patient's age and lack of atrophy. Cerebellar tonsils appear to be normal in location.  This report was signed and finalized on 5/6/2024 6:53 PM by Freddy Dacosta.         Assessment/Plan:   Zina  Prabha is a 33 y.o. non-English-speaking  female with a significant comorbidity miscarriages x 2.  She presents today for continued evaluation with complaints of intermittent dizziness, nausea, and gait/balance dysfunction.  Physical exam findings of neurologically intact.  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    Recommendations:  Cerebellar mass  Ms. Armando is done fairly well since we last saw her.  Her symptoms are relatively stable and have not worsened with continued use of oral steroids.  She is currently scheduled for a suboccipital craniotomy for tumor tomorrow, 5/17/2024.  She is scheduled to present to the hospital at 0500.  N.p.o. after midnight.  Her imaging and physical exam findings were discussed, as well as the risks, benefits, and possible complications of conservative management vs craniotomy for resection of mass were briefly reviewed, to include, but not limited to infection, bleeding, damage to local structures, CSF leak, seizures, hydrocephalus,weakness, numbness, paralysis, or loss of bowel, and bladder function, stroke, coma, MI, or death.  After careful consideration, the patient has elected to proceed with suboccipital craniotomy for tumor.  She will be reevaluated preoperatively.  Any additional questions and or concerns may be addressed at that time.  Will have her return for reassessment postoperatively.    Diagnoses and all orders for this visit:    1. Cerebellar mass (Primary)    2. Class 3 severe obesity due to excess calories with serious comorbidity and body mass index (BMI) of 40.0 to 44.9 in adult      Return for FOLLOWUP AFTER SURGERY.    Thank you, for allowing me to continue to participate in the care of this patient.    Sincerely,  TERRENCE Gutiérrez

## 2024-05-17 ENCOUNTER — APPOINTMENT (OUTPATIENT)
Dept: CT IMAGING | Facility: HOSPITAL | Age: 34
End: 2024-05-17
Payer: MEDICAID

## 2024-05-17 ENCOUNTER — HOSPITAL ENCOUNTER (INPATIENT)
Facility: HOSPITAL | Age: 34
LOS: 4 days | Discharge: HOME | End: 2024-05-21
Attending: NEUROLOGICAL SURGERY | Admitting: NEUROLOGICAL SURGERY
Payer: MEDICAID

## 2024-05-17 ENCOUNTER — ANESTHESIA EVENT (OUTPATIENT)
Dept: PERIOP | Facility: HOSPITAL | Age: 34
End: 2024-05-17
Payer: MEDICAID

## 2024-05-17 ENCOUNTER — ANESTHESIA (OUTPATIENT)
Dept: PERIOP | Facility: HOSPITAL | Age: 34
End: 2024-05-17
Payer: MEDICAID

## 2024-05-17 ENCOUNTER — APPOINTMENT (OUTPATIENT)
Dept: GENERAL RADIOLOGY | Facility: HOSPITAL | Age: 34
End: 2024-05-17
Payer: MEDICAID

## 2024-05-17 DIAGNOSIS — G93.89 CEREBELLAR MASS: Primary | ICD-10-CM

## 2024-05-17 DIAGNOSIS — Z74.09 IMPAIRED MOBILITY: ICD-10-CM

## 2024-05-17 DIAGNOSIS — Z98.890 STATUS POST CRANIOTOMY: ICD-10-CM

## 2024-05-17 LAB
ABO GROUP BLD: NORMAL
ARTERIAL PATENCY WRIST A: ABNORMAL
ATMOSPHERIC PRESS: 749 MMHG
B-HCG UR QL: NEGATIVE
BASE EXCESS BLDA CALC-SCNC: -6.4 MMOL/L (ref 0–2)
BDY SITE: ABNORMAL
BLD GP AB SCN SERPL QL: NEGATIVE
BODY TEMPERATURE: 36.2
CA-I BLD-MCNC: 4.26 MG/DL (ref 4.6–5.4)
COHGB MFR BLD: 0.4 % (ref 0–5)
GLUCOSE BLDC GLUCOMTR-MCNC: 119 MG/DL (ref 70–130)
GLUCOSE BLDC GLUCOMTR-MCNC: 122 MG/DL (ref 70–130)
GLUCOSE BLDC GLUCOMTR-MCNC: 84 MG/DL (ref 70–130)
GLUCOSE BLDC GLUCOMTR-MCNC: 88 MG/DL (ref 70–130)
GLUCOSE BLDC GLUCOMTR-MCNC: 91 MG/DL (ref 70–130)
HCO3 BLDA-SCNC: 20.3 MMOL/L (ref 20–26)
HCT VFR BLD CALC: 39.1 % (ref 38–51)
HGB BLDA-MCNC: 12.8 G/DL (ref 12–16)
INHALED O2 CONCENTRATION: 100 %
Lab: ABNORMAL
METHGB BLD QL: 0.8 % (ref 0–3)
MODALITY: ABNORMAL
OXYHGB MFR BLDV: 98.8 % (ref 94–99)
PCO2 BLDA: 43.9 MM HG (ref 35–45)
PCO2 TEMP ADJ BLD: 42.4 MM HG (ref 35–45)
PH BLDA: 7.27 PH UNITS (ref 7.35–7.45)
PH, TEMP CORRECTED: 7.29 PH UNITS (ref 7.35–7.45)
PO2 BLDA: 291 MM HG (ref 83–108)
PO2 TEMP ADJ BLD: 287 MM HG (ref 83–108)
POTASSIUM BLDA-SCNC: 4.4 MMOL/L (ref 3.5–5.2)
RH BLD: POSITIVE
SAO2 % BLDCOA: 100 % (ref 94–99)
SODIUM BLDA-SCNC: 130 MMOL/L (ref 136–145)
T&S EXPIRATION DATE: NORMAL
VENTILATOR MODE: ABNORMAL

## 2024-05-17 PROCEDURE — 82805 BLOOD GASES W/O2 SATURATION: CPT

## 2024-05-17 PROCEDURE — 25010000002 DEXAMETHASONE PER 1 MG: Performed by: NURSE ANESTHETIST, CERTIFIED REGISTERED

## 2024-05-17 PROCEDURE — 88307 TISSUE EXAM BY PATHOLOGIST: CPT | Performed by: NEUROLOGICAL SURGERY

## 2024-05-17 PROCEDURE — C1713 ANCHOR/SCREW BN/BN,TIS/BN: HCPCS | Performed by: NEUROLOGICAL SURGERY

## 2024-05-17 PROCEDURE — 00U20KZ SUPPLEMENT DURA MATER WITH NONAUTOLOGOUS TISSUE SUBSTITUTE, OPEN APPROACH: ICD-10-PCS | Performed by: NEUROLOGICAL SURGERY

## 2024-05-17 PROCEDURE — 00BC0ZZ EXCISION OF CEREBELLUM, OPEN APPROACH: ICD-10-PCS | Performed by: NEUROLOGICAL SURGERY

## 2024-05-17 PROCEDURE — 25010000002 CEFAZOLIN PER 500 MG: Performed by: NURSE PRACTITIONER

## 2024-05-17 PROCEDURE — 25010000002 BUPIVACAINE (PF) 0.25 % SOLUTION 30 ML VIAL: Performed by: NEUROLOGICAL SURGERY

## 2024-05-17 PROCEDURE — 70450 CT HEAD/BRAIN W/O DYE: CPT

## 2024-05-17 PROCEDURE — C1729 CATH, DRAINAGE: HCPCS | Performed by: NEUROLOGICAL SURGERY

## 2024-05-17 PROCEDURE — 63710000001 DEXAMETHASONE PER 0.25 MG: Performed by: NURSE PRACTITIONER

## 2024-05-17 PROCEDURE — 86900 BLOOD TYPING SEROLOGIC ABO: CPT | Performed by: ANESTHESIOLOGY

## 2024-05-17 PROCEDURE — 62272 THER SPI PNXR DRG CSF: CPT | Performed by: NEUROLOGICAL SURGERY

## 2024-05-17 PROCEDURE — 82948 REAGENT STRIP/BLOOD GLUCOSE: CPT

## 2024-05-17 PROCEDURE — 25010000002 HYDROMORPHONE 1 MG/ML SOLUTION: Performed by: NURSE ANESTHETIST, CERTIFIED REGISTERED

## 2024-05-17 PROCEDURE — 25010000002 PROPOFOL 10 MG/ML EMULSION: Performed by: NURSE ANESTHETIST, CERTIFIED REGISTERED

## 2024-05-17 PROCEDURE — 25010000002 SODIUM CHLORIDE 0.9 % WITH KCL 20 MEQ 20-0.9 MEQ/L-% SOLUTION: Performed by: NURSE PRACTITIONER

## 2024-05-17 PROCEDURE — 25010000002 FENTANYL CITRATE (PF) 100 MCG/2ML SOLUTION: Performed by: NURSE ANESTHETIST, CERTIFIED REGISTERED

## 2024-05-17 PROCEDURE — 25010000002 HYDRALAZINE PER 20 MG: Performed by: NURSE PRACTITIONER

## 2024-05-17 PROCEDURE — 009U30Z DRAINAGE OF SPINAL CANAL WITH DRAINAGE DEVICE, PERCUTANEOUS APPROACH: ICD-10-PCS | Performed by: NEUROLOGICAL SURGERY

## 2024-05-17 PROCEDURE — 82375 ASSAY CARBOXYHB QUANT: CPT

## 2024-05-17 PROCEDURE — 25810000003 LACTATED RINGERS PER 1000 ML: Performed by: NEUROLOGICAL SURGERY

## 2024-05-17 PROCEDURE — 61343 CRNEC SOPL CRV LAM DCMPRN: CPT | Performed by: NEUROLOGICAL SURGERY

## 2024-05-17 PROCEDURE — 69990 MICROSURGERY ADD-ON: CPT | Performed by: NEUROLOGICAL SURGERY

## 2024-05-17 PROCEDURE — 86850 RBC ANTIBODY SCREEN: CPT | Performed by: ANESTHESIOLOGY

## 2024-05-17 PROCEDURE — 88331 PATH CONSLTJ SURG 1 BLK 1SPC: CPT | Performed by: PATHOLOGY

## 2024-05-17 PROCEDURE — 83050 HGB METHEMOGLOBIN QUAN: CPT

## 2024-05-17 PROCEDURE — 86901 BLOOD TYPING SEROLOGIC RH(D): CPT | Performed by: ANESTHESIOLOGY

## 2024-05-17 PROCEDURE — C1889 IMPLANT/INSERT DEVICE, NOC: HCPCS | Performed by: NEUROLOGICAL SURGERY

## 2024-05-17 PROCEDURE — 25010000002 EPINEPHRINE 1 MG/ML SOLUTION 1 ML AMPULE: Performed by: NEUROLOGICAL SURGERY

## 2024-05-17 PROCEDURE — 25810000003 SODIUM CHLORIDE PER 500 ML: Performed by: NEUROLOGICAL SURGERY

## 2024-05-17 PROCEDURE — 81025 URINE PREGNANCY TEST: CPT | Performed by: NEUROLOGICAL SURGERY

## 2024-05-17 PROCEDURE — 61783 SCAN PROC SPINAL: CPT | Performed by: NEUROLOGICAL SURGERY

## 2024-05-17 PROCEDURE — 25010000002 ONDANSETRON PER 1 MG: Performed by: NURSE ANESTHETIST, CERTIFIED REGISTERED

## 2024-05-17 DEVICE — IMPLANTABLE DEVICE: Type: IMPLANTABLE DEVICE | Site: CRANIAL | Status: FUNCTIONAL

## 2024-05-17 DEVICE — ABSORBABLE HEMOSTAT (OXIDIZED REGENERATED CELLULOSE)
Type: IMPLANTABLE DEVICE | Site: CRANIAL | Status: FUNCTIONAL
Brand: SURGICEL

## 2024-05-17 DEVICE — HEMOST ABS SURGIFOAM SZ100 8X12 10MM: Type: IMPLANTABLE DEVICE | Site: CRANIAL | Status: FUNCTIONAL

## 2024-05-17 DEVICE — LIGACLIP MCA MULTIPLE CLIP APPLIERS, 20 SMALL CLIPS
Type: IMPLANTABLE DEVICE | Site: CRANIAL | Status: FUNCTIONAL
Brand: LIGACLIP

## 2024-05-17 DEVICE — CLIPAPPLR SCLP HEMO PRELD 1P/U STRL: Type: IMPLANTABLE DEVICE | Site: SCALP | Status: FUNCTIONAL

## 2024-05-17 DEVICE — KT HEMOST ABS SURGIFOAM PORCN 1GRAM: Type: IMPLANTABLE DEVICE | Site: CRANIAL | Status: FUNCTIONAL

## 2024-05-17 DEVICE — DURAGEN® PLUS DURAL REGENERATION MATRIX, 3 IN X 3 IN (7.5 CM X 7.5 CM)
Type: IMPLANTABLE DEVICE | Site: CRANIAL | Status: FUNCTIONAL
Brand: DURAGEN® PLUS

## 2024-05-17 DEVICE — SCRW NEURO LEFORTE SLF/DRL 1.5X4MM: Type: IMPLANTABLE DEVICE | Site: CRANIAL | Status: FUNCTIONAL

## 2024-05-17 DEVICE — SEAL DURL ADHERUS/AUTOSPRAY HYDROGEL DS: Type: IMPLANTABLE DEVICE | Site: DURA | Status: FUNCTIONAL

## 2024-05-17 DEVICE — FLOSEAL HEMOSTATIC MATRIX, 10ML
Type: IMPLANTABLE DEVICE | Site: CRANIAL | Status: FUNCTIONAL
Brand: FLOSEAL HEMOSTATIC MATRIX

## 2024-05-17 RX ORDER — AMOXICILLIN 250 MG
2 CAPSULE ORAL 2 TIMES DAILY
Status: DISCONTINUED | OUTPATIENT
Start: 2024-05-17 | End: 2024-05-21 | Stop reason: HOSPADM

## 2024-05-17 RX ORDER — HYDROCODONE BITARTRATE AND ACETAMINOPHEN 7.5; 325 MG/1; MG/1
1 TABLET ORAL EVERY 4 HOURS PRN
Status: DISCONTINUED | OUTPATIENT
Start: 2024-05-17 | End: 2024-05-21 | Stop reason: HOSPADM

## 2024-05-17 RX ORDER — BENZONATATE 100 MG/1
200 CAPSULE ORAL 3 TIMES DAILY PRN
Status: DISCONTINUED | OUTPATIENT
Start: 2024-05-17 | End: 2024-05-21 | Stop reason: HOSPADM

## 2024-05-17 RX ORDER — PROPOFOL 10 MG/ML
VIAL (ML) INTRAVENOUS AS NEEDED
Status: DISCONTINUED | OUTPATIENT
Start: 2024-05-17 | End: 2024-05-17 | Stop reason: SURG

## 2024-05-17 RX ORDER — HEPARIN SODIUM 5000 [USP'U]/ML
5000 INJECTION, SOLUTION INTRAVENOUS; SUBCUTANEOUS EVERY 12 HOURS SCHEDULED
Status: DISCONTINUED | OUTPATIENT
Start: 2024-05-18 | End: 2024-05-21 | Stop reason: HOSPADM

## 2024-05-17 RX ORDER — ONDANSETRON 2 MG/ML
INJECTION INTRAMUSCULAR; INTRAVENOUS AS NEEDED
Status: DISCONTINUED | OUTPATIENT
Start: 2024-05-17 | End: 2024-05-17 | Stop reason: SURG

## 2024-05-17 RX ORDER — LIDOCAINE HYDROCHLORIDE 20 MG/ML
INJECTION, SOLUTION EPIDURAL; INFILTRATION; INTRACAUDAL; PERINEURAL AS NEEDED
Status: DISCONTINUED | OUTPATIENT
Start: 2024-05-17 | End: 2024-05-17 | Stop reason: SURG

## 2024-05-17 RX ORDER — ONDANSETRON 2 MG/ML
4 INJECTION INTRAMUSCULAR; INTRAVENOUS EVERY 6 HOURS PRN
Status: DISCONTINUED | OUTPATIENT
Start: 2024-05-17 | End: 2024-05-21 | Stop reason: HOSPADM

## 2024-05-17 RX ORDER — ONDANSETRON 4 MG/1
4 TABLET, ORALLY DISINTEGRATING ORAL EVERY 6 HOURS PRN
Status: DISCONTINUED | OUTPATIENT
Start: 2024-05-17 | End: 2024-05-21 | Stop reason: HOSPADM

## 2024-05-17 RX ORDER — FENTANYL CITRATE 50 UG/ML
INJECTION, SOLUTION INTRAMUSCULAR; INTRAVENOUS AS NEEDED
Status: DISCONTINUED | OUTPATIENT
Start: 2024-05-17 | End: 2024-05-17 | Stop reason: SURG

## 2024-05-17 RX ORDER — NALOXONE HCL 0.4 MG/ML
0.04 VIAL (ML) INJECTION AS NEEDED
Status: DISCONTINUED | OUTPATIENT
Start: 2024-05-17 | End: 2024-05-17 | Stop reason: HOSPADM

## 2024-05-17 RX ORDER — LABETALOL HYDROCHLORIDE 5 MG/ML
5 INJECTION, SOLUTION INTRAVENOUS
Status: DISCONTINUED | OUTPATIENT
Start: 2024-05-17 | End: 2024-05-17 | Stop reason: HOSPADM

## 2024-05-17 RX ORDER — OXYCODONE AND ACETAMINOPHEN 10; 325 MG/1; MG/1
1 TABLET ORAL EVERY 4 HOURS PRN
Status: DISCONTINUED | OUTPATIENT
Start: 2024-05-17 | End: 2024-05-17 | Stop reason: HOSPADM

## 2024-05-17 RX ORDER — LIDOCAINE HYDROCHLORIDE 10 MG/ML
0.5 INJECTION, SOLUTION EPIDURAL; INFILTRATION; INTRACAUDAL; PERINEURAL ONCE AS NEEDED
Status: DISCONTINUED | OUTPATIENT
Start: 2024-05-17 | End: 2024-05-17 | Stop reason: HOSPADM

## 2024-05-17 RX ORDER — ONDANSETRON 2 MG/ML
4 INJECTION INTRAMUSCULAR; INTRAVENOUS
Status: DISCONTINUED | OUTPATIENT
Start: 2024-05-17 | End: 2024-05-17 | Stop reason: HOSPADM

## 2024-05-17 RX ORDER — ACETAZOLAMIDE 250 MG/1
250 TABLET ORAL 2 TIMES DAILY
Status: DISCONTINUED | OUTPATIENT
Start: 2024-05-17 | End: 2024-05-18

## 2024-05-17 RX ORDER — MAGNESIUM HYDROXIDE 1200 MG/15ML
LIQUID ORAL AS NEEDED
Status: DISCONTINUED | OUTPATIENT
Start: 2024-05-17 | End: 2024-05-17 | Stop reason: HOSPADM

## 2024-05-17 RX ORDER — NICARDIPINE HYDROCHLORIDE 2.5 MG/ML
INJECTION INTRAVENOUS AS NEEDED
Status: DISCONTINUED | OUTPATIENT
Start: 2024-05-17 | End: 2024-05-17 | Stop reason: SURG

## 2024-05-17 RX ORDER — FLUMAZENIL 0.1 MG/ML
0.2 INJECTION INTRAVENOUS AS NEEDED
Status: DISCONTINUED | OUTPATIENT
Start: 2024-05-17 | End: 2024-05-17 | Stop reason: HOSPADM

## 2024-05-17 RX ORDER — SUCCINYLCHOLINE/SOD CL,ISO/PF 200MG/10ML
SYRINGE (ML) INTRAVENOUS AS NEEDED
Status: DISCONTINUED | OUTPATIENT
Start: 2024-05-17 | End: 2024-05-17 | Stop reason: SURG

## 2024-05-17 RX ORDER — DEXAMETHASONE SODIUM PHOSPHATE 4 MG/ML
INJECTION, SOLUTION INTRA-ARTICULAR; INTRALESIONAL; INTRAMUSCULAR; INTRAVENOUS; SOFT TISSUE AS NEEDED
Status: DISCONTINUED | OUTPATIENT
Start: 2024-05-17 | End: 2024-05-17 | Stop reason: SURG

## 2024-05-17 RX ORDER — ACETAMINOPHEN 160 MG/5ML
650 SOLUTION ORAL EVERY 8 HOURS SCHEDULED
Status: COMPLETED | OUTPATIENT
Start: 2024-05-17 | End: 2024-05-19

## 2024-05-17 RX ORDER — HYDROCODONE BITARTRATE AND ACETAMINOPHEN 5; 325 MG/1; MG/1
1 TABLET ORAL EVERY 4 HOURS PRN
Status: DISCONTINUED | OUTPATIENT
Start: 2024-05-17 | End: 2024-05-21 | Stop reason: HOSPADM

## 2024-05-17 RX ORDER — MANNITOL 20 G/100ML
INJECTION, SOLUTION INTRAVENOUS CONTINUOUS PRN
Status: DISCONTINUED | OUTPATIENT
Start: 2024-05-17 | End: 2024-05-17 | Stop reason: SURG

## 2024-05-17 RX ORDER — PANTOPRAZOLE SODIUM 40 MG/1
40 TABLET, DELAYED RELEASE ORAL DAILY
Status: DISCONTINUED | OUTPATIENT
Start: 2024-05-17 | End: 2024-05-21 | Stop reason: HOSPADM

## 2024-05-17 RX ORDER — ROCURONIUM BROMIDE 10 MG/ML
INJECTION, SOLUTION INTRAVENOUS AS NEEDED
Status: DISCONTINUED | OUTPATIENT
Start: 2024-05-17 | End: 2024-05-17 | Stop reason: SURG

## 2024-05-17 RX ORDER — INSULIN LISPRO 100 [IU]/ML
3-14 INJECTION, SOLUTION INTRAVENOUS; SUBCUTANEOUS
Status: DISCONTINUED | OUTPATIENT
Start: 2024-05-17 | End: 2024-05-21 | Stop reason: HOSPADM

## 2024-05-17 RX ORDER — DROPERIDOL 2.5 MG/ML
0.62 INJECTION, SOLUTION INTRAMUSCULAR; INTRAVENOUS ONCE AS NEEDED
Status: DISCONTINUED | OUTPATIENT
Start: 2024-05-17 | End: 2024-05-17 | Stop reason: HOSPADM

## 2024-05-17 RX ORDER — BUTALBITAL, ACETAMINOPHEN AND CAFFEINE 50; 325; 40 MG/1; MG/1; MG/1
1 TABLET ORAL EVERY 6 HOURS PRN
Status: DISCONTINUED | OUTPATIENT
Start: 2024-05-17 | End: 2024-05-21 | Stop reason: HOSPADM

## 2024-05-17 RX ORDER — HYDRALAZINE HYDROCHLORIDE 20 MG/ML
10 INJECTION INTRAMUSCULAR; INTRAVENOUS EVERY 6 HOURS PRN
Status: DISCONTINUED | OUTPATIENT
Start: 2024-05-17 | End: 2024-05-21 | Stop reason: HOSPADM

## 2024-05-17 RX ORDER — SODIUM CHLORIDE, SODIUM LACTATE, POTASSIUM CHLORIDE, CALCIUM CHLORIDE 600; 310; 30; 20 MG/100ML; MG/100ML; MG/100ML; MG/100ML
1000 INJECTION, SOLUTION INTRAVENOUS CONTINUOUS
Status: DISCONTINUED | OUTPATIENT
Start: 2024-05-17 | End: 2024-05-17

## 2024-05-17 RX ORDER — ACETAMINOPHEN 500 MG
1000 TABLET ORAL EVERY 6 HOURS PRN
COMMUNITY

## 2024-05-17 RX ORDER — SODIUM CHLORIDE 0.9 % (FLUSH) 0.9 %
10 SYRINGE (ML) INJECTION AS NEEDED
Status: DISCONTINUED | OUTPATIENT
Start: 2024-05-17 | End: 2024-05-17 | Stop reason: HOSPADM

## 2024-05-17 RX ORDER — ACETAMINOPHEN 325 MG/1
650 TABLET ORAL EVERY 8 HOURS SCHEDULED
Status: COMPLETED | OUTPATIENT
Start: 2024-05-17 | End: 2024-05-19

## 2024-05-17 RX ORDER — HYDROMORPHONE HYDROCHLORIDE 1 MG/ML
0.5 INJECTION, SOLUTION INTRAMUSCULAR; INTRAVENOUS; SUBCUTANEOUS
Status: DISCONTINUED | OUTPATIENT
Start: 2024-05-17 | End: 2024-05-17 | Stop reason: HOSPADM

## 2024-05-17 RX ORDER — ACETAMINOPHEN 650 MG/1
650 SUPPOSITORY RECTAL EVERY 8 HOURS SCHEDULED
Status: COMPLETED | OUTPATIENT
Start: 2024-05-17 | End: 2024-05-19

## 2024-05-17 RX ORDER — SODIUM CHLORIDE 0.9 % (FLUSH) 0.9 %
3 SYRINGE (ML) INJECTION AS NEEDED
Status: DISCONTINUED | OUTPATIENT
Start: 2024-05-17 | End: 2024-05-17 | Stop reason: HOSPADM

## 2024-05-17 RX ORDER — DEXTROSE MONOHYDRATE 25 G/50ML
25 INJECTION, SOLUTION INTRAVENOUS
Status: DISCONTINUED | OUTPATIENT
Start: 2024-05-17 | End: 2024-05-21 | Stop reason: HOSPADM

## 2024-05-17 RX ORDER — FENTANYL CITRATE 50 UG/ML
50 INJECTION, SOLUTION INTRAMUSCULAR; INTRAVENOUS
Status: DISCONTINUED | OUTPATIENT
Start: 2024-05-17 | End: 2024-05-17 | Stop reason: HOSPADM

## 2024-05-17 RX ORDER — DEXAMETHASONE 4 MG/1
4 TABLET ORAL EVERY 6 HOURS SCHEDULED
Status: DISCONTINUED | OUTPATIENT
Start: 2024-05-17 | End: 2024-05-18

## 2024-05-17 RX ORDER — SODIUM CHLORIDE 9 MG/ML
INJECTION, SOLUTION INTRAVENOUS AS NEEDED
Status: DISCONTINUED | OUTPATIENT
Start: 2024-05-17 | End: 2024-05-17 | Stop reason: HOSPADM

## 2024-05-17 RX ORDER — NICOTINE POLACRILEX 4 MG
15 LOZENGE BUCCAL
Status: DISCONTINUED | OUTPATIENT
Start: 2024-05-17 | End: 2024-05-21 | Stop reason: HOSPADM

## 2024-05-17 RX ORDER — LEVETIRACETAM 500 MG/1
500 TABLET ORAL EVERY 12 HOURS SCHEDULED
Status: DISCONTINUED | OUTPATIENT
Start: 2024-05-17 | End: 2024-05-21 | Stop reason: HOSPADM

## 2024-05-17 RX ORDER — POLYETHYLENE GLYCOL 3350 17 G/17G
17 POWDER, FOR SOLUTION ORAL DAILY
Status: DISCONTINUED | OUTPATIENT
Start: 2024-05-17 | End: 2024-05-21 | Stop reason: HOSPADM

## 2024-05-17 RX ORDER — SODIUM CHLORIDE AND POTASSIUM CHLORIDE 150; 900 MG/100ML; MG/100ML
75 INJECTION, SOLUTION INTRAVENOUS CONTINUOUS
Status: DISCONTINUED | OUTPATIENT
Start: 2024-05-17 | End: 2024-05-21 | Stop reason: HOSPADM

## 2024-05-17 RX ADMIN — NICARDIPINE HYDROCHLORIDE 500 MCG: 2.5 INJECTION INTRAVENOUS at 08:30

## 2024-05-17 RX ADMIN — BUTALBITAL, ACETAMINOPHEN, AND CAFFEINE 1 TABLET: 50; 325; 40 TABLET ORAL at 20:42

## 2024-05-17 RX ADMIN — ONDANSETRON 4 MG: 2 INJECTION INTRAMUSCULAR; INTRAVENOUS at 15:10

## 2024-05-17 RX ADMIN — CEFAZOLIN 2000 MG: 2 INJECTION, POWDER, FOR SOLUTION INTRAMUSCULAR; INTRAVENOUS at 11:00

## 2024-05-17 RX ADMIN — MANNITOL: 20 INJECTION, SOLUTION INTRAVENOUS at 09:39

## 2024-05-17 RX ADMIN — ACETAMINOPHEN 650 MG: 325 TABLET, FILM COATED ORAL at 21:50

## 2024-05-17 RX ADMIN — FENTANYL CITRATE 100 MCG: 50 INJECTION, SOLUTION INTRAMUSCULAR; INTRAVENOUS at 08:40

## 2024-05-17 RX ADMIN — CEFAZOLIN 2000 MG: 2 INJECTION, POWDER, FOR SOLUTION INTRAMUSCULAR; INTRAVENOUS at 07:15

## 2024-05-17 RX ADMIN — LIDOCAINE HYDROCHLORIDE 60 MG: 20 INJECTION, SOLUTION EPIDURAL; INFILTRATION; INTRACAUDAL; PERINEURAL at 07:12

## 2024-05-17 RX ADMIN — SODIUM CHLORIDE, POTASSIUM CHLORIDE, SODIUM LACTATE AND CALCIUM CHLORIDE 1000 ML: 600; 310; 30; 20 INJECTION, SOLUTION INTRAVENOUS at 06:11

## 2024-05-17 RX ADMIN — NICARDIPINE HYDROCHLORIDE 500 MCG: 2.5 INJECTION INTRAVENOUS at 12:49

## 2024-05-17 RX ADMIN — HYDROCODONE BITARTRATE AND ACETAMINOPHEN 1 TABLET: 7.5; 325 TABLET ORAL at 23:26

## 2024-05-17 RX ADMIN — HYDROMORPHONE HYDROCHLORIDE 1 MG: 1 INJECTION, SOLUTION INTRAMUSCULAR; INTRAVENOUS; SUBCUTANEOUS at 15:54

## 2024-05-17 RX ADMIN — FENTANYL CITRATE 100 MCG: 50 INJECTION, SOLUTION INTRAMUSCULAR; INTRAVENOUS at 07:19

## 2024-05-17 RX ADMIN — FENTANYL CITRATE 100 MCG: 50 INJECTION, SOLUTION INTRAMUSCULAR; INTRAVENOUS at 10:39

## 2024-05-17 RX ADMIN — NICARDIPINE HYDROCHLORIDE 500 MCG: 2.5 INJECTION INTRAVENOUS at 08:37

## 2024-05-17 RX ADMIN — CEFAZOLIN 2000 MG: 2 INJECTION, POWDER, FOR SOLUTION INTRAMUSCULAR; INTRAVENOUS at 22:35

## 2024-05-17 RX ADMIN — CEFAZOLIN 2000 MG: 2 INJECTION, POWDER, FOR SOLUTION INTRAMUSCULAR; INTRAVENOUS at 14:56

## 2024-05-17 RX ADMIN — SENNOSIDES AND DOCUSATE SODIUM 2 TABLET: 50; 8.6 TABLET ORAL at 20:51

## 2024-05-17 RX ADMIN — DEXAMETHASONE 4 MG: 4 TABLET ORAL at 23:26

## 2024-05-17 RX ADMIN — LEVETIRACETAM 500 MG: 500 TABLET, FILM COATED ORAL at 20:42

## 2024-05-17 RX ADMIN — ACETAZOLAMIDE 250 MG: 250 TABLET ORAL at 20:51

## 2024-05-17 RX ADMIN — SODIUM CHLORIDE, POTASSIUM CHLORIDE, SODIUM LACTATE AND CALCIUM CHLORIDE: 600; 310; 30; 20 INJECTION, SOLUTION INTRAVENOUS at 11:13

## 2024-05-17 RX ADMIN — POTASSIUM CHLORIDE AND SODIUM CHLORIDE 75 ML/HR: 900; 150 INJECTION, SOLUTION INTRAVENOUS at 17:38

## 2024-05-17 RX ADMIN — FENTANYL CITRATE 100 MCG: 50 INJECTION, SOLUTION INTRAMUSCULAR; INTRAVENOUS at 11:20

## 2024-05-17 RX ADMIN — POLYETHYLENE GLYCOL 3350 17 G: 17 POWDER, FOR SOLUTION ORAL at 17:38

## 2024-05-17 RX ADMIN — DEXAMETHASONE 4 MG: 4 TABLET ORAL at 17:37

## 2024-05-17 RX ADMIN — Medication 100 MG: at 07:12

## 2024-05-17 RX ADMIN — FENTANYL CITRATE 100 MCG: 50 INJECTION, SOLUTION INTRAMUSCULAR; INTRAVENOUS at 13:35

## 2024-05-17 RX ADMIN — HYDROCODONE BITARTRATE AND ACETAMINOPHEN 1 TABLET: 5; 325 TABLET ORAL at 17:37

## 2024-05-17 RX ADMIN — ROCURONIUM BROMIDE 5 MG: 10 INJECTION INTRAVENOUS at 07:12

## 2024-05-17 RX ADMIN — FENTANYL CITRATE 100 MCG: 50 INJECTION, SOLUTION INTRAMUSCULAR; INTRAVENOUS at 07:12

## 2024-05-17 RX ADMIN — PANTOPRAZOLE SODIUM 40 MG: 40 TABLET, DELAYED RELEASE ORAL at 17:37

## 2024-05-17 RX ADMIN — FENTANYL CITRATE 100 MCG: 50 INJECTION, SOLUTION INTRAMUSCULAR; INTRAVENOUS at 09:37

## 2024-05-17 RX ADMIN — PROPOFOL 150 MG: 10 INJECTION, EMULSION INTRAVENOUS at 07:12

## 2024-05-17 RX ADMIN — HYDRALAZINE HYDROCHLORIDE 10 MG: 20 INJECTION, SOLUTION INTRAMUSCULAR; INTRAVENOUS at 17:37

## 2024-05-17 RX ADMIN — PROPOFOL 50 MG: 10 INJECTION, EMULSION INTRAVENOUS at 08:51

## 2024-05-17 RX ADMIN — DEXAMETHASONE SODIUM PHOSPHATE 10 MG: 4 INJECTION, SOLUTION INTRAMUSCULAR; INTRAVENOUS at 08:24

## 2024-05-17 NOTE — ANESTHESIA POSTPROCEDURE EVALUATION
"Patient: Zina Armando    Procedure Summary       Date: 05/17/24 Room / Location:  [facility]  /  [facility]    Anesthesia Start: 0709 Anesthesia Stop: 1618    Procedures:       CRANIOTOMY SUBOCCIPITAL WITH STEALTH, LUMBAR DRAIN INSERTION EXTERNAL (Head)      LUMBAR DRAIN INSERTION EXTERNAL (Spine Lumbar) Diagnosis:       Cerebellar mass      (Cerebellar mass [G93.89])    Surgeons: Rohith Alexander MD Provider: Andi Cao CRNA    Anesthesia Type: generalAura ASA Status: 3            Anesthesia Type: general, Aura    Vitals  Vitals Value Taken Time   /94 05/17/24 1655   Temp 97.8 °F (36.6 °C) 05/17/24 1655   Pulse 60 05/17/24 1700   Resp 12 05/17/24 1700   SpO2 96 % 05/17/24 1700           Post Anesthesia Care and Evaluation    Patient location during evaluation: PACU  Patient participation: complete - patient participated  Level of consciousness: awake and awake and alert  Pain score: 0  Pain management: adequate    Airway patency: patent  Anesthetic complications: No anesthetic complications  PONV Status: none  Cardiovascular status: acceptable  Respiratory status: acceptable  Hydration status: acceptable    Comments: Patient discharged according to acceptable Oliver score per RN assessment. See nursing records for further information.     Blood pressure 127/82, pulse 68, temperature 99.2 °F (37.3 °C), temperature source Axillary, resp. rate 12, height 151 cm (59.45\"), weight 99.7 kg (219 lb 12.8 oz), last menstrual period 04/13/2024, SpO2 96%, not currently breastfeeding.      "

## 2024-05-17 NOTE — NURSING NOTE
To CT scan with EKG/respiratory/SpO2 monitor for postop imaging; all lines/drain/monitors intact throughout scan; pt tolerated CT scan well. From CT scan to ICU 9 without incident per bed. Spouse escorted to ICU waiting area, ICU staff will call him when patient settled/assessed in ICU 9.

## 2024-05-17 NOTE — ANESTHESIA PROCEDURE NOTES
Airway  Urgency: elective    Date/Time: 5/17/2024 7:13 AM  Airway not difficult    General Information and Staff    Patient location during procedure: OR  CRNA/CAA: Andi Cao CRNA    Indications and Patient Condition  Indications for airway management: airway protection    Preoxygenated: yes  Mask difficulty assessment: 1 - vent by mask    Final Airway Details  Final airway type: endotracheal airway      Successful airway: ETT     Successful intubation technique: direct laryngoscopy  Endotracheal tube insertion site: oral  Blade: George  Blade size: 3.5 (3.5)  ETT size (mm): 7.5  Cormack-Lehane Classification: grade I - full view of glottis  Placement verified by: chest auscultation and capnometry   Measured from: gums  ETT/EBT to gums (cm): 21  Number of attempts at approach: 1  Assessment: lips, teeth, and gum same as pre-op and atraumatic intubation

## 2024-05-17 NOTE — ANESTHESIA PREPROCEDURE EVALUATION
Anesthesia Evaluation     Patient summary reviewed   no history of anesthetic complications:   NPO Solid Status: > 8 hours  NPO Liquid Status: > 8 hours           Airway   Mallampati: II  TM distance: >3 FB  No difficulty expected  Dental - normal exam     Pulmonary - negative pulmonary ROS   (-) asthma, sleep apnea, not a smoker  Cardiovascular   Exercise tolerance: excellent (>7 METS)    ECG reviewed    (-) hypertension, past MI, dysrhythmias, cardiac stents, hyperlipidemia      Neuro/Psych  (+) headaches  (-) seizures, TIA, CVA    ROS Comment: MRI Brain:  IMPRESSION:     1.  3.4 cm extra-axial nonenhancing mass in the fourth ventricle,  appearance favoring epidermoid cyst. There is mass effect on the  cerebellum and brainstem with minimal FLAIR hyperintensity in the left  cerebellar hemisphere and dorsal medulla. No obstructive hydrocephalus.    GI/Hepatic/Renal/Endo    (+) morbid obesity    Musculoskeletal     Abdominal    Substance History      OB/GYN          Other                      Anesthesia Plan    ASA 3     general and Aura     intravenous induction     Anesthetic plan, risks, benefits, and alternatives have been provided, discussed and informed consent has been obtained with: patient.    Use of blood products discussed with patient  Consented to blood products.      CODE STATUS:

## 2024-05-17 NOTE — OP NOTE
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 non-English-speaking  female with a significant comorbidity miscarriages x 2.  She presents today for continued evaluation with complaints of intermittent dizziness, nausea, and gait/balance dysfunction.  Physical exam findings of neurologically intact.  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     I discussed the risks of a  suboccipital craniotomy, C1 laminectomy, and duroplasty, including but not limited to 14% chance of respiratory depression, 1.5% chance of severe centerogenic apnea resulting in death, CSF leak, herniation of the cerebellar hemispheres, and vascular injury to the PICA, 20% chance of recurrence of symptoms.  infection, bleeding, damage to local structures, hydrocephalus, weakness, numbness, paralysis, stroke, coma, MI, or death.      DESCRIPTION OF OPERATION AND FINDINGS:   On the day of surgery, the patient was brought to the preoperative holding area where IV access was obtained. Prophylactic intravenous antibiotics and age appropriate Decadron were administered. The patient was then brought to the major operative suite. While on the [facility], she underwent an uneventful induction of general anesthetic with placement of  endotracheal tube. TEDs, SCD hoses, and a Robins catheter were applied.      Timeout was performed.  The patient was placed in the right lateral position and the lumbar region was prepped and draped in the routine manner. The region was thoroughly infiltrated with lidocaine with epinephrine 1-100,000.  IV antibiotics were given.  A Tuohy needle was inserted into the L4-5 interspace.  The thecal sac was entered on the first attempt.  The bed was placed in reverse Trendelenburg and had brisk flow of CSF. The catheter was inserted through the Tuohy needle without difficulty.  20 cm of the catheter could be easily advanced.  Excellent outflow of CSF was obtained so the needle was removed. The catheter was secured. The system was connected to a drainage system and clamped.    The patient was placed in the prone position on a standard operating table with gel chest rolls.  The patient was placed in a Arauz 3-point fixation head reno to at least 60 lbs.  All pressure points were inspected and appropriately padded, and she was secured to the table in flexion with at least 1 finger breadth between chin and chest.      The hair was then clipped from the occipital protuberance to the cervical region.  An incision was marked from approximately the nuchal line to C2. The scalp was prepped with DuraPrep and allowed to dry for 5 minutes.  The patient was then draped in the usual fashion.  At this point a WHO surgical timeout was performed with all members of the surgical team.      The skin was infiltrated with quarter percent Marcaine with epinephrine based on the patient's weight.  A linear skin incision was created with a #10 scalpel.  A cutaneous flap was then elevated leaving the muscular fascia intact. The muscular fascia was then opened in a Y-shaped fashion and tacked to the skin with Vicryl sutures.  The cervical musculature was then split in the avascular plane to the nuchal line.  The muscle was removed from the  suboccipital cranium in the subperiosteal plane.  This was exposed until the foramen magnum was reached and identified with a Penfield 1.  At this point the posterior arch of C1 was identified and Bovie cautery was used to expose laterally approximately 1 cm.  Attention was turned to the lateral gutters where exposure to the perivertebral venous plexus was achieved.  Cerebellar retractors were then readjusted.    Two shawn holes were made with a craniotome adjacent to the midline keel at the nuchal line approximately 3 cm from the foramen magnum.  The dura was released from the underside of the bone using a [employer] dental.  These 2 holes were connected across midline and to the foramen magnum by drilling a trough with a 3 mm sidecutting bit. The bone flap was placed aside.  A Chris and 2 mm Kerrison punch was then used to extend the inferior aspect of the decompression to the occipital condyles taking care not to incur vascular injury.    Next a large bite Leksell was used to thin the C1 lamina.  Once thinned the lamina was removed with both Leksell and 2 mm Kerrison.  The bony edges were then waxed.    The dura was then opened with a 15 blade and Metzenbaum scissors in a Y-shaped incision and tacked up with Nurolon sutures.   The tonsils were found to be at the level of C1.    The operative microscope was then brought into the field.  A #7 Rhoton and Rhoton nerve hook were used to dissect the arachnoid layer.  We quickly encountered a pearly white tumor consistent with epidermoid peeking from between the tonsils.  We began by attaching the Chavez retractor.  Appropriately sized blades were then placed into the field.    Next we gently bipolared the tonsils to help these retract laterally.  Using a 7 Rhoton we developed a plane on the lateral aspect of the tumor bilaterally.  This was going out of the foramen of Josie.  Next we cut down the middle of the tumor and internally debulk.  Then using a piecemeal  manner we were able to remove about 50% of the tumor.  Next we moved to the inferior aspect of the tumor and replace strip cottonoids along the floor of the fourth ventricle under direct visualization.  These were not used to blindly sweep.  Next we returned and moved laterally and superiorly around the tumor.  There was found to be aggressive adhesion of the tumor to both the lateral and superior aspects.  We continued to debulk and folding the tumor inwards on itself.  We used the sonopet to perform ultrasonic aspiration of the tumor.  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  Tumor was removed in a gross total fashion.  There was no excessive bleeding.    Thrombin-soaked Gelfoam pledgets were placed inside of the cavity to ensure that there was excellent hemostasis.    Neuromonitoring was noted to improve after tumor removal.    The cavity was then liberally irrigated with irrigation.  We then placed DuraGen over the brain and folded the dura over the DuraGen. A 3 x 3 cm bovine pericardial graft was cut to size and sewn in using three 4-0 Nurolon's.  Once closed in a watertight manner a Valsalva was performed.  A small area of CSF egress was secured with an additional Nurolon.  Finally dural graft was covered in DuraSeal.     The cervical muscles were closed in multiple layers and the fascia was closed with interrupted 2-0 Vicryl sutures in a watertight manner.  4-0 Monocryl was used for skin closure.  Incision was dressed with bacitracin ointment.    The patient was then removed from the Arauz head reno.  All pin sites were inspected for bleeding.  Patient was stable and transferred to the recovery.    There were no complications of the surgery per se. The needle count, sponge count, and the cottonoid count were correct.  Neuromonitoring improved after debulking.      Estimated Blood Loss: 100ml    Complications: None.    Implants:   Implant Name Type Inv. Item Serial No.   Lot No. LRB No. Used Action   CLIPAPPLR SCLP HEMO PRELD 1P/U STRL - [id number] Implant CLIPAPPLR SCLP HEMO PRELD 1P/U STRL  [employer]ENA[employer]LE[employer] DIV OF J AND J [id number] N/A 1 Implanted   HEMOST ABS SURGICEL ORIG 4X8IN STRL - [id number] Implant HEMOST ABS SURGICEL ORIG 4X8IN STRL  ETHICON  DIV OF J AND J [id number] N/A 1 Implanted   HEMOST ABS SURGIFOAM  8X12 10MM - [id number] Implant HEMOST ABS SURGIFOAM  8X12 10MM  ETHICON  DIV OF J AND J [id number] N/A 1 Implanted   KT HEMOST ABS SURGIFOAM PORCN 1GRAM - [id number] Implant KT HEMOST ABS SURGIFOAM PORCN 1GRAM  ETHICON  DIV OF J AND J [id number] N/A 1 Implanted   KT HEMOST ABS SURGIFOAM PORCN 1GRAM - [id number] Implant KT HEMOST ABS SURGIFOAM PORCN 1GRAM  ETHICON  DIV OF J AND J [id number] N/A 1 Implanted   CLIPAPPLR M/ ENDO LIGACLIP 9 3/8IN SM - [id number] Implant CLIPAPPLR M/ ENDO LIGACLIP 9 3/8IN SM  ETHICON ENDO SURGERY  DIV OF J AND J [id number] N/A 1 Implanted   HEMOST ABS SURGIFOAM  8X12 10MM - [id number] Implant HEMOST ABS SURGIFOAM  8X12 10MM  ETHICON  DIV OF J AND J [id number] N/A 1 Implanted   PTCH PERICARD PRAVEENA-GUARD 6X8CM - [id number] Implant PTCH PERICARD PRAVEENA-GUARD 6X8CM  HALL imoji [id number] N/A 1 Implanted   DURALMATRIX DURAGEN PLS 3X3IN EA/5 - [id number] Implant DURALMATRIX DURAGEN PLS 3X3IN EA/5  INTEGRA [id number] N/A 1 Implanted   SEAL DURL ADHERUS/AUTOSPRAY HYDROGEL DS - [id number] Implant SEAL DURL ADHERUS/AUTOSPRAY HYDROGEL DS  RODOLFO NALLELY [id number] N/A 1 Implanted   SCRW NEURO LEFORTE SLF/DRL 1.5X4MM - [id number] Implant SCRW NEURO LEFORTE SLF/DRL 1.5X4MM  JTS SURGICAL NR N/A 8 Implanted   PLT BURHL LEFORTE PRE/CONTRD TI 6HL 22X0.6MM SLVR - [id number] Implant PLT BURHL LEFORTE PRE/CONTRD TI 6HL 22X0.6MM SLVR  JTS SURGICAL  N/A 2 Implanted   KT SEAL HEMOS ABS FLOSEAL MATRX FAST/PREP 10ML - [id number] Implant KT SEAL HEMOS ABS FLOSEAL MATRX FAST/PREP 10ML  [facility] [id number] N/A 3 Implanted       Specimens:                 Specimens       ID Source Type Tests Collected By Collected At Frozen?    A Brain Tissue TISSUE PATHOLOGY EXAM   Rohith Alexander MD 5/17/24 1101 Yes    Description: BRAIN TUMOR FOR FROZEN    B Brain Tissue TISSUE PATHOLOGY EXAM   Rohith Alexander MD 5/17/24 1420 No    Description: BRAIN TUMOR FOR PERMANENT              Drains:   Urethral Catheter Non-latex;Silicone 16 Fr. (Active)       Lumbar Drain (Active)

## 2024-05-17 NOTE — ANESTHESIA PROCEDURE NOTES
Arterial Line      Patient reassessed immediately prior to procedure    Patient location during procedure: pre-op  Start time: 5/17/2024 6:45 AM   Line placed for hemodynamic monitoring, ABGs/Labs/ISTAT and MD/Surgeon request.  Performed By   Anesthesiologist: Tere Villagran MD   Preanesthetic Checklist  Completed: patient identified, IV checked, site marked, risks and benefits discussed, surgical consent, monitors and equipment checked, pre-op evaluation and timeout performed  Arterial Line Prep    Sterile Tech: gloves and sterile barriers  Prep: ChloraPrep  Patient monitoring: blood pressure monitoring, continuous pulse oximetry and EKG  Arterial Line Procedure   Location:  radial artery  Catheter size: 20 G   Guidance: palpation technique  Number of attempts: 1 (prior unsuccessful attempt Valentin Cao CRNA.)  Successful placement: yes   Post Assessment   Dressing Type: occlusive dressing applied, secured with tape and wrist guard applied.   Complications no  Circ/Move/Sens Assessment: normal and unchanged.   Patient Tolerance: patient tolerated the procedure well with no apparent complications

## 2024-05-18 ENCOUNTER — APPOINTMENT (OUTPATIENT)
Dept: MRI IMAGING | Facility: HOSPITAL | Age: 34
End: 2024-05-18
Payer: MEDICAID

## 2024-05-18 LAB
ANION GAP SERPL CALCULATED.3IONS-SCNC: 10 MMOL/L (ref 5–15)
ARTERIAL PATENCY WRIST A: ABNORMAL
ARTERIAL PATENCY WRIST A: ABNORMAL
ATMOSPHERIC PRESS: 748 MMHG
ATMOSPHERIC PRESS: 749 MMHG
BASE EXCESS BLDA CALC-SCNC: -3.5 MMOL/L (ref 0–2)
BASE EXCESS BLDA CALC-SCNC: -4.2 MMOL/L (ref 0–2)
BASOPHILS # BLD MANUAL: 0 10*3/MM3 (ref 0–0.2)
BASOPHILS NFR BLD MANUAL: 0 % (ref 0–1.5)
BDY SITE: ABNORMAL
BDY SITE: ABNORMAL
BODY TEMPERATURE: 36.3
BODY TEMPERATURE: 37.1
BUN SERPL-MCNC: 20 MG/DL (ref 6–20)
BUN/CREAT SERPL: 52.6 (ref 7–25)
CA-I BLD-MCNC: 4.38 MG/DL (ref 4.6–5.4)
CA-I BLD-MCNC: 4.66 MG/DL (ref 4.6–5.4)
CALCIUM SPEC-SCNC: 8 MG/DL (ref 8.6–10.5)
CHLORIDE SERPL-SCNC: 106 MMOL/L (ref 98–107)
CO2 SERPL-SCNC: 21 MMOL/L (ref 22–29)
COHGB MFR BLD: 0.5 % (ref 0–5)
COHGB MFR BLD: 0.9 % (ref 0–5)
CREAT SERPL-MCNC: 0.38 MG/DL (ref 0.57–1)
DEPRECATED RDW RBC AUTO: 43.7 FL (ref 37–54)
EGFRCR SERPLBLD CKD-EPI 2021: 135.9 ML/MIN/1.73
EOSINOPHIL # BLD MANUAL: 0 10*3/MM3 (ref 0–0.4)
EOSINOPHIL NFR BLD MANUAL: 0 % (ref 0.3–6.2)
ERYTHROCYTE [DISTWIDTH] IN BLOOD BY AUTOMATED COUNT: 14.3 % (ref 12.3–15.4)
GLUCOSE BLDC GLUCOMTR-MCNC: 100 MG/DL (ref 70–130)
GLUCOSE BLDC GLUCOMTR-MCNC: 106 MG/DL (ref 70–130)
GLUCOSE BLDC GLUCOMTR-MCNC: 98 MG/DL (ref 70–130)
GLUCOSE SERPL-MCNC: 104 MG/DL (ref 65–99)
HCO3 BLDA-SCNC: 19.4 MMOL/L (ref 20–26)
HCO3 BLDA-SCNC: 20.5 MMOL/L (ref 20–26)
HCT VFR BLD AUTO: 40.3 % (ref 34–46.6)
HCT VFR BLD CALC: 42.2 % (ref 38–51)
HCT VFR BLD CALC: 42.5 % (ref 38–51)
HGB BLD-MCNC: 12.9 G/DL (ref 12–15.9)
HGB BLDA-MCNC: 13.8 G/DL (ref 12–16)
HGB BLDA-MCNC: 13.9 G/DL (ref 12–16)
INHALED O2 CONCENTRATION: 100 %
INHALED O2 CONCENTRATION: 100 %
LYMPHOCYTES # BLD MANUAL: 3.24 10*3/MM3 (ref 0.7–3.1)
LYMPHOCYTES NFR BLD MANUAL: 4 % (ref 5–12)
Lab: ABNORMAL
Lab: ABNORMAL
MCH RBC QN AUTO: 27.3 PG (ref 26.6–33)
MCHC RBC AUTO-ENTMCNC: 32 G/DL (ref 31.5–35.7)
MCV RBC AUTO: 85.2 FL (ref 79–97)
METHGB BLD QL: 0.5 % (ref 0–3)
METHGB BLD QL: 0.8 % (ref 0–3)
MODALITY: ABNORMAL
MODALITY: ABNORMAL
MONOCYTES # BLD: 1.44 10*3/MM3 (ref 0.1–0.9)
NEUTROPHILS # BLD AUTO: 31.31 10*3/MM3 (ref 1.7–7)
NEUTROPHILS NFR BLD MANUAL: 85 % (ref 42.7–76)
NEUTS BAND NFR BLD MANUAL: 2 % (ref 0–5)
NEUTS VAC BLD QL SMEAR: ABNORMAL
OXYHGB MFR BLDV: 99 % (ref 94–99)
OXYHGB MFR BLDV: 99.2 % (ref 94–99)
PCO2 BLDA: 28.7 MM HG (ref 35–45)
PCO2 BLDA: 35.6 MM HG (ref 35–45)
PCO2 TEMP ADJ BLD: 28.8 MM HG (ref 35–45)
PCO2 TEMP ADJ BLD: 34.5 MM HG (ref 35–45)
PH BLDA: 7.37 PH UNITS (ref 7.35–7.45)
PH BLDA: 7.44 PH UNITS (ref 7.35–7.45)
PH, TEMP CORRECTED: 7.38 PH UNITS (ref 7.35–7.45)
PH, TEMP CORRECTED: 7.44 PH UNITS (ref 7.35–7.45)
PLAT MORPH BLD: NORMAL
PLATELET # BLD AUTO: 242 10*3/MM3 (ref 140–450)
PMV BLD AUTO: 9.7 FL (ref 6–12)
PO2 BLDA: 335 MM HG (ref 83–108)
PO2 BLDA: 342 MM HG (ref 83–108)
PO2 TEMP ADJ BLD: 331 MM HG (ref 83–108)
PO2 TEMP ADJ BLD: 343 MM HG (ref 83–108)
POTASSIUM BLDA-SCNC: 3.9 MMOL/L (ref 3.5–5.2)
POTASSIUM BLDA-SCNC: 4.2 MMOL/L (ref 3.5–5.2)
POTASSIUM SERPL-SCNC: 3.6 MMOL/L (ref 3.5–5.2)
RBC # BLD AUTO: 4.73 10*6/MM3 (ref 3.77–5.28)
RBC MORPH BLD: NORMAL
SAO2 % BLDCOA: >100.1 % (ref 94–99)
SAO2 % BLDCOA: >100.1 % (ref 94–99)
SODIUM BLDA-SCNC: 138 MMOL/L (ref 136–145)
SODIUM BLDA-SCNC: 138 MMOL/L (ref 136–145)
SODIUM SERPL-SCNC: 137 MMOL/L (ref 136–145)
VARIANT LYMPHS NFR BLD MANUAL: 9 % (ref 19.6–45.3)
VENTILATOR MODE: ABNORMAL
VENTILATOR MODE: ABNORMAL
WBC NRBC COR # BLD AUTO: 35.99 10*3/MM3 (ref 3.4–10.8)

## 2024-05-18 PROCEDURE — 85007 BL SMEAR W/DIFF WBC COUNT: CPT | Performed by: NURSE PRACTITIONER

## 2024-05-18 PROCEDURE — 63710000001 DEXAMETHASONE PER 0.25 MG: Performed by: NURSE PRACTITIONER

## 2024-05-18 PROCEDURE — 0 GADOBENATE DIMEGLUMINE 529 MG/ML SOLUTION: Performed by: NEUROLOGICAL SURGERY

## 2024-05-18 PROCEDURE — 85025 COMPLETE CBC W/AUTO DIFF WBC: CPT | Performed by: NURSE PRACTITIONER

## 2024-05-18 PROCEDURE — 25010000002 HYDRALAZINE PER 20 MG: Performed by: NURSE PRACTITIONER

## 2024-05-18 PROCEDURE — 97161 PT EVAL LOW COMPLEX 20 MIN: CPT

## 2024-05-18 PROCEDURE — 99024 POSTOP FOLLOW-UP VISIT: CPT | Performed by: NEUROLOGICAL SURGERY

## 2024-05-18 PROCEDURE — 97165 OT EVAL LOW COMPLEX 30 MIN: CPT | Performed by: OCCUPATIONAL THERAPIST

## 2024-05-18 PROCEDURE — 25010000002 HEPARIN (PORCINE) PER 1000 UNITS: Performed by: NURSE PRACTITIONER

## 2024-05-18 PROCEDURE — 82948 REAGENT STRIP/BLOOD GLUCOSE: CPT

## 2024-05-18 PROCEDURE — 80048 BASIC METABOLIC PNL TOTAL CA: CPT | Performed by: NURSE PRACTITIONER

## 2024-05-18 PROCEDURE — 25010000002 CEFAZOLIN PER 500 MG: Performed by: NURSE PRACTITIONER

## 2024-05-18 PROCEDURE — 63710000001 DEXAMETHASONE PER 0.25 MG: Performed by: NEUROLOGICAL SURGERY

## 2024-05-18 PROCEDURE — 25010000002 SODIUM CHLORIDE 0.9 % WITH KCL 20 MEQ 20-0.9 MEQ/L-% SOLUTION: Performed by: NURSE PRACTITIONER

## 2024-05-18 PROCEDURE — 97116 GAIT TRAINING THERAPY: CPT

## 2024-05-18 PROCEDURE — 70553 MRI BRAIN STEM W/O & W/DYE: CPT

## 2024-05-18 PROCEDURE — A9577 INJ MULTIHANCE: HCPCS | Performed by: NEUROLOGICAL SURGERY

## 2024-05-18 RX ORDER — CHLORHEXIDINE GLUCONATE 500 MG/1
1 CLOTH TOPICAL EVERY 24 HOURS
Status: DISCONTINUED | OUTPATIENT
Start: 2024-05-19 | End: 2024-05-20 | Stop reason: HOSPADM

## 2024-05-18 RX ORDER — DEXAMETHASONE 4 MG/1
4 TABLET ORAL EVERY 12 HOURS SCHEDULED
Status: COMPLETED | OUTPATIENT
Start: 2024-05-18 | End: 2024-05-20

## 2024-05-18 RX ORDER — DEXAMETHASONE 2 MG/1
2 TABLET ORAL EVERY 12 HOURS SCHEDULED
Status: DISCONTINUED | OUTPATIENT
Start: 2024-05-20 | End: 2024-05-21 | Stop reason: HOSPADM

## 2024-05-18 RX ORDER — CYCLOBENZAPRINE HCL 10 MG
5 TABLET ORAL 3 TIMES DAILY PRN
Status: DISCONTINUED | OUTPATIENT
Start: 2024-05-18 | End: 2024-05-21 | Stop reason: HOSPADM

## 2024-05-18 RX ORDER — CHLORHEXIDINE GLUCONATE 500 MG/1
1 CLOTH TOPICAL ONCE
Status: COMPLETED | OUTPATIENT
Start: 2024-05-18 | End: 2024-05-18

## 2024-05-18 RX ORDER — DEXAMETHASONE 0.5 MG/1
0.5 TABLET ORAL EVERY 12 HOURS SCHEDULED
Status: DISCONTINUED | OUTPATIENT
Start: 2024-05-24 | End: 2024-05-21 | Stop reason: HOSPADM

## 2024-05-18 RX ORDER — GABAPENTIN 300 MG/1
300 CAPSULE ORAL 3 TIMES DAILY
Status: DISCONTINUED | OUTPATIENT
Start: 2024-05-18 | End: 2024-05-21 | Stop reason: HOSPADM

## 2024-05-18 RX ORDER — DEXAMETHASONE 2 MG/1
1 TABLET ORAL EVERY 12 HOURS SCHEDULED
Status: DISCONTINUED | OUTPATIENT
Start: 2024-05-22 | End: 2024-05-21 | Stop reason: HOSPADM

## 2024-05-18 RX ADMIN — ACETAMINOPHEN 650 MG: 325 TABLET, FILM COATED ORAL at 14:54

## 2024-05-18 RX ADMIN — POTASSIUM CHLORIDE AND SODIUM CHLORIDE 75 ML/HR: 900; 150 INJECTION, SOLUTION INTRAVENOUS at 06:17

## 2024-05-18 RX ADMIN — HYDROCODONE BITARTRATE AND ACETAMINOPHEN 1 TABLET: 7.5; 325 TABLET ORAL at 08:44

## 2024-05-18 RX ADMIN — ACETAMINOPHEN 650 MG: 325 TABLET, FILM COATED ORAL at 21:52

## 2024-05-18 RX ADMIN — LEVETIRACETAM 500 MG: 500 TABLET, FILM COATED ORAL at 08:01

## 2024-05-18 RX ADMIN — DEXAMETHASONE 4 MG: 4 TABLET ORAL at 05:32

## 2024-05-18 RX ADMIN — HYDRALAZINE HYDROCHLORIDE 10 MG: 20 INJECTION, SOLUTION INTRAMUSCULAR; INTRAVENOUS at 05:08

## 2024-05-18 RX ADMIN — GABAPENTIN 300 MG: 300 CAPSULE ORAL at 20:24

## 2024-05-18 RX ADMIN — HYDROCODONE BITARTRATE AND ACETAMINOPHEN 1 TABLET: 7.5; 325 TABLET ORAL at 14:54

## 2024-05-18 RX ADMIN — PANTOPRAZOLE SODIUM 40 MG: 40 TABLET, DELAYED RELEASE ORAL at 08:01

## 2024-05-18 RX ADMIN — ACETAMINOPHEN 650 MG: 325 TABLET, FILM COATED ORAL at 05:32

## 2024-05-18 RX ADMIN — ACETAZOLAMIDE 250 MG: 250 TABLET ORAL at 08:02

## 2024-05-18 RX ADMIN — CYCLOBENZAPRINE HYDROCHLORIDE 5 MG: 5 TABLET, FILM COATED ORAL at 19:34

## 2024-05-18 RX ADMIN — CHLORHEXIDINE GLUCONATE 1 APPLICATION: 500 CLOTH TOPICAL at 12:00

## 2024-05-18 RX ADMIN — BUTALBITAL, ACETAMINOPHEN, AND CAFFEINE 1 TABLET: 50; 325; 40 TABLET ORAL at 10:58

## 2024-05-18 RX ADMIN — POLYETHYLENE GLYCOL 3350 17 G: 17 POWDER, FOR SOLUTION ORAL at 08:01

## 2024-05-18 RX ADMIN — GABAPENTIN 300 MG: 300 CAPSULE ORAL at 16:38

## 2024-05-18 RX ADMIN — CEFAZOLIN 2000 MG: 2 INJECTION, POWDER, FOR SOLUTION INTRAMUSCULAR; INTRAVENOUS at 06:47

## 2024-05-18 RX ADMIN — POTASSIUM CHLORIDE AND SODIUM CHLORIDE 75 ML/HR: 900; 150 INJECTION, SOLUTION INTRAVENOUS at 23:49

## 2024-05-18 RX ADMIN — LEVETIRACETAM 500 MG: 500 TABLET, FILM COATED ORAL at 20:24

## 2024-05-18 RX ADMIN — HYDROCODONE BITARTRATE AND ACETAMINOPHEN 1 TABLET: 7.5; 325 TABLET ORAL at 20:12

## 2024-05-18 RX ADMIN — CEFAZOLIN 2000 MG: 2 INJECTION, POWDER, FOR SOLUTION INTRAMUSCULAR; INTRAVENOUS at 23:44

## 2024-05-18 RX ADMIN — HEPARIN SODIUM 5000 UNITS: 5000 INJECTION INTRAVENOUS; SUBCUTANEOUS at 08:01

## 2024-05-18 RX ADMIN — DEXAMETHASONE 4 MG: 4 TABLET ORAL at 20:24

## 2024-05-18 RX ADMIN — Medication 1 APPLICATION: at 20:24

## 2024-05-18 RX ADMIN — SENNOSIDES AND DOCUSATE SODIUM 2 TABLET: 50; 8.6 TABLET ORAL at 08:01

## 2024-05-18 RX ADMIN — GADOBENATE DIMEGLUMINE 20 ML: 529 INJECTION, SOLUTION INTRAVENOUS at 10:33

## 2024-05-18 RX ADMIN — SENNOSIDES AND DOCUSATE SODIUM 2 TABLET: 50; 8.6 TABLET ORAL at 20:24

## 2024-05-18 RX ADMIN — CEFAZOLIN 2000 MG: 2 INJECTION, POWDER, FOR SOLUTION INTRAMUSCULAR; INTRAVENOUS at 14:54

## 2024-05-18 RX ADMIN — HEPARIN SODIUM 5000 UNITS: 5000 INJECTION INTRAVENOUS; SUBCUTANEOUS at 20:25

## 2024-05-18 RX ADMIN — Medication 1 APPLICATION: at 14:54

## 2024-05-18 NOTE — PLAN OF CARE
Goal Outcome Evaluation:  Plan of Care Reviewed With: patient, spouse        Progress: improving  Outcome Evaluation: A/O x4. Moves all extremities appropriately. RA.  line used, Mauritian speaking only. Complaints of incisional head pain with improvement with prn meds. Hydralazine given x1. Arterial line positional at times. UOP adequate. Spouse at bedside. Lumbar drain not draining adequately, provider aware and no new orders.         Problem: Adult Inpatient Plan of Care  Goal: Plan of Care Review  Outcome: Ongoing, Progressing  Flowsheets (Taken 5/18/2024 0557)  Progress: improving  Plan of Care Reviewed With:   patient   spouse  Outcome Evaluation: A/O x4. Moves all extremities appropriately. RA.  line used, Mauritian speaking only. Complaints of incisional head pain with improvement with prn meds. Hydralazine given x1. Arterial line positional at times. UOP adequate. Spouse at bedside. Lumbar drain not draining adequately, provider aware and no new orders.  Goal: Patient-Specific Goal (Individualized)  Outcome: Ongoing, Progressing  Goal: Absence of Hospital-Acquired Illness or Injury  Outcome: Ongoing, Progressing  Intervention: Identify and Manage Fall Risk  Recent Flowsheet Documentation  Taken 5/18/2024 0500 by Edna Welsh RN  Safety Promotion/Fall Prevention: safety round/check completed  Taken 5/18/2024 0400 by Edna Welsh RN  Safety Promotion/Fall Prevention: safety round/check completed  Taken 5/18/2024 0300 by Edna Welsh RN  Safety Promotion/Fall Prevention: safety round/check completed  Taken 5/18/2024 0200 by Edna Welsh RN  Safety Promotion/Fall Prevention: safety round/check completed  Taken 5/18/2024 0100 by Edna Welsh RN  Safety Promotion/Fall Prevention: safety round/check completed  Taken 5/18/2024 0000 by Edna Welsh, RN  Safety Promotion/Fall Prevention: safety round/check completed  Taken 5/17/2024 2300 by Edna Welsh, RN  Safety  Promotion/Fall Prevention: safety round/check completed  Taken 5/17/2024 2200 by Edna Welsh RN  Safety Promotion/Fall Prevention: safety round/check completed  Taken 5/17/2024 2100 by Edna Welsh RN  Safety Promotion/Fall Prevention: safety round/check completed  Taken 5/17/2024 2000 by Edna Welsh RN  Safety Promotion/Fall Prevention: safety round/check completed  Taken 5/17/2024 1900 by Edna Welsh RN  Safety Promotion/Fall Prevention: safety round/check completed  Intervention: Prevent Skin Injury  Recent Flowsheet Documentation  Taken 5/18/2024 0400 by Edna Welsh RN  Body Position:   turned   left  Taken 5/18/2024 0200 by Edna Welsh RN  Body Position:   turned   right  Taken 5/18/2024 0000 by Edna Welsh RN  Body Position: patient/family refused  Taken 5/17/2024 2200 by Edna Welsh RN  Body Position:   turned   supine  Taken 5/17/2024 2000 by Edna Welsh RN  Body Position:   turned   right  Skin Protection:   adhesive use limited   skin-to-device areas padded   skin-to-skin areas padded   tubing/devices free from skin contact   incontinence pads utilized  Intervention: Prevent and Manage VTE (Venous Thromboembolism) Risk  Recent Flowsheet Documentation  Taken 5/18/2024 0400 by Edna Welsh RN  Activity Management: bedrest  VTE Prevention/Management:   bilateral   sequential compression devices on  Taken 5/18/2024 0200 by Edna Welsh RN  Activity Management: bedrest  Taken 5/18/2024 0000 by Edna Welsh RN  Activity Management: bedrest  Taken 5/17/2024 2200 by Edna Welsh RN  Activity Management: bedrest  Taken 5/17/2024 2000 by Edna Welsh RN  Activity Management: bedrest  VTE Prevention/Management:   bilateral   sequential compression devices on  Goal: Optimal Comfort and Wellbeing  Outcome: Ongoing, Progressing  Intervention: Monitor Pain and Promote Comfort  Recent Flowsheet Documentation  Taken 5/17/2024 2000 by Edna Welsh RN  Pain  Management Interventions: see MAR  Intervention: Provide Person-Centered Care  Recent Flowsheet Documentation  Taken 5/18/2024 0400 by Edna Welsh, RN  Trust Relationship/Rapport: care explained  Taken 5/18/2024 0000 by Edna Welsh, RN  Trust Relationship/Rapport: care explained  Taken 5/17/2024 2000 by Edna Welsh, RN  Trust Relationship/Rapport: care explained  Goal: Readiness for Transition of Care  Outcome: Ongoing, Progressing  Intervention: Mutually Develop Transition Plan  Recent Flowsheet Documentation  Taken 5/17/2024 2137 by Edna Welsh, RN  Transportation Anticipated: family or friend will provide  Patient/Family Anticipated Services at Transition: none  Patient/Family Anticipates Transition to: home with family

## 2024-05-18 NOTE — THERAPY EVALUATION
Patient Name: Zina Armando  : 1990    MRN: [id number]                              Today's Date: 2024       Admit Date: 2024    Visit Dx:     ICD-10-CM ICD-9-CM   1. Impaired mobility [Z74.09]  Z74.09 799.89   2. Cerebellar mass  G93.89 348.89     Patient Active Problem List   Diagnosis    Cerebellar mass     Past Medical History:   Diagnosis Date    Cerebellar mass 2024    Miscarriage 2024    Miscarriage 2023    Personal history of COVID-2021     Past Surgical History:   Procedure Laterality Date    NO PAST SURGERIES        General Information       Row Name 24 0855          Physical Therapy Time and Intention    Document Type evaluation  s/p suboccipital crani with external lumbar drain insertion on . She presented with intermittent dizziness, nausea, and gait/balance dysfunction. Dx: cerebellar mass  -     Mode of Treatment co-treatment;physical therapy  -       Row Name 24 0855          General Information    Patient Profile Reviewed yes  -     Prior Level of Function independent:;all household mobility;ADL's  -     Existing Precautions/Restrictions fall  -     Barriers to Rehab medically complex  -       Row Name 24 0855          Living Environment    People in Home spouse  -       Row Name 24 0855          Cognition    Orientation Status (Cognition) oriented x 4  -       Row Name 24 0855          Safety Issues, Functional Mobility    Safety Issues Affecting Function (Mobility) ability to follow commands  -     Impairments Affecting Function (Mobility) balance  -               User Key  (r) = Recorded By, (t) = Taken By, (c) = Cosigned By      Initials Name Provider Type     Channing Reynolds, PT Physical Therapist                   Mobility       Row Name 24 0855          Bed Mobility    Bed Mobility rolling right  -     Rolling Right [place] (Bed Mobility) verbal cues;contact guard  -       Row Name 24  0855          Sit-Stand Transfer    Sit-Stand [place] (Transfers) verbal cues;contact guard  -LH       Row Name 05/18/24 0855          Gait/Stairs (Locomotion)    [place] Level (Gait) verbal cues;minimum assist (75% patient effort)  -     Distance in Feet (Gait) 150  -               User Key  (r) = Recorded By, (t) = Taken By, (c) = Cosigned By      Initials Name Provider Type     Channing Reynolds, PT Physical Therapist                   Obj/Interventions       Row Name 05/18/24 0855          Range of Motion Comprehensive    General Range of Motion bilateral upper extremity ROM WFL;bilateral lower extremity ROM WFL  -LH       Row Name 05/18/24 0855          Strength Comprehensive (MMT)    General Manual Muscle Testing (MMT) Assessment no strength deficits identified  -LH       Row Name 05/18/24 0855          Sensory Assessment (Somatosensory)    Sensory Assessment (Somatosensory) sensation intact  -               User Key  (r) = Recorded By, (t) = Taken By, (c) = Cosigned By      Initials Name Provider Type     Channing Reynolds, PT Physical Therapist                   Goals/Plan       Row Name 05/18/24 0855          Bed Mobility Goal 1 (PT)    Activity/Assistive Device (Bed Mobility Goal 1, PT) bed mobility activities, all  -     Time Frame (Bed Mobility Goal 1, PT) 10 days  -     Progress/Outcomes (Bed Mobility Goal 1, PT) new goal  -LH       Row Name 05/18/24 0855          Transfer Goal 1 (PT)    Activity/Assistive Device (Transfer Goal 1, PT) sit-to-stand/stand-to-sit  -     [place] Level/Cues Needed (Transfer Goal 1, PT) independent  -     Time Frame (Transfer Goal 1, PT) 10 days  -     Progress/Outcome (Transfer Goal 1, PT) new goal  -LH       Row Name 05/18/24 0855          Gait Training Goal 1 (PT)    Activity/Assistive Device (Gait Training Goal 1, PT) gait (walking locomotion);diminish gait deviation  -     [place] Level (Gait Training Goal 1, PT) independent  -      Distance (Gait Training Goal 1, PT) 250ft  -     Time Frame (Gait Training Goal 1, PT) 10 days  -     Progress/Outcome (Gait Training Goal 1, PT) new goal  -       Row Name 05/18/24 0855          Therapy Assessment/Plan (PT)    Planned Therapy Interventions (PT) balance training;bed mobility training;gait training;home exercise program;patient/family education;neuromuscular re-education;transfer training  -               User Key  (r) = Recorded By, (t) = Taken By, (c) = Cosigned By      Initials Name Provider Type     Channing Reynolds, PT Physical Therapist                   Clinical Impression       Row Name 05/18/24 0855          Pain    Pretreatment Pain Rating 0/10 - no pain  -     Posttreatment Pain Rating 0/10 - no pain  -     Pain Intervention(s) Medication (See MAR)  -       Row Name 05/18/24 0855          Plan of Care Review    Plan of Care Reviewed With patient;family  -     Outcome Evaluation PT IE complete.  Pt permitted family member in room to interpret.  A&Ox4.  No c/o pain.  Pt indpendent PLOF.  Today she is CGA bed mobility and sit<>stand.  Ambulated 150ft intermittent min A x1.  Coodination intact.  PT to see for balance activities and progressive ambulation.  Recommend home with A with possible OP PT at NH.  Thank you for referral.  -       Row Name 05/18/24 0855          Therapy Assessment/Plan (PT)    Patient/Family Therapy Goals Statement (PT) return home  -     Rehab Potential (PT) good, to achieve stated therapy goals  -     Criteria for Skilled Interventions Met (PT) yes;skilled treatment is necessary  -     Therapy Frequency (PT) 2 times/day  -     Predicted Duration of Therapy Intervention (PT) until dc  -       Row Name 05/18/24 0855          Positioning and Restraints    Pre-Treatment Position in bed  -     Post Treatment Position wheelchair  -     In Wheelchair sitting;with family/caregiver;with nsg;with other staff  going for [facility]  -               User  Key  (r) = Recorded By, (t) = Taken By, (c) = Cosigned By      Initials Name Provider Type     Channing Reynolds, PT Physical Therapist                   Outcome Measures       Row Name 05/18/24 0855          How much help from another person do you currently need...    Turning from your back to your side while in flat bed without using bedrails? 3  -LH     Moving from lying on back to sitting on the side of a flat bed without bedrails? 3  -LH     Moving to and from a bed to a chair (including a wheelchair)? 3  -LH     Standing up from a chair using your arms (e.g., wheelchair, bedside chair)? 3  -LH     Climbing 3-5 steps with a railing? 3  -LH     To walk in hospital room? 3  -     AM-PAC 6 Clicks Score (PT) 18  -     Highest Level of Mobility Goal 6 --> Walk 10 steps or more  -       Row Name 05/18/24 0855          Functional Assessment    Outcome Measure Options AM-PAC 6 Clicks Basic Mobility (PT)  -               User Key  (r) = Recorded By, (t) = Taken By, (c) = Cosigned By      Initials Name Provider Type     Channing Reynolds, PT Physical Therapist                                 Physical Therapy Education       Title: PT OT SLP Therapies (Done)       Topic: Physical Therapy (Done)       Point: Mobility training (Done)       Learning Progress Summary             Patient Acceptance, E,D, DU,VU by  at 5/18/2024 1120    Comment: benefits of PT and POC, amanda for A OOB                         Point: Precautions (Done)       Learning Progress Summary             Patient Acceptance, E,D, DU,VU by  at 5/18/2024 1120    Comment: benefits of PT and POC, amanda for A OOB                                         User Key       Initials Effective Dates Name Provider Type [facility] 02/03/23 -  Channing Reynolds, PT Physical Therapist PT                  PT Recommendation and Plan  Planned Therapy Interventions (PT): balance training, bed mobility training, gait training, home exercise program, patient/family  education, neuromuscular re-education, transfer training  Plan of Care Reviewed With: patient, family  Outcome Evaluation: PT IE complete.  Pt permitted family member in room to interpret.  A&Ox4.  No c/o pain.  Pt indpendent PLOF.  Today she is CGA bed mobility and sit<>stand.  Ambulated 150ft intermittent min A x1.  Coodination intact.  PT to see for balance activities and progressive ambulation.  Recommend home with A with possible OP PT at OR.  Thank you for referral.     Time Calculation:         PT Charges       Row Name 05/18/24 1120             Time Calculation    Start Time 0855  -      Stop Time 0933  -      Time Calculation (min) 38 min  -      PT Received On 05/18/24  -      PT Goal Re-Cert Due Date 05/28/24  -         Untimed Charges    PT Eval/Re-eval Minutes 38  -         Total Minutes    Untimed Charges Total Minutes 38  -       Total Minutes 38  -                User Key  (r) = Recorded By, (t) = Taken By, (c) = Cosigned By      Initials Name Provider Type     Channing Reynolds, PT Physical Therapist                  Therapy Charges for Today       Code Description Service Date Service Provider Modifiers Qty    [id number]  PT EVAL LOW COMPLEXITY 3 5/18/2024 Channing Reynolds, PT GP 1            PT G-Codes  Outcome Measure Options: AM-PAC 6 Clicks Basic Mobility (PT)  AM-PAC 6 Clicks Score (PT): 18  PT Discharge Summary  Anticipated Discharge Disposition (PT): home with assist, home with outpatient therapy services    Channing Reynolds PT  5/18/2024

## 2024-05-18 NOTE — THERAPY TREATMENT NOTE
Acute Care - Physical Therapy Treatment Note  [facility]     Patient Name: Zina Armando  : 1990  MRN: [id number]  Today's Date: 2024      Visit Dx:     ICD-10-CM ICD-9-CM   1. Impaired mobility [Z74.09]  Z74.09 799.89   2. Cerebellar mass  G93.89 348.89     Patient Active Problem List   Diagnosis    Cerebellar mass     Past Medical History:   Diagnosis Date    Cerebellar mass 2024    Miscarriage 2024    Miscarriage 2023    Personal history of COVID-19      Past Surgical History:   Procedure Laterality Date    NO PAST SURGERIES       PT Assessment (Last 12 Hours)       PT Evaluation and Treatment       Row Name 24 1426          Physical Therapy Time and Intention    Subjective Information complains of;fatigue;pain  -TB     Document Type therapy note (daily note)  -TB     Mode of Treatment physical therapy  -TB       Row Name 24 1426          General Information    Existing Precautions/Restrictions fall  Lumbar Drain  -TB       Row Name 24 142          Pain    Pretreatment Pain Rating 5/10  -TB     Posttreatment Pain Rating 5/10  -TB     Pain Location - head  -       Row Name 24 1426          Bed Mobility    Bed Mobility scooting/bridging;supine-sit;sit-supine  -TB     Scooting/Bridging [place] (Bed Mobility) minimum assist (75% patient effort);verbal cues  -TB     Supine-Sit [place] (Bed Mobility) moderate assist (50% patient effort);verbal cues  -TB     Sit-Supine [place] (Bed Mobility) minimum assist (75% patient effort);verbal cues  -TB     Assistive Device (Bed Mobility) draw sheet  -TB       Row Name 24 142          Transfers    Transfers sit-stand transfer;stand-sit transfer  -TB       Row Name 24 142          Sit-Stand Transfer    Sit-Stand [place] (Transfers) contact guard;verbal cues  -TB       Row Name 24 142          Stand-Sit Transfer    Stand-Sit [place] (Transfers) contact guard;verbal cues  -TB        Row Name 05/18/24 1426          Gait/Stairs (Locomotion)    [place] Level (Gait) contact guard;2 person assist;verbal cues  -TB     Distance in Feet (Gait) 200  -TB       Row Name             Wound 05/17/24 0932 occipital region Incision    Wound - Properties Group Placement Date: 05/17/24  -VALARIE Placement Time: 0932 -VALARIE Location: occipital region  -VALARIE Primary Wound Type: Incision  -VALARIE    Retired Wound - Properties Group Placement Date: 05/17/24  -VALARIE Placement Time: 0932 -VALARIE Location: occipital region  -VALARIE Primary Wound Type: Incision  -VALARIE    Retired Wound - Properties Group Date first assessed: 05/17/24  -VALARIE Time first assessed: 0932 -VALARIE Location: occipital region  -VALARIE Primary Wound Type: Incision  -VALARIE      Row Name 05/18/24 1426          Positioning and Restraints    Pre-Treatment Position in bed  -TB     Post Treatment Position bed  -TB     In Bed side lying right;call light within reach;encouraged to call for assist;patient within staff view;with family/caregiver;side rails up x2  -TB               User Key  (r) = Recorded By, (t) = Taken By, (c) = Cosigned By      Initials Name Provider Type    VALARIE Luis Hernandez, RN Registered Nurse    TB Juma Nation, PTA Physical Therapist Assistant                    Physical Therapy Education       Title: PT OT SLP Therapies (In Progress)       Topic: Physical Therapy (Done)       Point: Mobility training (Done)       Learning Progress Summary             Patient Acceptance, E,D, DU,VU by  at 5/18/2024 1120    Comment: benefits of PT and POC, amanda for A OOB                         Point: Precautions (Done)       Learning Progress Summary             Patient Acceptance, E,D, DU,VU by  at 5/18/2024 1120    Comment: benefits of PT and POC, amanda for A OOB                                         User Key       Initials Effective Dates Name Provider Type [facility] 02/03/23 -  Channing Reynolds, PT Physical Therapist PT                  PT Recommendation and  Plan             Time Calculation:    PT Charges       Row Name 05/18/24 1551 05/18/24 1120          Time Calculation    Start Time 1426  - 0855  -     Stop Time 1450  - 0933  -     Time Calculation (min) 24 min  - 38 min  -     PT Received On 05/18/24  -TB 05/18/24  -     PT Goal Re-Cert Due Date -- 05/28/24  -        Time Calculation- PT    Total Timed Code Minutes- PT 24 minute(s)  - --        Untimed Charges    PT Eval/Re-eval Minutes -- 38  -LH        Total Minutes    Untimed Charges Total Minutes -- 38  -LH      Total Minutes -- 38  -LH               User Key  (r) = Recorded By, (t) = Taken By, (c) = Cosigned By      Initials Name Provider Type     Channing Reynolds, PT Physical Therapist    TB Juma Nation, PTA Physical Therapist Assistant                  Therapy Charges for Today       Code Description Service Date Service Provider Modifiers Qty    [id number] HC GAIT TRAINING EA 15 MIN 5/18/2024 Juma Nation PTA GP 2            PT G-Codes  Outcome Measure Options: AM-PAC 6 Clicks Basic Mobility (PT)  AM-PAC 6 Clicks Score (PT): 18  AM-PAC 6 Clicks Score (OT): 21    Juma Nation PTA  5/18/2024

## 2024-05-18 NOTE — PROGRESS NOTES
"Neurosurgery Daily Progress Note    HPI:  Zina Armando is a 33 y.o. non-English-speaking  female with a significant comorbidity miscarriages x 2. She presents today for continued evaluation with complaints of intermittent dizziness, nausea, and gait/balance dysfunction. Physical exam findings of neurologically intact. 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  9cc.  Keep.  Max PT/OT  Rapid wean of decadron  Postop MRI today  DC azetazolamide    CV: BEN.  No bradycardia  Pulm: BEN on room air  :  DC burgos.   FEN: Erica PO  Endocrine: Strict Blood glucose control  GI: BEN  ID: Leukocytosis 2/2 Decradon.    Heme:  DVT prophylaxis  Pain: Well controlled  Dispo: Pending removal of LD      Chief complaint:   Headache for 2 months    HPI  Subjective  Doing well    Temp:  [97.6 °F (36.4 °C)-99.2 °F (37.3 °C)] 98.2 °F (36.8 °C)  Heart Rate:  [60-84] 83  Resp:  [11-23] 23  BP: (116-142)/() 119/73    Output by Drain (mL)  05/17/24 0701 - 05/17/24 1900  05/17/24 1901 - 05/18/24 0700  05/18/24 0701 - 05/18/24 0812  Range Total  Requested LDAs do not have output data documented.      Objective:  Vital signs: (most recent): Blood pressure 119/76, pulse 80, temperature 98.6 °F (37 °C), temperature source Oral, resp. rate 21, height 151 cm (59.45\"), weight 101 kg (223 lb 1.7 oz), last menstrual period 04/13/2024, SpO2 96%, not currently breastfeeding.        Neurologic Exam     Mental Status   Oriented to person, place, and time.   Speech: speech is normal   Level of " consciousness: alert    Cranial Nerves     CN III, IV, VI   Pupils are equal, round, and reactive to light.  Extraocular motions are normal.     CN V   Facial sensation intact.     CN VII   Facial expression full, symmetric.     Motor Exam   Right arm pronator drift: absent  Left arm pronator drift: absent    Strength   Right deltoid: 5/5  Left deltoid: 5/5  Right biceps: 5/5  Left biceps: 5/5  Right triceps: 5/5  Left triceps: 5/5  Right iliopsoas: 5/5  Left iliopsoas: 5/5  Right quadriceps: 5/5  Left quadriceps: 5/5  Right gastroc: 5/5  Left gastroc: 5/5    Sensory Exam   Light touch normal.       Drains:   Urethral Catheter Non-latex;Silicone 16 Fr. (Active)  Daily Indications  Required activity restriction from trauma, surgery, (e.g. unstable spine, fracture, hemodynamics)  05/18/24 0400  Site Assessment  Clean;Skin intact  05/18/24 0400  Collection Container  Standard drainage bag  05/18/24 0400  Securement Method  Securing device  05/18/24 0400  Catheter care complete  Yes  05/18/24 0400  Output (mL)  300 mL  05/18/24 0400     Lumbar Drain (Active)  Drain Status  Clamped  05/18/24 0400  Site Description  Unable to view  05/18/24 0400  Dressing Status  Clean;Dry;Intact  05/18/24 0400  CSF Output (mL)  9 mL  05/18/24 0617      Imaging Results (Last 24 Hours)     Procedure  Component  Value  Units  Date/Time  CT Head Without Contrast [[id number]]  Collected: 05/17/24 1727  Updated: 05/17/24 1736  Narrative:    EXAMINATION: CT HEAD WO CONTRAST-  5/17/2024 5:28 PM     HISTORY: Post suboccipital craniotomy for tumor.     DOSE: 1279 mGycm. All CT scans are performed using dose optimization  techniques as appropriate to the performed exam and including at least  one of the following: Automated exposure control, adjustment of the mA  and/or kV according to size, and the use of the iterative reconstruction  technique..     FINDINGS: Today's exam is compared to previous study of 5/6/2024.  Multiple contiguous axial images  were obtained from the skull base to  the vertex per protocol without intravenous contrast enhancement with  reformatted images obtained in the sagittal and coronal projections from  the original data set. The patient has undergone suboccipital craniotomy  as well as resection of a segment of the posterior ring of the C1  vertebral body. Postoperative air is noted at the surgical bed as well  as within the basilar cisterns with postoperative pneumocephalus  present. There is also air within the ventricular system including  within the frontal horn of both lateral ventricles and the third  ventricle. Ventricles are essentially stable in size and appearance from  the previous exam. Mild prominence of the temporal horns is stable.  Previously noted hemorrhagic lesion involving the vermis of the  cerebellum has been resected with some residual hypodensity. There is  associated decompression of the posterior cranial fossa. No mass effect  or shift of the midline at this time.     Impression:    1.. Status post suboccipital craniotomy including resection of a portion  of the posterior arch of C1. Previously noted partially hemorrhagic  hypodense mass in the posterior cranial fossa has been resected. There  is some residual hypodensity in the surgical bed. Post operative  pneumocephalus as well as air within the ventricular system is  demonstrated. There is also air within the soft tissues at the level of  the occiput and posterior cervical spine.  2. Ventricles are stable in size and appearance from the previous exam.  There is no shift of the midline. No evidence of acute intra or  extra-axial hemorrhage.     This report was signed and finalized on 5/17/2024 5:33 PM by Dr. Sloan Castellon MD.         Lab Results (last 24 hours)     Procedure  Component  Value  Units  Date/Time  Manual Differential [[id number]]  (Abnormal)  Collected: 05/18/24 0540  Specimen: Blood, Arterial Line  Updated: 05/18/24 0621  Neutrophil  %  85.0  %  Lymphocyte %  9.0  %  Monocyte %  4.0  %  Eosinophil %  0.0  %  Basophil %  0.0  %  Bands %   2.0  %  Neutrophils Absolute  31.31  10*3/mm3  Lymphocytes Absolute  3.24  10*3/mm3  Monocytes Absolute  1.44  10*3/mm3  Eosinophils Absolute  0.00  10*3/mm3  Basophils Absolute  0.00  10*3/mm3  RBC Morphology  Normal  Vacuolated Neutrophils  Slight/1+  Platelet Morphology  Normal  Basic Metabolic Panel [[id number]]  (Abnormal)  Collected: 05/18/24 0540  Specimen: Blood, Arterial Line  Updated: 05/18/24 0618  Glucose  104  mg/dL  BUN  20  mg/dL  Creatinine  0.38  mg/dL  Sodium  137  mmol/L  Potassium  3.6  mmol/L  Chloride  106  mmol/L  CO2  21.0  mmol/L  Calcium  8.0  mg/dL  BUN/Creatinine Ratio  52.6  Anion Gap  10.0  mmol/L  eGFR  135.9  mL/min/1.73  Narrative:    GFR Normal >60  Chronic Kidney Disease <60  Kidney Failure <15  CBC & Differential [[id number]]  (Abnormal)  Collected: 05/18/24 0540  Specimen: Blood, Arterial Line  Updated: 05/18/24 0556  Narrative:    The following orders were created for panel order CBC & Differential.  Procedure                               Abnormality         Status                     ---------                               -----------         ------                     CBC Auto Differential[[id number]]        Abnormal            Final result               Please view results for these tests on the individual orders.  CBC Auto Differential [[id number]]  (Abnormal)  Collected: 05/18/24 0540  Specimen: Blood, Arterial Line  Updated: 05/18/24 0556  WBC  35.99  10*3/mm3  RBC  4.73  10*6/mm3  Hemoglobin  12.9  g/dL  Hematocrit  40.3  %  MCV  85.2  fL  MCH  27.3  pg  MCHC  32.0  g/dL  RDW  14.3  %  RDW-SD  43.7  fl  MPV  9.7  fL  Platelets  242  10*3/mm3  Blood Gas, Arterial With Co-Ox [[id number]]  (Abnormal)  Collected: 05/17/24 1302  Specimen: Arterial Blood  Updated: 05/18/24 0017  Site  Arterial Line  Mc's Test  N/A  pH, Arterial  7.438  pH units  pCO2, Arterial  28.7  mm  Hg  Comment:  84 Value below reference range  pO2, Arterial  342.0  mm Hg  Comment:  83 Value above reference range  HCO3, Arterial  19.4  mmol/L  Comment:  84 Value below reference range  Base Excess, Arterial  -3.5  mmol/L  Comment:  84 Value below reference range  O2 Saturation, Arterial  >100.1  %  Comment:  93 Value above reportable range > 100.1  Hemoglobin, Blood Gas  13.9  g/dL  Hematocrit, Blood Gas  42.5  %  Oxyhemoglobin  99.0  %  Methemoglobin  0.80  %  Carboxyhemoglobin  0.5  %  Temperature  37.1  Sodium, Arterial  138  mmol/L  Potassium, Arterial  3.9  mmol/L  Ionized Calcium  4.38  mg/dL  Comment:  84 Value below reference range  Barometric Pressure for Blood Gas  748  mmHg  Modality  Ventilator  Comment:  Corrected result. Previous result was Room Air on 5/17/2024 at 1304 CDT.  FIO2  100  %  Ventilator Mode  NA  Collected by  DR PARKS  Comment:  Meter: [id number]     :  Aury Lindsey, BETO  pH, Temp Corrected  7.437  pH Units  pCO2, Temperature Corrected  28.8  mm Hg  pO2, Temperature Corrected  343  mm Hg  Blood Gas, Arterial With Co-Ox [[id number]]  (Abnormal)  Collected: 05/17/24 0836  Specimen: Arterial Blood  Updated: 05/18/24 0016  Site  Arterial Line  Mc's Test  N/A  pH, Arterial  7.368  pH units  pCO2, Arterial  35.6  mm Hg  pO2, Arterial  335.0  mm Hg  Comment:  83 Value above reference range  HCO3, Arterial  20.5  mmol/L  Base Excess, Arterial  -4.2  mmol/L  Comment:  84 Value below reference range  O2 Saturation, Arterial  >100.1  %  Comment:  93 Value above reportable range > 100.1  Hemoglobin, Blood Gas  13.8  g/dL  Hematocrit, Blood Gas  42.2  %  Oxyhemoglobin  99.2  %  Comment:  83 Value above reference range  Methemoglobin  0.50  %  Carboxyhemoglobin  0.9  %  Temperature  36.3  Sodium, Arterial  138  mmol/L  Potassium, Arterial  4.2  mmol/L  Ionized Calcium  4.66  mg/dL  Barometric Pressure for Blood Gas  749  mmHg  Modality  Ventilator  Comment:  Corrected  result. Previous result was Room Air on 5/17/2024 at 0837 CDT.  FIO2  100  %  Ventilator Mode  NA  Collected by  DR ALEXANDER  Comment:  Meter: [id number]     :  Aury Lindsey, BETO  pH, Temp Corrected  7.378  pH Units  pCO2, Temperature Corrected  34.5  mm Hg  pO2, Temperature Corrected  331  mm Hg  POC Glucose Once [[id number]]  (Normal)  Collected: 05/17/24 2102  Specimen: Blood  Updated: 05/17/24 2113  Glucose  119  mg/dL  Comment:  : [id number] Blaise ShannonMeter ID: [id number]  POC Glucose Once [[id number]]  (Normal)  Collected: 05/17/24 1744  Specimen: Blood  Updated: 05/17/24 1756  Glucose  122  mg/dL  Comment:  : [id number] Zacharymolly Blackter ID: [id number]  Tissue Pathology Exam [[id number]]  Collected: 05/17/24 1101  Specimen: Tissue from Brain, Tissue from Brain  Updated: 05/17/24 1732  Case Report  --  Surgical Pathology Report                         Case: [id number]                                  Authorizing Provider:  Rohith Alexander MD Collected:           05/17/2024 11:01 AM          Ordering Location:     [facility] OR  Received:            05/17/2024 11:07 AM          Pathologist:           Itzel Sidhu MD                                                        Intraop:               Itzel Sidhu MD                                                        Specimen:    Brain, BRAIN TUMOR FOR FROZEN                                                          Intraoperative Consultation  --  Specimen: Brain tumor, biopsy  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  Reported to Dr. Alexander on 5/17/2024 at 11:20 CDT by Itzel Sidhu MD.  POC Glucose Once [[id number]]  (Normal)  Collected: 05/17/24 1057  Specimen: Blood  Updated: 05/17/24 1111  Glucose  91  mg/dL  Comment:  : [id number] Mary SutherlandinMeter ID: [id number]  Blood Gas, Arterial With Co-Ox [[id number]]  (Abnormal)  Collected: 05/17/24 1057  Specimen: Arterial  Blood  Updated: 05/17/24 1059  Site  Arterial Line  Mc's Test  N/A  pH, Arterial  7.274  pH units  Comment:  84 Value below reference range  pCO2, Arterial  43.9  mm Hg  pO2, Arterial  291.0  mm Hg  Comment:  83 Value above reference range  HCO3, Arterial  20.3  mmol/L  Base Excess, Arterial  -6.4  mmol/L  Comment:  84 Value below reference range  O2 Saturation, Arterial  100.0  %  Comment:  83 Value above reference range  Hemoglobin, Blood Gas  12.8  g/dL  Hematocrit, Blood Gas  39.1  %  Oxyhemoglobin  98.8  %  Methemoglobin  0.80  %  Carboxyhemoglobin  0.4  %  Temperature  36.2  Sodium, Arterial  130  mmol/L  Comment:  84 Value below reference range  Potassium, Arterial  4.4  mmol/L  Ionized Calcium  4.26  mg/dL  Comment:  84 Value below reference range  Barometric Pressure for Blood Gas  749  mmHg  Modality  Ventilator  FIO2  100  %  Ventilator Mode  NA  Collected by  SURGERY  Comment:  Meter: [id number]     :  Pat Sutherland CRT  pH, Temp Corrected  7.285  pH Units  pCO2, Temperature Corrected  42.4  mm Hg  pO2, Temperature Corrected  287  mm Hg  POC Glucose Once [[id number]]  (Normal)  Collected: 05/17/24 0832  Specimen: Blood  Updated: 05/17/24 0844  Glucose  84  mg/dL  Comment:  : [id number] Mary SutherlandinMeter ID: [id number]        [id number]  Rohith Alexander MD

## 2024-05-18 NOTE — PLAN OF CARE
Goal Outcome Evaluation:  Plan of Care Reviewed With: patient, family           Outcome Evaluation: OT eval completed. Pt presents alert and oriented x4, c/o 8/10 neck pain. Pt premitted family member in room to interpret. She was bale to complete all bed mobility, sit <> stand t/fs and functional mobility with CGA. She did required Mod A to don socks due pain and presence of multiple lines/tubes/drains. Pt would benefit from skilled OT services to improve safety and return to PLOF. Anticipate d/c home with family when medically able.      Anticipated Discharge Disposition (OT): home with assist

## 2024-05-18 NOTE — PLAN OF CARE
Goal Outcome Evaluation:  Plan of Care Reviewed With: patient        Progress: improving       Problem: Adult Inpatient Plan of Care  Goal: Plan of Care Review  Outcome: Ongoing, Progressing  Flowsheets (Taken 5/18/2024 1851)  Progress: improving  Plan of Care Reviewed With: patient  Goal: Patient-Specific Goal (Individualized)  Outcome: Ongoing, Progressing  Goal: Absence of Hospital-Acquired Illness or Injury  Outcome: Ongoing, Progressing  Intervention: Identify and Manage Fall Risk  Recent Flowsheet Documentation  Taken 5/18/2024 1058 by Olya Vickers RN  Safety Promotion/Fall Prevention: safety round/check completed  Intervention: Prevent Skin Injury  Recent Flowsheet Documentation  Taken 5/18/2024 1800 by Olya Vickers RN  Body Position:   turned   supine  Taken 5/18/2024 1300 by Olya Vickers RN  Body Position: weight shifting  Taken 5/18/2024 1000 by Olya Vickers RN  Body Position: weight shifting  Intervention: Prevent and Manage VTE (Venous Thromboembolism) Risk  Recent Flowsheet Documentation  Taken 5/18/2024 1300 by Olya Vickers RN  Activity Management: up in chair  Taken 5/18/2024 1000 by Olya Vickers RN  Activity Management: up in chair  Goal: Optimal Comfort and Wellbeing  Outcome: Ongoing, Progressing  Intervention: Monitor Pain and Promote Comfort  Recent Flowsheet Documentation  Taken 5/18/2024 1058 by Olya Vickers RN  Pain Management Interventions: see MAR  Goal: Readiness for Transition of Care  Outcome: Ongoing, Progressing     Problem: Skin Injury Risk Increased  Goal: Skin Health and Integrity  Outcome: Ongoing, Progressing

## 2024-05-18 NOTE — THERAPY EVALUATION
Patient Name: Zina Armando  : 1990    MRN: [id number]                              Today's Date: 2024       Admit Date: 2024    Visit Dx:     ICD-10-CM ICD-9-CM   1. Impaired mobility [Z74.09]  Z74.09 799.89   2. Cerebellar mass  G93.89 348.89     Patient Active Problem List   Diagnosis    Cerebellar mass     Past Medical History:   Diagnosis Date    Cerebellar mass 2024    Miscarriage 2024    Miscarriage 2023    Personal history of COVID-2021     Past Surgical History:   Procedure Laterality Date    NO PAST SURGERIES        General Information       Row Name 24 0844          OT Time and Intention    Document Type evaluation  Pt s/p suboccipital crani with external lumbar drain insertion on . She presented with intermittent dizziness, nausea, and gait/balance dysfunction. Dx: cerebellar mass  -JJ     Mode of Treatment occupational therapy  -JJ       Row Name 24 0844          General Information    Patient Profile Reviewed yes  -JJ     Prior Level of Function independent:;all household mobility;transfer;bed mobility;ADL's  -JJ     Existing Precautions/Restrictions fall   lumbar drain  -JJ     Barriers to Rehab medically complex  -JJ       Row Name 24 0844          Living Environment    People in Home spouse  -JJ       Row Name 24 0844          Cognition    Orientation Status (Cognition) oriented x 4  -JJ       Row Name 24 0844          Safety Issues, Functional Mobility    Impairments Affecting Function (Mobility) balance  -JJ               User Key  (r) = Recorded By, (t) = Taken By, (c) = Cosigned By      Initials Name Provider Type    Dorota Cortez, ANITHA/L, CSRS Occupational Therapist                     Mobility/ADL's       Row Name 24 0844          Bed Mobility    Bed Mobility rolling right;supine-sit  -JJ     Rolling Right [place] (Bed Mobility) verbal cues;contact guard  -JJ     Supine-Sit [place] (Bed Mobility) contact  guard;verbal cues  -     Assistive Device (Bed Mobility) head of bed elevated  -JJ       Row Name 05/18/24 0844          Transfers    Transfers sit-stand transfer;stand-sit transfer  -JJ       Row Name 05/18/24 0844          Sit-Stand Transfer    Sit-Stand [place] (Transfers) verbal cues;contact guard  -JJ       Row Name 05/18/24 0844          Stand-Sit Transfer    Stand-Sit [place] (Transfers) contact guard;verbal cues  -JJ       Row Name 05/18/24 0844          Functional Mobility    Functional Mobility- Ind. Level contact guard assist  -     Functional Mobility- Comment amb around unit  -JJ       Row Name 05/18/24 0844          Activities of Daily Living    BADL Assessment/Intervention lower body dressing  -JJ       Row Name 05/18/24 0844          Lower Body Dressing Assessment/Training    [place] Level (Lower Body Dressing) don;socks;maximum assist (25% patient effort)  -     Position (Lower Body Dressing) edge of bed sitting  -               User Key  (r) = Recorded By, (t) = Taken By, (c) = Cosigned By      Initials Name Provider Type    Dorota Cortez, WILDERR/L, CSRS Occupational Therapist                   Obj/Interventions       Row Name 05/18/24 0844          Sensory Assessment (Somatosensory)    Sensory Assessment (Somatosensory) UE sensation intact  -JJ       Row Name 05/18/24 0844          Vision Assessment/Intervention    Visual Impairment/Limitations WFL  -JJ       Row Name 05/18/24 0844          Range of Motion Comprehensive    General Range of Motion bilateral upper extremity ROM WFL  -JJ       Row Name 05/18/24 0844          Strength Comprehensive (MMT)    Comment, General Manual Muscle Testing (MMT) Assessment B UE strength 5/5  -JJ       Row Name 05/18/24 0844          Motor Skills    Motor Skills coordination  -     Coordination WFL;upper extremity;lower extremity;bilateral  -JJ       Row Name 05/18/24 0844          Balance    Balance Assessment sitting static  balance;sitting dynamic balance;standing dynamic balance;standing static balance  -JJ     Static Sitting Balance standby assist  -JJ     Dynamic Sitting Balance contact guard;supervision  -JJ     Position, Sitting Balance unsupported;sitting edge of bed  -JJ     Static Standing Balance contact guard;supervision  -JJ     Dynamic Standing Balance contact guard  -JJ     Position/Device Used, Standing Balance unsupported  -JJ               User Key  (r) = Recorded By, (t) = Taken By, (c) = Cosigned By      Initials Name Provider Type    Dorota Cortez, OTR/L, CSRS Occupational Therapist                   Goals/Plan       Row Name 05/18/24 0844          Transfer Goal 1 (OT)    Activity/Assistive Device (Transfer Goal 1, OT) toilet;tub  -JJ     [place] Level/Cues Needed (Transfer Goal 1, OT) independent  -JJ     Time Frame (Transfer Goal 1, OT) long term goal (LTG);by discharge  -JJ     Progress/Outcome (Transfer Goal 1, OT) new goal  -JJ       Row Name 05/18/24 0844          Bathing Goal 1 (OT)    Activity/Device (Bathing Goal 1, OT) bathing skills, all  -JJ     [place] Level/Cues Needed (Bathing Goal 1, OT) modified independence  -JJ     Time Frame (Bathing Goal 1, OT) long term goal (LTG);by discharge  -JJ     Progress/Outcomes (Bathing Goal 1, OT) new goal  -JJ       Row Name 05/18/24 0844          Dressing Goal 1 (OT)    Activity/Device (Dressing Goal 1, OT) dressing skills, all  -JJ     [place]/Cues Needed (Dressing Goal 1, OT) modified independence  -JJ     Time Frame (Dressing Goal 1, OT) long term goal (LTG);by discharge  -JJ     Progress/Outcome (Dressing Goal 1, OT) new goal  -JJ       Row Name 05/18/24 0844          Toileting Goal 1 (OT)    Activity/Device (Toileting Goal 1, OT) toileting skills, all  -JJ     [place] Level/Cues Needed (Toileting Goal 1, OT) modified independence  -JJ     Time Frame (Toileting Goal 1, OT) long term goal (LTG);by discharge  -JJ     Progress/Outcome  (Toileting Goal 1, OT) new goal  -       Row Name 05/18/24 0844          Therapy Assessment/Plan (OT)    Planned Therapy Interventions (OT) BADL retraining;functional balance retraining;occupation/activity based interventions;transfer/mobility retraining;patient/caregiver education/training  -               User Key  (r) = Recorded By, (t) = Taken By, (c) = Cosigned By      Initials Name Provider Type    Dorota Cortez, OTR/L, CSRS Occupational Therapist                   Clinical Impression       Row Name 05/18/24 0844          Pain Assessment    Pretreatment Pain Rating 0/10 - no pain  -J     Posttreatment Pain Rating 0/10 - no pain  -     Pain Intervention(s) Medication (See MAR);Repositioned;Ambulation/increased activity  -[facility] Name 05/18/24 0844          Plan of Care Review    Plan of Care Reviewed With patient;family  -     Outcome Evaluation OT eval completed. Pt presents alert and oriented x4, c/o 8/10 neck pain. Pt premitted family member in room to interpret. She was bale to complete all bed mobility, sit <> stand t/fs and functional mobility with CGA. She did required Mod A to don socks due pain and presence of multiple lines/tubes/drains. Pt would benefit from skilled OT services to improve safety and return to PLOF. Anticipate d/c home with family when medically able.  -       Row Name 05/18/24 0844          Therapy Assessment/Plan (OT)    Rehab Potential (OT) good, to achieve stated therapy goals  -     Criteria for Skilled Therapeutic Interventions Met (OT) yes;skilled treatment is necessary  -     Therapy Frequency (OT) 3 times/wk  -     Predicted Duration of Therapy Intervention (OT) 10 days  -       Row Name 05/18/24 0844          Therapy Plan Review/Discharge Plan (OT)    Anticipated Discharge Disposition (OT) home with assist  -[facility] Name 05/18/24 0844          Positioning and Restraints    Pre-Treatment Position in bed  -     Post Treatment Position  wheelchair  -JJ     In Wheelchair notified nsg;sitting;with family/caregiver;with other staff  -               User Key  (r) = Recorded By, (t) = Taken By, (c) = Cosigned By      Initials Name Provider Type    Dorota Cortez, OTR/L, COMFORTS Occupational Therapist                   Outcome Measures       Row Name 05/18/24 0844          How much help from another is currently needed...    Putting on and taking off regular lower body clothing? 3  -JJ     Bathing (including washing, rinsing, and drying) 3  -JJ     Toileting (which includes using toilet bed pan or urinal) 3  -JJ     Putting on and taking off regular upper body clothing 4  -JJ     Taking care of personal grooming (such as brushing teeth) 4  -JJ     Eating meals 4  -     AM-PAC 6 Clicks Score (OT) 21  -       Row Name 05/18/24 0855          How much help from another person do you currently need...    Turning from your back to your side while in flat bed without using bedrails? 3  -LH     Moving from lying on back to sitting on the side of a flat bed without bedrails? 3  -LH     Moving to and from a bed to a chair (including a wheelchair)? 3  -LH     Standing up from a chair using your arms (e.g., wheelchair, bedside chair)? 3  -LH     Climbing 3-5 steps with a railing? 3  -LH     To walk in hospital room? 3  -     AM-PAC 6 Clicks Score (PT) 18  -     Highest Level of Mobility Goal 6 --> Walk 10 steps or more  -       Row Name 05/18/24 0855 05/18/24 0844       Functional Assessment    Outcome Measure Options AM-PAC 6 Clicks Basic Mobility (PT)  - AM-PAC 6 Clicks Daily Activity (OT)  -              User Key  (r) = Recorded By, (t) = Taken By, (c) = Cosigned By      Initials Name Provider Type     Channing Reynolds, PT Physical Therapist    Dorota Cortez, OTR/L, COMFORTS Occupational Therapist                    Occupational Therapy Education       Title: PT OT SLP Therapies (In Progress)       Topic: Occupational Therapy (In Progress)        Point: ADL training (Done)       Description:   Instruct learner(s) on proper safety adaptation and remediation techniques during self care or transfers.   Instruct in proper use of assistive devices.                  Learning Progress Summary             Patient Acceptance, E, VU by  at 5/18/2024 1153                         Point: Home exercise program (Not Started)       Description:   Instruct learner(s) on appropriate technique for monitoring, assisting and/or progressing therapeutic exercises/activities.                  Learner Progress:  Not documented in this visit.              Point: Precautions (Done)       Description:   Instruct learner(s) on prescribed precautions during self-care and functional transfers.                  Learning Progress Summary             Patient Acceptance, E, VU by MAURO at 5/18/2024 1153                         Point: Body mechanics (Not Started)       Description:   Instruct learner(s) on proper positioning and spine alignment during self-care, functional mobility activities and/or exercises.                  Learner Progress:  Not documented in this visit.                              User Key       Initials Effective Dates Name Provider Type Discipline     07/11/23 -  Dorota Villagran, ANITHA/L, CSRS Occupational Therapist OT                  OT Recommendation and Plan  Planned Therapy Interventions (OT): BADL retraining, functional balance retraining, occupation/activity based interventions, transfer/mobility retraining, patient/caregiver education/training  Therapy Frequency (OT): 3 times/wk  Plan of Care Review  Plan of Care Reviewed With: patient, family  Outcome Evaluation: OT eval completed. Pt presents alert and oriented x4, c/o 8/10 neck pain. Pt premitted family member in room to interpret. She was bale to complete all bed mobility, sit <> stand t/fs and functional mobility with CGA. She did required Mod A to don socks due pain and presence of multiple  lines/tubes/drains. Pt would benefit from skilled OT services to improve safety and return to PLOF. Anticipate d/c home with family when medically able.     Time Calculation:         Time Calculation- OT       Row Name 05/18/24 0844             Time Calculation- OT    OT Start Time 0844  -      OT Stop Time 0925  -      OT Time Calculation (min) 41 min  -      OT Received On 05/18/24  -      OT Goal Re-Cert Due Date 05/28/24  -                User Key  (r) = Recorded By, (t) = Taken By, (c) = Cosigned By      Initials Name Provider Type    Dorota Cortez OTR/L, CSRS Occupational Therapist                  Therapy Charges for Today       Code Description Service Date Service Provider Modifiers Qty    74207995105 HC OT EVAL LOW COMPLEXITY 3 5/18/2024 Dorota Villagran OTR/L, CSRS GO 1                 ANITHA Hughes/L, CSRS  5/18/2024

## 2024-05-18 NOTE — PLAN OF CARE
Problem: Adult Inpatient Plan of Care  Goal: Plan of Care Review  Recent Flowsheet Documentation  Taken 5/18/2024 0855 by Channing Reynolds, PT  Plan of Care Reviewed With:   patient   family  Outcome Evaluation: PT IE complete.  Pt permitted family member in room to interpret.  A&Ox4.  No c/o pain.  Pt indpendent PLOF.  Today she is CGA bed mobility and sit<>stand.  Ambulated 150ft intermittent min A x1.  Coodination intact.  PT to see for balance activities and progressive ambulation.  Recommend home with A with possible OP PT at [facility].  Thank you for referral.   Goal Outcome Evaluation:  Plan of Care Reviewed With: patient, family           Outcome Evaluation: PT IE complete.  Pt permitted family member in room to interpret.  A&Ox4.  No c/o pain.  Pt indpendent PLOF.  Today she is CGA bed mobility and sit<>stand.  Ambulated 150ft intermittent min A x1.  Coodination intact.  PT to see for balance activities and progressive ambulation.  Recommend home with A with possible OP PT at [facility].  Thank you for referral.      Anticipated Discharge Disposition (PT): home with assist, home with outpatient therapy services

## 2024-05-19 LAB
ANION GAP SERPL CALCULATED.3IONS-SCNC: 10 MMOL/L (ref 5–15)
BASOPHILS # BLD AUTO: 0.04 10*3/MM3 (ref 0–0.2)
BASOPHILS NFR BLD AUTO: 0.2 % (ref 0–1.5)
BUN SERPL-MCNC: 14 MG/DL (ref 6–20)
BUN/CREAT SERPL: 35.9 (ref 7–25)
CALCIUM SPEC-SCNC: 8.1 MG/DL (ref 8.6–10.5)
CHLORIDE SERPL-SCNC: 105 MMOL/L (ref 98–107)
CO2 SERPL-SCNC: 21 MMOL/L (ref 22–29)
CREAT SERPL-MCNC: 0.39 MG/DL (ref 0.57–1)
DEPRECATED RDW RBC AUTO: 44.5 FL (ref 37–54)
EGFRCR SERPLBLD CKD-EPI 2021: 135 ML/MIN/1.73
EOSINOPHIL # BLD AUTO: 0 10*3/MM3 (ref 0–0.4)
EOSINOPHIL NFR BLD AUTO: 0 % (ref 0.3–6.2)
ERYTHROCYTE [DISTWIDTH] IN BLOOD BY AUTOMATED COUNT: 14.5 % (ref 12.3–15.4)
GLUCOSE BLDC GLUCOMTR-MCNC: 83 MG/DL (ref 70–130)
GLUCOSE BLDC GLUCOMTR-MCNC: 89 MG/DL (ref 70–130)
GLUCOSE SERPL-MCNC: 83 MG/DL (ref 65–99)
HCT VFR BLD AUTO: 39.8 % (ref 34–46.6)
HGB BLD-MCNC: 12.7 G/DL (ref 12–15.9)
IMM GRANULOCYTES # BLD AUTO: 0.28 10*3/MM3 (ref 0–0.05)
IMM GRANULOCYTES NFR BLD AUTO: 1.1 % (ref 0–0.5)
LYMPHOCYTES # BLD AUTO: 1.6 10*3/MM3 (ref 0.7–3.1)
LYMPHOCYTES NFR BLD AUTO: 6.4 % (ref 19.6–45.3)
MCH RBC QN AUTO: 27.4 PG (ref 26.6–33)
MCHC RBC AUTO-ENTMCNC: 31.9 G/DL (ref 31.5–35.7)
MCV RBC AUTO: 85.8 FL (ref 79–97)
MONOCYTES # BLD AUTO: 1.65 10*3/MM3 (ref 0.1–0.9)
MONOCYTES NFR BLD AUTO: 6.6 % (ref 5–12)
NEUTROPHILS NFR BLD AUTO: 21.45 10*3/MM3 (ref 1.7–7)
NEUTROPHILS NFR BLD AUTO: 85.7 % (ref 42.7–76)
NRBC BLD AUTO-RTO: 0 /100 WBC (ref 0–0.2)
PLATELET # BLD AUTO: 224 10*3/MM3 (ref 140–450)
PMV BLD AUTO: 9.9 FL (ref 6–12)
POTASSIUM SERPL-SCNC: 4.1 MMOL/L (ref 3.5–5.2)
RBC # BLD AUTO: 4.64 10*6/MM3 (ref 3.77–5.28)
SODIUM SERPL-SCNC: 136 MMOL/L (ref 136–145)
WBC NRBC COR # BLD AUTO: 25.02 10*3/MM3 (ref 3.4–10.8)

## 2024-05-19 PROCEDURE — 25010000002 CEFAZOLIN PER 500 MG: Performed by: NURSE PRACTITIONER

## 2024-05-19 PROCEDURE — 63710000001 DEXAMETHASONE PER 0.25 MG: Performed by: NEUROLOGICAL SURGERY

## 2024-05-19 PROCEDURE — 25010000002 HEPARIN (PORCINE) PER 1000 UNITS: Performed by: NURSE PRACTITIONER

## 2024-05-19 PROCEDURE — 85025 COMPLETE CBC W/AUTO DIFF WBC: CPT | Performed by: NURSE PRACTITIONER

## 2024-05-19 PROCEDURE — 99024 POSTOP FOLLOW-UP VISIT: CPT | Performed by: NEUROLOGICAL SURGERY

## 2024-05-19 PROCEDURE — 97116 GAIT TRAINING THERAPY: CPT

## 2024-05-19 PROCEDURE — 25010000002 SODIUM CHLORIDE 0.9 % WITH KCL 20 MEQ 20-0.9 MEQ/L-% SOLUTION: Performed by: NURSE PRACTITIONER

## 2024-05-19 PROCEDURE — 80048 BASIC METABOLIC PNL TOTAL CA: CPT | Performed by: NURSE PRACTITIONER

## 2024-05-19 PROCEDURE — 82948 REAGENT STRIP/BLOOD GLUCOSE: CPT

## 2024-05-19 RX ADMIN — HYDROCODONE BITARTRATE AND ACETAMINOPHEN 1 TABLET: 5; 325 TABLET ORAL at 16:02

## 2024-05-19 RX ADMIN — PANTOPRAZOLE SODIUM 40 MG: 40 TABLET, DELAYED RELEASE ORAL at 08:30

## 2024-05-19 RX ADMIN — CHLORHEXIDINE GLUCONATE 1 APPLICATION: 500 CLOTH TOPICAL at 04:23

## 2024-05-19 RX ADMIN — SENNOSIDES AND DOCUSATE SODIUM 2 TABLET: 50; 8.6 TABLET ORAL at 20:26

## 2024-05-19 RX ADMIN — DEXAMETHASONE 4 MG: 4 TABLET ORAL at 20:25

## 2024-05-19 RX ADMIN — POTASSIUM CHLORIDE AND SODIUM CHLORIDE 75 ML/HR: 900; 150 INJECTION, SOLUTION INTRAVENOUS at 14:43

## 2024-05-19 RX ADMIN — HEPARIN SODIUM 5000 UNITS: 5000 INJECTION INTRAVENOUS; SUBCUTANEOUS at 08:30

## 2024-05-19 RX ADMIN — HYDROCODONE BITARTRATE AND ACETAMINOPHEN 1 TABLET: 7.5; 325 TABLET ORAL at 23:34

## 2024-05-19 RX ADMIN — GABAPENTIN 300 MG: 300 CAPSULE ORAL at 08:30

## 2024-05-19 RX ADMIN — CEFAZOLIN 2000 MG: 2 INJECTION, POWDER, FOR SOLUTION INTRAMUSCULAR; INTRAVENOUS at 23:06

## 2024-05-19 RX ADMIN — LEVETIRACETAM 500 MG: 500 TABLET, FILM COATED ORAL at 20:25

## 2024-05-19 RX ADMIN — ACETAMINOPHEN 650 MG: 325 TABLET, FILM COATED ORAL at 16:02

## 2024-05-19 RX ADMIN — CEFAZOLIN 2000 MG: 2 INJECTION, POWDER, FOR SOLUTION INTRAMUSCULAR; INTRAVENOUS at 16:02

## 2024-05-19 RX ADMIN — LEVETIRACETAM 500 MG: 500 TABLET, FILM COATED ORAL at 08:30

## 2024-05-19 RX ADMIN — HEPARIN SODIUM 5000 UNITS: 5000 INJECTION INTRAVENOUS; SUBCUTANEOUS at 20:26

## 2024-05-19 RX ADMIN — GABAPENTIN 300 MG: 300 CAPSULE ORAL at 20:26

## 2024-05-19 RX ADMIN — ACETAMINOPHEN 650 MG: 325 TABLET, FILM COATED ORAL at 06:02

## 2024-05-19 RX ADMIN — POLYETHYLENE GLYCOL 3350 17 G: 17 POWDER, FOR SOLUTION ORAL at 09:00

## 2024-05-19 RX ADMIN — GABAPENTIN 300 MG: 300 CAPSULE ORAL at 16:03

## 2024-05-19 RX ADMIN — Medication 1 APPLICATION: at 08:30

## 2024-05-19 RX ADMIN — SENNOSIDES AND DOCUSATE SODIUM 2 TABLET: 50; 8.6 TABLET ORAL at 08:30

## 2024-05-19 RX ADMIN — HYDROCODONE BITARTRATE AND ACETAMINOPHEN 1 TABLET: 7.5; 325 TABLET ORAL at 02:05

## 2024-05-19 RX ADMIN — HYDROCODONE BITARTRATE AND ACETAMINOPHEN 1 TABLET: 5; 325 TABLET ORAL at 10:50

## 2024-05-19 RX ADMIN — Medication 1 APPLICATION: at 20:26

## 2024-05-19 RX ADMIN — CYCLOBENZAPRINE HYDROCHLORIDE 5 MG: 5 TABLET, FILM COATED ORAL at 12:20

## 2024-05-19 RX ADMIN — CEFAZOLIN 2000 MG: 2 INJECTION, POWDER, FOR SOLUTION INTRAMUSCULAR; INTRAVENOUS at 06:02

## 2024-05-19 RX ADMIN — DEXAMETHASONE 4 MG: 4 TABLET ORAL at 08:30

## 2024-05-19 NOTE — PLAN OF CARE
Goal Outcome Evaluation:  Plan of Care Reviewed With: patient        Progress: improving         Problem: Adult Inpatient Plan of Care  Goal: Plan of Care Review  Outcome: Ongoing, Progressing  Flowsheets  Taken 5/19/2024 1652 by Olya Vickers RN  Plan of Care Reviewed With: patient  Taken 5/19/2024 1020 by Juma Nation PTA  Progress: improving  Goal: Patient-Specific Goal (Individualized)  Outcome: Ongoing, Progressing  Goal: Absence of Hospital-Acquired Illness or Injury  Outcome: Ongoing, Progressing  Intervention: Identify and Manage Fall Risk  Recent Flowsheet Documentation  Taken 5/19/2024 0900 by Olya Vickers, RN  Safety Promotion/Fall Prevention: safety round/check completed  Taken 5/19/2024 0700 by Olya Vickers RN  Safety Promotion/Fall Prevention: safety round/check completed  Goal: Optimal Comfort and Wellbeing  Outcome: Ongoing, Progressing  Goal: Readiness for Transition of Care  Outcome: Ongoing, Progressing     Problem: Skin Injury Risk Increased  Goal: Skin Health and Integrity  Outcome: Ongoing, Progressing                               Performing Laboratory: [id number] Performing Laboratory: [id number]

## 2024-05-19 NOTE — THERAPY TREATMENT NOTE
Acute Care - Physical Therapy Treatment Note  [facility]     Patient Name: Zina Armando  : 1990  MRN: [id number]  Today's Date: 2024      Visit Dx:     ICD-10-CM ICD-9-CM   1. Impaired mobility [Z74.09]  Z74.09 799.89   2. Cerebellar mass  G93.89 348.89     Patient Active Problem List   Diagnosis    Cerebellar mass     Past Medical History:   Diagnosis Date    Cerebellar mass 2024    Miscarriage 2024    Miscarriage 2023    Personal history of COVID-2021     Past Surgical History:   Procedure Laterality Date    NO PAST SURGERIES       PT Assessment (Last 12 Hours)       PT Evaluation and Treatment       Row Name 24 1020          Physical Therapy Time and Intention    Document Type therapy note (daily note)  -TB     Mode of Treatment physical therapy  -TB       Row Name 24 1020          General Information    Existing Precautions/Restrictions fall  Lumbar Drain  -TB       Row Name 24 1020          Bed Mobility    Bed Mobility scooting/bridging;sidelying-sit;sit-sidelying  -TB     Scooting/Bridging [place] (Bed Mobility) minimum assist (75% patient effort);verbal cues  -TB     Supine-Sit [place] (Bed Mobility) moderate assist (50% patient effort);verbal cues  -TB     Sit-Supine [place] (Bed Mobility) minimum assist (75% patient effort);verbal cues  -TB     Assistive Device (Bed Mobility) draw sheet  -TB       Row Name 24 1020          Transfers    Transfers sit-stand transfer;stand-sit transfer;toilet transfer  -TB       Row Name 24 1020          Sit-Stand Transfer    Sit-Stand [place] (Transfers) contact guard;verbal cues  -TB       Row Name 24 1020          Stand-Sit Transfer    Stand-Sit [place] (Transfers) contact guard;verbal cues  -TB       Row Name 24 1020          Toilet Transfer    Type (Toilet Transfer) sit-stand;stand-sit  -TB     [place] Level (Toilet Transfer) contact guard;verbal cues  -TB     Assistive  Device (Toilet Transfer) commode, bedside without drop arms  -TB       Row Name 05/19/24 1020          Gait/Stairs (Locomotion)    [place] Level (Gait) contact guard;2 person assist;verbal cues  -TB     Distance in Feet (Gait) 200  -TB       Row Name 05/19/24 1020          Balance    Balance Assessment sitting static balance;sitting dynamic balance  -TB     Static Sitting Balance supervision  -TB     Dynamic Sitting Balance supervision  -TB     Position, Sitting Balance sitting edge of bed  -TB       Row Name             Wound 05/17/24 0932 occipital region Incision    Wound - Properties Group Placement Date: 05/17/24  -VALARIE Placement Time: 0932 -VALARIE Location: occipital region  -VALARIE Primary Wound Type: Incision  -VALARIE    Retired Wound - Properties Group Placement Date: 05/17/24  -VALARIE Placement Time: 0932 -VALARIE Location: occipital region  -VALARIE Primary Wound Type: Incision  -VALARIE    Retired Wound - Properties Group Date first assessed: 05/17/24  -VALARIE Time first assessed: 0932 -VALARIE Location: occipital region  -VALARIE Primary Wound Type: Incision  -VALARIE      Row Name 05/19/24 1020          Plan of Care Review    Plan of Care Reviewed With patient;family  -TB     Progress improving  -TB     Outcome Evaluation PT tx complete. Bed mob Min-Mod to EOB. Sitting balance Supervision. Stood CGA. Amb 200' w/ CGA 1-2 (nursing w/ lumbar drain). Pt amb w/ good even gait speed. CGA for tf to BSC. Encouraged pt to sit in chair but she requested the bed. Pt needs Min A w/ LEs getting back into bed and Min A to assist w/ positioning. Will cont POC.  -TB       Row Name 05/19/24 1020          Positioning and Restraints    Pre-Treatment Position in bed  -TB     Post Treatment Position bed  -TB     In Bed side lying right;call light within reach;encouraged to call for assist;patient within staff view;with family/caregiver;side rails up x3  -TB               User Key  (r) = Recorded By, (t) = Taken By, (c) = Cosigned By      Initials Name Provider Type     Luis Gallardo, RN Registered Nurse    TB Juma Nation PTA Physical Therapist Assistant                    Physical Therapy Education       Title: PT OT SLP Therapies (In Progress)       Topic: Physical Therapy (Done)       Point: Mobility training (Done)       Learning Progress Summary             Patient Acceptance, E,D, DU,VU by  at 5/18/2024 1120    Comment: benefits of PT and POC, amanda for A OOB                         Point: Precautions (Done)       Learning Progress Summary             Patient Acceptance, E,D, DU,VU by  at 5/18/2024 1120    Comment: benefits of PT and POC, amanda for A OOB                                         User Key       Initials Effective Dates Name Provider Type Discipline     02/03/23 -  Channing Reynolds, PT Physical Therapist PT                  PT Recommendation and Plan     Plan of Care Reviewed With: patient, family  Progress: improving  Outcome Evaluation: PT tx complete. Bed mob Min-Mod to EOB. Sitting balance Supervision. Stood CGA. Amb 200' w/ CGA 1-2 (nursing w/ lumbar drain). Pt amb w/ good even gait speed. CGA for tf to BSC. Encouraged pt to sit in chair but she requested the bed. Pt needs Min A w/ LEs getting back into bed and Min A to assist w/ positioning. Will cont POC.   Outcome Measures       Row Name 05/19/24 1020             How much help from another person do you currently need...    Turning from your back to your side while in flat bed without using bedrails? 3  -TB      Moving from lying on back to sitting on the side of a flat bed without bedrails? 3  -TB      Moving to and from a bed to a chair (including a wheelchair)? 3  -TB      Standing up from a chair using your arms (e.g., wheelchair, bedside chair)? 3  -TB      Climbing 3-5 steps with a railing? 3  -TB      To walk in hospital room? 3  -TB      AM-PAC 6 Clicks Score (PT) 18  -TB      Highest Level of Mobility Goal 6 --> Walk 10 steps or more  -TB         Functional Assessment    Outcome  Measure Options AM-PAC 6 Clicks Basic Mobility (PT)  -TB                User Key  (r) = Recorded By, (t) = Taken By, (c) = Cosigned By      Initials Name Provider Type    Juma Sims PTA Physical Therapist Assistant                     Time Calculation:    PT Charges       Row Name 05/19/24 1148             Time Calculation    Start Time 1020  -TB      Stop Time 1043  -TB      Time Calculation (min) 23 min  -TB      PT Received On 05/19/24  -TB         Time Calculation- PT    Total Timed Code Minutes- PT 23 minute(s)  -TB                User Key  (r) = Recorded By, (t) = Taken By, (c) = Cosigned By      Initials Name Provider Type    Juma Sims PTA Physical Therapist Assistant                  Therapy Charges for Today       Code Description Service Date Service Provider Modifiers Qty    84762139909 HC GAIT TRAINING EA 15 MIN 5/18/2024 Juma Nation, JUAN GP 2    75625864510 HC GAIT TRAINING EA 15 MIN 5/19/2024 Juma Nation, JUAN GP 2            PT G-Codes  Outcome Measure Options: AM-PAC 6 Clicks Basic Mobility (PT)  AM-PAC 6 Clicks Score (PT): 18  AM-PAC 6 Clicks Score (OT): 21    Juma Nation PTA  5/19/2024

## 2024-05-19 NOTE — PLAN OF CARE
Goal Outcome Evaluation:  Plan of Care Reviewed With: patient, spouse        Progress: improving  Outcome Evaluation: Intermittent pain. Norco x2. Flexeril x1. Scheduled tylenol and gabapentin. Lumbar drain open, positional but draining. VSS. Frequent turns in bed for comfort.            Problem: Adult Inpatient Plan of Care  Goal: Plan of Care Review  Outcome: Ongoing, Progressing  Flowsheets (Taken 5/19/2024 0539)  Progress: improving  Plan of Care Reviewed With:   patient   spouse  Outcome Evaluation: Intermittent pain. Norco x2. Flexeril x1. Scheduled tylenol and gabapentin. Lumbar drain open, positional but draining. VSS.  Goal: Patient-Specific Goal (Individualized)  Outcome: Ongoing, Progressing  Goal: Absence of Hospital-Acquired Illness or Injury  Outcome: Ongoing, Progressing  Intervention: Identify and Manage Fall Risk  Recent Flowsheet Documentation  Taken 5/19/2024 0500 by Edna Welsh RN  Safety Promotion/Fall Prevention: safety round/check completed  Taken 5/19/2024 0400 by Edna Welsh RN  Safety Promotion/Fall Prevention: safety round/check completed  Taken 5/19/2024 0300 by Edna Welsh RN  Safety Promotion/Fall Prevention: safety round/check completed  Taken 5/19/2024 0205 by Edna Welsh RN  Safety Promotion/Fall Prevention: safety round/check completed  Taken 5/19/2024 0100 by Edna Welsh RN  Safety Promotion/Fall Prevention: safety round/check completed  Taken 5/19/2024 0000 by Edna Welsh RN  Safety Promotion/Fall Prevention: safety round/check completed  Taken 5/18/2024 2300 by Edna Welsh RN  Safety Promotion/Fall Prevention: safety round/check completed  Taken 5/18/2024 2200 by Edna Welsh RN  Safety Promotion/Fall Prevention: safety round/check completed  Taken 5/18/2024 2100 by Edna Welsh RN  Safety Promotion/Fall Prevention: safety round/check completed  Taken 5/18/2024 2000 by Edna Welsh RN  Safety Promotion/Fall Prevention: safety  round/check completed  Taken 5/18/2024 1900 by Edna Welsh RN  Safety Promotion/Fall Prevention: safety round/check completed  Intervention: Prevent Skin Injury  Recent Flowsheet Documentation  Taken 5/19/2024 0400 by Edna Welsh RN  Body Position:   turned   left  Taken 5/19/2024 0205 by Edna Welsh RN  Body Position:   turned   right  Taken 5/19/2024 0000 by Edna Welsh RN  Body Position:   turned   left  Taken 5/18/2024 2200 by Edna Welsh RN  Body Position:   turned   right  Taken 5/18/2024 2100 by Edna Welsh RN  Body Position:   turned   left  Taken 5/18/2024 2000 by Edna Welsh RN  Body Position:   turned   right  Skin Protection:   adhesive use limited   incontinence pads utilized   skin-to-skin areas padded   skin-to-device areas padded   tubing/devices free from skin contact  Taken 5/18/2024 1900 by Edna Welsh RN  Body Position:   turned   left  Intervention: Prevent and Manage VTE (Venous Thromboembolism) Risk  Recent Flowsheet Documentation  Taken 5/19/2024 0400 by Edna Welsh RN  Activity Management: bedrest  Taken 5/19/2024 0205 by Edna Welsh RN  Activity Management: bedrest  Taken 5/19/2024 0000 by Edna Welsh RN  Activity Management: bedrest  Taken 5/18/2024 2200 by Edna Welsh RN  Activity Management: bedrest  Taken 5/18/2024 2000 by Edna Welsh RN  Activity Management: bedrest  VTE Prevention/Management: (see mar)   bilateral   sequential compression devices off   other (see comments)  Goal: Optimal Comfort and Wellbeing  Outcome: Ongoing, Progressing  Intervention: Monitor Pain and Promote Comfort  Recent Flowsheet Documentation  Taken 5/19/2024 0205 by Edna Welsh RN  Pain Management Interventions:   position adjusted   see MAR  Taken 5/18/2024 2012 by Edna Welsh RN  Pain Management Interventions: see MAR  Intervention: Provide Person-Centered Care  Recent Flowsheet Documentation  Taken 5/19/2024 0400 by Blaise  Edna, RN  Trust Relationship/Rapport:   care explained   questions answered  Taken 5/19/2024 0000 by Edna Welsh RN  Trust Relationship/Rapport:   care explained   choices provided   questions answered  Taken 5/18/2024 2000 by Edna Welsh, RN  Trust Relationship/Rapport:   care explained   questions answered  Goal: Readiness for Transition of Care  Outcome: Ongoing, Progressing

## 2024-05-19 NOTE — PROGRESS NOTES
"Neurosurgery Daily Progress Note    HPI:  Zina Armando is a 33 y.o. non-English-speaking  female with a significant comorbidity miscarriages x 2. She presents today for continued evaluation with complaints of intermittent dizziness, nausea, and gait/balance dysfunction. Physical exam findings of neurologically intact. 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  146cc.  Keep.  Max PT/OT  Rapid wean of decadron  Postop MRI reviewed a gross total resection  DC azetazolamide    CV: BEN.  No bradycardia  Pulm: BEN on room air  :  DC burgos.   FEN: Erica PO  Endocrine: Strict Blood glucose control  GI: BEN  ID: Leukocytosis 2/2 [place].    Heme:  DVT prophylaxis  Pain: Well controlled  Dispo: Pending removal of LD      Chief complaint:   Headache for 2 months    HPI  Subjective  Doing well    Temp:  [98.6 °F (37 °C)-99.5 °F (37.5 °C)] 99.5 °F (37.5 °C)  Heart Rate:  [70-84] 84  Resp:  [18-22] 22  BP: (111-145)/(66-99) 111/70    Output by Drain (mL) 05/18/24 0701 - 05/18/24 1900 05/18/24 1901 - 05/19/24 0700 05/19/24 0701 - 05/19/24 1025 Range Total   Requested LDAs do not have output data documented.       Objective:  Vital signs: (most recent): Blood pressure 120/85, pulse 75, temperature 99.5 °F (37.5 °C), temperature source Oral, resp. rate 19, height 151 cm (59.45\"), weight 97.7 kg (215 lb 6.2 oz), last menstrual period 04/13/2024, SpO2 96%, not currently breastfeeding.        Neurologic Exam     Mental Status   Oriented to person, place, and time.   Speech: speech is normal "   Level of consciousness: alert    Cranial Nerves     CN III, IV, VI   Pupils are equal, round, and reactive to light.  Extraocular motions are normal.     CN V   Facial sensation intact.     CN VII   Facial expression full, symmetric.     Motor Exam   Right arm pronator drift: absent  Left arm pronator drift: absent    Strength   Right deltoid: 5/5  Left deltoid: 5/5  Right biceps: 5/5  Left biceps: 5/5  Right triceps: 5/5  Left triceps: 5/5  Right iliopsoas: 5/5  Left iliopsoas: 5/5  Right quadriceps: 5/5  Left quadriceps: 5/5  Right gastroc: 5/5  Left gastroc: 5/5    Sensory Exam   Light touch normal.       Drains:   Urethral Catheter Non-latex;Silicone 16 Fr. (Active)  Daily Indications  Required activity restriction from trauma, surgery, (e.g. unstable spine, fracture, hemodynamics)  05/18/24 0400  Site Assessment  Clean;Skin intact  05/18/24 0400  Collection Container  Standard drainage bag  05/18/24 0400  Securement Method  Securing device  05/18/24 0400  Catheter care complete  Yes  05/18/24 0400  Output (mL)  300 mL  05/18/24 0400     Lumbar Drain (Active)  Drain Status  Clamped  05/18/24 0400  Site Description  Unable to view  05/18/24 0400  Dressing Status  Clean;Dry;Intact  05/18/24 0400  CSF Output (mL)  9 mL  05/18/24 0617      Imaging Results (Last 24 Hours)       Procedure Component Value Units Date/Time    MRI Brain With & Without Contrast [[id number]] Collected: 05/18/24 1102     Updated: 05/18/24 1120    Narrative:      MRI BRAIN W WO CONTRAST- 5/18/2024 8:50 AM     HISTORY: Evaluation status post craniotomy for tumor; G93.89-Other  specified disorders of brain     COMPARISON: None.       TECHNIQUE: Multiplanar imaging of the brain was performed in a routine  fashion before and after the intravenous injection of gadolinium  contrast.     FINDINGS:  Increased signal on diffusion weighted imaging within the surgical  cavity within the posterior midline of the posterior cranial fossa and  adjacent to  the fourth ventricle as well as adjacent to the frontal  horns of both lateral ventricles is related to postoperative  pneumocephalus-likely chemical shift artifact. No evidence of diffusion  restriction to suggest acute ischemia or infarct.     The previously noted heterogeneous mass within the midline of the  posterior cranial fossa with associated hemorrhage and mass effect on  the fourth ventricle involving the vermis has been resected status post  suboccipital craniotomy. There is a small amount of edema within the  medial aspect of both cerebellar hemispheres postoperatively. Minimal  residual mass effect on the fourth ventricle. The ventricles remain  stable in size and configuration from the previous exam. No evidence of  transependymal edema or progressive distention.     No evidence of postoperative hemorrhage. The basilar cisterns are patent  with no evidence of impending uncal herniation.     Questionable focus of vertically oriented enhancement within the left  cerebellar hemisphere cannot be confirmed on coronal reconstructions and  may be artifactual in nature given its appearance. No abnormal  enhancement is demonstrated.     The visualized paranasal sinuses are normally aerated. The orbits are  unremarkable.  Normal flow voids in the [place].       Impression:      1. Status post resection of the heterogeneous hemorrhagic mass within  the midline of the posterior cranial fossa involving the vermis. The  patient has undergone suboccipital craniotomy. There is edema within the  medial aspect of both cerebellar hemispheres postoperatively on FLAIR  sequencing. Previously noted mass effect on the fourth ventricle shows  marked interval improvement. The ventricles are stable in size and  configuration. No transependymal edema present. No evidence of  postoperative hemorrhage.  2. Areas of increased signal on diffusion imaging in the posterior  cranial fossa as well as adjacent to the frontal  horn of both lateral  ventricles is related to postoperative pneumocephalus and may represent  chemical shift artifact. No evidence of acute ischemia or infarct.  Normal flow voids are noted in the [place].        This report was signed and finalized on 5/18/2024 11:17 AM by Dr. Sloan Castellon MD.             Lab Results (last 24 hours)       Procedure Component Value Units Date/Time    POC Glucose Once [[id number]]  (Normal) Collected: 05/19/24 0742    Specimen: Blood Updated: 05/19/24 0753     Glucose 83 mg/dL      Comment: : natalia Vickers AlanMeter ID: [id number]       Basic Metabolic Panel [[id number]]  (Abnormal) Collected: 05/19/24 0426    Specimen: Blood Updated: 05/19/24 0547     Glucose 83 mg/dL      BUN 14 mg/dL      Creatinine 0.39 mg/dL      Sodium 136 mmol/L      Potassium 4.1 mmol/L      Chloride 105 mmol/L      CO2 21.0 mmol/L      Calcium 8.1 mg/dL      BUN/Creatinine Ratio 35.9     Anion Gap 10.0 mmol/L      eGFR 135.0 mL/min/1.73     Narrative:      GFR Normal >60  Chronic Kidney Disease <60  Kidney Failure <15      CBC & Differential [[id number]]  (Abnormal) Collected: 05/19/24 0426    Specimen: Blood Updated: 05/19/24 0522    Narrative:      The following orders were created for panel order CBC & Differential.  Procedure                               Abnormality         Status                     ---------                               -----------         ------                     CBC Auto Differential[[id number]]        Abnormal            Final result                 Please view results for these tests on the individual orders.    CBC Auto Differential [[id number]]  (Abnormal) Collected: 05/19/24 0426    Specimen: Blood Updated: 05/19/24 0522     WBC 25.02 10*3/mm3      RBC 4.64 10*6/mm3      Hemoglobin 12.7 g/dL      Hematocrit 39.8 %      MCV 85.8 fL      MCH 27.4 pg      MCHC 31.9 g/dL      RDW 14.5 %      RDW-SD 44.5 fl      MPV 9.9 fL      Platelets 224 10*3/mm3       Neutrophil % 85.7 %      Lymphocyte % 6.4 %      Monocyte % 6.6 %      Eosinophil % 0.0 %      Basophil % 0.2 %      Immature Grans % 1.1 %      Neutrophils, Absolute 21.45 10*3/mm3      Lymphocytes, Absolute 1.60 10*3/mm3      Monocytes, Absolute 1.65 10*3/mm3      Eosinophils, Absolute 0.00 10*3/mm3      Basophils, Absolute 0.04 10*3/mm3      Immature Grans, Absolute 0.28 10*3/mm3      nRBC 0.0 /100 WBC     POC Glucose Once [[id number]]  (Normal) Collected: 05/18/24 2119    Specimen: Blood Updated: 05/18/24 2130     Glucose 98 mg/dL      Comment: : [id number] Blaise ShannonMeter ID: [id number]       POC Glucose Once [[id number]]  (Normal) Collected: 05/18/24 1208    Specimen: Blood Updated: 05/18/24 1220     Glucose 100 mg/dL      Comment: : [id number] Mikel AnnMeter ID: [id number]            at 10:25 CDT by Rohith Alexander MD.  Lab Results (last 24 hours)       Procedure Component Value Units Date/Time    POC Glucose Once [[id number]]  (Normal) Collected: 05/19/24 0742    Specimen: Blood Updated: 05/19/24 0753     Glucose 83 mg/dL      Comment: : [id number] [place] VarnernMeter ID: [id number]       Basic Metabolic Panel [[id number]]  (Abnormal) Collected: 05/19/24 0426    Specimen: Blood Updated: 05/19/24 0547     Glucose 83 mg/dL      BUN 14 mg/dL      Creatinine 0.39 mg/dL      Sodium 136 mmol/L      Potassium 4.1 mmol/L      Chloride 105 mmol/L      CO2 21.0 mmol/L      Calcium 8.1 mg/dL      BUN/Creatinine Ratio 35.9     Anion Gap 10.0 mmol/L      eGFR 135.0 mL/min/1.73     Narrative:      GFR Normal >60  Chronic Kidney Disease <60  Kidney Failure <15      CBC & Differential [[id number]]  (Abnormal) Collected: 05/19/24 0426    Specimen: Blood Updated: 05/19/24 0522    Narrative:      The following orders were created for panel order CBC & Differential.  Procedure                               Abnormality         Status                     ---------                               -----------          ------                     CBC Auto Differential[[id number]]        Abnormal            Final result                 Please view results for these tests on the individual orders.    CBC Auto Differential [[id number]]  (Abnormal) Collected: 05/19/24 0426    Specimen: Blood Updated: 05/19/24 0522     WBC 25.02 10*3/mm3      RBC 4.64 10*6/mm3      Hemoglobin 12.7 g/dL      Hematocrit 39.8 %      MCV 85.8 fL      MCH 27.4 pg      MCHC 31.9 g/dL      RDW 14.5 %      RDW-SD 44.5 fl      MPV 9.9 fL      Platelets 224 10*3/mm3      Neutrophil % 85.7 %      Lymphocyte % 6.4 %      Monocyte % 6.6 %      Eosinophil % 0.0 %      Basophil % 0.2 %      Immature Grans % 1.1 %      Neutrophils, Absolute 21.45 10*3/mm3      Lymphocytes, Absolute 1.60 10*3/mm3      Monocytes, Absolute 1.65 10*3/mm3      Eosinophils, Absolute 0.00 10*3/mm3      Basophils, Absolute 0.04 10*3/mm3      Immature Grans, Absolute 0.28 10*3/mm3      nRBC 0.0 /100 WBC     POC Glucose Once [[id number]]  (Normal) Collected: 05/18/24 2119    Specimen: Blood Updated: 05/18/24 2130     Glucose 98 mg/dL      Comment: : [id number] Blaise ShannonMeter ID: [id number]       POC Glucose Once [[id number]]  (Normal) Collected: 05/18/24 1208    Specimen: Blood Updated: 05/18/24 1220     Glucose 100 mg/dL      Comment: : [id number] Mikel AnnMeter ID: [id number]

## 2024-05-19 NOTE — PLAN OF CARE
Goal Outcome Evaluation:         Pt sitting up chair more, drain closely monitored per orders, pt ambulating with PT well, pt still having pain at incision site. DR frazier aware, and discussed pain with patient while he made rounds, pt voiding without difficulty after catheter removed. Neuro's intact.

## 2024-05-19 NOTE — THERAPY TREATMENT NOTE
Acute Care - Physical Therapy Treatment Note  [facility]     Patient Name: Zina Armando  : 1990  MRN: [id number]  Today's Date: 2024      Visit Dx:     ICD-10-CM ICD-9-CM   1. Impaired mobility [Z74.09]  Z74.09 799.89   2. Cerebellar mass  G93.89 348.89     Patient Active Problem List   Diagnosis    Cerebellar mass     Past Medical History:   Diagnosis Date    Cerebellar mass 2024    Miscarriage 2024    Miscarriage 2023    Personal history of COVID-2021     Past Surgical History:   Procedure Laterality Date    NO PAST SURGERIES       PT Assessment (Last 12 Hours)       PT Evaluation and Treatment       Row Name 24 1310 24 1020       Physical Therapy Time and Intention    Document Type therapy note (daily note)  -TB therapy note (daily note)  -TB    Mode of Treatment physical therapy  -TB physical therapy  -TB      Row Name 24 13124 1020       General Information    Existing Precautions/Restrictions fall  Lumbar Drain  -TB fall  Lumbar Drain  -TB      Row Name 24 13124 1020       Bed Mobility    Bed Mobility scooting/bridging;sidelying-sit;sit-sidelying  -TB scooting/bridging;sidelying-sit;sit-sidelying  -TB    Scooting/Bridging [place] (Bed Mobility) minimum assist (75% patient effort);verbal cues  -TB minimum assist (75% patient effort);verbal cues  -TB    Supine-Sit [place] (Bed Mobility) minimum assist (75% patient effort);verbal cues  -TB moderate assist (50% patient effort);verbal cues  -TB    Sit-Supine [place] (Bed Mobility) minimum assist (75% patient effort);verbal cues  -TB minimum assist (75% patient effort);verbal cues  -TB    Assistive Device (Bed Mobility) draw sheet  -TB draw sheet  -TB      Row Name 24 1310 24 1020       Transfers    Transfers sit-stand transfer;stand-sit transfer;toilet transfer  -TB sit-stand transfer;stand-sit transfer;toilet transfer  -TB      Row Name 24 1310 24 1020        Sit-Stand Transfer    Sit-Stand [place] (Transfers) contact guard;verbal cues  -TB contact guard;verbal cues  -TB      Row Name 05/19/24 1310 05/19/24 1020       Stand-Sit Transfer    Stand-Sit [place] (Transfers) contact guard;verbal cues  -TB contact guard;verbal cues  -TB      Row Name 05/19/24 1310 05/19/24 1020       Toilet Transfer    Type (Toilet Transfer) sit-stand;stand-sit  -TB sit-stand;stand-sit  -TB    [place] Level (Toilet Transfer) contact guard;verbal cues  -TB contact guard;verbal cues  -TB    Assistive Device (Toilet Transfer) commode, bedside without drop arms  -TB commode, bedside without drop arms  -TB      Row Name 05/19/24 1310 05/19/24 1020       Gait/Stairs (Locomotion)    [place] Level (Gait) contact guard;verbal cues  RN w/ lumbar drain  -TB contact guard;2 person assist;verbal cues  -TB    Distance in Feet (Gait) 200  -  -TB      Row Name 05/19/24 1310 05/19/24 1020       Balance    Balance Assessment sitting static balance;sitting dynamic balance  -TB sitting static balance;sitting dynamic balance  -TB    Static Sitting Balance supervision  -TB supervision  -TB    Dynamic Sitting Balance supervision  -TB supervision  -TB    Position, Sitting Balance sitting edge of bed  -TB sitting edge of bed  -TB      Row Name             Wound 05/17/24 0932 occipital region Incision    Wound - Properties Group Placement Date: 05/17/24  -VALARIE Placement Time: 0932 -JB Location: occipital region  -VALARIE Primary Wound Type: Incision  -VALARIE    Retired Wound - Properties Group Placement Date: 05/17/24  -VALARIE Placement Time: 0932 -VALARIE Location: occipital region  -VALARIE Primary Wound Type: Incision  -VALARIE    Retired Wound - Properties Group Date first assessed: 05/17/24  -VALARIE Time first assessed: 0932 -VALARIE Location: occipital region  -VALARIE Primary Wound Type: Incision  -VALARIE      Row Name 05/19/24 1020          Plan of Care Review    Plan of Care Reviewed With patient;family  -TB     Progress  improving  -TB     Outcome Evaluation PT tx complete. Bed mob Min-Mod to EOB. Sitting balance Supervision. Stood CGA. Amb 200' w/ CGA 1-2 (nursing w/ lumbar drain). Pt amb w/ good even gait speed. CGA for tf to BSC. Encouraged pt to sit in chair but she requested the bed. Pt needs Min A w/ LEs getting back into bed and Min A to assist w/ positioning. Will cont POC.  -TB       Row Name 05/19/24 1310 05/19/24 1020       Positioning and Restraints    Pre-Treatment Position in bed  -TB in bed  -TB    Post Treatment Position bed  -TB bed  -TB    In Bed side lying right;call light within reach;encouraged to call for assist;patient within staff view;with family/caregiver;side rails up x3  -TB side lying right;call light within reach;encouraged to call for assist;patient within staff view;with family/caregiver;side rails up x3  -TB              User Key  (r) = Recorded By, (t) = Taken By, (c) = Cosigned By      Initials Name Provider Type    Luis Gallardo, RN Registered Nurse    Juma Sims, JUAN Physical Therapist Assistant                    Physical Therapy Education       Title: PT OT SLP Therapies (In Progress)       Topic: Physical Therapy (Done)       Point: Mobility training (Done)       Learning Progress Summary             Patient Acceptance, E,D, DU,VU by  at 5/18/2024 1120    Comment: benefits of PT and POC, amanda for A OOB                         Point: Precautions (Done)       Learning Progress Summary             Patient Acceptance, E,D, DU,VU by  at 5/18/2024 1120    Comment: benefits of PT and POC, amanda for A OOB                                         User Key       Initials Effective Dates Name Provider Type [facility] 02/03/23 -  Channing Reynolds, PT Physical Therapist PT                  PT Recommendation and Plan     Plan of Care Reviewed With: patient, family  Progress: improving  Outcome Evaluation: PT tx complete. Bed mob Min-Mod to EOB. Sitting balance Supervision. Stood CGA.  Amb 200' w/ CGA 1-2 (nursing w/ lumbar drain). Pt amb w/ good even gait speed. CGA for tf to BSC. Encouraged pt to sit in chair but she requested the bed. Pt needs Min A w/ LEs getting back into bed and Min A to assist w/ positioning. Will cont POC.   Outcome Measures       Row Name 05/19/24 1020             How much help from another person do you currently need...    Turning from your back to your side while in flat bed without using bedrails? 3  -TB      Moving from lying on back to sitting on the side of a flat bed without bedrails? 3  -TB      Moving to and from a bed to a chair (including a wheelchair)? 3  -TB      Standing up from a chair using your arms (e.g., wheelchair, bedside chair)? 3  -TB      Climbing 3-5 steps with a railing? 3  -TB      To walk in hospital room? 3  -TB      AM-PAC 6 Clicks Score (PT) 18  -TB      Highest Level of Mobility Goal 6 --> Walk 10 steps or more  -TB         Functional Assessment    Outcome Measure Options AM-PAC 6 Clicks Basic Mobility (PT)  -TB                User Key  (r) = Recorded By, (t) = Taken By, (c) = Cosigned By      Initials Name Provider Type    Juma Sims PTA Physical Therapist Assistant                     Time Calculation:    PT Charges       Row Name 05/19/24 1608 05/19/24 1148          Time Calculation    Start Time 1310  -TB 1020  -TB     Stop Time 1334  -TB 1043  -TB     Time Calculation (min) 24 min  -TB 23 min  -TB     PT Received On 05/19/24  -TB 05/19/24  -TB        Time Calculation- PT    Total Timed Code Minutes- PT 24 minute(s)  -TB 23 minute(s)  -TB               User Key  (r) = Recorded By, (t) = Taken By, (c) = Cosigned By      Initials Name Provider Type    Juma Sims, PTA Physical Therapist Assistant                  Therapy Charges for Today       Code Description Service Date Service Provider Modifiers Qty    17889422999 HC GAIT TRAINING EA 15 MIN 5/18/2024 Juma Nation PTA GP 2    41773070525 HC GAIT  TRAINING EA 15 MIN 5/19/2024 Juma Nation, PTA GP 2    48358863854 HC GAIT TRAINING EA 15 MIN 5/19/2024 Juma Nation, PTA GP 2            PT G-Codes  Outcome Measure Options: AM-PAC 6 Clicks Basic Mobility (PT)  AM-PAC 6 Clicks Score (PT): 18  AM-PAC 6 Clicks Score (OT): 21    Juma Nation PTA  5/19/2024

## 2024-05-19 NOTE — PLAN OF CARE
Goal Outcome Evaluation:  Plan of Care Reviewed With: patient, family        Progress: improving  Outcome Evaluation: PT tx complete. Bed mob Min-Mod to EOB. Sitting balance Supervision. Stood CGA. Amb 200' w/ CGA 1-2 (nursing w/ lumbar drain). Pt amb w/ good even gait speed. CGA for tf to BSC. Encouraged pt to sit in chair but she requested the bed. Pt needs Min A w/ LEs getting back into bed and Min A to assist w/ positioning. Will cont POC.

## 2024-05-20 LAB
ANION GAP SERPL CALCULATED.3IONS-SCNC: 8 MMOL/L (ref 5–15)
BASOPHILS # BLD AUTO: 0.04 10*3/MM3 (ref 0–0.2)
BASOPHILS NFR BLD AUTO: 0.2 % (ref 0–1.5)
BUN SERPL-MCNC: 14 MG/DL (ref 6–20)
BUN/CREAT SERPL: 38.9 (ref 7–25)
CALCIUM SPEC-SCNC: 8.2 MG/DL (ref 8.6–10.5)
CHLORIDE SERPL-SCNC: 105 MMOL/L (ref 98–107)
CO2 SERPL-SCNC: 26 MMOL/L (ref 22–29)
CREAT SERPL-MCNC: 0.36 MG/DL (ref 0.57–1)
DEPRECATED RDW RBC AUTO: 44.6 FL (ref 37–54)
EGFRCR SERPLBLD CKD-EPI 2021: 137.7 ML/MIN/1.73
EOSINOPHIL # BLD AUTO: 0.04 10*3/MM3 (ref 0–0.4)
EOSINOPHIL NFR BLD AUTO: 0.2 % (ref 0.3–6.2)
ERYTHROCYTE [DISTWIDTH] IN BLOOD BY AUTOMATED COUNT: 14.5 % (ref 12.3–15.4)
GLUCOSE BLDC GLUCOMTR-MCNC: 108 MG/DL (ref 70–130)
GLUCOSE BLDC GLUCOMTR-MCNC: 121 MG/DL (ref 70–130)
GLUCOSE BLDC GLUCOMTR-MCNC: 79 MG/DL (ref 70–130)
GLUCOSE BLDC GLUCOMTR-MCNC: 82 MG/DL (ref 70–130)
GLUCOSE SERPL-MCNC: 103 MG/DL (ref 65–99)
HCT VFR BLD AUTO: 38.6 % (ref 34–46.6)
HGB BLD-MCNC: 12.2 G/DL (ref 12–15.9)
IMM GRANULOCYTES # BLD AUTO: 0.17 10*3/MM3 (ref 0–0.05)
IMM GRANULOCYTES NFR BLD AUTO: 1 % (ref 0–0.5)
LAB AP CASE REPORT: NORMAL
LYMPHOCYTES # BLD AUTO: 1.25 10*3/MM3 (ref 0.7–3.1)
LYMPHOCYTES NFR BLD AUTO: 7.1 % (ref 19.6–45.3)
Lab: NORMAL
Lab: NORMAL
MCH RBC QN AUTO: 27.2 PG (ref 26.6–33)
MCHC RBC AUTO-ENTMCNC: 31.6 G/DL (ref 31.5–35.7)
MCV RBC AUTO: 86 FL (ref 79–97)
MONOCYTES # BLD AUTO: 0.95 10*3/MM3 (ref 0.1–0.9)
MONOCYTES NFR BLD AUTO: 5.4 % (ref 5–12)
NEUTROPHILS NFR BLD AUTO: 15.14 10*3/MM3 (ref 1.7–7)
NEUTROPHILS NFR BLD AUTO: 86.1 % (ref 42.7–76)
NRBC BLD AUTO-RTO: 0 /100 WBC (ref 0–0.2)
PATH REPORT.FINAL DX SPEC: NORMAL
PATH REPORT.GROSS SPEC: NORMAL
PLATELET # BLD AUTO: 199 10*3/MM3 (ref 140–450)
PMV BLD AUTO: 9.6 FL (ref 6–12)
POTASSIUM SERPL-SCNC: 4.2 MMOL/L (ref 3.5–5.2)
RBC # BLD AUTO: 4.49 10*6/MM3 (ref 3.77–5.28)
SODIUM SERPL-SCNC: 139 MMOL/L (ref 136–145)
WBC NRBC COR # BLD AUTO: 17.59 10*3/MM3 (ref 3.4–10.8)

## 2024-05-20 PROCEDURE — 63710000001 DEXAMETHASONE PER 0.25 MG: Performed by: NEUROLOGICAL SURGERY

## 2024-05-20 PROCEDURE — 25010000002 SODIUM CHLORIDE 0.9 % WITH KCL 20 MEQ 20-0.9 MEQ/L-% SOLUTION: Performed by: NURSE PRACTITIONER

## 2024-05-20 PROCEDURE — 80048 BASIC METABOLIC PNL TOTAL CA: CPT | Performed by: NURSE PRACTITIONER

## 2024-05-20 PROCEDURE — 25010000002 CEFAZOLIN PER 500 MG: Performed by: NURSE PRACTITIONER

## 2024-05-20 PROCEDURE — 97116 GAIT TRAINING THERAPY: CPT

## 2024-05-20 PROCEDURE — 25010000002 HEPARIN (PORCINE) PER 1000 UNITS: Performed by: NURSE PRACTITIONER

## 2024-05-20 PROCEDURE — 99024 POSTOP FOLLOW-UP VISIT: CPT | Performed by: NURSE PRACTITIONER

## 2024-05-20 PROCEDURE — 97535 SELF CARE MNGMENT TRAINING: CPT | Performed by: OCCUPATIONAL THERAPIST

## 2024-05-20 PROCEDURE — 85025 COMPLETE CBC W/AUTO DIFF WBC: CPT | Performed by: NURSE PRACTITIONER

## 2024-05-20 PROCEDURE — 82948 REAGENT STRIP/BLOOD GLUCOSE: CPT

## 2024-05-20 RX ADMIN — HEPARIN SODIUM 5000 UNITS: 5000 INJECTION INTRAVENOUS; SUBCUTANEOUS at 08:00

## 2024-05-20 RX ADMIN — HYDROCODONE BITARTRATE AND ACETAMINOPHEN 1 TABLET: 5; 325 TABLET ORAL at 05:11

## 2024-05-20 RX ADMIN — SENNOSIDES AND DOCUSATE SODIUM 2 TABLET: 50; 8.6 TABLET ORAL at 20:12

## 2024-05-20 RX ADMIN — GABAPENTIN 300 MG: 300 CAPSULE ORAL at 08:00

## 2024-05-20 RX ADMIN — POTASSIUM CHLORIDE AND SODIUM CHLORIDE 75 ML/HR: 900; 150 INJECTION, SOLUTION INTRAVENOUS at 04:46

## 2024-05-20 RX ADMIN — CEFAZOLIN 2000 MG: 2 INJECTION, POWDER, FOR SOLUTION INTRAMUSCULAR; INTRAVENOUS at 15:34

## 2024-05-20 RX ADMIN — HEPARIN SODIUM 5000 UNITS: 5000 INJECTION INTRAVENOUS; SUBCUTANEOUS at 20:12

## 2024-05-20 RX ADMIN — HYDROCODONE BITARTRATE AND ACETAMINOPHEN 1 TABLET: 7.5; 325 TABLET ORAL at 09:16

## 2024-05-20 RX ADMIN — CYCLOBENZAPRINE HYDROCHLORIDE 5 MG: 5 TABLET, FILM COATED ORAL at 08:26

## 2024-05-20 RX ADMIN — HYDROCODONE BITARTRATE AND ACETAMINOPHEN 1 TABLET: 7.5; 325 TABLET ORAL at 15:34

## 2024-05-20 RX ADMIN — LEVETIRACETAM 500 MG: 500 TABLET, FILM COATED ORAL at 08:03

## 2024-05-20 RX ADMIN — LEVETIRACETAM 500 MG: 500 TABLET, FILM COATED ORAL at 20:16

## 2024-05-20 RX ADMIN — GABAPENTIN 300 MG: 300 CAPSULE ORAL at 15:34

## 2024-05-20 RX ADMIN — Medication 1 APPLICATION: at 08:00

## 2024-05-20 RX ADMIN — PANTOPRAZOLE SODIUM 40 MG: 40 TABLET, DELAYED RELEASE ORAL at 08:00

## 2024-05-20 RX ADMIN — DEXAMETHASONE 2 MG: 2 TABLET ORAL at 20:16

## 2024-05-20 RX ADMIN — CHLORHEXIDINE GLUCONATE 1 APPLICATION: 500 CLOTH TOPICAL at 04:46

## 2024-05-20 RX ADMIN — CEFAZOLIN 2000 MG: 2 INJECTION, POWDER, FOR SOLUTION INTRAMUSCULAR; INTRAVENOUS at 07:54

## 2024-05-20 RX ADMIN — SENNOSIDES AND DOCUSATE SODIUM 2 TABLET: 50; 8.6 TABLET ORAL at 08:00

## 2024-05-20 RX ADMIN — DEXAMETHASONE 4 MG: 4 TABLET ORAL at 08:03

## 2024-05-20 RX ADMIN — GABAPENTIN 300 MG: 300 CAPSULE ORAL at 20:12

## 2024-05-20 RX ADMIN — POLYETHYLENE GLYCOL 3350 17 G: 17 POWDER, FOR SOLUTION ORAL at 08:00

## 2024-05-20 NOTE — PLAN OF CARE
Goal Outcome Evaluation:  Plan of Care Reviewed With: patient, family        Progress: improving     Pt received from Unit today. VSS. IV fluids/ ABX given. Infrequent c/o pain. SBA to BRP. Safety maintained. Call light in reach.  phone used.

## 2024-05-20 NOTE — THERAPY TREATMENT NOTE
Acute Care - Physical Therapy Treatment Note  [facility]     Patient Name: Zina Armando  : 1990  MRN: [id number]  Today's Date: 2024      Visit Dx:     ICD-10-CM ICD-9-CM   1. Impaired mobility [Z74.09]  Z74.09 799.89   2. Cerebellar mass  G93.89 348.89     Patient Active Problem List   Diagnosis    Cerebellar mass     Past Medical History:   Diagnosis Date    Cerebellar mass 2024    Miscarriage 2024    Miscarriage 2023    Personal history of COVID-19      Past Surgical History:   Procedure Laterality Date    CRANIOTOMY N/A 2024    Procedure: CRANIOTOMY SUBOCCIPITAL WITH STEALTH, LUMBAR DRAIN INSERTION EXTERNAL;  Surgeon: Rohith Alexander MD;  Location: [facility] OR;  Service: Neurosurgery;  Laterality: N/A;    LUMBAR DRAIN INSERTION EXTERNAL N/A 2024    Procedure: LUMBAR DRAIN INSERTION EXTERNAL;  Surgeon: Rohith Alexander MD;  Location: [facility] OR;  Service: Neurosurgery;  Laterality: N/A;    NO PAST SURGERIES       PT Assessment (Last 12 Hours)       PT Evaluation and Treatment       Row Name 24 1332 24 0824       Physical Therapy Time and Intention    Subjective Information no complaints  -TB no complaints  -TB    Document Type therapy note (daily note)  -TB therapy note (daily note)  -TB    Mode of Treatment physical therapy  -TB physical therapy  -TB      Row Name 24 1332 24 0824       General Information    Existing Precautions/Restrictions fall  -TB fall  Lumbar Drain  -TB      Row Name 24 1332 24 0824       Bed Mobility    Bed Mobility scooting/bridging;sidelying-sit;sit-sidelying  -TB scooting/bridging;sidelying-sit;sit-sidelying  -TB    Scooting/Bridging [place] (Bed Mobility) standby assist;verbal cues  -TB minimum assist (75% patient effort);verbal cues  -TB    Supine-Sit [place] (Bed Mobility) standby assist  -TB standby assist  HOB elevated  -TB    Sit-Supine [place] (Bed Mobility) standby assist  -TB  standby assist  -TB    Assistive Device (Bed Mobility) head of bed elevated  -TB draw sheet;head of bed elevated  -TB      Row Name 05/20/24 1332 05/20/24 0824       Transfers    Transfers sit-stand transfer;stand-sit transfer;toilet transfer  -TB sit-stand transfer;stand-sit transfer  -TB      Row Name 05/20/24 1332 05/20/24 0824       Sit-Stand Transfer    Sit-Stand [place] (Transfers) contact guard;verbal cues  -TB contact guard;verbal cues  -TB      Row Name 05/20/24 1332 05/20/24 0824       Stand-Sit Transfer    Stand-Sit [place] (Transfers) contact guard;verbal cues  -TB contact guard;verbal cues  -TB      Row Name 05/20/24 1332 05/20/24 0824       Toilet Transfer    Type (Toilet Transfer) sit-stand;stand-sit  -TB sit-stand;stand-sit  -TB    [place] Level (Toilet Transfer) standby assist;verbal cues  -TB contact guard;verbal cues  -TB    Assistive Device (Toilet Transfer) commode  -TB commode, bedside without drop arms  -TB      Row Name 05/20/24 1332 05/20/24 0824       Gait/Stairs (Locomotion)    [place] Level (Gait) contact guard;verbal cues  -TB contact guard;verbal cues  RN w/ lumbar drain  -TB    Distance in Feet (Gait) 400  -  -TB      Row Name 05/20/24 1332 05/20/24 0824       Balance    Balance Assessment sitting static balance;sitting dynamic balance  -TB sitting static balance;sitting dynamic balance  -TB    Static Sitting Balance independent  -TB independent  -TB    Dynamic Sitting Balance independent  -TB independent  -TB    Position, Sitting Balance sitting edge of bed  -TB sitting edge of bed  -TB      Row Name             Wound 05/17/24 0932 occipital region Incision    Wound - Properties Group Placement Date: 05/17/24  -VALARIE Placement Time: 0932 -VALARIE Location: occipital region  -VALARIE Primary Wound Type: Incision  -VALARIE    Retired Wound - Properties Group Placement Date: 05/17/24  -VALARIE Placement Time: 0932 -VALARIE Location: occipital region  -VALARIE Primary Wound Type: Incision  -VALARIE     Retired Wound - Properties Group Date first assessed: 05/17/24  -VALARIE Time first assessed: 0932  -VALARIE Location: occipital region  -VALARIE Primary Wound Type: Incision  -VALARIE      Row Name 05/20/24 1332          Plan of Care Review    Plan of Care Reviewed With patient  -TB     Progress improving  -TB     Outcome Evaluation PT tx complete. Bed mob SBA to EOB. Sitting balance Ind. AROM BLE exs. Stood SBA. Amb 400' w/ CGA/SBA. No difficulty or LOB. SBA for tolieting. Will cont POC.  -TB       Row Name 05/20/24 1332 05/20/24 0824       Positioning and Restraints    Pre-Treatment Position in bed  -TB in bed  -TB    Post Treatment Position bed  -TB bed  -TB    In Bed side lying right;call light within reach;encouraged to call for assist;notified nsg;side rails up x2  -TB side lying right;with nsg  -TB              User Key  (r) = Recorded By, (t) = Taken By, (c) = Cosigned By      Initials Name Provider Type    VALARIE Luis Hernandez, RN Registered Nurse    Juma Sims, PTA Physical Therapist Assistant                    Physical Therapy Education       Title: PT OT SLP Therapies (In Progress)       Topic: Physical Therapy (Done)       Point: Mobility training (Done)       Learning Progress Summary             Patient Acceptance, E,D, DU,VU by  at 5/18/2024 1120    Comment: benefits of PT and POC, amanda for A OOB                         Point: Precautions (Done)       Learning Progress Summary             Patient Acceptance, E,D, DU,VU by  at 5/18/2024 1120    Comment: benefits of PT and POC, amanda for A OOB                                         User Key       Initials Effective Dates Name Provider Type [facility] 02/03/23 -  Channing Reynolds, PT Physical Therapist PT                  PT Recommendation and Plan     Plan of Care Reviewed With: patient  Progress: improving  Outcome Evaluation: PT tx complete. Bed mob SBA to EOB. Sitting balance Ind. AROM BLE exs. Stood SBA. Amb 400' w/ CGA/SBA. No difficulty or LOB.  SBA for tolieting. Will cont POC.   Outcome Measures       Row Name 05/20/24 0824 05/19/24 1020          How much help from another person do you currently need...    Turning from your back to your side while in flat bed without using bedrails? 3  -TB 3  -TB     Moving from lying on back to sitting on the side of a flat bed without bedrails? 3  -TB 3  -TB     Moving to and from a bed to a chair (including a wheelchair)? 4  -TB 3  -TB     Standing up from a chair using your arms (e.g., wheelchair, bedside chair)? 4  -TB 3  -TB     Climbing 3-5 steps with a railing? 3  -TB 3  -TB     To walk in hospital room? 3  -TB 3  -TB     AM-PAC 6 Clicks Score (PT) 20  -TB 18  -TB     Highest Level of Mobility Goal 6 --> Walk 10 steps or more  -TB 6 --> Walk 10 steps or more  -TB        Functional Assessment    Outcome Measure Options AM-PAC 6 Clicks Basic Mobility (PT)  -TB AM-PAC 6 Clicks Basic Mobility (PT)  -TB               User Key  (r) = Recorded By, (t) = Taken By, (c) = Cosigned By      Initials Name Provider Type    TB Juma Nation PTA Physical Therapist Assistant                     Time Calculation:    PT Charges       Row Name 05/20/24 1549 05/20/24 1321          Time Calculation    Start Time 1332  -TB 0824  -TB     Stop Time 1355  -TB 0836  -TB     Time Calculation (min) 23 min  -TB 12 min  -TB     PT Received On 05/20/24  -TB 05/20/24  -TB        Time Calculation- PT    Total Timed Code Minutes- PT 23 minute(s)  -TB 12 minute(s)  -TB               User Key  (r) = Recorded By, (t) = Taken By, (c) = Cosigned By      Initials Name Provider Type    TB Juma Nation, PTA Physical Therapist Assistant                  Therapy Charges for Today       Code Description Service Date Service Provider Modifiers Qty    23597740177 HC GAIT TRAINING EA 15 MIN 5/19/2024 Juma Nation, PTA GP 2    54216908765 HC GAIT TRAINING EA 15 MIN 5/19/2024 Juma Nation, PTA GP 2    86830788016 HC GAIT  TRAINING EA 15 MIN 5/20/2024 Juma Nation, PTA GP 1    02553063783 HC GAIT TRAINING EA 15 MIN 5/20/2024 Juma Nation, PTA GP 2            PT G-Codes  Outcome Measure Options: AM-PAC 6 Clicks Basic Mobility (PT)  AM-PAC 6 Clicks Score (PT): 18  AM-PAC 6 Clicks Score (OT): 21    Juma Nation PTA  5/20/2024

## 2024-05-20 NOTE — PROGRESS NOTES
"Neurosurgery Daily Progress Note    HPI:  Zina Armando is a 33 y.o. non-English-speaking  female with a significant comorbidity miscarriages x 2. She presents today for continued evaluation with complaints of intermittent dizziness, nausea, and gait/balance dysfunction. Physical exam findings of neurologically intact. 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     Assessment:   Past Medical History:   Diagnosis Date    Cerebellar mass 05/2024    Miscarriage 04/2024    Miscarriage 02/2023    Personal history of COVID-19 2021     Active Hospital Problems    Diagnosis     **Cerebellar mass      Plan:   Neuro: Stable and intact  4th Ventricular Epidermoid  3 Days Post-Op Suboccipital Crani and LD placement  LD = 143 mL, removed  Max PT/OT  Continue Decadron taper  Postop MRI reviewed a gross total resection  DC azetazolamide    CV: BEN.  No bradycardia  Pulm: BEN on room air  : Waiting spontaneously  FEN: Erica PO.  BUN/creatinine ratio improving.  Continue IV fluids  Endocrine: Strict Blood glucose control  GI: PPI  ID: Leukocytosis 2/2 [place].    Heme:  DVT prophylaxis  Pain: Well controlled  Dispo: PT/OT   Transfer to floor today    Chief complaint:   Headache for 2 months    HPI  Subjective  Doing well    Temp:  [97.9 °F (36.6 °C)-98.8 °F (37.1 °C)] 97.9 °F (36.6 °C)  Heart Rate:  [68-86] 73  Resp:  [19] 19  BP: ()/(60-92) 119/92    Output by Drain (mL) 05/19/24 0701 - 05/19/24 1900 05/19/24 1901 - 05/20/24 0700 05/20/24 0701 - 05/20/24 0832 Range Total   Requested LDAs do not have output data documented.       Objective:  Vital signs: (most recent): Blood pressure 119/92, pulse 73, temperature 97.9 °F (36.6 °C), temperature source Axillary, resp. rate 19, height 151 cm (59.45\"), weight 94.6 kg (208 lb 8.9 oz), last menstrual period 04/13/2024, SpO2 97%, not currently breastfeeding.      Neurologic Exam     Mental Status   Oriented " to person, place, and time.   Speech: speech is normal   Level of consciousness: alert    Cranial Nerves     CN III, IV, VI   Pupils are equal, round, and reactive to light.  Extraocular motions are normal.     CN V   Facial sensation intact.     CN VII   Facial expression full, symmetric.     Motor Exam   Right arm pronator drift: absent  Left arm pronator drift: absent    Strength   Right deltoid: 5/5  Left deltoid: 5/5  Right biceps: 5/5  Left biceps: 5/5  Right triceps: 5/5  Left triceps: 5/5  Right iliopsoas: 5/5  Left iliopsoas: 5/5  Right quadriceps: 5/5  Left quadriceps: 5/5  Right gastroc: 5/5  Left gastroc: 5/5    Sensory Exam   Light touch normal.       Drains:   Urethral Catheter Non-latex;Silicone 16 Fr. (Active)  Daily Indications  Required activity restriction from trauma, surgery, (e.g. unstable spine, fracture, hemodynamics)  05/18/24 0400  Site Assessment  Clean;Skin intact  05/18/24 0400  Collection Container  Standard drainage bag  05/18/24 0400  Securement Method  Securing device  05/18/24 0400  Catheter care complete  Yes  05/18/24 0400  Output (mL)  300 mL  05/18/24 0400     Lumbar Drain (Active)  Drain Status  Clamped  05/18/24 0400  Site Description  Unable to view  05/18/24 0400  Dressing Status  Clean;Dry;Intact  05/18/24 0400  CSF Output (mL)  9 mL  05/18/24 0617      Imaging Results (Last 24 Hours)       ** No results found for the last 24 hours. **          Lab Results (last 24 hours)       Procedure Component Value Units Date/Time    POC Glucose Once [[id number]]  (Normal) Collected: 05/20/24 0746    Specimen: Blood Updated: 05/20/24 0758     Glucose 82 mg/dL      Comment: : [id number] [facility] ID: [id number]       Basic Metabolic Panel [[id number]]  (Abnormal) Collected: 05/20/24 0240    Specimen: Blood Updated: 05/20/24 0325     Glucose 103 mg/dL      BUN 14 mg/dL      Creatinine 0.36 mg/dL      Sodium 139 mmol/L      Potassium 4.2 mmol/L      Chloride 105 mmol/L       CO2 26.0 mmol/L      Calcium 8.2 mg/dL      BUN/Creatinine Ratio 38.9     Anion Gap 8.0 mmol/L      eGFR 137.7 mL/min/1.73     Narrative:      GFR Normal >60  Chronic Kidney Disease <60  Kidney Failure <15      CBC & Differential [[id number]]  (Abnormal) Collected: 05/20/24 0240    Specimen: Blood Updated: 05/20/24 0310    Narrative:      The following orders were created for panel order CBC & Differential.  Procedure                               Abnormality         Status                     ---------                               -----------         ------                     CBC Auto Differential[[id number]]        Abnormal            Final result                 Please view results for these tests on the individual orders.    CBC Auto Differential [[id number]]  (Abnormal) Collected: 05/20/24 0240    Specimen: Blood Updated: 05/20/24 0310     WBC 17.59 10*3/mm3      RBC 4.49 10*6/mm3      Hemoglobin 12.2 g/dL      Hematocrit 38.6 %      MCV 86.0 fL      MCH 27.2 pg      MCHC 31.6 g/dL      RDW 14.5 %      RDW-SD 44.6 fl      MPV 9.6 fL      Platelets 199 10*3/mm3      Neutrophil % 86.1 %      Lymphocyte % 7.1 %      Monocyte % 5.4 %      Eosinophil % 0.2 %      Basophil % 0.2 %      Immature Grans % 1.0 %      Neutrophils, Absolute 15.14 10*3/mm3      Lymphocytes, Absolute 1.25 10*3/mm3      Monocytes, Absolute 0.95 10*3/mm3      Eosinophils, Absolute 0.04 10*3/mm3      Basophils, Absolute 0.04 10*3/mm3      Immature Grans, Absolute 0.17 10*3/mm3      nRBC 0.0 /100 WBC     POC Glucose Once [[id number]]  (Normal) Collected: 05/19/24 2018    Specimen: Blood Updated: 05/19/24 2028     Glucose 89 mg/dL      Comment: : [id number] [facility] ID: [id number]            at 08:32 CDT by TERRENCE Gutiérrez.  Lab Results (last 24 hours)       Procedure Component Value Units Date/Time    POC Glucose Once [[id number]]  (Normal) Collected: 05/20/24 0746    Specimen: Blood Updated: 05/20/24 0758     Glucose 82 mg/dL       Comment: : [id number] John Narvaez ID: [id number]       Basic Metabolic Panel [[id number]]  (Abnormal) Collected: 05/20/24 0240    Specimen: Blood Updated: 05/20/24 0325     Glucose 103 mg/dL      BUN 14 mg/dL      Creatinine 0.36 mg/dL      Sodium 139 mmol/L      Potassium 4.2 mmol/L      Chloride 105 mmol/L      CO2 26.0 mmol/L      Calcium 8.2 mg/dL      BUN/Creatinine Ratio 38.9     Anion Gap 8.0 mmol/L      eGFR 137.7 mL/min/1.73     Narrative:      GFR Normal >60  Chronic Kidney Disease <60  Kidney Failure <15      CBC & Differential [[id number]]  (Abnormal) Collected: 05/20/24 0240    Specimen: Blood Updated: 05/20/24 0310    Narrative:      The following orders were created for panel order CBC & Differential.  Procedure                               Abnormality         Status                     ---------                               -----------         ------                     CBC Auto Differential[[id number]]        Abnormal            Final result                 Please view results for these tests on the individual orders.    CBC Auto Differential [[id number]]  (Abnormal) Collected: 05/20/24 0240    Specimen: Blood Updated: 05/20/24 0310     WBC 17.59 10*3/mm3      RBC 4.49 10*6/mm3      Hemoglobin 12.2 g/dL      Hematocrit 38.6 %      MCV 86.0 fL      MCH 27.2 pg      MCHC 31.6 g/dL      RDW 14.5 %      RDW-SD 44.6 fl      MPV 9.6 fL      Platelets 199 10*3/mm3      Neutrophil % 86.1 %      Lymphocyte % 7.1 %      Monocyte % 5.4 %      Eosinophil % 0.2 %      Basophil % 0.2 %      Immature Grans % 1.0 %      Neutrophils, Absolute 15.14 10*3/mm3      Lymphocytes, Absolute 1.25 10*3/mm3      Monocytes, Absolute 0.95 10*3/mm3      Eosinophils, Absolute 0.04 10*3/mm3      Basophils, Absolute 0.04 10*3/mm3      Immature Grans, Absolute 0.17 10*3/mm3      nRBC 0.0 /100 WBC     POC Glucose Once [[id number]]  (Normal) Collected: 05/19/24 2018    Specimen: Blood Updated: 05/19/24 2028     Glucose 89  mg/dL      Comment: : [id number] [facility] ID: [id number]

## 2024-05-20 NOTE — PLAN OF CARE
Goal Outcome Evaluation:  Plan of Care Reviewed With: patient        Progress: improving  Outcome Evaluation: PT tx complete. Bed mob SBA to EOB. Sitting balance Ind. AROM BLE exs. Stood SBA. Amb 400' w/ CGA/SBA. No difficulty or LOB. SBA for tolieting. Will cont POC.

## 2024-05-20 NOTE — CASE MANAGEMENT/SOCIAL WORK
Discharge Planning Assessment  [facility]     Patient Name: Zina Armando  MRN: [id number]  Today's Date: 5/20/2024    Admit Date: 5/17/2024        Discharge Needs Assessment       Row Name 05/20/24 1205       Living Environment    People in Home spouse;child(katerin), dependent    Name(s) of People in Home Deshawn    Current Living Arrangements home    Primary Care Provided by self    Provides Primary Care For no one    Family Caregiver if Needed spouse    Family Caregiver Names Deshawn    Able to Return to Prior Arrangements yes       Resource/Environmental Concerns    Resource/Environmental Concerns none       Transition Planning    Patient/Family Anticipates Transition to home with family    Transportation Anticipated family or friend will provide       Discharge Needs Assessment    Readmission Within the Last 30 Days no previous admission in last 30 days    Equipment Currently Used at Home none    Concerns to be Addressed no discharge needs identified    Equipment Needed After Discharge none    Discharge Coordination/Progress spoke to patient who speaks limited English; has been independent at home prior to illness; has RX coverage and no PCP; will follow for DC needs                   Discharge Plan    No documentation.                 Continued Care and Services - Admitted Since 5/17/2024    No active coordination exists for this encounter.          Demographic Summary    No documentation.                  Functional Status    No documentation.                  Psychosocial    No documentation.                  Abuse/Neglect    No documentation.                  Legal    No documentation.                  Substance Abuse    No documentation.                  Patient Forms    No documentation.                     Charito Moncada RN

## 2024-05-20 NOTE — PROGRESS NOTES
"Neurosurgery Daily Progress Note    HPI:  Zina Armando is a 33 y.o. non-English-speaking  female with a significant comorbidity miscarriages x 2. She presents today for continued evaluation with complaints of intermittent dizziness, nausea, and gait/balance dysfunction. Physical exam findings of neurologically intact. 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     Assessment:   Past Medical History:   Diagnosis Date    Cerebellar mass 05/2024    Miscarriage 04/2024    Miscarriage 02/2023    Personal history of COVID-19 2021     Active Hospital Problems    Diagnosis     **Cerebellar mass      Plan:   Neuro: Stable and intact  4th Ventricular Epidermoid  3 Days Post-Op Suboccipital Crani and LD placement  LD = 143 mL, removed  Max PT/OT  Continue Decadron taper  Postop MRI reviewed a gross total resection  DC azetazolamide    CV: BEN.  No bradycardia  Pulm: BEN on room air  : Waiting spontaneously  FEN: Erica PO  Endocrine: Strict Blood glucose control  GI: PPI  ID: Leukocytosis 2/2 Decradon.    Heme:  DVT prophylaxis  Pain: Well controlled  Dispo: PT/OT   Transfer to floor today    Chief complaint:   Headache for 2 months    HPI  Subjective  Doing well    Temp:  [97.9 °F (36.6 °C)-98.8 °F (37.1 °C)] 97.9 °F (36.6 °C)  Heart Rate:  [68-86] 73  Resp:  [19] 19  BP: ()/(60-92) 119/92    Output by Drain (mL) 05/19/24 0701 - 05/19/24 1900 05/19/24 1901 - 05/20/24 0700 05/20/24 0701 - 05/20/24 0829 Range Total   Requested LDAs do not have output data documented.       Objective:  Vital signs: (most recent): Blood pressure 119/92, pulse 73, temperature 97.9 °F (36.6 °C), temperature source Axillary, resp. rate 19, height 151 cm (59.45\"), weight 94.6 kg (208 lb 8.9 oz), last menstrual period 04/13/2024, SpO2 97%, not currently breastfeeding.      Neurologic Exam     Mental Status   Oriented to person, place, and time.   Speech: speech is " normal   Level of consciousness: alert    Cranial Nerves     CN III, IV, VI   Pupils are equal, round, and reactive to light.  Extraocular motions are normal.     CN V   Facial sensation intact.     CN VII   Facial expression full, symmetric.     Motor Exam   Right arm pronator drift: absent  Left arm pronator drift: absent    Strength   Right deltoid: 5/5  Left deltoid: 5/5  Right biceps: 5/5  Left biceps: 5/5  Right triceps: 5/5  Left triceps: 5/5  Right iliopsoas: 5/5  Left iliopsoas: 5/5  Right quadriceps: 5/5  Left quadriceps: 5/5  Right gastroc: 5/5  Left gastroc: 5/5    Sensory Exam   Light touch normal.       Drains:   Urethral Catheter Non-latex;Silicone 16 Fr. (Active)  Daily Indications  Required activity restriction from trauma, surgery, (e.g. unstable spine, fracture, hemodynamics)  05/18/24 0400  Site Assessment  Clean;Skin intact  05/18/24 0400  Collection Container  Standard drainage bag  05/18/24 0400  Securement Method  Securing device  05/18/24 0400  Catheter care complete  Yes  05/18/24 0400  Output (mL)  300 mL  05/18/24 0400     Lumbar Drain (Active)  Drain Status  Clamped  05/18/24 0400  Site Description  Unable to view  05/18/24 0400  Dressing Status  Clean;Dry;Intact  05/18/24 0400  CSF Output (mL)  9 mL  05/18/24 0617      Imaging Results (Last 24 Hours)       ** No results found for the last 24 hours. **          Lab Results (last 24 hours)       Procedure Component Value Units Date/Time    POC Glucose Once [[id number]]  (Normal) Collected: 05/20/24 0746    Specimen: Blood Updated: 05/20/24 0758     Glucose 82 mg/dL      Comment: : [id number] John Narvaez ID: [id number]       Basic Metabolic Panel [[id number]]  (Abnormal) Collected: 05/20/24 0240    Specimen: Blood Updated: 05/20/24 0325     Glucose 103 mg/dL      BUN 14 mg/dL      Creatinine 0.36 mg/dL      Sodium 139 mmol/L      Potassium 4.2 mmol/L      Chloride 105 mmol/L      CO2 26.0 mmol/L      Calcium 8.2 mg/dL       BUN/Creatinine Ratio 38.9     Anion Gap 8.0 mmol/L      eGFR 137.7 mL/min/1.73     Narrative:      GFR Normal >60  Chronic Kidney Disease <60  Kidney Failure <15      CBC & Differential [[id number]]  (Abnormal) Collected: 05/20/24 0240    Specimen: Blood Updated: 05/20/24 0310    Narrative:      The following orders were created for panel order CBC & Differential.  Procedure                               Abnormality         Status                     ---------                               -----------         ------                     CBC Auto Differential[[id number]]        Abnormal            Final result                 Please view results for these tests on the individual orders.    CBC Auto Differential [[id number]]  (Abnormal) Collected: 05/20/24 0240    Specimen: Blood Updated: 05/20/24 0310     WBC 17.59 10*3/mm3      RBC 4.49 10*6/mm3      Hemoglobin 12.2 g/dL      Hematocrit 38.6 %      MCV 86.0 fL      MCH 27.2 pg      MCHC 31.6 g/dL      RDW 14.5 %      RDW-SD 44.6 fl      MPV 9.6 fL      Platelets 199 10*3/mm3      Neutrophil % 86.1 %      Lymphocyte % 7.1 %      Monocyte % 5.4 %      Eosinophil % 0.2 %      Basophil % 0.2 %      Immature Grans % 1.0 %      Neutrophils, Absolute 15.14 10*3/mm3      Lymphocytes, Absolute 1.25 10*3/mm3      Monocytes, Absolute 0.95 10*3/mm3      Eosinophils, Absolute 0.04 10*3/mm3      Basophils, Absolute 0.04 10*3/mm3      Immature Grans, Absolute 0.17 10*3/mm3      nRBC 0.0 /100 WBC     POC Glucose Once [[id number]]  (Normal) Collected: 05/19/24 2018    Specimen: Blood Updated: 05/19/24 2028     Glucose 89 mg/dL      Comment: : [id number] Cate SamiMeter ID: [id number]            at 08:29 CDT by TERRENCE Gutiérrez.  Lab Results (last 24 hours)       Procedure Component Value Units Date/Time    POC Glucose Once [[id number]]  (Normal) Collected: 05/20/24 0746    Specimen: Blood Updated: 05/20/24 0758     Glucose 82 mg/dL      Comment: : [id number] John  [facility] [id number]       Basic Metabolic Panel [[id number]]  (Abnormal) Collected: 05/20/24 0240    Specimen: Blood Updated: 05/20/24 0325     Glucose 103 mg/dL      BUN 14 mg/dL      Creatinine 0.36 mg/dL      Sodium 139 mmol/L      Potassium 4.2 mmol/L      Chloride 105 mmol/L      CO2 26.0 mmol/L      Calcium 8.2 mg/dL      BUN/Creatinine Ratio 38.9     Anion Gap 8.0 mmol/L      eGFR 137.7 mL/min/1.73     Narrative:      GFR Normal >60  Chronic Kidney Disease <60  Kidney Failure <15      CBC & Differential [[id number]]  (Abnormal) Collected: 05/20/24 0240    Specimen: Blood Updated: 05/20/24 0310    Narrative:      The following orders were created for panel order CBC & Differential.  Procedure                               Abnormality         Status                     ---------                               -----------         ------                     CBC Auto Differential[[id number]]        Abnormal            Final result                 Please view results for these tests on the individual orders.    CBC Auto Differential [[id number]]  (Abnormal) Collected: 05/20/24 0240    Specimen: Blood Updated: 05/20/24 0310     WBC 17.59 10*3/mm3      RBC 4.49 10*6/mm3      Hemoglobin 12.2 g/dL      Hematocrit 38.6 %      MCV 86.0 fL      MCH 27.2 pg      MCHC 31.6 g/dL      RDW 14.5 %      RDW-SD 44.6 fl      MPV 9.6 fL      Platelets 199 10*3/mm3      Neutrophil % 86.1 %      Lymphocyte % 7.1 %      Monocyte % 5.4 %      Eosinophil % 0.2 %      Basophil % 0.2 %      Immature Grans % 1.0 %      Neutrophils, Absolute 15.14 10*3/mm3      Lymphocytes, Absolute 1.25 10*3/mm3      Monocytes, Absolute 0.95 10*3/mm3      Eosinophils, Absolute 0.04 10*3/mm3      Basophils, Absolute 0.04 10*3/mm3      Immature Grans, Absolute 0.17 10*3/mm3      nRBC 0.0 /100 WBC     POC Glucose Once [[id number]]  (Normal) Collected: 05/19/24 2018    Specimen: Blood Updated: 05/19/24 2028     Glucose 89 mg/dL      Comment: : [id number]  [facility] ID: [id number]

## 2024-05-20 NOTE — PLAN OF CARE
Goal Outcome Evaluation:  Plan of Care Reviewed With: patient, family        Progress: improving  Outcome Evaluation: OT tx completed. Pt presents alert and oriented x4, sitting up in bed eating breakfast. She was able to complete all self feeding skills independently. Came to sitting at EOB with Sup and HOB elevated. She attempted to don socks while seated at EOB. Pt was unable to bring ankle to opposite knee or bend over at hips to don socks, ultimately required Mod A to perform task. Completed sit <> stand t/fs with SBA-CGA and amb short distance to bsc. She performed perineal hygiene with set-up and CGA. Required Min A to don new brief and clothing mgt. She amb back to bed with CGA and Sup-CGA for bed mobility. Left sitting up in bed at end of session. Continue OT POC      Anticipated Discharge Disposition (OT): home with assist

## 2024-05-20 NOTE — PLAN OF CARE
Goal Outcome Evaluation:      - axo / nsr / room air / lumbar: 50 mL (at the moment) / 1500 mL UOP / wbc: 25 -- > 17 / ns w/ 20 K+ @ 75 / afebrile      Problem: Adult Inpatient Plan of Care  Goal: Plan of Care Review  Outcome: Ongoing, Progressing  Goal: Patient-Specific Goal (Individualized)  Outcome: Ongoing, Progressing  Goal: Absence of Hospital-Acquired Illness or Injury  Outcome: Ongoing, Progressing  Intervention: Identify and Manage Fall Risk  Recent Flowsheet Documentation  Taken 5/20/2024 0500 by Kranthi Pérez RN  Safety Promotion/Fall Prevention:   safety round/check completed   room organization consistent   clutter free environment maintained  Taken 5/20/2024 0400 by Kranthi Pérez RN  Safety Promotion/Fall Prevention:   safety round/check completed   room organization consistent   clutter free environment maintained  Taken 5/20/2024 0300 by Kranthi Pérez RN  Safety Promotion/Fall Prevention:   safety round/check completed   room organization consistent   clutter free environment maintained  Taken 5/20/2024 0200 by Kranthi Pérez RN  Safety Promotion/Fall Prevention:   safety round/check completed   room organization consistent   clutter free environment maintained  Taken 5/20/2024 0100 by Kranthi Pérez RN  Safety Promotion/Fall Prevention:   safety round/check completed   room organization consistent   clutter free environment maintained  Taken 5/20/2024 0000 by Kranthi Pérez RN  Safety Promotion/Fall Prevention:   safety round/check completed   room organization consistent   clutter free environment maintained  Taken 5/19/2024 2300 by Kranthi Pérez RN  Safety Promotion/Fall Prevention:   safety round/check completed   room organization consistent   clutter free environment maintained  Taken 5/19/2024 2200 by Kranthi Pérez RN  Safety Promotion/Fall Prevention:   safety round/check completed   room organization consistent   clutter free environment maintained  Taken 5/19/2024 2100 by Elisa  KEVIN Crisostomo  Safety Promotion/Fall Prevention:   safety round/check completed   room organization consistent   clutter free environment maintained  Taken 5/19/2024 2000 by Kranthi Pérez RN  Safety Promotion/Fall Prevention:   safety round/check completed   room organization consistent   clutter free environment maintained  Taken 5/19/2024 1900 by Kranthi Pérez RN  Safety Promotion/Fall Prevention:   safety round/check completed   room organization consistent   clutter free environment maintained  Intervention: Prevent Skin Injury  Recent Flowsheet Documentation  Taken 5/20/2024 0400 by Kranthi Pérez RN  Body Position:   position changed independently   weight shifting  Skin Protection:   adhesive use limited   skin-to-device areas padded   skin-to-skin areas padded   tubing/devices free from skin contact  Taken 5/20/2024 0200 by Kranthi Pérez RN  Body Position:   position changed independently   weight shifting  Taken 5/20/2024 0000 by Kranthi Pérez RN  Body Position:   position changed independently   weight shifting  Skin Protection:   adhesive use limited   skin-to-device areas padded   skin-to-skin areas padded   tubing/devices free from skin contact  Taken 5/19/2024 2200 by Kranthi Pérez RN  Body Position:   position changed independently   side-lying   right  Taken 5/19/2024 2000 by Kranthi Pérez RN  Body Position:   position changed independently   weight shifting  Skin Protection:   adhesive use limited   skin-to-device areas padded   skin-to-skin areas padded   tubing/devices free from skin contact  Intervention: Prevent and Manage VTE (Venous Thromboembolism) Risk  Recent Flowsheet Documentation  Taken 5/20/2024 0400 by Kranthi Pérez RN  Activity Management: back to bed  Taken 5/20/2024 0000 by Kranthi Pérez RN  Activity Management: back to bed  Taken 5/19/2024 2000 by Kranthi Pérez RN  Activity Management: back to bed  Taken 5/19/2024 1900 by Kranthi Pérez RN  Activity Management: up in  chair  Intervention: Prevent Infection  Recent Flowsheet Documentation  Taken 5/20/2024 0400 by Kranthi Pérez RN  Infection Prevention:   single patient room provided   rest/sleep promoted   hand hygiene promoted  Taken 5/20/2024 0000 by Kranthi Pérez RN  Infection Prevention:   single patient room provided   rest/sleep promoted   hand hygiene promoted  Taken 5/19/2024 2000 by Kranthi Pérez RN  Infection Prevention:   single patient room provided   rest/sleep promoted   hand hygiene promoted  Goal: Optimal Comfort and Wellbeing  Outcome: Ongoing, Progressing  Intervention: Monitor Pain and Promote Comfort  Recent Flowsheet Documentation  Taken 5/20/2024 0511 by Kranthi Pérez RN  Pain Management Interventions: see MAR  Taken 5/20/2024 0400 by Kranthi Pérez RN  Pain Management Interventions:   position adjusted   relaxation techniques promoted  Taken 5/19/2024 2334 by Kranthi Pérez RN  Pain Management Interventions:   see MAR   relaxation techniques promoted   position adjusted  Taken 5/19/2024 1900 by Kranthi Pérez RN  Pain Management Interventions: position adjusted  Intervention: Provide Person-Centered Care  Recent Flowsheet Documentation  Taken 5/20/2024 0400 by Kranthi Pérez RN  Trust Relationship/Rapport:   care explained   choices provided   reassurance provided   thoughts/feelings acknowledged  Taken 5/20/2024 0000 by Kranthi Pérez RN  Trust Relationship/Rapport:   care explained   choices provided   reassurance provided   thoughts/feelings acknowledged  Taken 5/19/2024 2000 by Kranthi Pérez RN  Trust Relationship/Rapport:   care explained   choices provided   reassurance provided   thoughts/feelings acknowledged  Goal: Readiness for Transition of Care  Outcome: Ongoing, Progressing     Problem: Skin Injury Risk Increased  Goal: Skin Health and Integrity  Outcome: Ongoing, Progressing  Intervention: Optimize Skin Protection  Recent Flowsheet Documentation  Taken 5/20/2024 0400 by Kranthi Pérez  RN  Pressure Reduction Techniques:   frequent weight shift encouraged   weight shift assistance provided  Head of Bed (HOB) Positioning: HOB at 20-30 degrees  Pressure Reduction Devices: pressure-redistributing mattress utilized  Skin Protection:   adhesive use limited   skin-to-device areas padded   skin-to-skin areas padded   tubing/devices free from skin contact  Taken 5/20/2024 0200 by Kranthi Pérez RN  Head of Bed (HOB) Positioning: HOB at 20-30 degrees  Taken 5/20/2024 0000 by Kranthi Pérez RN  Pressure Reduction Techniques:   frequent weight shift encouraged   weight shift assistance provided  Head of Bed (HOB) Positioning: HOB at 20-30 degrees  Pressure Reduction Devices: pressure-redistributing mattress utilized  Skin Protection:   adhesive use limited   skin-to-device areas padded   skin-to-skin areas padded   tubing/devices free from skin contact  Taken 5/19/2024 2200 by Kranthi Pérez RN  Head of Bed (HOB) Positioning: HOB at 20-30 degrees  Taken 5/19/2024 2000 by Kranthi Pérez RN  Pressure Reduction Techniques:   frequent weight shift encouraged   weight shift assistance provided  Head of Bed (HOB) Positioning: HOB at 20-30 degrees  Pressure Reduction Devices: pressure-redistributing mattress utilized  Skin Protection:   adhesive use limited   skin-to-device areas padded   skin-to-skin areas padded   tubing/devices free from skin contact     Problem: Fall Injury Risk  Goal: Absence of Fall and Fall-Related Injury  Outcome: Ongoing, Progressing  Intervention: Identify and Manage Contributors  Recent Flowsheet Documentation  Taken 5/20/2024 0500 by Kranthi Pérez RN  Medication Review/Management: medications reviewed  Taken 5/20/2024 0400 by Kranthi Pérez RN  Medication Review/Management: medications reviewed  Taken 5/20/2024 0300 by Kranthi Pérez RN  Medication Review/Management: medications reviewed  Taken 5/20/2024 0200 by Kranthi Pérez RN  Medication Review/Management: medications  reviewed  Taken 5/20/2024 0100 by Kranthi Pérez RN  Medication Review/Management: medications reviewed  Taken 5/20/2024 0000 by Kranthi Pérez RN  Medication Review/Management: medications reviewed  Taken 5/19/2024 2300 by Kranthi Pérez RN  Medication Review/Management: medications reviewed  Taken 5/19/2024 2200 by Kranthi Pérez RN  Medication Review/Management: medications reviewed  Taken 5/19/2024 2100 by Kranthi Pérez RN  Medication Review/Management: medications reviewed  Taken 5/19/2024 2000 by Kranthi Pérez RN  Medication Review/Management: medications reviewed  Taken 5/19/2024 1900 by Kranthi Pérez RN  Medication Review/Management: medications reviewed  Intervention: Promote Injury-Free Environment  Recent Flowsheet Documentation  Taken 5/20/2024 0500 by Kranthi Pérez RN  Safety Promotion/Fall Prevention:   safety round/check completed   room organization consistent   clutter free environment maintained  Taken 5/20/2024 0400 by Karnthi Pérez RN  Safety Promotion/Fall Prevention:   safety round/check completed   room organization consistent   clutter free environment maintained  Taken 5/20/2024 0300 by Kranthi Pérez RN  Safety Promotion/Fall Prevention:   safety round/check completed   room organization consistent   clutter free environment maintained  Taken 5/20/2024 0200 by Kranthi Pérez RN  Safety Promotion/Fall Prevention:   safety round/check completed   room organization consistent   clutter free environment maintained  Taken 5/20/2024 0100 by Kranthi Pérez RN  Safety Promotion/Fall Prevention:   safety round/check completed   room organization consistent   clutter free environment maintained  Taken 5/20/2024 0000 by Kranthi Pérez RN  Safety Promotion/Fall Prevention:   safety round/check completed   room organization consistent   clutter free environment maintained  Taken 5/19/2024 2300 by Kranthi Pérez RN  Safety Promotion/Fall Prevention:   safety round/check completed   room organization  consistent   clutter free environment maintained  Taken 5/19/2024 2200 by Kranthi Pérez RN  Safety Promotion/Fall Prevention:   safety round/check completed   room organization consistent   clutter free environment maintained  Taken 5/19/2024 2100 by Kranthi Pérez RN  Safety Promotion/Fall Prevention:   safety round/check completed   room organization consistent   clutter free environment maintained  Taken 5/19/2024 2000 by Kranthi Pérez RN  Safety Promotion/Fall Prevention:   safety round/check completed   room organization consistent   clutter free environment maintained  Taken 5/19/2024 1900 by Kranthi Pérez, RN  Safety Promotion/Fall Prevention:   safety round/check completed   room organization consistent   clutter free environment maintained

## 2024-05-20 NOTE — THERAPY TREATMENT NOTE
Patient Name: Zina Armando  : 1990    MRN: [id number]                              Today's Date: 2024       Admit Date: 2024    Visit Dx:     ICD-10-CM ICD-9-CM   1. Impaired mobility [Z74.09]  Z74.09 799.89   2. Cerebellar mass  G93.89 348.89     Patient Active Problem List   Diagnosis    Cerebellar mass     Past Medical History:   Diagnosis Date    Cerebellar mass 2024    Miscarriage 2024    Miscarriage 2023    Personal history of COVID-2021     Past Surgical History:   Procedure Laterality Date    CRANIOTOMY N/A 2024    Procedure: CRANIOTOMY SUBOCCIPITAL WITH STEALTH, LUMBAR DRAIN INSERTION EXTERNAL;  Surgeon: Rohith Alexander MD;  Location:  [facility];  Service: Neurosurgery;  Laterality: N/A;    LUMBAR DRAIN INSERTION EXTERNAL N/A 2024    Procedure: LUMBAR DRAIN INSERTION EXTERNAL;  Surgeon: Rohith Alexander MD;  Location:  [facility];  Service: Neurosurgery;  Laterality: N/A;    NO PAST SURGERIES        General Information       Row Name 24 0813          OT Time and Intention    Document Type therapy note (daily note)  -J     Mode of Treatment occupational therapy  -       Row Name 24 0813          General Information    Patient Profile Reviewed yes  -J     Existing Precautions/Restrictions fall  lumbar drain  -J     Barriers to Rehab medically complex  -JJ       Row Name 24 0813          Cognition    Orientation Status (Cognition) oriented x 4  -               User Key  (r) = Recorded By, (t) = Taken By, (c) = Cosigned By      Initials Name Provider Type    JDorota Amador, WILDERR/L, CSRS Occupational Therapist                     Mobility/ADL's       Row Name 24 0813          Bed Mobility    Bed Mobility supine-sit;sit-supine  -JJ     Supine-Sit [place] (Bed Mobility) supervision  -J     Sit-Supine [place] (Bed Mobility) supervision  -     Assistive Device (Bed Mobility) head of bed elevated  -       Row  Name 05/20/24 0813          Transfers    Transfers sit-stand transfer;stand-sit transfer;toilet transfer  -       Row Name 05/20/24 0813          Sit-Stand Transfer    Sit-Stand [place] (Transfers) supervision;contact guard  -       Row Name 05/20/24 0813          Stand-Sit Transfer    Stand-Sit [place] (Transfers) contact guard;supervision  -       Row Name 05/20/24 0813          Toilet Transfer    Type (Toilet Transfer) sit-stand;stand-sit  -     [place] Level (Toilet Transfer) contact guard;verbal cues;supervision  -     Assistive Device (Toilet Transfer) commode, bedside without drop arms  -       Row Name 05/20/24 0813          Activities of Daily Living    BADL Assessment/Intervention feeding;lower body dressing;toileting  -       Row Name 05/20/24 0813          Lower Body Dressing Assessment/Training    [place] Level (Lower Body Dressing) don;socks;moderate assist (50% patient effort)  -     Position (Lower Body Dressing) edge of bed sitting  -     Comment, (Lower Body Dressing) unable to perform needed hip flexion to bring ankle to opposite knee and unable to bend over to place sock on feet.  -       Row Name 05/20/24 0813          Self-Feeding Assessment/Training    [place] Level (Feeding) feeding skills;independent  -       Row Name 05/20/24 0813          Toileting Assessment/Training    [place] Level (Toileting) adjust/manage clothing;change pad/brief;minimum assist (75% patient effort);perform perineal hygiene;set up;contact guard assist  -     Assistive Devices (Toileting) commode, bedside without drop arms  -     Position (Toileting) supported sitting;supported standing  -               User Key  (r) = Recorded By, (t) = Taken By, (c) = Cosigned By      Initials Name Provider Type    JDroota Amador, ANITHA/L, CSRS Occupational Therapist                   Obj/Interventions    No documentation.                  Goals/Plan       Row Name  05/20/24 0813          Transfer Goal 1 (OT)    Activity/Assistive Device (Transfer Goal 1, OT) --  -JJ     [place] Level/Cues Needed (Transfer Goal 1, OT) --  -JJ     Time Frame (Transfer Goal 1, OT) --  -JJ     Strategies/Barriers (Transfers Goal 1, OT) --  -JJ     Progress/Outcome (Transfer Goal 1, OT) --  -JJ       Row Name 05/20/24 0813          Bathing Goal 1 (OT)    Activity/Device (Bathing Goal 1, OT) --  -JJ     [place] Level/Cues Needed (Bathing Goal 1, OT) --  -JJ     Time Frame (Bathing Goal 1, OT) --  -JJ     Progress/Outcomes (Bathing Goal 1, OT) --  -J       Row Name 05/20/24 0813          Dressing Goal 1 (OT)    Activity/Device (Dressing Goal 1, OT) --  -JJ     [place]/Cues Needed (Dressing Goal 1, OT) --  -JJ     Time Frame (Dressing Goal 1, OT) --  -JJ     Progress/Outcome (Dressing Goal 1, OT) --  -JJ       Row Name 05/20/24 0813          Toileting Goal 1 (OT)    Activity/Device (Toileting Goal 1, OT) --  -JJ     [place] Level/Cues Needed (Toileting Goal 1, OT) --  -JJ     Time Frame (Toileting Goal 1, OT) --  -JJ     Progress/Outcome (Toileting Goal 1, OT) --  -JJ               User Key  (r) = Recorded By, (t) = Taken By, (c) = Cosigned By      Initials Name Provider Type    Dorota Cortez, OTR/L, CSRS Occupational Therapist                   Clinical Impression       Row Name 05/20/24 0813          Pain Assessment    Pretreatment Pain Rating 0/10 - no pain  -JJ     Posttreatment Pain Rating 6/10  -JJ     Pain Location - head  -JJ     Pain Intervention(s) Medication (See MAR);Repositioned;Ambulation/increased activity  -JJ       Row Name 05/20/24 0813          Plan of Care Review    Plan of Care Reviewed With patient;family  -JJ     Progress improving  -JJ     Outcome Evaluation OT tx completed. Pt presents alert and oriented x4, sitting up in bed eating breakfast. She was able to complete all self feeding skills independently. Came to sitting at EOB with Sup and HOB  elevated. She attempted to don socks while seated at EOB. Pt was unable to bring ankle to opposite knee or bend over at hips to don socks, ultimately required Mod A to perform task. Completed sit <> stand t/fs with SBA-CGA and amb short distance to bsc. She performed perineal hygiene with set-up and CGA. Required Min A to don new brief and clothing mgt. She amb back to bed with CGA and Sup-CGA for bed mobility. Left sitting up in bed at end of session. Continue OT POC  -JJ       Row Name 05/20/24 0813          Therapy Plan Review/Discharge Plan (OT)    Anticipated Discharge Disposition (OT) home with assist  -J       Row Name 05/20/24 0813          Positioning and Restraints    Pre-Treatment Position in bed  -JJ     Post Treatment Position bed  -JJ     In Bed notified nsg;fowlers;call light within reach;encouraged to call for assist;side rails up x2;with family/caregiver;with nsg  -JJ               User Key  (r) = Recorded By, (t) = Taken By, (c) = Cosigned By      Initials Name Provider Type    Dorota Cortez, OTR/L, CSRS Occupational Therapist                   Outcome Measures       Row Name 05/20/24 1030 05/20/24 0800       How much help from another person do you currently need...    Turning from your back to your side while in flat bed without using bedrails? 3  -SJ 3  -CH    Moving from lying on back to sitting on the side of a flat bed without bedrails? 3  -SJ 3  -CH    Moving to and from a bed to a chair (including a wheelchair)? 3  -SJ 3  -CH    Standing up from a chair using your arms (e.g., wheelchair, bedside chair)? 3  -SJ 3  -CH    Climbing 3-5 steps with a railing? 3  -SJ 3  -CH    To walk in hospital room? 3  -SJ 3  -CH    AM-PAC 6 Clicks Score (PT) 18  -SJ 18  -CH    Highest Level of Mobility Goal 6 --> Walk 10 steps or more  -SJ 6 --> Walk 10 steps or more  -CH      Row Name 05/20/24 0000          How much help from another person do you currently need...    Turning from your back to your  side while in flat bed without using bedrails? 3  -TS     Moving from lying on back to sitting on the side of a flat bed without bedrails? 3  -TS     Moving to and from a bed to a chair (including a wheelchair)? 3  -TS     Standing up from a chair using your arms (e.g., wheelchair, bedside chair)? 3  -TS     Climbing 3-5 steps with a railing? 3  -TS     To walk in hospital room? 3  -TS     AM-PAC 6 Clicks Score (PT) 18  -TS     Highest Level of Mobility Goal 6 --> Walk 10 steps or more  -TS               User Key  (r) = Recorded By, (t) = Taken By, (c) = Cosigned By      Initials Name Provider Type    CH Celena Lopez RN Registered Nurse    Renee Easton LPN Licensed Nurse    Kranthi Shetty RN Registered Nurse                    Occupational Therapy Education       Title: PT OT SLP Therapies (In Progress)       Topic: Occupational Therapy (In Progress)       Point: ADL training (Done)       Description:   Instruct learner(s) on proper safety adaptation and remediation techniques during self care or transfers.   Instruct in proper use of assistive devices.                  Learning Progress Summary             Patient Acceptance, E, VU by MAURO at 5/18/2024 1154                         Point: Home exercise program (Not Started)       Description:   Instruct learner(s) on appropriate technique for monitoring, assisting and/or progressing therapeutic exercises/activities.                  Learner Progress:  Not documented in this visit.              Point: Precautions (Done)       Description:   Instruct learner(s) on prescribed precautions during self-care and functional transfers.                  Learning Progress Summary             Patient Acceptance, E, VU by MAURO at 5/18/2024 1153                         Point: Body mechanics (Not Started)       Description:   Instruct learner(s) on proper positioning and spine alignment during self-care, functional mobility activities and/or exercises.                   Learner Progress:  Not documented in this visit.                              User Key       Initials Effective Dates Name Provider Type Discipline     07/11/23 -  Dorota Villagran OTR/L, CHELSEA Occupational Therapist OT                  OT Recommendation and Plan  Planned Therapy Interventions (OT): BADL retraining, functional balance retraining, occupation/activity based interventions, transfer/mobility retraining, patient/caregiver education/training  Therapy Frequency (OT): 3 times/wk  Plan of Care Review  Plan of Care Reviewed With: patient, family  Progress: improving  Outcome Evaluation: OT tx completed. Pt presents alert and oriented x4, sitting up in bed eating breakfast. She was able to complete all self feeding skills independently. Came to sitting at EOB with Sup and HOB elevated. She attempted to don socks while seated at EOB. Pt was unable to bring ankle to opposite knee or bend over at hips to don socks, ultimately required Mod A to perform task. Completed sit <> stand t/fs with SBA-CGA and amb short distance to bsc. She performed perineal hygiene with set-up and CGA. Required Min A to don new brief and clothing mgt. She amb back to bed with CGA and Sup-CGA for bed mobility. Left sitting up in bed at end of session. Continue OT POC     Time Calculation:         Time Calculation- OT       Row Name 05/20/24 0813             Time Calculation- OT    OT Start Time 0813  -JJ      OT Stop Time 0836  -JJ      OT Time Calculation (min) 23 min  -JJ      Total Timed Code Minutes- OT 23 minute(s)  -JJ      OT Received On 05/20/24  -JJ         Timed Charges    00631 - OT Self Care/Mgmt Minutes 23  -JJ         Total Minutes    Timed Charges Total Minutes 23  -JJ       Total Minutes 23  -JJ                User Key  (r) = Recorded By, (t) = Taken By, (c) = Cosigned By      Initials Name Provider Type     Dorota Villagran OTR/L, CHELSEA Occupational Therapist                  Therapy Charges for Today        Code Description Service Date Service Provider Modifiers Qty    79210015566 HC OT SELF CARE/MGMT/TRAIN EA 15 MIN 5/20/2024 Dorota Villagran OTR/L, CSRS GO 2                 ANITHA Hughes/L, CSRS  5/20/2024

## 2024-05-20 NOTE — THERAPY TREATMENT NOTE
Acute Care - Physical Therapy Treatment Note  [facility]     Patient Name: Zina Armando  : 1990  MRN: [id number]  Today's Date: 2024      Visit Dx:     ICD-10-CM ICD-9-CM   1. Impaired mobility [Z74.09]  Z74.09 799.89   2. Cerebellar mass  G93.89 348.89     Patient Active Problem List   Diagnosis    Cerebellar mass     Past Medical History:   Diagnosis Date    Cerebellar mass 2024    Miscarriage 2024    Miscarriage 2023    Personal history of COVID-19      Past Surgical History:   Procedure Laterality Date    CRANIOTOMY N/A 2024    Procedure: CRANIOTOMY SUBOCCIPITAL WITH STEALTH, LUMBAR DRAIN INSERTION EXTERNAL;  Surgeon: Rohith Alexander MD;  Location: [facility];  Service: Neurosurgery;  Laterality: N/A;    LUMBAR DRAIN INSERTION EXTERNAL N/A 2024    Procedure: LUMBAR DRAIN INSERTION EXTERNAL;  Surgeon: Rohith Alexander MD;  Location: [facility] OR;  Service: Neurosurgery;  Laterality: N/A;    NO PAST SURGERIES       PT Assessment (Last 12 Hours)       PT Evaluation and Treatment       Row Name 24          Physical Therapy Time and Intention    Subjective Information no complaints  -TB     Document Type therapy note (daily note)  -TB     Mode of Treatment physical therapy  -TB       Row Name 24          General Information    Existing Precautions/Restrictions fall  Lumbar Drain  -TB       Row Name 24          Bed Mobility    Bed Mobility scooting/bridging;sidelying-sit;sit-sidelying  -TB     Scooting/Bridging [place] (Bed Mobility) minimum assist (75% patient effort);verbal cues  -TB     Supine-Sit [place] (Bed Mobility) standby assist  HOB elevated  -TB     Sit-Supine [place] (Bed Mobility) standby assist  -TB     Assistive Device (Bed Mobility) draw sheet;head of bed elevated  -TB       Row Name 24          Transfers    Transfers sit-stand transfer;stand-sit transfer  -TB       Row Name 24           Sit-Stand Transfer    Sit-Stand [place] (Transfers) contact guard;verbal cues  -TB       Row Name 05/20/24 0824          Stand-Sit Transfer    Stand-Sit [place] (Transfers) contact guard;verbal cues  -TB       Row Name 05/20/24 0824          Toilet Transfer    Type (Toilet Transfer) sit-stand;stand-sit  -TB     [place] Level (Toilet Transfer) contact guard;verbal cues  -TB     Assistive Device (Toilet Transfer) commode, bedside without drop arms  -TB       Row Name 05/20/24 0824          Gait/Stairs (Locomotion)    [place] Level (Gait) contact guard;verbal cues  RN w/ lumbar drain  -TB     Distance in Feet (Gait) 200  -TB       Row Name 05/20/24 0824          Balance    Balance Assessment sitting static balance;sitting dynamic balance  -TB     Static Sitting Balance independent  -TB     Dynamic Sitting Balance independent  -TB     Position, Sitting Balance sitting edge of bed  -TB       Row Name             Wound 05/17/24 0932 occipital region Incision    Wound - Properties Group Placement Date: 05/17/24  -VALARIE Placement Time: 0932 -VALARIE Location: occipital region  -VALARIE Primary Wound Type: Incision  -VALARIE    Retired Wound - Properties Group Placement Date: 05/17/24  -VALARIE Placement Time: 0932 -VALARIE Location: occipital region  -VALARIE Primary Wound Type: Incision  -VALARIE    Retired Wound - Properties Group Date first assessed: 05/17/24  -VALARIE Time first assessed: 0932 -VALARIE Location: occipital region  -VALARIE Primary Wound Type: Incision  -VALARIE      Row Name 05/20/24 0824          Positioning and Restraints    Pre-Treatment Position in bed  -TB     Post Treatment Position bed  -TB     In Bed side lying right;with nsg  -TB               User Key  (r) = Recorded By, (t) = Taken By, (c) = Cosigned By      Initials Name Provider Type    Luis Gallardo, RN Registered Nurse    Juma Sims, PTA Physical Therapist Assistant                    Physical Therapy Education       Title: PT OT SLP Therapies (In  Progress)       Topic: Physical Therapy (Done)       Point: Mobility training (Done)       Learning Progress Summary             Patient Acceptance, E,D, DU,VU by  at 5/18/2024 1120    Comment: benefits of PT and POC, amanda for A OOB                         Point: Precautions (Done)       Learning Progress Summary             Patient Acceptance, E,D, DU,VU by  at 5/18/2024 1120    Comment: benefits of PT and POC, amanda for A OOB                                         User Key       Initials Effective Dates Name Provider Type Discipline     02/03/23 -  Channing Reynolds, PT Physical Therapist PT                  PT Recommendation and Plan     Plan of Care Reviewed With: patient, family  Progress: improving  Outcome Evaluation: PT tx complete. Bed mob Min-Mod to EOB. Sitting balance Supervision. Stood CGA. Amb 200' w/ CGA 1-2 (nursing w/ lumbar drain). Pt amb w/ good even gait speed. CGA for tf to BSC. Encouraged pt to sit in chair but she requested the bed. Pt needs Min A w/ LEs getting back into bed and Min A to assist w/ positioning. Will cont POC.   Outcome Measures       Row Name 05/20/24 0824 05/19/24 1020          How much help from another person do you currently need...    Turning from your back to your side while in flat bed without using bedrails? 3  -TB 3  -TB     Moving from lying on back to sitting on the side of a flat bed without bedrails? 3  -TB 3  -TB     Moving to and from a bed to a chair (including a wheelchair)? 4  -TB 3  -TB     Standing up from a chair using your arms (e.g., wheelchair, bedside chair)? 4  -TB 3  -TB     Climbing 3-5 steps with a railing? 3  -TB 3  -TB     To walk in hospital room? 3  -TB 3  -TB     AM-PAC 6 Clicks Score (PT) 20  -TB 18  -TB     Highest Level of Mobility Goal 6 --> Walk 10 steps or more  -TB 6 --> Walk 10 steps or more  -TB        Functional Assessment    Outcome Measure Options AM-PAC 6 Clicks Basic Mobility (PT)  -TB AM-PAC 6 Clicks Basic Mobility (PT)  -TB                User Key  (r) = Recorded By, (t) = Taken By, (c) = Cosigned By      Initials Name Provider Type    TB Juma Nation PTA Physical Therapist Assistant                     Time Calculation:    PT Charges       Row Name 05/20/24 1321             Time Calculation    Start Time 0824  -TB      Stop Time 0836  -TB      Time Calculation (min) 12 min  -TB      PT Received On 05/20/24  -TB         Time Calculation- PT    Total Timed Code Minutes- PT 12 minute(s)  -TB                User Key  (r) = Recorded By, (t) = Taken By, (c) = Cosigned By      Initials Name Provider Type    TB Juma Nation, PTA Physical Therapist Assistant                  Therapy Charges for Today       Code Description Service Date Service Provider Modifiers Qty    07148701837 HC GAIT TRAINING EA 15 MIN 5/19/2024 Juma Nation, JUAN GP 2    67066820788 HC GAIT TRAINING EA 15 MIN 5/19/2024 Juma Nation, JUAN GP 2    52041304260 HC GAIT TRAINING EA 15 MIN 5/20/2024 Juma Nation, PTA GP 1            PT G-Codes  Outcome Measure Options: AM-PAC 6 Clicks Basic Mobility (PT)  AM-PAC 6 Clicks Score (PT): 18  AM-PAC 6 Clicks Score (OT): 21    Juma Nation PTA  5/20/2024

## 2024-05-21 ENCOUNTER — READMISSION MANAGEMENT (OUTPATIENT)
Dept: CALL CENTER | Facility: HOSPITAL | Age: 34
End: 2024-05-21
Payer: MEDICAID

## 2024-05-21 VITALS
DIASTOLIC BLOOD PRESSURE: 72 MMHG | HEART RATE: 79 BPM | WEIGHT: 208.56 LBS | SYSTOLIC BLOOD PRESSURE: 121 MMHG | OXYGEN SATURATION: 98 % | RESPIRATION RATE: 18 BRPM | HEIGHT: 59 IN | TEMPERATURE: 98.8 F | BODY MASS INDEX: 42.04 KG/M2

## 2024-05-21 LAB
ANION GAP SERPL CALCULATED.3IONS-SCNC: 7 MMOL/L (ref 5–15)
BASOPHILS # BLD AUTO: 0.02 10*3/MM3 (ref 0–0.2)
BASOPHILS NFR BLD AUTO: 0.2 % (ref 0–1.5)
BUN SERPL-MCNC: 14 MG/DL (ref 6–20)
BUN/CREAT SERPL: 35.9 (ref 7–25)
CALCIUM SPEC-SCNC: 8.6 MG/DL (ref 8.6–10.5)
CHLORIDE SERPL-SCNC: 103 MMOL/L (ref 98–107)
CO2 SERPL-SCNC: 28 MMOL/L (ref 22–29)
CREAT SERPL-MCNC: 0.39 MG/DL (ref 0.57–1)
DEPRECATED RDW RBC AUTO: 45.1 FL (ref 37–54)
EGFRCR SERPLBLD CKD-EPI 2021: 135 ML/MIN/1.73
EOSINOPHIL # BLD AUTO: 0.02 10*3/MM3 (ref 0–0.4)
EOSINOPHIL NFR BLD AUTO: 0.2 % (ref 0.3–6.2)
ERYTHROCYTE [DISTWIDTH] IN BLOOD BY AUTOMATED COUNT: 14.3 % (ref 12.3–15.4)
GLUCOSE BLDC GLUCOMTR-MCNC: 104 MG/DL (ref 70–130)
GLUCOSE BLDC GLUCOMTR-MCNC: 88 MG/DL (ref 70–130)
GLUCOSE SERPL-MCNC: 97 MG/DL (ref 65–99)
HCT VFR BLD AUTO: 37.3 % (ref 34–46.6)
HGB BLD-MCNC: 11.8 G/DL (ref 12–15.9)
IMM GRANULOCYTES # BLD AUTO: 0.1 10*3/MM3 (ref 0–0.05)
IMM GRANULOCYTES NFR BLD AUTO: 0.8 % (ref 0–0.5)
LYMPHOCYTES # BLD AUTO: 1.89 10*3/MM3 (ref 0.7–3.1)
LYMPHOCYTES NFR BLD AUTO: 15.8 % (ref 19.6–45.3)
MCH RBC QN AUTO: 27.6 PG (ref 26.6–33)
MCHC RBC AUTO-ENTMCNC: 31.6 G/DL (ref 31.5–35.7)
MCV RBC AUTO: 87.4 FL (ref 79–97)
MONOCYTES # BLD AUTO: 0.82 10*3/MM3 (ref 0.1–0.9)
MONOCYTES NFR BLD AUTO: 6.9 % (ref 5–12)
NEUTROPHILS NFR BLD AUTO: 76.1 % (ref 42.7–76)
NEUTROPHILS NFR BLD AUTO: 9.09 10*3/MM3 (ref 1.7–7)
NRBC BLD AUTO-RTO: 0 /100 WBC (ref 0–0.2)
PLATELET # BLD AUTO: 216 10*3/MM3 (ref 140–450)
PMV BLD AUTO: 9.5 FL (ref 6–12)
POTASSIUM SERPL-SCNC: 4.3 MMOL/L (ref 3.5–5.2)
RBC # BLD AUTO: 4.27 10*6/MM3 (ref 3.77–5.28)
SODIUM SERPL-SCNC: 138 MMOL/L (ref 136–145)
WBC NRBC COR # BLD AUTO: 11.94 10*3/MM3 (ref 3.4–10.8)

## 2024-05-21 PROCEDURE — 99024 POSTOP FOLLOW-UP VISIT: CPT | Performed by: NURSE PRACTITIONER

## 2024-05-21 PROCEDURE — 80048 BASIC METABOLIC PNL TOTAL CA: CPT | Performed by: NURSE PRACTITIONER

## 2024-05-21 PROCEDURE — 25010000002 SODIUM CHLORIDE 0.9 % WITH KCL 20 MEQ 20-0.9 MEQ/L-% SOLUTION: Performed by: NURSE PRACTITIONER

## 2024-05-21 PROCEDURE — 97116 GAIT TRAINING THERAPY: CPT

## 2024-05-21 PROCEDURE — 85025 COMPLETE CBC W/AUTO DIFF WBC: CPT | Performed by: NURSE PRACTITIONER

## 2024-05-21 PROCEDURE — 82948 REAGENT STRIP/BLOOD GLUCOSE: CPT

## 2024-05-21 PROCEDURE — 97535 SELF CARE MNGMENT TRAINING: CPT

## 2024-05-21 PROCEDURE — 97530 THERAPEUTIC ACTIVITIES: CPT

## 2024-05-21 PROCEDURE — 25010000002 HEPARIN (PORCINE) PER 1000 UNITS: Performed by: NURSE PRACTITIONER

## 2024-05-21 RX ORDER — DEXAMETHASONE 1 MG
TABLET ORAL
Qty: 10 TABLET | Refills: 0 | Status: SHIPPED | OUTPATIENT
Start: 2024-05-21 | End: 2024-05-26

## 2024-05-21 RX ORDER — HYDROCODONE BITARTRATE AND ACETAMINOPHEN 5; 325 MG/1; MG/1
1 TABLET ORAL EVERY 6 HOURS
Qty: 24 TABLET | Refills: 0 | Status: SHIPPED | OUTPATIENT
Start: 2024-05-21 | End: 2024-05-28 | Stop reason: SDUPTHER

## 2024-05-21 RX ORDER — BENZONATATE 200 MG/1
200 CAPSULE ORAL 3 TIMES DAILY PRN
Qty: 90 CAPSULE | Refills: 0 | Status: SHIPPED | OUTPATIENT
Start: 2024-05-21

## 2024-05-21 RX ORDER — CYCLOBENZAPRINE HCL 5 MG
5 TABLET ORAL 3 TIMES DAILY PRN
Qty: 60 TABLET | Refills: 0 | Status: SHIPPED | OUTPATIENT
Start: 2024-05-21

## 2024-05-21 RX ORDER — LEVETIRACETAM 500 MG/1
500 TABLET ORAL EVERY 12 HOURS SCHEDULED
Qty: 20 TABLET | Refills: 0 | Status: SHIPPED | OUTPATIENT
Start: 2024-05-21 | End: 2024-05-28 | Stop reason: SDUPTHER

## 2024-05-21 RX ORDER — BUTALBITAL, ACETAMINOPHEN AND CAFFEINE 50; 325; 40 MG/1; MG/1; MG/1
1 TABLET ORAL EVERY 6 HOURS PRN
Qty: 12 TABLET | Refills: 0 | Status: SHIPPED | OUTPATIENT
Start: 2024-05-21 | End: 2024-05-24

## 2024-05-21 RX ORDER — AMOXICILLIN 250 MG
2 CAPSULE ORAL 2 TIMES DAILY
Qty: 60 TABLET | Refills: 0 | Status: SHIPPED | OUTPATIENT
Start: 2024-05-21

## 2024-05-21 RX ORDER — GABAPENTIN 300 MG/1
300 CAPSULE ORAL 3 TIMES DAILY
Qty: 60 CAPSULE | Refills: 0 | Status: SHIPPED | OUTPATIENT
Start: 2024-05-21

## 2024-05-21 RX ADMIN — POTASSIUM CHLORIDE AND SODIUM CHLORIDE 75 ML/HR: 900; 150 INJECTION, SOLUTION INTRAVENOUS at 08:34

## 2024-05-21 RX ADMIN — GABAPENTIN 300 MG: 300 CAPSULE ORAL at 08:34

## 2024-05-21 RX ADMIN — POLYETHYLENE GLYCOL 3350 17 G: 17 POWDER, FOR SOLUTION ORAL at 08:34

## 2024-05-21 RX ADMIN — DEXAMETHASONE 2 MG: 2 TABLET ORAL at 08:34

## 2024-05-21 RX ADMIN — HEPARIN SODIUM 5000 UNITS: 5000 INJECTION INTRAVENOUS; SUBCUTANEOUS at 08:34

## 2024-05-21 RX ADMIN — HYDROCODONE BITARTRATE AND ACETAMINOPHEN 1 TABLET: 7.5; 325 TABLET ORAL at 07:30

## 2024-05-21 RX ADMIN — HYDROCODONE BITARTRATE AND ACETAMINOPHEN 1 TABLET: 7.5; 325 TABLET ORAL at 00:44

## 2024-05-21 RX ADMIN — LEVETIRACETAM 500 MG: 500 TABLET, FILM COATED ORAL at 08:34

## 2024-05-21 RX ADMIN — SENNOSIDES AND DOCUSATE SODIUM 2 TABLET: 50; 8.6 TABLET ORAL at 08:34

## 2024-05-21 RX ADMIN — PANTOPRAZOLE SODIUM 40 MG: 40 TABLET, DELAYED RELEASE ORAL at 08:34

## 2024-05-21 NOTE — THERAPY DISCHARGE NOTE
Acute Care - Physical Therapy Discharge Summary  [facility]       Patient Name: Zina Armando  : 1990  MRN: [id number]    Today's Date: 2024                 Admit Date: 2024      PT Recommendation and Plan    Visit Dx:    ICD-10-CM ICD-9-CM   1. Cerebellar mass  G93.89 348.89   2. Impaired mobility [Z74.09]  Z74.09 799.89   3. Status post craniotomy  Z98.890 V45.89        Outcome Measures       Row Name 24 1000 24 0829 24 0824       How much help from another person do you currently need...    Turning from your back to your side while in flat bed without using bedrails? -- 4  -TB 3  -TB    Moving from lying on back to sitting on the side of a flat bed without bedrails? -- 3  -TB 3  -TB    Moving to and from a bed to a chair (including a wheelchair)? -- 4  -TB 4  -TB    Standing up from a chair using your arms (e.g., wheelchair, bedside chair)? -- 4  -TB 4  -TB    Climbing 3-5 steps with a railing? -- 3  -TB 3  -TB    To walk in hospital room? -- 3  -TB 3  -TB    AM-PAC 6 Clicks Score (PT) -- 21  -TB 20  -TB    Highest Level of Mobility Goal -- 6 --> Walk 10 steps or more  -TB 6 --> Walk 10 steps or more  -TB       How much help from another is currently needed...    Putting on and taking off regular lower body clothing? 3  -EC -- --    Bathing (including washing, rinsing, and drying) 3  -EC -- --    Toileting (which includes using toilet bed pan or urinal) 3  -EC -- --    Putting on and taking off regular upper body clothing 4  -EC -- --    Taking care of personal grooming (such as brushing teeth) 4  -EC -- --    Eating meals 4  -EC -- --    AM-PAC 6 Clicks Score (OT) 21  -EC -- --       Functional Assessment    Outcome Measure Options AM-PAC 6 Clicks Daily Activity (OT)  -EC AM-PAC 6 Clicks Basic Mobility (PT)  -TB AM-PAC 6 Clicks Basic Mobility (PT)  -TB      Row Name 24 1020             How much help from another person do you currently need...    Turning from your back  to your side while in flat bed without using bedrails? 3  -TB      Moving from lying on back to sitting on the side of a flat bed without bedrails? 3  -TB      Moving to and from a bed to a chair (including a wheelchair)? 3  -TB      Standing up from a chair using your arms (e.g., wheelchair, bedside chair)? 3  -TB      Climbing 3-5 steps with a railing? 3  -TB      To walk in hospital room? 3  -TB      AM-PAC 6 Clicks Score (PT) 18  -TB      Highest Level of Mobility Goal 6 --> Walk 10 steps or more  -TB         Functional Assessment    Outcome Measure Options AM-PAC 6 Clicks Basic Mobility (PT)  -TB                User Key  (r) = Recorded By, (t) = Taken By, (c) = Cosigned By      Initials Name Provider Type    TB Juma Nation, PTA Physical Therapist Assistant    Tere Young, OTR/L Occupational Therapist                     PT Charges       Row Name 05/21/24 1137             Time Calculation    Start Time 0802  -TB      Stop Time 0829  -TB      Time Calculation (min) 27 min  -TB      PT Received On 05/21/24  -TB         Time Calculation- PT    Total Timed Code Minutes- PT 27 minute(s)  -TB                User Key  (r) = Recorded By, (t) = Taken By, (c) = Cosigned By      Initials Name Provider Type    TB Juma Nation, PTA Physical Therapist Assistant                     PT Rehab Goals       Row Name 05/21/24 1508             Bed Mobility Goal 1 (PT)    Activity/Assistive Device (Bed Mobility Goal 1, PT) bed mobility activities, all  -      Time Frame (Bed Mobility Goal 1, PT) 10 days  -      Progress/Outcomes (Bed Mobility Goal 1, PT) goal met  -         Transfer Goal 1 (PT)    Activity/Assistive Device (Transfer Goal 1, PT) sit-to-stand/stand-to-sit  -      [place] Level/Cues Needed (Transfer Goal 1, PT) independent  -      Time Frame (Transfer Goal 1, PT) 10 days  -      Progress/Outcome (Transfer Goal 1, PT) goal met  -         Gait Training Goal 1 (PT)     Activity/Assistive Device (Gait Training Goal 1, PT) gait (walking locomotion);diminish gait deviation  -AH      [place] Level (Gait Training Goal 1, PT) independent  -AH      Distance (Gait Training Goal 1, PT) 250ft  -AH      Time Frame (Gait Training Goal 1, PT) 10 days  -      Progress/Outcome (Gait Training Goal 1, PT) goal met  -                User Key  (r) = Recorded By, (t) = Taken By, (c) = Cosigned By      Initials Name Provider Type Discipline    Beatriz Godfrey PTA Physical Therapist Assistant PT                        PT Discharge Summary  Reason for Discharge: Discharge from facility  Outcomes Achieved: Refer to plan of care for updates on goals achieved  Discharge Destination: Home      Beatriz Ybarra PTA   5/21/2024

## 2024-05-21 NOTE — DISCHARGE INSTRUCTIONS
[facility]    Postoperative care following brain surgery  Dear Patient,  You have recently undergone brain surgery (suboccipital craniotomy for tumor) and are now ready to go home. These written instructions are intended to help you to recover quickly.  If you have ANY QUESTIONS about your condition prior to discharge please ask Dr. Alexander. In particular, if you have concerns about going home discuss them now. We do not want you to go until you are completely comfortable leaving the hospital.   If you have ANY QUESTIONS about your condition after you go home call your doctor. The number is [phone] which is answered 24 hours a day. During regular working hours a  will connect you to your doctor, one of his partners, or one of our nurses. At night or on weekends the answering service will connect you with the physicianon call. DO NOT HESITATE to call. We want to help you with any problems.     Seizures  Anyone who has brain surgery has a small risk of seizures postoperatively. Seizures may involve involuntary shaking of the arms and legs, loss of consciousness, loss of urinary or bowel control. They typically last a few minutes, then the patient returns to normal. Seizures are frightening to watch, but usually resolve without long-term problems. Your doctor will often attempt to prevent seizures after surgery by putting you on anti-seizure medicine like Dilantin or Keppra. Sometimes seizures occur even when these medicines are used  What to do if a seizure occurs:  If a seizure occurs and the patient does not return to normal in 10 minutes, call your Dr. Alexander or go to the local emergency room.   If multiple seizures occur, call 911.     Neurological Deficit  Neurological deficits are problems with brain function like speech difficulty, weakness, numbness, imbalance, etc. These deficits may be present before or after brain surgery. Prior to discharge Dr. Alexander will make sure  that all treatment needed to help you recover from such deficits has been instituted. He will also make sure that these deficits are stable or improving. After you go home, if you think any of your brain problems are getting worse, not better, it may be a sign of bleeding, infection, or other problems. Call Dr. Alexander. He will order tests and prescribe treatment as needed.    Deep vein thrombosis/ pulmonary embolus  Some patients who undergo surgery develop blood clots in the veins of the legs. These clots can cause pain or swelling in the legs, or may cause no obvious problem. They can break free from the legs and travel to the lungs causing shortness of breath and/or chest pain. If you develop pain or swelling in your legs after surgery, call your doctor. If you develop breathing problems or chest pain after surgery, call 911.    Medication  It is important to take your medication EXACTLY as prescribed. Some patients are reluctant to take pain medication. It is perfectly fine to take pain medication for several weeks after surgery. We want to eliminate pain whenever possible. Many pain medications can cause nausea (sick to your stomach), constipation (inability to poop), or itching. Nausea may be minimized by taking the medication with food. Constipation can be relieved by taking stool softeners and/ or laxatives that you can purchase over the counter as needed.    It is important to realize that no pain after surgery is an unrealistic expectation.  Pain medication will never reduce your pain score to zero.  The goal of pain medicine is to reduce your pain to the point you can move, take care of yourself, and participate in therapy.  Make sure to work with your caregiver to determine what is an adequate level of pain control to promote healthy movement and then take your medication to reach this goal.      You have undergone a Craniotomy (1) surgery which you are expected to recover from over several weeks.      Use of opioids immediately following surgery is often necessary.  Pain after surgery results in decreased quality of life, surgical complications, and prolonged rehabilitation.  Thus in certain situations, the benefits of a limited course of opioids may outweigh the risk.      However, multiple studies have found that patients of all ages frequently take fewer opioid pills than the amount prescribed after common surgeries.  In some cases they do not take any of the prescribed medications at all.  This results in excess opioid pills that are accessible to others, raise a concern for misuse, can be stolen by family members, can result in later addiction, or can often be used in overdose.  Therefore we are going to make every effort to only prescribe as much pain medication as necessary to limit the amount of pills that are left over after you recover.      First-line treatment should include nonopioid analgesics.  Examples of these would be Tylenol or nonsteroidal anti-inflammatories such as ibuprofen or Aleve.  - Tylenol 1000 mg every six hours as needed    Second-line treatment. If the above first-line treatments are insufficient to control your pain you have also been provided a small dose of opioids.  In order to avoid addiction you should take the lowest amount possible.    Type I: Opioid prescription for 3 days or less (every 6 hours).  May consider an additional 3-day course (every 8 hours)    EXAMPLE IF APPLICABLE:  Please taper your pain medications to avoid long term addiction.  Week 1: Take your pain medication no more than ever 6 hours as needed for severe pain.  Week 2: Take your pain medication no more than every 8 hours as needed for severe pain.  Week 3: Take your pain medication no more than every 12 hours as needed for unresolved pain.    These recommendations are based on ...  CDC guidelines for control and prevention of postsurgical pain,   [place] opioid prescribing engagement network (OPEN),    Marily kellogg in [facility] medical directors group prescribing opioids for postoperative pain-supplemental guidance (2018)    Wound Care  Your incision is held together with either staples that need to be removed in 2 weeks or dissolvable sutures that do not need to be removed.     Seventy-two hours after surgery it is OK to get the wound wet, so you can take a shower or bath.     You do not need to put any medication (like Neosporin or Vitamin E) on the wound. Scrubbing the wound should be avoided until the staples nondissolvable sutures come out.     No nicotine products, including second-hand smoke, gum or patches. Nicotine will delay healing and increase the likelihood of a surgical complication. For help quitting, call the Quitline: 1-526.168.4482     Potential wound problems include the following:  Infection--If the wound becomes red, tender, swollen, or warm it may be infected. Infection is often accompanied by fever. If you think your wound might be infected you should call your doctor. Often you can send us a picture of the wound so we can better evaluate it.   Drainage--Fluid should not drain from your wound. If it does, call your doctor. Colored fluid may indicate infection. Clear fluid may indicate leakage of spinal fluid.   Dehiscence--If the wound does not heal properly it may open up along the staple line. This is called dehiscence. Call us immediately.   Sutures--Occasionally, one of the buried threads (sutures) may work through the skin. If you think this has happened call your doctor.   Swelling--Spinal fluid or blood may collect under the skin. This is usually harmless, but needs to be evaluated. Call your doctor.     Hydrocephalus  Your brain manufactures about one quart of clear fluid (called cerebrospinal fluid or CSF) every day. Normally this fluid is reabsorbed into the blood stream. After brain surgery the flow of fluid and the reabsorption process may be impaired. This  leads to a buildup of water on the brain that is called hydrocephalus. Hydrocephalus can cause headache, somnolence, difficulty thinking, imbalance and loss of urinary control. If you think that the patient may have hydrocephalus, call your doctor. She/He will order a brain scan. If it shows water buildup a simple operation called a shunt may be needed to fix the problem.    How to contact your doctor  The neurosurgeon, Dr. Alexander, and her/his team did your surgery and, therefore, are likely to know more about your condition than any other physicians. We are immediately available to help you with any problems after surgery. Please call us for any concerns at the following numbers:   Doctor’s office [phone] (answered 24 hours a day)   [facility]'s  [phone] (alternative emergency number for on-call neurosurgeon)     Specific instructions related to your surgery  Diet: no restrictions, eat a heart healthy diet.   Activity: as tolerated, no heavy lifting or strenuous exercise for at least 2 weeks.  ?  Decadron (dexamethasone): You are being prescribed a steroid to reduce brain swelling called decadron. Please take Pepcid or Zantac while on decadron to prevent gastric irritation and a possible bleeding stomach ulcer. Decadron can also cause your blood glucose (blood sugar) to be elevated. If you normally have problems with high blood glucose or diabetes than please continue to check your levels regularly or follow up with your primary care physician. Decadron can cause rapid weight gain, muscle loss or weakness, depression, and pancreatitis (acute onset of severe stomach pain with nausea and vomitting).     You have been placed on a steroid taper. This medication is intended to help alleviate swelling within your brain. It is important that you take the medication as prescribed in the taper. While you are on steroid medications it is important that you are on an anti-acid (e.g. pepcid)  to prevent a GI ulcer. Steroids tend to raise your blood sugar levels. Therefore, you should follow-up with your primary care physician within one week following discharge to ensure that your glucose levels are not elevated to a harmful extent.    Follow up:   Follow up with [facility] in 2 weeks for post op wound check and with Dr. Alexander in the neurosurgery clinic ([phone]) in 6 weeks.  We will scheduled these appointment(s) for you.

## 2024-05-21 NOTE — PLAN OF CARE
Goal Outcome Evaluation:  Plan of Care Reviewed With: patient        Progress: improving   Pt A/O x4. VSS. Infrequent c/o pain. SBA to BRP. Safety maintained. Call light in reach.  phone used. D/C in progress. D/C information printed in [place].

## 2024-05-21 NOTE — PLAN OF CARE
Goal Outcome Evaluation:  Plan of Care Reviewed With: patient        Progress: improving  Outcome Evaluation: PT tx complete. Bed mob CGA to EOB. Sitting balance Ind. Stood Ind. Amb 400' w/ Supervision. No difficulty. No questions. Reviewed home safety.

## 2024-05-21 NOTE — THERAPY TREATMENT NOTE
Acute Care - Physical Therapy Treatment Note  [facility]     Patient Name: Zina Armando  : 1990  MRN: [id number]  Today's Date: 2024      Visit Dx:     ICD-10-CM ICD-9-CM   1. Cerebellar mass  G93.89 348.89   2. Impaired mobility [Z74.09]  Z74.09 799.89   3. Status post craniotomy  Z98.890 V45.89     Patient Active Problem List   Diagnosis    Cerebellar mass     Past Medical History:   Diagnosis Date    Cerebellar mass 2024    Miscarriage 2024    Miscarriage 2023    Personal history of COVID-19      Past Surgical History:   Procedure Laterality Date    CRANIOTOMY N/A 2024    Procedure: CRANIOTOMY SUBOCCIPITAL WITH STEALTH, LUMBAR DRAIN INSERTION EXTERNAL;  Surgeon: Rohith Alexander MD;  Location: [facility];  Service: Neurosurgery;  Laterality: N/A;    LUMBAR DRAIN INSERTION EXTERNAL N/A 2024    Procedure: LUMBAR DRAIN INSERTION EXTERNAL;  Surgeon: Rohith Alexander MD;  Location: [facility] OR;  Service: Neurosurgery;  Laterality: N/A;    NO PAST SURGERIES       PT Assessment (Last 12 Hours)       PT Evaluation and Treatment       Row Name 24 0802          Physical Therapy Time and Intention    Subjective Information no complaints  -TB     Document Type therapy note (daily note)  -TB     Mode of Treatment physical therapy  -TB       Row Name 24 0802          General Information    Existing Precautions/Restrictions fall  -TB       Row Name 24 0802          Bed Mobility    Bed Mobility scooting/bridging;sidelying-sit;sit-sidelying  -TB     Scooting/Bridging [place] (Bed Mobility) supervision  -TB     Sidelying-Sit [place] (Bed Mobility) contact guard  -TB     Sit-Sidelying [place] (Bed Mobility) supervision  -TB     Assistive Device (Bed Mobility) head of bed elevated  -TB       Row Name 24 0802          Transfers    Transfers sit-stand transfer;stand-sit transfer  -TB       Row Name 24 0802          Sit-Stand Transfer     Sit-Stand [place] (Transfers) independent  -TB       Row Name 05/21/24 0802          Stand-Sit Transfer    Stand-Sit [place] (Transfers) independent  -TB       Row Name 05/21/24 0802          Gait/Stairs (Locomotion)    [place] Level (Gait) supervision  -TB     Distance in Feet (Gait) 400  -TB       Row Name 05/21/24 0802          Balance    Balance Assessment sitting static balance;sitting dynamic balance  -TB     Static Sitting Balance independent  -TB     Dynamic Sitting Balance independent  -TB     Position, Sitting Balance sitting edge of bed  -TB       Row Name             Wound 05/17/24 0932 occipital region Incision    Wound - Properties Group Placement Date: 05/17/24  -VALARIE Placement Time: 0932 -VALARIE Location: occipital region  -VALARIE Primary Wound Type: Incision  -VALARIE    Retired Wound - Properties Group Placement Date: 05/17/24  -VALARIE Placement Time: 0932 -VALARIE Location: occipital region  -VALARIE Primary Wound Type: Incision  -VALARIE    Retired Wound - Properties Group Date first assessed: 05/17/24  -VALARIE Time first assessed: 0932 -VALARIE Location: occipital region  -VALARIE Primary Wound Type: Incision  -VALARIE      Row Name 05/21/24 0802          Plan of Care Review    Plan of Care Reviewed With patient  -TB     Progress improving  -TB     Outcome Evaluation PT tx complete. Bed mob CGA to EOB. Sitting balance Ind. Stood Ind. Amb 400' w/ Supervision. No difficulty. No questions. Reviewed home safety.  -TB       Row Name 05/21/24 0802          Positioning and Restraints    Pre-Treatment Position in bed  -TB     Post Treatment Position bed  -TB     In Bed fowlers;call light within reach;encouraged to call for assist;with family/caregiver;side rails up x2  -TB               User Key  (r) = Recorded By, (t) = Taken By, (c) = Cosigned By      Initials Name Provider Type    Luis Gallardo, RN Registered Nurse    Juma Sims, PTA Physical Therapist Assistant                    Physical Therapy Education        Title: PT OT SLP Therapies (In Progress)       Topic: Physical Therapy (Done)       Point: Mobility training (Done)       Learning Progress Summary             Patient Acceptance, E, VU by AR at 5/21/2024 0058    Acceptance, E,D, DU,VU by  at 5/18/2024 1120    Comment: benefits of PT and POC, amanda for A OOB   Family Acceptance, E, VU by AR at 5/21/2024 0058                         Point: Precautions (Done)       Learning Progress Summary             Patient Acceptance, E,D, DU,VU by  at 5/18/2024 1120    Comment: benefits of PT and POC, amanda for A OOB                                         User Key       Initials Effective Dates Name Provider Type Discipline     02/03/23 -  Channing Reynolds, PT Physical Therapist PT    AR 05/24/22 -  Lety Lees, KEVIN Registered Nurse Nurse                  PT Recommendation and Plan     Plan of Care Reviewed With: patient  Progress: improving  Outcome Evaluation: PT tx complete. Bed mob CGA to EOB. Sitting balance Ind. Stood Ind. Amb 400' w/ Supervision. No difficulty. No questions. Reviewed home safety.   Outcome Measures       Row Name 05/21/24 1000 05/21/24 0829 05/20/24 0824       How much help from another person do you currently need...    Turning from your back to your side while in flat bed without using bedrails? -- 4  -TB 3  -TB    Moving from lying on back to sitting on the side of a flat bed without bedrails? -- 3  -TB 3  -TB    Moving to and from a bed to a chair (including a wheelchair)? -- 4  -TB 4  -TB    Standing up from a chair using your arms (e.g., wheelchair, bedside chair)? -- 4  -TB 4  -TB    Climbing 3-5 steps with a railing? -- 3  -TB 3  -TB    To walk in hospital room? -- 3  -TB 3  -TB    AM-PAC 6 Clicks Score (PT) -- 21  -TB 20  -TB    Highest Level of Mobility Goal -- 6 --> Walk 10 steps or more  -TB 6 --> Walk 10 steps or more  -TB       How much help from another is currently needed...    Putting on and taking off regular lower body clothing? 3  -EC  -- --    Bathing (including washing, rinsing, and drying) 3  -EC -- --    Toileting (which includes using toilet bed pan or urinal) 3  -EC -- --    Putting on and taking off regular upper body clothing 4  -EC -- --    Taking care of personal grooming (such as brushing teeth) 4  -EC -- --    Eating meals 4  -EC -- --    AM-PAC 6 Clicks Score (OT) 21  -EC -- --       Functional Assessment    Outcome Measure Options AM-PAC 6 Clicks Daily Activity (OT)  -EC AM-PAC 6 Clicks Basic Mobility (PT)  -TB AM-PAC 6 Clicks Basic Mobility (PT)  -TB      Row Name 05/19/24 1020             How much help from another person do you currently need...    Turning from your back to your side while in flat bed without using bedrails? 3  -TB      Moving from lying on back to sitting on the side of a flat bed without bedrails? 3  -TB      Moving to and from a bed to a chair (including a wheelchair)? 3  -TB      Standing up from a chair using your arms (e.g., wheelchair, bedside chair)? 3  -TB      Climbing 3-5 steps with a railing? 3  -TB      To walk in hospital room? 3  -TB      AM-PAC 6 Clicks Score (PT) 18  -TB      Highest Level of Mobility Goal 6 --> Walk 10 steps or more  -TB         Functional Assessment    Outcome Measure Options AM-PAC 6 Clicks Basic Mobility (PT)  -TB                User Key  (r) = Recorded By, (t) = Taken By, (c) = Cosigned By      Initials Name Provider Type    TB Juma Nation, PTA Physical Therapist Assistant    EC Tere Gaitan, OTR/L Occupational Therapist                     Time Calculation:    PT Charges       Row Name 05/21/24 1137             Time Calculation    Start Time 0802  -TB      Stop Time 0829  -TB      Time Calculation (min) 27 min  -TB      PT Received On 05/21/24  -TB         Time Calculation- PT    Total Timed Code Minutes- PT 27 minute(s)  -TB                User Key  (r) = Recorded By, (t) = Taken By, (c) = Cosigned By      Initials Name Provider Type    Juma Sims,  JUAN Physical Therapist Assistant                  Therapy Charges for Today       Code Description Service Date Service Provider Modifiers Qty    37798312720 HC GAIT TRAINING EA 15 MIN 5/20/2024 Juma Nation, JUAN GP 1    00138022496 HC GAIT TRAINING EA 15 MIN 5/20/2024 Juma Nation, PTA GP 2    35470690500 HC GAIT TRAINING EA 15 MIN 5/21/2024 Juma Nation, PTA GP 2            PT G-Codes  Outcome Measure Options: AM-PAC 6 Clicks Daily Activity (OT)  AM-PAC 6 Clicks Score (PT): 21  AM-PAC 6 Clicks Score (OT): 21    Juma Nation PTA  5/21/2024

## 2024-05-21 NOTE — PLAN OF CARE
Goal Outcome Evaluation:  Plan of Care Reviewed With: patient        Progress: improving  Outcome Evaluation: OT tx completed. Pt presents in sidelying,  utilized for tx & pt was agreeable to therapy. Pt performed bed mob & donned socks with SBA. She amb around the room and performed dynamic reaching tasks to simulate home ADLs. She demos difficulty with lower level items and states she cannot turn her head well. Educated on compensatory strategies for this to reduce fall risk once home. She was able to perform all aspects of toileting with SBA. Returned to the bed & is planning to d/c home w assist sometime today.      Anticipated Discharge Disposition (OT): home with assist

## 2024-05-21 NOTE — PLAN OF CARE
Goal Outcome Evaluation:  Plan of Care Reviewed With: patient            Outcome Evaluation: Pt A&Ox4, up standby to bathroom, voiding without difficulty. On RA saturating in 90s. Dressing to back CDI, xeroform to posterior head with small draining. Medications given and BG monitored per orders, PRN Norco given with positive effect. Heparin for VTE. LFA IV with NS 20K @75. Spouse at bedside, safety maintained, call light within reach.

## 2024-05-21 NOTE — THERAPY DISCHARGE NOTE
Acute Care - Occupational Therapy Treatment Note/Discharge  [facility]     Patient Name: Zina Armando  : 1990  MRN: [id number]  Today's Date: 2024               Admit Date: 2024       ICD-10-CM ICD-9-CM   1. Cerebellar mass  G93.89 348.89   2. Impaired mobility [Z74.09]  Z74.09 799.89   3. Status post craniotomy  Z98.890 V45.89     Patient Active Problem List   Diagnosis    Cerebellar mass     Past Medical History:   Diagnosis Date    Cerebellar mass 2024    Miscarriage 2024    Miscarriage 2023    Personal history of COVID-19      Past Surgical History:   Procedure Laterality Date    CRANIOTOMY N/A 2024    Procedure: CRANIOTOMY SUBOCCIPITAL WITH STEALTH, LUMBAR DRAIN INSERTION EXTERNAL;  Surgeon: Rohith Alexander MD;  Location: [facility];  Service: Neurosurgery;  Laterality: N/A;    LUMBAR DRAIN INSERTION EXTERNAL N/A 2024    Procedure: LUMBAR DRAIN INSERTION EXTERNAL;  Surgeon: Rohith Alexander MD;  Location: [facility];  Service: Neurosurgery;  Laterality: N/A;    NO PAST SURGERIES         OT ASSESSMENT FLOWSHEET (Last 12 Hours)       OT Evaluation and Treatment       Row Name 24 0924                   OT Time and Intention    Subjective Information no complaints  -EC        Document Type therapy note (daily note)  -EC        Mode of Treatment occupational therapy  -EC           General Information    Patient Profile Reviewed yes  -EC        Existing Precautions/Restrictions fall  -EC           Pain Assessment    Pretreatment Pain Rating 0/10 - no pain  -EC        Posttreatment Pain Rating 0/10 - no pain  -EC        Pre/Posttreatment Pain Comment minor pain in back of head when reaching for lower items  -EC        Pain Intervention(s) Repositioned;Ambulation/increased activity  -EC           Activities of Daily Living    BADL Assessment/Intervention toileting;lower body dressing  -EC           Lower Body Dressing Assessment/Training    [place]  Level (Lower Body Dressing) don;socks;standby assist  -EC        Position (Lower Body Dressing) edge of bed sitting  -EC           Toileting Assessment/Training    [place] Level (Toileting) adjust/manage clothing;perform perineal hygiene;standby assist  -EC        Assistive Devices (Toileting) commode;grab bar/safety frame  -EC        Position (Toileting) unsupported sitting;unsupported standing  -EC           Bed Mobility    Bed Mobility supine-sit;sit-supine  -EC        Supine-Sit [place] (Bed Mobility) standby assist  -EC        Sit-Supine [place] (Bed Mobility) standby assist  -EC           Functional Mobility    Functional Mobility- Ind. Level contact guard assist  -EC        Functional Mobility- Comment amb in room & to BR  -EC           Transfer Assessment/Treatment    Transfers sit-stand transfer;stand-sit transfer;toilet transfer  -EC           Sit-Stand Transfer    Sit-Stand [place] (Transfers) independent  -EC           Stand-Sit Transfer    Stand-Sit [place] (Transfers) independent  -EC           Toilet Transfer    Type (Toilet Transfer) sit-stand;stand-sit  -EC        [place] Level (Toilet Transfer) standby assist  -EC        Assistive Device (Toilet Transfer) commode;grab bars/safety frame  -EC           Balance    Balance Interventions dynamic reaching;occupation based/functional task;standing;moderate challenge  -EC        Comment, Balance difficulty reaching lower level items  -EC           Wound 05/17/24 0932 occipital region Incision    Wound - Properties Group Placement Date: 05/17/24  -VALARIE Placement Time: 0932 -JB Location: occipital region  -VALARIE Primary Wound Type: Incision  -VALARIE    Retired Wound - Properties Group Placement Date: 05/17/24  -VALARIE Placement Time: 0932 -JB Location: occipital region  -VALARIE Primary Wound Type: Incision  -VALARIE    Retired Wound - Properties Group Date first assessed: 05/17/24  -VALARIE Time first assessed: 0932 -JB Location: occipital region  -VALARIE  Primary Wound Type: Incision  -VALARIE       Plan of Care Review    Plan of Care Reviewed With patient  -EC        Progress improving  -EC        Outcome Evaluation OT tx completed. Pt presents in sidelying,  utilized for tx & pt was agreeable to therapy. Pt performed bed mob & donned socks with SBA. She amb around the room and performed dynamic reaching tasks to simulate home ADLs. She demos difficulty with lower level items and states she cannot turn her head well. Educated on compensatory strategies for this to reduce fall risk once home. She was able to perform all aspects of toileting with SBA. Returned to the bed & is planning to d/c home w assist sometime today.  -EC                  User Key  (r) = Recorded By, (t) = Taken By, (c) = Cosigned By      Initials Name Effective Dates    VALARIE Lius Hernandez RN 02/17/22 -     EC Tere Gaitan OTR/L 10/13/23 -                     Occupational Therapy Education       Title: PT OT SLP Therapies (In Progress)       Topic: Occupational Therapy (In Progress)       Point: ADL training (Done)       Description:   Instruct learner(s) on proper safety adaptation and remediation techniques during self care or transfers.   Instruct in proper use of assistive devices.                  Learning Progress Summary             Patient Acceptance, E, VU by EC at 5/21/2024 1041    Acceptance, E, VU by MAURO at 5/18/2024 1153                         Point: Home exercise program (Not Started)       Description:   Instruct learner(s) on appropriate technique for monitoring, assisting and/or progressing therapeutic exercises/activities.                  Learner Progress:  Not documented in this visit.              Point: Precautions (Done)       Description:   Instruct learner(s) on prescribed precautions during self-care and functional transfers.                  Learning Progress Summary             Patient Acceptance, E, VU by EC at 5/21/2024 1041    Acceptance, E, VU by MAURO at  5/18/2024 1153                         Point: Body mechanics (Done)       Description:   Instruct learner(s) on proper positioning and spine alignment during self-care, functional mobility activities and/or exercises.                  Learning Progress Summary             Patient Acceptance, E, VU by EC at 5/21/2024 1041    Acceptance, E, VU by AR at 5/21/2024 0058   Family Acceptance, E, VU by AR at 5/21/2024 0058                                         User Key       Initials Effective Dates Name Provider Type Discipline     07/11/23 -  Dorota Villagran OTR/L, CSRS Occupational Therapist OT    AR 05/24/22 -  Lety Lees, RN Registered Nurse Nurse     10/13/23 -  Tere Gaitan OTR/L Occupational Therapist OT                    OT Recommendation and Plan     Plan of Care Review  Plan of Care Reviewed With: patient  Progress: improving  Outcome Evaluation: OT tx completed. Pt presents in sidelying,  utilized for tx & pt was agreeable to therapy. Pt performed bed mob & donned socks with SBA. She amb around the room and performed dynamic reaching tasks to simulate home ADLs. She demos difficulty with lower level items and states she cannot turn her head well. Educated on compensatory strategies for this to reduce fall risk once home. She was able to perform all aspects of toileting with SBA. Returned to the bed & is planning to d/c home w assist sometime today.  Plan of Care Reviewed With: patient  Outcome Evaluation: OT tx completed. Pt presents in sidelying,  utilized for tx & pt was agreeable to therapy. Pt performed bed mob & donned socks with SBA. She amb around the room and performed dynamic reaching tasks to simulate home ADLs. She demos difficulty with lower level items and states she cannot turn her head well. Educated on compensatory strategies for this to reduce fall risk once home. She was able to perform all aspects of toileting with SBA. Returned to the bed & is planning to  d/c home w assist sometime today.      OT Rehab Goals       Row Name 05/21/24 1000             Transfer Goal 1 (OT)    Activity/Assistive Device (Transfer Goal 1, OT) toilet;tub  -EC      [place] Level/Cues Needed (Transfer Goal 1, OT) independent  -EC      Time Frame (Transfer Goal 1, OT) long term goal (LTG);by discharge  -EC      Progress/Outcome (Transfer Goal 1, OT) goal not met  -EC         Bathing Goal 1 (OT)    Activity/Device (Bathing Goal 1, OT) bathing skills, all  -EC      [place] Level/Cues Needed (Bathing Goal 1, OT) modified independence  -EC      Time Frame (Bathing Goal 1, OT) long term goal (LTG);by discharge  -EC      Progress/Outcomes (Bathing Goal 1, OT) goal not met  -EC         Dressing Goal 1 (OT)    Activity/Device (Dressing Goal 1, OT) dressing skills, all  -EC      [place]/Cues Needed (Dressing Goal 1, OT) modified independence  -EC      Time Frame (Dressing Goal 1, OT) long term goal (LTG);by discharge  -EC      Progress/Outcome (Dressing Goal 1, OT) goal partially met  -EC         Toileting Goal 1 (OT)    Activity/Device (Toileting Goal 1, OT) toileting skills, all  -EC      [place] Level/Cues Needed (Toileting Goal 1, OT) modified independence  -EC      Time Frame (Toileting Goal 1, OT) long term goal (LTG);by discharge  -EC      Progress/Outcome (Toileting Goal 1, OT) goal partially met  -EC                User Key  (r) = Recorded By, (t) = Taken By, (c) = Cosigned By      Initials Name Provider Type Discipline    EC Tere Gaitan, OTR/L Occupational Therapist OT                     Outcome Measures       Row Name 05/21/24 1000 05/20/24 0824 05/19/24 1020       How much help from another person do you currently need...    Turning from your back to your side while in flat bed without using bedrails? -- 3  -TB 3  -TB    Moving from lying on back to sitting on the side of a flat bed without bedrails? -- 3  -TB 3  -TB    Moving to and from a bed to a chair (including a  wheelchair)? -- 4  -TB 3  -TB    Standing up from a chair using your arms (e.g., wheelchair, bedside chair)? -- 4  -TB 3  -TB    Climbing 3-5 steps with a railing? -- 3  -TB 3  -TB    To walk in hospital room? -- 3  -TB 3  -TB    AM-PAC 6 Clicks Score (PT) -- 20  -TB 18  -TB    Highest Level of Mobility Goal -- 6 --> Walk 10 steps or more  -TB 6 --> Walk 10 steps or more  -TB       How much help from another is currently needed...    Putting on and taking off regular lower body clothing? 3  -EC -- --    Bathing (including washing, rinsing, and drying) 3  -EC -- --    Toileting (which includes using toilet bed pan or urinal) 3  -EC -- --    Putting on and taking off regular upper body clothing 4  -EC -- --    Taking care of personal grooming (such as brushing teeth) 4  -EC -- --    Eating meals 4  -EC -- --    AM-PAC 6 Clicks Score (OT) 21  -EC -- --       Functional Assessment    Outcome Measure Options AM-PAC 6 Clicks Daily Activity (OT)  -EC AM-PAC 6 Clicks Basic Mobility (PT)  -TB AM-PAC 6 Clicks Basic Mobility (PT)  -TB              User Key  (r) = Recorded By, (t) = Taken By, (c) = Cosigned By      Initials Name Provider Type    TB Juma Nation, PTA Physical Therapist Assistant    EC Tere Gaitan, OTR/L Occupational Therapist                    Time Calculation:    Time Calculation- OT       Row Name 05/21/24 0959             Time Calculation- OT    OT Start Time 0930  -EC      OT Stop Time 0959  -EC      OT Time Calculation (min) 29 min  -EC      Total Timed Code Minutes- OT 29 minute(s)  -EC      OT Received On 05/21/24  -EC         Timed Charges    77875 - OT Therapeutic Activity Minutes 14  -EC      69774 - OT Self Care/Mgmt Minutes 15  -EC         Total Minutes    Timed Charges Total Minutes 29  -EC       Total Minutes 29  -EC                User Key  (r) = Recorded By, (t) = Taken By, (c) = Cosigned By      Initials Name Provider Type    EC Tere Gaitan, OTR/L Occupational Therapist                   Timed Therapy Charges  Total Units: 2      Suggested Charges  Total Units: 2      Procedure Name Documented Minutes Units Code    HC OT SELF CARE/MGMT/TRAIN EA 15 MIN 15 1   07479 (CPT®)      HC OT THERAPEUTIC ACT EA 15 MIN 14 1   10676 (CPT®)                 Documented Minutes  Total Minutes: 29      Therapy Provided Minutes    76287 - OT Self Care/Mgmt Minutes 15    12849 - OT Therapeutic Activity Minutes 14                  Therapy Charges for Today       Code Description Service Date Service Provider Modifiers Qty    48386577383 HC OT THERAPEUTIC ACT EA 15 MIN 5/21/2024 Tere Gaitan, OTR/L GO 1    54255178312 HC OT SELF CARE/MGMT/TRAIN EA 15 MIN 5/21/2024 Tere Gaitan OTR/L GO 1                 OT Discharge Summary  Anticipated Discharge Disposition (OT): home with assist  Reason for Discharge: Discharge from facility  Outcomes Achieved: Refer to plan of care for updates on goals achieved  Discharge Destination: Home with assist    Tere Gaitan OTR/RENE  5/21/2024

## 2024-05-21 NOTE — PROGRESS NOTES
"Neurosurgery Daily Progress Note    HPI:  Zina Armando is a 33 y.o. non-English-speaking  female with a significant comorbidity miscarriages x 2. She presents today for continued evaluation with complaints of intermittent dizziness, nausea, and gait/balance dysfunction. Physical exam findings of neurologically intact. 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   #[id number]    Plan:   Neuro: Stable and intact/at baseline  4th Ventricular Epidermoid  4 Days Post-Op Suboccipital Crani and LD placement  LD removed.  No evidence of leak  Max PT/OT  Continue Decadron taper  Postop MRI reviewed a gross total resection  Azetazolamide discontinued    CV: BEN.  No bradycardia  Pulm: BEN on room air  : Voiding spontaneously  FEN: Erica PO.  BUN/creatinine ratio continues to improve  Endocrine: Strict Blood glucose control  GI: PPI  ID: Leukocytosis 2/2 Decradon.    Heme:  DVT prophylaxis  Pain: Well controlled  Dispo: PT/OT   DC home today    Chief complaint:   Headache for 2 months    HPI  Subjective  Doing well    Temp:  [97.8 °F (36.6 °C)-98.8 °F (37.1 °C)] 98.8 °F (37.1 °C)  Heart Rate:  [68-84] 79  Resp:  [16-19] 18  BP: ()/(60-78) 121/72    Output by Drain (mL) 05/20/24 0701 - 05/20/24 1900 05/20/24 1901 - 05/21/24 0700 05/21/24 0701 - 05/21/24 0915 Range Total   Patient has no LDAs of requested type attached.       Objective:  Vital signs: (most recent): Blood pressure 121/72, pulse 79, temperature 98.8 °F (37.1 °C), temperature source Oral, resp. rate 18, height 151 cm (59.45\"), weight 94.6 kg (208 lb 8.9 oz), last menstrual period 04/13/2024, " SpO2 98%, not currently breastfeeding.      Neurologic Exam     Mental Status   Oriented to person, place, and time.   Speech: speech is normal   Level of consciousness: alert    Cranial Nerves     CN III, IV, VI   Pupils are equal, round, and reactive to light.  Extraocular motions are normal.     CN V   Facial sensation intact.     CN VII   Facial expression full, symmetric.     Motor Exam   Right arm pronator drift: absent  Left arm pronator drift: absent    Strength   Right deltoid: 5/5  Left deltoid: 5/5  Right biceps: 5/5  Left biceps: 5/5  Right triceps: 5/5  Left triceps: 5/5  Right iliopsoas: 5/5  Left iliopsoas: 5/5  Right quadriceps: 5/5  Left quadriceps: 5/5  Right gastroc: 5/5  Left gastroc: 5/5    Sensory Exam   Light touch normal.     Incision: C, D, I    Drains:   Urethral Catheter Non-latex;Silicone 16 Fr. (Active)  Daily Indications  Required activity restriction from trauma, surgery, (e.g. unstable spine, fracture, hemodynamics)  05/18/24 0400  Site Assessment  Clean;Skin intact  05/18/24 0400  Collection Container  Standard drainage bag  05/18/24 0400  Securement Method  Securing device  05/18/24 0400  Catheter care complete  Yes  05/18/24 0400  Output (mL)  300 mL  05/18/24 0400     Lumbar Drain (Active)  Drain Status  Clamped  05/18/24 0400  Site Description  Unable to view  05/18/24 0400  Dressing Status  Clean;Dry;Intact  05/18/24 0400  CSF Output (mL)  9 mL  05/18/24 0617    Imaging Results (Last 24 Hours)       ** No results found for the last 24 hours. **          Lab Results (last 24 hours)       Procedure Component Value Units Date/Time    POC Glucose Once [[id number]]  (Normal) Collected: 05/21/24 0732    Specimen: Blood Updated: 05/21/24 0743     Glucose 104 mg/dL      Comment: : [id number] BrodericktonMeter ID: [id number]       Basic Metabolic Panel [[id number]]  (Abnormal) Collected: 05/21/24 0341    Specimen: Blood Updated: 05/21/24 0418     Glucose 97 mg/dL      BUN 14 mg/dL       Creatinine 0.39 mg/dL      Sodium 138 mmol/L      Potassium 4.3 mmol/L      Chloride 103 mmol/L      CO2 28.0 mmol/L      Calcium 8.6 mg/dL      BUN/Creatinine Ratio 35.9     Anion Gap 7.0 mmol/L      eGFR 135.0 mL/min/1.73     Narrative:      GFR Normal >60  Chronic Kidney Disease <60  Kidney Failure <15      CBC & Differential [[id number]]  (Abnormal) Collected: 05/21/24 0341    Specimen: Blood Updated: 05/21/24 0359    Narrative:      The following orders were created for panel order CBC & Differential.  Procedure                               Abnormality         Status                     ---------                               -----------         ------                     CBC Auto Differential[[id number]]        Abnormal            Final result                 Please view results for these tests on the individual orders.    CBC Auto Differential [[id number]]  (Abnormal) Collected: 05/21/24 0341    Specimen: Blood Updated: 05/21/24 0359     WBC 11.94 10*3/mm3      RBC 4.27 10*6/mm3      Hemoglobin 11.8 g/dL      Hematocrit 37.3 %      MCV 87.4 fL      MCH 27.6 pg      MCHC 31.6 g/dL      RDW 14.3 %      RDW-SD 45.1 fl      MPV 9.5 fL      Platelets 216 10*3/mm3      Neutrophil % 76.1 %      Lymphocyte % 15.8 %      Monocyte % 6.9 %      Eosinophil % 0.2 %      Basophil % 0.2 %      Immature Grans % 0.8 %      Neutrophils, Absolute 9.09 10*3/mm3      Lymphocytes, Absolute 1.89 10*3/mm3      Monocytes, Absolute 0.82 10*3/mm3      Eosinophils, Absolute 0.02 10*3/mm3      Basophils, Absolute 0.02 10*3/mm3      Immature Grans, Absolute 0.10 10*3/mm3      nRBC 0.0 /100 WBC     POC Glucose Once [[id number]]  (Normal) Collected: 05/20/24 2011    Specimen: Blood Updated: 05/20/24 2022     Glucose 79 mg/dL      Comment: : [id number] Lety LeesMeter ID: [id number]       POC Glucose Once [[id number]]  (Normal) Collected: 05/20/24 1656    Specimen: Blood Updated: 05/20/24 1706     Glucose 108 mg/dL      Comment:  : [id number] Burgess BrodericktonMeter ID: [id number]       Tissue Pathology Exam [[id number]] Collected: 05/17/24 1101    Specimen: Tissue from Brain, Tissue from Brain Updated: 05/20/24 1642     Note to Patients --     This report may contain a detailed description of human tissue sent by a health care provider to the laboratory for pathologic evaluation. The content of this report is essential for diagnosis and may provide important critical findings. This information may be unfamiliar to patients to review without a medical professional present. It is advised that the patient review this report in the presence of a health care provider who can answer questions and explain the results.       Case Report --     Surgical Pathology Report                         Case: [id number]                                  Authorizing Provider:  Rohith Alexander MD Collected:           05/17/2024 11:01 AM          Ordering Location:     [facility] OR  Received:            05/17/2024 11:07 AM          Pathologist:           Katia Oswald MD                                                         Intraop:               Itzel Sidhu MD                                                        Specimens:   1) - Brain, BRAIN TUMOR FOR FROZEN                                                                  2) - Brain, BRAIN TUMOR FOR PERMANENT                                                       Final Diagnosis --     1,2. Brain tumor, biopsy:  - Squamous epithelium and keratinous debris, consistent with epidermoid cyst.       Intraoperative Consultation --       Specimen: Brain tumor, biopsy  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  Reported to Dr. Alexander on 5/17/2024 at 11:20 CDT by Itzel Sidhu MD.       Gross Description --     1. Brain.  Received fresh for frozen section labeled with the patient name, date of birth, and designated brain tumor.  The specimen consists of a  "Telfa pad with fragments of gray-white, soft friable material.  A crush prep slide is made and submitted for H&E stain.  Representative sections of the remaining material is submitted for frozen section.  The frozen section remnant is wrapped in lens paper and submitted in block 1A.  The remaining material is wrapped in lens paper and submitted in block 1B.    2. Brain.   Received in a formalin filled container labeled with the patient's name, date of birth, and \"brain tumor\".  The specimen consists of multiple Telfa pads with multiple fragments of yellow-gray soft and friable material aggregating to 5.1 x 4.3 x 1.3 cm.  Accompanying the friable material is a plate of possible dural tissue versus cyst wall measuring 3.5 x 2.3 cm and averaging 0.2 cm in depth.  The cut surface of the thickened areas within this fragment are yellow-pink with a slight edematous appearance.  The remaining material is friable and has a slight waxy appearance.  Representative sections are wrapped in lens paper and submitted in blocks 2A and 2B.        POC Glucose Once [[id number]]  (Normal) Collected: 05/20/24 1218    Specimen: Blood Updated: 05/20/24 1229     Glucose 121 mg/dL      Comment: : [id number] KBJ CapitaltonMeter ID: [id number]            at 09:15 CDT by TERRENCE Gutiérrez.  Lab Results (last 24 hours)       Procedure Component Value Units Date/Time    POC Glucose Once [[id number]]  (Normal) Collected: 05/21/24 0732    Specimen: Blood Updated: 05/21/24 0743     Glucose 104 mg/dL      Comment: : [id number] GaniparaerstonMeter ID: [id number]       Basic Metabolic Panel [[id number]]  (Abnormal) Collected: 05/21/24 0341    Specimen: Blood Updated: 05/21/24 0418     Glucose 97 mg/dL      BUN 14 mg/dL      Creatinine 0.39 mg/dL      Sodium 138 mmol/L      Potassium 4.3 mmol/L      Chloride 103 mmol/L      CO2 28.0 mmol/L      Calcium 8.6 mg/dL      BUN/Creatinine Ratio 35.9     Anion Gap 7.0 mmol/L      eGFR 135.0 " mL/min/1.73     Narrative:      GFR Normal >60  Chronic Kidney Disease <60  Kidney Failure <15      CBC & Differential [[id number]]  (Abnormal) Collected: 05/21/24 0341    Specimen: Blood Updated: 05/21/24 0359    Narrative:      The following orders were created for panel order CBC & Differential.  Procedure                               Abnormality         Status                     ---------                               -----------         ------                     CBC Auto Differential[[id number]]        Abnormal            Final result                 Please view results for these tests on the individual orders.    CBC Auto Differential [[id number]]  (Abnormal) Collected: 05/21/24 0341    Specimen: Blood Updated: 05/21/24 0359     WBC 11.94 10*3/mm3      RBC 4.27 10*6/mm3      Hemoglobin 11.8 g/dL      Hematocrit 37.3 %      MCV 87.4 fL      MCH 27.6 pg      MCHC 31.6 g/dL      RDW 14.3 %      RDW-SD 45.1 fl      MPV 9.5 fL      Platelets 216 10*3/mm3      Neutrophil % 76.1 %      Lymphocyte % 15.8 %      Monocyte % 6.9 %      Eosinophil % 0.2 %      Basophil % 0.2 %      Immature Grans % 0.8 %      Neutrophils, Absolute 9.09 10*3/mm3      Lymphocytes, Absolute 1.89 10*3/mm3      Monocytes, Absolute 0.82 10*3/mm3      Eosinophils, Absolute 0.02 10*3/mm3      Basophils, Absolute 0.02 10*3/mm3      Immature Grans, Absolute 0.10 10*3/mm3      nRBC 0.0 /100 WBC     POC Glucose Once [[id number]]  (Normal) Collected: 05/20/24 2011    Specimen: Blood Updated: 05/20/24 2022     Glucose 79 mg/dL      Comment: : [id number] Lety SanabriaeyMeter ID: [id number]       POC Glucose Once [[id number]]  (Normal) Collected: 05/20/24 1656    Specimen: Blood Updated: 05/20/24 1706     Glucose 108 mg/dL      Comment: : [id number] Burgess CamposerstonMeter ID: [id number]       Tissue Pathology Exam [[id number]] Collected: 05/17/24 1101    Specimen: Tissue from Brain, Tissue from Brain Updated: 05/20/24 1642     Note to Patients --      This report may contain a detailed description of human tissue sent by a health care provider to the laboratory for pathologic evaluation. The content of this report is essential for diagnosis and may provide important critical findings. This information may be unfamiliar to patients to review without a medical professional present. It is advised that the patient review this report in the presence of a health care provider who can answer questions and explain the results.       Case Report --     Surgical Pathology Report                         Case: [id number]                                  Authorizing Provider:  Rohith Alexander MD Collected:           05/17/2024 11:01 AM          Ordering Location:     [facility] OR  Received:            05/17/2024 11:07 AM          Pathologist:           Katia Oswald MD                                                         Intraop:               Itzel Sidhu MD                                                        Specimens:   1) - Brain, BRAIN TUMOR FOR FROZEN                                                                  2) - Brain, BRAIN TUMOR FOR PERMANENT                                                       Final Diagnosis --     1,2. Brain tumor, biopsy:  - Squamous epithelium and keratinous debris, consistent with epidermoid cyst.       Intraoperative Consultation --       Specimen: Brain tumor, biopsy  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  Reported to Dr. Alexander on 5/17/2024 at 11:20 CDT by Itzel Sidhu MD.       Gross Description --     1. Brain.  Received fresh for frozen section labeled with the patient name, date of birth, and designated brain tumor.  The specimen consists of a Telfa pad with fragments of gray-white, soft friable material.  A crush prep slide is made and submitted for H&E stain.  Representative sections of the remaining material is submitted for frozen section.  The frozen section  "remnant is wrapped in lens paper and submitted in block 1A.  The remaining material is wrapped in lens paper and submitted in block 1B.    2. Brain.   Received in a formalin filled container labeled with the patient's name, date of birth, and \"brain tumor\".  The specimen consists of multiple Telfa pads with multiple fragments of yellow-gray soft and friable material aggregating to 5.1 x 4.3 x 1.3 cm.  Accompanying the friable material is a plate of possible dural tissue versus cyst wall measuring 3.5 x 2.3 cm and averaging 0.2 cm in depth.  The cut surface of the thickened areas within this fragment are yellow-pink with a slight edematous appearance.  The remaining material is friable and has a slight waxy appearance.  Representative sections are wrapped in lens paper and submitted in blocks 2A and 2B.        POC Glucose Once [[id number]]  (Normal) Collected: 05/20/24 1218    Specimen: Blood Updated: 05/20/24 1229     Glucose 121 mg/dL      Comment: : [id number] Escobar KierstonMeter ID: [id number]               "

## 2024-05-21 NOTE — DISCHARGE SUMMARY
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  #[id number]     Hospital Course  Patient is a 33 y.o. female non-English-speaking  female with a significant comorbidity miscarriages x 2. She presents with a new problem of 2-week onset of headache. Physical exam findings of neurologically intact. Their imaging shows midline posterior fossa mass with compression of the fourth ventricle and mild hydrocephalus.     On 5/17/2024 the patient was brought to the main operating room where she underwent an uneventful suboccipital craniotomy for tumor and lumbar drain placement.  Postoperatively she did extremely well and her neurological exam remained unchanged.  She was kept in the hospital for close neurologic monitoring, airway observation, and for pain control.  The lumbar drain has been removed.  She has been able to ambulate, tolerate oral diet, oral pain medications, and void.   She has been evaluated by physical therapy and occupational therapy and deemed safe for discharge home with family.   Postoperative education was performed at bedside which included wound care instructions, maximal physical activity, use of postoperative pain medication including tapering, and indications for contacting the neurosurgical office vs the emergency department.  Arrangements for postoperative follow-up should be provided prior to hospital discharge.  She will be provided with an appropriate course of oral medication for pain control.  Additional information provided in hospital discharge instructions.    Procedures Performed  Procedure(s):  CRANIOTOMY SUBOCCIPITAL WITH STEALTH,  LUMBAR DRAIN INSERTION EXTERNAL  LUMBAR DRAIN INSERTION EXTERNAL       Consults:   Consults       No orders found from 4/18/2024 to 5/18/2024.          Pertinent Test Results:   MRI Brain With & Without Contrast    Result Date: 5/18/2024  1. Status post resection of the heterogeneous hemorrhagic mass within the midline of the posterior cranial fossa involving the vermis. The patient has undergone suboccipital craniotomy. There is edema within the medial aspect of both cerebellar hemispheres postoperatively on FLAIR sequencing. Previously noted mass effect on the fourth ventricle shows marked interval improvement. The ventricles are stable in size and configuration. No transependymal edema present. No evidence of postoperative hemorrhage. 2. Areas of increased signal on diffusion imaging in the posterior cranial fossa as well as adjacent to the frontal horn of both lateral ventricles is related to postoperative pneumocephalus and may represent chemical shift artifact. No evidence of acute ischemia or infarct. Normal flow voids are noted in the [place].   This report was signed and finalized on 5/18/2024 11:17 AM by Dr. Sloan Castellon MD.      CT Head Without Contrast    Result Date: 5/17/2024  1.. Status post suboccipital craniotomy including resection of a portion of the posterior arch of C1. Previously noted partially hemorrhagic hypodense mass in the posterior cranial fossa has been resected. There is some residual hypodensity in the surgical bed. Post operative pneumocephalus as well as air within the ventricular system is demonstrated. There is also air within the soft tissues at the level of the occiput and posterior cervical spine. 2. Ventricles are stable in size and appearance from the previous exam. There is no shift of the midline. No evidence of acute intra or extra-axial hemorrhage.  This report was signed and finalized on 5/17/2024 5:33 PM by Dr. Sloan Castellon MD.      MRI Brain With &  Without Contrast    Result Date: 5/13/2024   1.  3.4 cm extra-axial nonenhancing mass in the fourth ventricle, appearance favoring epidermoid cyst. There is mass effect on the cerebellum and brainstem with minimal FLAIR hyperintensity in the left cerebellar hemisphere and dorsal medulla. No obstructive hydrocephalus.  This report was signed and finalized on 5/13/2024 1:12 PM by Dr Ho Burleson.      XR Chest 1 View    Result Date: 5/7/2024  No acute findings.  This report was signed and finalized on 5/7/2024 6:55 AM by Dr. Rosalino Goodrich MD.      MRI Brain With & Without Contrast    Result Date: 5/6/2024     1. Area of restricted diffusion with associated hemorrhage without contrast enhancement at the midline of the cerebellum involving the vermis likely hemorrhagic infarct. 2. There is associated mass effect on the inferior fourth ventricle with mild communicating hydrocephalus.  This report was signed and finalized on 5/6/2024 9:23 PM by Freddy Dacosta.      CT Head Without Contrast    Result Date: 5/6/2024   1. Area of hypodensity likely edema at the midline of the cerebellum involving the vermis with associated hyperdensity likely due to hemorrhage. This may represent a hemorrhagic cerebellar infarct or possible hemorrhagic mass. MR head without and with contrast is recommended. 2. Mass effect on the median aperture and prominent lateral ventricles for the patient's age and lack of atrophy. Cerebellar tonsils appear to be normal in location.  This report was signed and finalized on 5/6/2024 6:53 PM by Freddy Dacosta.       CBC:   Results from last 7 days   Lab Units 05/21/24  0341 05/20/24 0240 05/19/24 0426 05/18/24  0540   WBC 10*3/mm3 11.94* 17.59* 25.02* 35.99*   HEMOGLOBIN g/dL 11.8* 12.2 12.7 12.9   HEMATOCRIT % 37.3 38.6 39.8 40.3   PLATELETS 10*3/mm3 216 199 224 242     BMP:  Results from last 7 days   Lab Units 05/21/24  0341 05/20/24 0240 05/19/24  0426 05/18/24  0540 05/18/24  0540  "05/17/24  1302 05/17/24  1057 05/17/24  0836   SODIUM mmol/L 138 139 136  --  137  --   --   --    SODIUM, ARTERIAL mmol/L  --   --   --   --   --  138 130* 138   POTASSIUM mmol/L 4.3 4.2 4.1  --  3.6  --   --   --    CHLORIDE mmol/L 103 105 105  --  106  --   --   --    CO2 mmol/L 28.0 26.0 21.0*  --  21.0*  --   --   --    BUN mg/dL 14 14 14  --  20  --   --   --    CREATININE mg/dL 0.39* 0.36* 0.39*  --  0.38*  --   --   --    GLUCOSE mg/dL 97 103* 83   < > 104*  --   --   --    CALCIUM mg/dL 8.6 8.2* 8.1*  --  8.0*  --   --   --     < > = values in this interval not displayed.     Culture Results:   SURGICAL PATHOLOGY RESULTS  Lab Results   Component Value Date    FINALDX  05/17/2024     1,2. Brain tumor, biopsy:  - Squamous epithelium and keratinous debris, consistent with epidermoid cyst.      INTRAOP  05/17/2024       Specimen: Brain tumor, biopsy  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  Reported to Dr. Alexander on 5/17/2024 at 11:20 CDT by Itzel Sidhu MD.      GROSSDES  05/17/2024     1. Brain.  Received fresh for frozen section labeled with the patient name, date of birth, and designated brain tumor.  The specimen consists of a Telfa pad with fragments of gray-white, soft friable material.  A crush prep slide is made and submitted for H&E stain.  Representative sections of the remaining material is submitted for frozen section.  The frozen section remnant is wrapped in lens paper and submitted in block 1A.  The remaining material is wrapped in lens paper and submitted in block 1B.    2. Brain.   Received in a formalin filled container labeled with the patient's name, date of birth, and \"brain tumor\".  The specimen consists of multiple Telfa pads with multiple fragments of yellow-gray soft and friable material aggregating to 5.1 x 4.3 x 1.3 cm.  Accompanying the friable material is a plate of possible dural tissue versus cyst wall measuring 3.5 x 2.3 cm and averaging 0.2 cm in " depth.  The cut surface of the thickened areas within this fragment are yellow-pink with a slight edematous appearance.  The remaining material is friable and has a slight waxy appearance.  Representative sections are wrapped in lens paper and submitted in blocks 2A and 2B.        MICRO  08/31/2019     Sections of the umbilical cord reveal the usual three-vessel anatomy.  There is no evidence of funisitis.    Sections of placental membranes reveal no evidence of acute chorioamnionitis.  Macro phages are seen in the placental membranes containing granular rodriguez tan pigment.  The material in these macro phages demonstrates positive Prussian blue iron staining.  This supports the diagnosis of hemosiderin as the pigment.  The control stains appropriately.    Sections of placental tissue reveal mature chorionic villi compatible with third trimester gestation.  The villi appear adequately vascularized.  There is patchy intervillous fibrin deposition.       Condition on Discharge:  Stable    Vital Signs  Temp:  [97.8 °F (36.6 °C)-98.8 °F (37.1 °C)] 98.8 °F (37.1 °C)  Heart Rate:  [68-84] 79  Resp:  [16-19] 18  BP: ()/(60-78) 121/72    Physical Exam:   Physical Exam  Vitals and nursing note reviewed.   Constitutional:       General: She is not in acute distress.     Appearance: Normal appearance. She is well-developed and well-groomed. She is morbidly obese. She is not ill-appearing, toxic-appearing or diaphoretic.          Comments: BMI 41.49   HENT:      Head: Normocephalic and atraumatic.      Right Ear: Hearing normal.      Left Ear: Hearing normal.   Eyes:      Extraocular Movements: EOM normal.      Conjunctiva/sclera: Conjunctivae normal.      Pupils: Pupils are equal, round, and reactive to light.   Neck:      Trachea: Trachea normal.   Cardiovascular:      Rate and Rhythm: Normal rate and regular rhythm.   Pulmonary:      Effort: Pulmonary effort is normal. No tachypnea, bradypnea, accessory muscle usage or  respiratory distress.   Abdominal:      Palpations: Abdomen is soft.   Musculoskeletal:      Cervical back: Full passive range of motion without pain and neck supple.   Skin:     General: Skin is warm and dry.   Neurological:      Mental Status: She is alert and oriented to person, place, and time.      GCS: GCS eye subscore is 4. GCS verbal subscore is 5. GCS motor subscore is 6.      Gait: Gait is intact.      Deep Tendon Reflexes:      Reflex Scores:       Tricep reflexes are 2+ on the right side and 2+ on the left side.       Bicep reflexes are 2+ on the right side and 2+ on the left side.       Brachioradialis reflexes are 2+ on the right side and 2+ on the left side.       Patellar reflexes are 2+ on the right side and 2+ on the left side.       Achilles reflexes are 2+ on the right side and 2+ on the left side.  Psychiatric:         Speech: Speech normal.         Behavior: Behavior normal. Behavior is cooperative.        Neurologic Exam     Mental Status   Oriented to person, place, and time.   Attention: normal. Concentration: normal.   Speech: speech is normal   Level of consciousness: alert    Cranial Nerves     CN II   Visual fields full to confrontation.     CN III, IV, VI   Pupils are equal, round, and reactive to light.  Extraocular motions are normal.     CN V   Facial sensation intact.     CN VII   Facial expression full, symmetric.     CN VIII   CN VIII normal.     CN IX, X   CN IX normal.     CN XI   CN XI normal.     Motor Exam   Right arm tone: normal  Left arm tone: normal  Right arm pronator drift: absent  Left arm pronator drift: absent  Right leg tone: normal  Left leg tone: normal    Strength   Right deltoid: 5/5  Left deltoid: 5/5  Right biceps: 5/5  Left biceps: 5/5  Right triceps: 5/5  Left triceps: 5/5  Right wrist extension: 5/5  Left wrist extension: 5/5  Right iliopsoas: 5/5  Left iliopsoas: 5/5  Right quadriceps: 5/5  Left quadriceps: 5/5  Right anterior tibial: 5/5  Left anterior  tibial: 5/5  Right gastroc: 5/5  Left gastroc: 5/5  No dysmetria  Right EHL 5/5  Left EHL 5/5       Sensory Exam   Right arm light touch: normal  Left arm light touch: normal  Right leg light touch: normal  Left leg light touch: normal    Gait, Coordination, and Reflexes     Gait  Gait: normal    Tremor   Resting tremor: absent  Intention tremor: absent  Action tremor: absent    Reflexes   Right brachioradialis: 2+  Left brachioradialis: 2+  Right biceps: 2+  Left biceps: 2+  Right triceps: 2+  Left triceps: 2+  Right patellar: 2+  Left patellar: 2+  Right achilles: 2+  Left achilles: 2+  Right plantar: normal  Left plantar: normal  Right Crockett: absent  Left Crockett: absent  Right ankle clonus: absent  Left ankle clonus: absent  Right pendular knee jerk: absent  Left pendular knee jerk: absent    Discharge Disposition  Home or Self Care    Discharge Medications     Discharge Medications        New Medications        Instructions Start Date   benzonatate 200 MG capsule  Commonly known as: TESSALON   200 mg, Oral, 3 Times Daily PRN      butalbital-acetaminophen-caffeine -40 MG per tablet  Commonly known as: FIORICET, ESGIC   1 tablet, Oral, Every 6 Hours PRN      cyclobenzaprine 5 MG tablet  Commonly known as: FLEXERIL   5 mg, Oral, 3 Times Daily PRN      gabapentin 300 MG capsule  Commonly known as: NEURONTIN   300 mg, Oral, 3 Times Daily      HYDROcodone-acetaminophen 5-325 MG per tablet  Commonly known as: NORCO   1 tablet, Oral, Every 6 Hours PRN      levETIRAcetam 500 MG tablet  Commonly known as: KEPPRA   500 mg, Oral, Every 12 Hours Scheduled      sennosides-docusate 8.6-50 MG per tablet  Commonly known as: PERICOLACE   2 tablets, Oral, 2 Times Daily             Changes to Medications        Instructions Start Date   dexAMETHasone 0.5 MG tablet  Commonly known as: DECADRON  What changed:   medication strength  See the new instructions.   Take 4 tablets by mouth Every 12 (Twelve) Hours for 1 day, THEN 2  tablets Every 12 (Twelve) Hours for 2 days, THEN 1 tablet Every 12 (Twelve) Hours for 2 days.   Start Date: May 21, 2024            Continue These Medications        Instructions Start Date   acetaminophen 500 MG tablet  Commonly known as: TYLENOL   1,000 mg, Oral, Every 6 Hours PRN      pantoprazole 40 MG EC tablet  Commonly known as: Protonix   40 mg, Oral, Daily             Stop These Medications      acetaZOLAMIDE 250 MG tablet  Commonly known as: DIAMOX            Discharge Diet:   Diet Instructions       Advance Diet As Tolerated -Target Diet: Regular diet.  Advance as tolerated      Target Diet: Regular diet.  Advance as tolerated           Activity at Discharge:   Activity Instructions       Activity as Tolerated      Bathing Restrictions      Shower daily allowing clean soapy water to cleanse wound.  Do not scrub wounds.  Make all attempts to keep with the hair off incision.  Avoid caps or scarf's.  Do not soak wound.  Please do not apply ointment to incisions.    Type of Restriction: Bathing    Bathing Restrictions: No Tub Bath    Driving Restrictions      Type of Restriction: Driving    Driving Restrictions: No Driving While Taking Narcotics    Gradually Increase Activity Until at Pre-Hospitalization Level      Lifting Restrictions      Type of Restriction: Lifting    Lifting Restrictions: Lifting Restriction (Indicate Limit)    Weight Limit (Pounds): 8    Length of Lifting Restriction: 2-3 WEEKS           Follow-up Appointments  Future Appointments   Date Time Provider Department Center   5/30/2024  9:00 AM [facility] JOEL ACEVEDO [address]  2 [facility]   5/30/2024  9:30 AM Nichol Nova APRN [facility]     Additional Instructions for the Follow-ups that You Need to Schedule       Call MD With Problems / Concerns   As directed      Instructions: CALL WITH ANY NEW OR ADDITIONAL CONCERNS.    Order Comments: Instructions: CALL WITH ANY NEW OR ADDITIONAL CONCERNS.         Discharge Follow-up with  Specified Provider: Dr. Alexander; 6 Weeks   As directed      To: Dr. Alexander   Follow Up: 6 Weeks   Follow Up Details: 6-8 WEEKS        Discharge Follow-up with Specified Provider: TERRENCE Allen; 2 Weeks   As directed      To: TERRENCE Allen   Follow Up: 2 Weeks        MRI Brain With & Without Contrast   Jul 01, 2024      Stealth protocol    Order Comments: Stealth protocol    STEREOTACTIC GUIDANCE PROTOCOL?: No   Will fiducial markers be needed for this procedure?: No   Exam reason: Continued evaluation status post suboccipital craniotomy for tumor   Pregnant?: Unknown   Release to patient: Routine Release              Test Results Pending at Discharge  None     TERRENCE Gutiérrez  05/21/24  09:25 T    [id number]

## 2024-05-22 NOTE — OUTREACH NOTE
Prep Survey      Flowsheet Row Responses   [facility] patient discharged from? [facility]   Is LACE score < 7 ? No   Eligibility Readm Mgmt   Discharge diagnosis Cerebellar masssuboccipital craniotomy for tumor and lumbar drain placement.   Does the patient have one of the following disease processes/diagnoses(primary or secondary)? General Surgery   Does the patient have Home health ordered? No   Is there a DME ordered? No   Prep survey completed? Yes            MAURILIO DUDLEY - Registered Nurse

## 2024-05-28 ENCOUNTER — READMISSION MANAGEMENT (OUTPATIENT)
Dept: CALL CENTER | Facility: HOSPITAL | Age: 34
End: 2024-05-28
Payer: MEDICAID

## 2024-05-28 DIAGNOSIS — Z98.890 STATUS POST CRANIOTOMY: ICD-10-CM

## 2024-05-28 RX ORDER — HYDROCODONE BITARTRATE AND ACETAMINOPHEN 5; 325 MG/1; MG/1
1 TABLET ORAL EVERY 12 HOURS PRN
Qty: 14 TABLET | Refills: 0 | Status: SHIPPED | OUTPATIENT
Start: 2024-05-28

## 2024-05-28 RX ORDER — LEVETIRACETAM 500 MG/1
500 TABLET ORAL EVERY 12 HOURS SCHEDULED
Qty: 40 TABLET | Refills: 0 | Status: SHIPPED | OUTPATIENT
Start: 2024-05-31

## 2024-05-28 NOTE — TELEPHONE ENCOUNTER
Patients family calling in for request on medications. Advised we will not refill Fioricet at this time due to patient not having any headaches currently. Also advised she is done taking steroid dexamethasone. Will need to continue Keppra for 4wks so additional 20 days worth sent into [facility] retail pharmacy per their request. Advised they call me if they have any questions or concerns post operatively. Family appreciative of call

## 2024-05-28 NOTE — OUTREACH NOTE
General Surgery Week 1 Survey      Flowsheet Row Responses   [facility] patient discharged from? [place]   Does the patient have one of the following disease processes/diagnoses(primary or secondary)? General Surgery   Week 1 attempt successful? Yes   Call start time 1534   Call end time 1551   If primary language is not English what is the name and relationship or agency of  used? [employer] #[id number], [employer].   Meds reviewed with patient/caregiver? Yes   Is the patient having any side effects they believe may be caused by any medication additions or changes? No   Does the patient have all medications related to this admission filled (includes all antibiotics, pain medications, etc.) Yes   Is the patient taking all medications as directed (includes completed medication regime)? Yes   Does the patient have a follow up appointment scheduled with their surgeon? Yes   Has the patient kept scheduled appointments due by today? N/A   Comments Surgeon appt 06/04/24.   Has home health visited the patient within 72 hours of discharge? N/A   Psychosocial issues? No   Did the patient receive a copy of their discharge instructions? Yes   Nursing interventions Reviewed instructions with patient   What is the patient's perception of their health status since discharge? Improving   Nursing interventions Nurse provided patient education   Is the patient /caregiver able to teach back basic post-op care? Drive as instructed by MD in discharge instructions, Take showers only when approved by MD-sponge bathe until then, No tub bath, swimming, or hot tub until instructed by MD, Keep incision areas clean,dry and protected, Do not remove steri-strips, Lifting as instructed by MD in discharge instructions   Is the patient/caregiver able to teach back signs and symptoms of incisional infection? Increased redness, swelling or pain at the incisonal site, Increased drainage or bleeding, Incisional warmth, Pus or odor  from incision, Fever   Is the patient/caregiver able to teach back steps to recovery at home? Set small, achievable goals for return to baseline health, Rest and rebuild strength, gradually increase activity, Practice good oral hygiene, Eat a well-balance diet   If the patient is a current smoker, are they able to teach back resources for cessation? Not a smoker   Is the patient/caregiver able to teach back the hierarchy of who to call/visit for symptoms/problems? PCP, Specialist, Home health nurse, Urgent Care, ED, 911 Yes   Additional teach back comments Reviewed s/s of infection and ICP.   Week 1 call completed? Yes   Is the patient interested in additional calls from an ambulatory ? No   Would this patient benefit from a Referral to Amb Social Work? No   Wrap up additional comments Patient states is improving. Denies any s/s of infection. Denies any need or concerns today. States has only 8 Keppra tabs remaining and does not see her surgeon until 06/04/24. Advised to notify surgeon for refill. Voiced understanding. Denies any other needs today.   Call end time [id number]            Mari MARKS - Registered Nurse

## 2024-05-30 ENCOUNTER — OFFICE VISIT (OUTPATIENT)
Age: 34
End: 2024-05-30
Payer: MEDICAID

## 2024-05-30 VITALS
BODY MASS INDEX: 48.37 KG/M2 | SYSTOLIC BLOOD PRESSURE: 110 MMHG | DIASTOLIC BLOOD PRESSURE: 76 MMHG | HEIGHT: 55 IN | WEIGHT: 209 LBS

## 2024-05-30 DIAGNOSIS — G93.89 CEREBELLAR MASS: ICD-10-CM

## 2024-05-30 DIAGNOSIS — Z51.89 FOLLOW-UP VISIT AFTER MISCARRIAGE: Primary | ICD-10-CM

## 2024-05-30 DIAGNOSIS — N96 HISTORY OF RECURRENT MISCARRIAGES: ICD-10-CM

## 2024-05-30 DIAGNOSIS — O03.9 FOLLOW-UP VISIT AFTER MISCARRIAGE: Primary | ICD-10-CM

## 2024-05-30 DIAGNOSIS — Z98.890 STATUS POST CRANIOTOMY: Primary | ICD-10-CM

## 2024-05-30 LAB
B-HCG UR QL: NEGATIVE
EXPIRATION DATE: NORMAL
INTERNAL NEGATIVE CONTROL: NEGATIVE
INTERNAL POSITIVE CONTROL: POSITIVE
Lab: NORMAL

## 2024-05-30 RX ORDER — BUTALBITAL, ACETAMINOPHEN AND CAFFEINE 50; 325; 40 MG/1; MG/1; MG/1
1 TABLET ORAL EVERY 6 HOURS PRN
Qty: 12 TABLET | Refills: 0 | Status: SHIPPED | OUTPATIENT
Start: 2024-05-30

## 2024-05-30 NOTE — TELEPHONE ENCOUNTER
Patients family member calling in for refills on keppra and fioricet. States she is now suffering with some headaches since we last spoke and thinks that worked well for her. Looks like kemendyra is ready to  where we sent it in Tuesday (verified with [facility])     Will pend fioricet script. Patient knows to check with pharmacy after lunch today.

## 2024-05-30 NOTE — PROGRESS NOTES
"Sosa Armando is a 33 y.o. female    History of Present Illness  Patient arrived for a gyne visit for follow-up post miscarriage. She has had surgery for a tumor, so she is still sore from that and experiencing headaches. She has resumed menses and describes it as normal. She has experienced two miscarriages and does desire to conceive.         /76   Ht 59 cm (23.23\")   Wt 94.8 kg (209 lb)   LMP 05/18/2024 (Approximate)   .35 kg/m²     No facility-administered encounter medications on file as of 5/30/2024.     Outpatient Encounter Medications as of 5/30/2024   Medication Sig Dispense Refill    cyclobenzaprine (FLEXERIL) 5 MG tablet Take 1 tablet by mouth 3 (Three) Times a Day As Needed for Muscle Spasms. 60 tablet 0    levETIRAcetam (KEPPRA) 500 MG tablet Take 1 tablet by mouth Every 12 (Twelve) Hours. 40 tablet 0    [DISCONTINUED] acetaminophen (TYLENOL) 500 MG tablet Take 2 tablets by mouth Every 6 (Six) Hours As Needed for Mild Pain.      [DISCONTINUED] benzonatate (TESSALON) 200 MG capsule Take 1 capsule by mouth 3 (Three) Times a Day As Needed for Cough. 90 capsule 0    [DISCONTINUED] gabapentin (NEURONTIN) 300 MG capsule Take 1 capsule by mouth 3 (Three) Times a Day. 60 capsule 0    [DISCONTINUED] HYDROcodone-acetaminophen (NORCO) 5-325 MG per tablet Take 1 tablet by mouth Every 12 (Twelve) Hours As Needed for Severe Pain. 14 tablet 0    [DISCONTINUED] pantoprazole (Protonix) 40 MG EC tablet Take 1 tablet by mouth Daily. 30 tablet 0    [DISCONTINUED] sennosides-docusate (PERICOLACE) 8.6-50 MG per tablet Take 2 tablets by mouth 2 (Two) Times a Day. 60 tablet 0       Surgical History  Past Surgical History:   Procedure Laterality Date    CRANIOTOMY N/A 5/17/2024    Procedure: CRANIOTOMY SUBOCCIPITAL WITH STEALTH, LUMBAR DRAIN INSERTION EXTERNAL;  Surgeon: Rohith Alexander MD;  Location: [facility];  Service: Neurosurgery;  Laterality: N/A;    INCISION AND DRAINAGE OF WOUND " N/A 6/5/2024    Procedure: Lumbar Drain and Suboccipital Wound revision, possible removal of bone, possible revision of dural graft. HOLD ANTIBIOTICS UNTIL CSF OBTAINED;  Surgeon: Rohith Alexander MD;  Location:  [facility];  Service: Neurosurgery;  Laterality: N/A;    LUMBAR DRAIN INSERTION EXTERNAL N/A 5/17/2024    Procedure: LUMBAR DRAIN INSERTION EXTERNAL;  Surgeon: Rohith Alexander MD;  Location:  [facility];  Service: Neurosurgery;  Laterality: N/A;    LUMBAR DRAIN INSERTION EXTERNAL N/A 6/5/2024    Procedure: LUMBAR DRAIN INSERTION EXTERNAL;  Surgeon: Rohith Alexander MD;  Location:  [facility];  Service: Neurosurgery;  Laterality: N/A;    NO PAST SURGERIES         Family History  History reviewed. No pertinent family history.    The following portions of the patient's history were reviewed and updated as appropriate: allergies, current medications, past family history, past medical history, past social history, past surgical history, and problem list.    Review of Systems   Constitutional:  Positive for fatigue. Negative for chills and fever.   Respiratory:  Negative for chest tightness and shortness of breath.    Cardiovascular:  Negative for chest pain and leg swelling.   Gastrointestinal:  Negative for abdominal pain.   Genitourinary:  Negative for amenorrhea, difficulty urinating, dysuria, flank pain, frequency, menstrual problem, pelvic pain, pelvic pressure, urgency, urinary incontinence, vaginal bleeding and vaginal discharge.   Musculoskeletal:  Negative for arthralgias, back pain, gait problem and myalgias.   Skin:  Positive for wound (healing incision). Negative for pallor and rash.   Allergic/Immunologic: Negative for environmental allergies.   Neurological:  Positive for headache. Negative for dizziness.   Hematological:  Negative for adenopathy.   Psychiatric/Behavioral:  Negative for dysphoric mood, self-injury, suicidal ideas and depressed mood. The patient is nervous/anxious.         Objective   Physical Exam  Vitals reviewed.   Constitutional:       General: She is not in acute distress.     Appearance: Normal appearance. She is well-developed and well-groomed. She is morbidly obese. She is not ill-appearing, toxic-appearing or diaphoretic.   HENT:      Head: Normocephalic.   Cardiovascular:      Rate and Rhythm: Normal rate and regular rhythm.      Heart sounds: Normal heart sounds.   Pulmonary:      Effort: Pulmonary effort is normal. No respiratory distress.      Breath sounds: Normal breath sounds.   Abdominal:      Tenderness: There is no abdominal tenderness. There is no right CVA tenderness or left CVA tenderness.   Musculoskeletal:      Cervical back: Normal range of motion.      Right lower leg: No edema.      Left lower leg: No edema.   Skin:     General: Skin is warm and dry.   Neurological:      Mental Status: She is alert and oriented to person, place, and time.      Gait: Gait normal.   Psychiatric:         Attention and Perception: Attention and perception normal.         Mood and Affect: Mood and affect normal.         Speech: Speech normal.         Behavior: Behavior normal. Behavior is cooperative.         Thought Content: Thought content normal.         Judgment: Judgment normal.         Ultrasound today: Anteverted uterus measures 6.6 cm in length. Endometrial lining measures 5.7 mm. Normal-appearing ovaries. No free fluid.        Chart reviewed for screenings  Last Pap Smear: 11/08/2023 NILM (ECTZ present)   Last Mammogram: Screening to begin at age 40. No family history of breast cancer noted.    Last Colonoscopy: Screening to begin at age 45. No family history of colon cancer noted.    Last Bone Density Scan: Screening to begin by 5 years after the onset of menopause.        Assessment & Plan   Diagnoses and all orders for this visit:    1. Follow-up visit after miscarriage (Primary)  Ultrasound today and reviewed with no abnormalities noted. Hcg level today to  ensure return to negative result. Discussed waiting until wound incision has healed and follow-up with neurosurgeon complete prior to trying to conceive. Also discussed waiting to have a few regular cycles prior to conceive.    -     HCG, B-subunit, Quantitative  -     POC Pregnancy, Urine    2. History of recurrent miscarriages  Discussed miscarriage panel and labs ordered.   -     CBC (No Diff)  -     JOSÉ MIGUEL  -     Anticardiolipin Antibody, IgG / M, Qn  -     Lupus Anticoagulant Panel  -     TSH Rfx On Abnormal To Free T4  -     Hemoglobin A1c  -     HCG, B-subunit, Quantitative         Return to the office in 2 months for a gyne visit with preconception follow-up and as needed with concerns.         This note has been signed electronically.    Nichol Nova, DNP, APRN, CNM, RNC-OB  5/30/2024

## 2024-05-31 LAB — HCG INTACT+B SERPL-ACNC: <1 MIU/ML

## 2024-06-05 ENCOUNTER — ANESTHESIA EVENT (OUTPATIENT)
Dept: PERIOP | Facility: HOSPITAL | Age: 34
End: 2024-06-05
Payer: MEDICAID

## 2024-06-05 ENCOUNTER — HOSPITAL ENCOUNTER (INPATIENT)
Facility: HOSPITAL | Age: 34
LOS: 6 days | Discharge: HOME HEALTH | End: 2024-06-11
Attending: NEUROLOGICAL SURGERY | Admitting: NEUROLOGICAL SURGERY
Payer: MEDICAID

## 2024-06-05 ENCOUNTER — ANESTHESIA (OUTPATIENT)
Dept: PERIOP | Facility: HOSPITAL | Age: 34
End: 2024-06-05
Payer: MEDICAID

## 2024-06-05 ENCOUNTER — OFFICE VISIT (OUTPATIENT)
Dept: NEUROSURGERY | Facility: CLINIC | Age: 34
End: 2024-06-05
Payer: MEDICAID

## 2024-06-05 ENCOUNTER — APPOINTMENT (OUTPATIENT)
Dept: GENERAL RADIOLOGY | Facility: HOSPITAL | Age: 34
End: 2024-06-05
Payer: MEDICAID

## 2024-06-05 ENCOUNTER — APPOINTMENT (OUTPATIENT)
Dept: MRI IMAGING | Facility: HOSPITAL | Age: 34
End: 2024-06-05
Payer: MEDICAID

## 2024-06-05 VITALS — BODY MASS INDEX: 42.13 KG/M2 | WEIGHT: 209 LBS | HEIGHT: 59 IN

## 2024-06-05 DIAGNOSIS — Z98.890 STATUS POST CRANIOTOMY: ICD-10-CM

## 2024-06-05 DIAGNOSIS — G93.89 CEREBELLAR MASS: Primary | ICD-10-CM

## 2024-06-05 DIAGNOSIS — E66.01 CLASS 3 SEVERE OBESITY DUE TO EXCESS CALORIES WITH SERIOUS COMORBIDITY AND BODY MASS INDEX (BMI) OF 40.0 TO 44.9 IN ADULT: ICD-10-CM

## 2024-06-05 DIAGNOSIS — G93.89 CEREBELLAR MASS: ICD-10-CM

## 2024-06-05 DIAGNOSIS — T81.49XA SUPERFICIAL POSTOPERATIVE WOUND INFECTION: Primary | ICD-10-CM

## 2024-06-05 LAB
ABO GROUP BLD: NORMAL
ALBUMIN SERPL-MCNC: 3.6 G/DL (ref 3.5–5.2)
ALBUMIN/GLOB SERPL: 0.9 G/DL
ALP SERPL-CCNC: 142 U/L (ref 39–117)
ALT SERPL W P-5'-P-CCNC: 97 U/L (ref 1–33)
ANA SER QL: NEGATIVE
ANION GAP SERPL CALCULATED.3IONS-SCNC: 8 MMOL/L (ref 5–15)
APPEARANCE CSF: CLEAR
APTT HEX PL PPP: 1 SEC
APTT IMM NP PPP: NORMAL SEC
APTT PPP 1:1 SALINE: NORMAL SEC
APTT PPP: 27.6 SEC
APTT PPP: 32.5 SECONDS (ref 24.5–36)
AST SERPL-CCNC: 21 U/L (ref 1–32)
B2 GLYCOPROT1 IGA SER-ACNC: <10 SAU
B2 GLYCOPROT1 IGG SER-ACNC: <10 SGU
B2 GLYCOPROT1 IGM SER-ACNC: <10 SMU
BASOPHILS # BLD AUTO: 0.02 10*3/MM3 (ref 0–0.2)
BASOPHILS NFR BLD AUTO: 0.3 % (ref 0–1.5)
BILIRUB SERPL-MCNC: <0.2 MG/DL (ref 0–1.2)
BILIRUB UR QL STRIP: NEGATIVE
BLD GP AB SCN SERPL QL: NEGATIVE
BUN SERPL-MCNC: 6 MG/DL (ref 6–20)
BUN/CREAT SERPL: 16.7 (ref 7–25)
CALCIUM SPEC-SCNC: 9.7 MG/DL (ref 8.6–10.5)
CARDIOLIPIN IGG SER IA-ACNC: <10 GPL
CARDIOLIPIN IGG SER IA-ACNC: <9 GPL U/ML (ref 0–14)
CARDIOLIPIN IGM SER IA-ACNC: <10 MPL
CARDIOLIPIN IGM SER IA-ACNC: <9 MPL U/ML (ref 0–12)
CHLORIDE SERPL-SCNC: 101 MMOL/L (ref 98–107)
CLARITY UR: CLEAR
CO2 SERPL-SCNC: 29 MMOL/L (ref 22–29)
COLOR CSF: COLORLESS
COLOR SPUN CSF: COLORLESS
COLOR UR: YELLOW
CONFIRM APTT: 2.8 SEC
CONFIRM DRVVT: NORMAL SEC
CREAT SERPL-MCNC: 0.36 MG/DL (ref 0.57–1)
CRP SERPL-MCNC: 3.54 MG/DL (ref 0–0.5)
DEPRECATED RDW RBC AUTO: 41.8 FL (ref 37–54)
DRVVT SCREEN TO CONFIRM RATIO: NORMAL RATIO
EGFRCR SERPLBLD CKD-EPI 2021: 137.7 ML/MIN/1.73
EOSINOPHIL # BLD AUTO: 0.12 10*3/MM3 (ref 0–0.4)
EOSINOPHIL NFR BLD AUTO: 2.1 % (ref 0.3–6.2)
ERYTHROCYTE [DISTWIDTH] IN BLOOD BY AUTOMATED COUNT: 13.4 % (ref 12.3–15.4)
ERYTHROCYTE [DISTWIDTH] IN BLOOD BY AUTOMATED COUNT: 13.7 % (ref 11.7–15.4)
ERYTHROCYTE [SEDIMENTATION RATE] IN BLOOD: 114 MM/HR (ref 0–20)
GLOBULIN UR ELPH-MCNC: 3.8 GM/DL
GLUCOSE CSF-MCNC: 46 MG/DL (ref 40–70)
GLUCOSE SERPL-MCNC: 91 MG/DL (ref 65–99)
GLUCOSE UR STRIP-MCNC: NEGATIVE MG/DL
HBA1C MFR BLD: 5.7 % (ref 4.8–5.6)
HCT VFR BLD AUTO: 34.8 % (ref 34–46.6)
HCT VFR BLD AUTO: 36 % (ref 34–46.6)
HGB BLD-MCNC: 11.2 G/DL (ref 12–15.9)
HGB BLD-MCNC: 11.7 G/DL (ref 11.1–15.9)
HGB UR QL STRIP.AUTO: NEGATIVE
IMM GRANULOCYTES # BLD AUTO: 0.02 10*3/MM3 (ref 0–0.05)
IMM GRANULOCYTES NFR BLD AUTO: 0.3 % (ref 0–0.5)
INR PPP: 1 RATIO
INR PPP: 1.06 (ref 0.91–1.09)
KETONES UR QL STRIP: NEGATIVE
LABORATORY COMMENT REPORT: NORMAL
LEUKOCYTE ESTERASE UR QL STRIP.AUTO: NEGATIVE
LYMPHOCYTES # BLD AUTO: 2.14 10*3/MM3 (ref 0.7–3.1)
LYMPHOCYTES NFR BLD AUTO: 36.7 % (ref 19.6–45.3)
MCH RBC QN AUTO: 27.7 PG (ref 26.6–33)
MCH RBC QN AUTO: 28 PG (ref 26.6–33)
MCHC RBC AUTO-ENTMCNC: 32.2 G/DL (ref 31.5–35.7)
MCHC RBC AUTO-ENTMCNC: 32.5 G/DL (ref 31.5–35.7)
MCV RBC AUTO: 86 FL (ref 79–97)
MCV RBC AUTO: 86.1 FL (ref 79–97)
MONOCYTES # BLD AUTO: 0.68 10*3/MM3 (ref 0.1–0.9)
MONOCYTES NFR BLD AUTO: 11.7 % (ref 5–12)
NEUTROPHILS NFR BLD AUTO: 2.85 10*3/MM3 (ref 1.7–7)
NEUTROPHILS NFR BLD AUTO: 48.9 % (ref 42.7–76)
NITRITE UR QL STRIP: NEGATIVE
NRBC BLD AUTO-RTO: 0 /100 WBC (ref 0–0.2)
NUC CELL # CSF MANUAL: 5 /MM3 (ref 0–5)
PH UR STRIP.AUTO: 5.5 [PH] (ref 5–8)
PLATELET # BLD AUTO: 289 X10E3/UL (ref 150–450)
PLATELET # BLD AUTO: 530 10*3/MM3 (ref 140–450)
PMV BLD AUTO: 8.9 FL (ref 6–12)
POTASSIUM SERPL-SCNC: 3.9 MMOL/L (ref 3.5–5.2)
PROT CSF-MCNC: 40.4 MG/DL (ref 15–45)
PROT SERPL-MCNC: 7.4 G/DL (ref 6–8.5)
PROT UR QL STRIP: NEGATIVE
PROTHROMBIN TIME: 10.6 SEC
PROTHROMBIN TIME: 14.3 SECONDS (ref 11.8–14.8)
RBC # BLD AUTO: 4.04 10*6/MM3 (ref 3.77–5.28)
RBC # BLD AUTO: 4.18 X10E6/UL (ref 3.77–5.28)
RBC # CSF MANUAL: 38 /MM3 (ref 0–0)
RH BLD: POSITIVE
SCREEN DRVVT: 40.2 SEC
SODIUM SERPL-SCNC: 138 MMOL/L (ref 136–145)
SP GR UR STRIP: 1.02 (ref 1–1.03)
SPECIMEN VOL CSF: 6 ML
T&S EXPIRATION DATE: NORMAL
THROMBIN TIME: 14.7 SEC
TSH SERPL DL<=0.005 MIU/L-ACNC: 1.46 UIU/ML (ref 0.45–4.5)
TUBE # CSF: 1
UROBILINOGEN UR QL STRIP: NORMAL
WBC # BLD AUTO: 9.3 X10E3/UL (ref 3.4–10.8)
WBC NRBC COR # BLD AUTO: 5.83 10*3/MM3 (ref 3.4–10.8)

## 2024-06-05 PROCEDURE — A9577 INJ MULTIHANCE: HCPCS | Performed by: NEUROLOGICAL SURGERY

## 2024-06-05 PROCEDURE — 81003 URINALYSIS AUTO W/O SCOPE: CPT | Performed by: NURSE PRACTITIONER

## 2024-06-05 PROCEDURE — 82945 GLUCOSE OTHER FLUID: CPT | Performed by: NEUROLOGICAL SURGERY

## 2024-06-05 PROCEDURE — 86140 C-REACTIVE PROTEIN: CPT | Performed by: NURSE PRACTITIONER

## 2024-06-05 PROCEDURE — 85652 RBC SED RATE AUTOMATED: CPT | Performed by: NURSE PRACTITIONER

## 2024-06-05 PROCEDURE — 62329 THER SPI PNXR CSF FLUOR/CT: CPT | Performed by: NEUROLOGICAL SURGERY

## 2024-06-05 PROCEDURE — 25010000002 CEFAZOLIN PER 500 MG: Performed by: NURSE ANESTHETIST, CERTIFIED REGISTERED

## 2024-06-05 PROCEDURE — 25010000002 VANCOMYCIN 1 G RECONSTITUTED SOLUTION: Performed by: NEUROLOGICAL SURGERY

## 2024-06-05 PROCEDURE — 0 GADOBENATE DIMEGLUMINE 529 MG/ML SOLUTION: Performed by: NEUROLOGICAL SURGERY

## 2024-06-05 PROCEDURE — C1713 ANCHOR/SCREW BN/BN,TIS/BN: HCPCS | Performed by: NEUROLOGICAL SURGERY

## 2024-06-05 PROCEDURE — 87205 SMEAR GRAM STAIN: CPT | Performed by: NEUROLOGICAL SURGERY

## 2024-06-05 PROCEDURE — 86901 BLOOD TYPING SEROLOGIC RH(D): CPT | Performed by: NURSE PRACTITIONER

## 2024-06-05 PROCEDURE — C1729 CATH, DRAINAGE: HCPCS | Performed by: NEUROLOGICAL SURGERY

## 2024-06-05 PROCEDURE — 25010000002 PROPOFOL 200 MG/20ML EMULSION: Performed by: NURSE ANESTHETIST, CERTIFIED REGISTERED

## 2024-06-05 PROCEDURE — G0378 HOSPITAL OBSERVATION PER HR: HCPCS

## 2024-06-05 PROCEDURE — 76000 FLUOROSCOPY <1 HR PHYS/QHP: CPT

## 2024-06-05 PROCEDURE — 25010000002 BUPIVACAINE (PF) 0.25 % SOLUTION 30 ML VIAL: Performed by: NEUROLOGICAL SURGERY

## 2024-06-05 PROCEDURE — 87077 CULTURE AEROBIC IDENTIFY: CPT | Performed by: NEUROLOGICAL SURGERY

## 2024-06-05 PROCEDURE — 10180 I&D COMPLEX PO WOUND INFCTJ: CPT | Performed by: NEUROLOGICAL SURGERY

## 2024-06-05 PROCEDURE — 84157 ASSAY OF PROTEIN OTHER: CPT | Performed by: NEUROLOGICAL SURGERY

## 2024-06-05 PROCEDURE — 25010000002 ONDANSETRON PER 1 MG: Performed by: NURSE ANESTHETIST, CERTIFIED REGISTERED

## 2024-06-05 PROCEDURE — 87015 SPECIMEN INFECT AGNT CONCNTJ: CPT | Performed by: NEUROLOGICAL SURGERY

## 2024-06-05 PROCEDURE — 87070 CULTURE OTHR SPECIMN AEROBIC: CPT | Performed by: NEUROLOGICAL SURGERY

## 2024-06-05 PROCEDURE — 25010000002 SUGAMMADEX 200 MG/2ML SOLUTION: Performed by: NURSE ANESTHETIST, CERTIFIED REGISTERED

## 2024-06-05 PROCEDURE — 25010000002 EPINEPHRINE 1 MG/ML SOLUTION 1 ML AMPULE: Performed by: NEUROLOGICAL SURGERY

## 2024-06-05 PROCEDURE — 87186 SC STD MICRODIL/AGAR DIL: CPT | Performed by: NEUROLOGICAL SURGERY

## 2024-06-05 PROCEDURE — 86900 BLOOD TYPING SEROLOGIC ABO: CPT | Performed by: NURSE PRACTITIONER

## 2024-06-05 PROCEDURE — 89050 BODY FLUID CELL COUNT: CPT | Performed by: NEUROLOGICAL SURGERY

## 2024-06-05 PROCEDURE — 88304 TISSUE EXAM BY PATHOLOGIST: CPT | Performed by: NEUROLOGICAL SURGERY

## 2024-06-05 PROCEDURE — 1160F RVW MEDS BY RX/DR IN RCRD: CPT | Performed by: NURSE PRACTITIONER

## 2024-06-05 PROCEDURE — 99024 POSTOP FOLLOW-UP VISIT: CPT | Performed by: NURSE PRACTITIONER

## 2024-06-05 PROCEDURE — 85730 THROMBOPLASTIN TIME PARTIAL: CPT | Performed by: NURSE PRACTITIONER

## 2024-06-05 PROCEDURE — 0JB00ZZ EXCISION OF SCALP SUBCUTANEOUS TISSUE AND FASCIA, OPEN APPROACH: ICD-10-PCS | Performed by: NEUROLOGICAL SURGERY

## 2024-06-05 PROCEDURE — 85025 COMPLETE CBC W/AUTO DIFF WBC: CPT | Performed by: NURSE PRACTITIONER

## 2024-06-05 PROCEDURE — 0NP00KZ REMOVAL OF NONAUTOLOGOUS TISSUE SUBSTITUTE FROM SKULL, OPEN APPROACH: ICD-10-PCS | Performed by: NEUROLOGICAL SURGERY

## 2024-06-05 PROCEDURE — 0NR00JZ REPLACEMENT OF SKULL WITH SYNTHETIC SUBSTITUTE, OPEN APPROACH: ICD-10-PCS | Performed by: NEUROLOGICAL SURGERY

## 2024-06-05 PROCEDURE — 86850 RBC ANTIBODY SCREEN: CPT | Performed by: NURSE PRACTITIONER

## 2024-06-05 PROCEDURE — 70553 MRI BRAIN STEM W/O & W/DYE: CPT

## 2024-06-05 PROCEDURE — S0260 H&P FOR SURGERY: HCPCS | Performed by: NURSE PRACTITIONER

## 2024-06-05 PROCEDURE — 009U30Z DRAINAGE OF SPINAL CANAL WITH DRAINAGE DEVICE, PERCUTANEOUS APPROACH: ICD-10-PCS | Performed by: NEUROLOGICAL SURGERY

## 2024-06-05 PROCEDURE — 25010000002 FENTANYL CITRATE (PF) 50 MCG/ML SOLUTION: Performed by: NURSE ANESTHETIST, CERTIFIED REGISTERED

## 2024-06-05 PROCEDURE — 86592 SYPHILIS TEST NON-TREP QUAL: CPT | Performed by: NEUROLOGICAL SURGERY

## 2024-06-05 PROCEDURE — 1159F MED LIST DOCD IN RCRD: CPT | Performed by: NURSE PRACTITIONER

## 2024-06-05 PROCEDURE — 25010000002 VANCOMYCIN 1 G RECONSTITUTED SOLUTION 1 EACH VIAL: Performed by: NEUROLOGICAL SURGERY

## 2024-06-05 PROCEDURE — 62143 RPL B1 FLP/PROSTC PLATE SKL: CPT | Performed by: NEUROLOGICAL SURGERY

## 2024-06-05 PROCEDURE — 72100 X-RAY EXAM L-S SPINE 2/3 VWS: CPT

## 2024-06-05 PROCEDURE — 85610 PROTHROMBIN TIME: CPT | Performed by: NURSE PRACTITIONER

## 2024-06-05 PROCEDURE — 25810000003 LACTATED RINGERS PER 1000 ML: Performed by: NURSE ANESTHETIST, CERTIFIED REGISTERED

## 2024-06-05 PROCEDURE — 80053 COMPREHEN METABOLIC PANEL: CPT | Performed by: NURSE PRACTITIONER

## 2024-06-05 PROCEDURE — 87040 BLOOD CULTURE FOR BACTERIA: CPT | Performed by: NURSE PRACTITIONER

## 2024-06-05 DEVICE — SCRW PLT NEURO LEFORTE L/P SLF/DRL 1.4X3.7MM SLVR: Type: IMPLANTABLE DEVICE | Site: SUBOCCIPITAL | Status: FUNCTIONAL

## 2024-06-05 DEVICE — FLOSEAL HEMOSTATIC MATRIX, 10ML
Type: IMPLANTABLE DEVICE | Site: SUBOCCIPITAL | Status: FUNCTIONAL
Brand: FLOSEAL HEMOSTATIC MATRIX

## 2024-06-05 DEVICE — KT HEMOST ABS SURGIFOAM PORCN 1GRAM: Type: IMPLANTABLE DEVICE | Site: SUBOCCIPITAL | Status: FUNCTIONAL

## 2024-06-05 DEVICE — HEMOST ABS SURGIFOAM SZ100 8X12 10MM: Type: IMPLANTABLE DEVICE | Site: SUBOCCIPITAL | Status: FUNCTIONAL

## 2024-06-05 DEVICE — ABSORBABLE HEMOSTAT (OXIDIZED REGENERATED CELLULOSE)
Type: IMPLANTABLE DEVICE | Site: SUBOCCIPITAL | Status: FUNCTIONAL
Brand: SURGICEL

## 2024-06-05 DEVICE — IMPLANTABLE DEVICE: Type: IMPLANTABLE DEVICE | Site: SUBOCCIPITAL | Status: FUNCTIONAL

## 2024-06-05 RX ORDER — ACETAMINOPHEN 650 MG/1
325 SUPPOSITORY RECTAL EVERY 4 HOURS PRN
Status: DISCONTINUED | OUTPATIENT
Start: 2024-06-05 | End: 2024-06-11 | Stop reason: HOSPADM

## 2024-06-05 RX ORDER — MAGNESIUM HYDROXIDE 1200 MG/15ML
LIQUID ORAL AS NEEDED
Status: DISCONTINUED | OUTPATIENT
Start: 2024-06-05 | End: 2024-06-05 | Stop reason: HOSPADM

## 2024-06-05 RX ORDER — ACETAMINOPHEN 500 MG
1000 TABLET ORAL ONCE
Status: CANCELLED | OUTPATIENT
Start: 2024-06-05 | End: 2024-06-05

## 2024-06-05 RX ORDER — SODIUM CHLORIDE 0.9 % (FLUSH) 0.9 %
3 SYRINGE (ML) INJECTION EVERY 12 HOURS SCHEDULED
Status: CANCELLED | OUTPATIENT
Start: 2024-06-05

## 2024-06-05 RX ORDER — BACITRACIN ZINC 500 [USP'U]/G
OINTMENT TOPICAL AS NEEDED
Status: DISCONTINUED | OUTPATIENT
Start: 2024-06-05 | End: 2024-06-05 | Stop reason: HOSPADM

## 2024-06-05 RX ORDER — LEVETIRACETAM 500 MG/1
500 TABLET ORAL EVERY 12 HOURS SCHEDULED
Status: DISCONTINUED | OUTPATIENT
Start: 2024-06-05 | End: 2024-06-11 | Stop reason: HOSPADM

## 2024-06-05 RX ORDER — ONDANSETRON 4 MG/1
4 TABLET, ORALLY DISINTEGRATING ORAL EVERY 6 HOURS PRN
Status: DISCONTINUED | OUTPATIENT
Start: 2024-06-05 | End: 2024-06-06 | Stop reason: SDUPTHER

## 2024-06-05 RX ORDER — FENTANYL CITRATE 50 UG/ML
50 INJECTION, SOLUTION INTRAMUSCULAR; INTRAVENOUS
Status: DISCONTINUED | OUTPATIENT
Start: 2024-06-05 | End: 2024-06-10 | Stop reason: HOSPADM

## 2024-06-05 RX ORDER — DROPERIDOL 2.5 MG/ML
0.62 INJECTION, SOLUTION INTRAMUSCULAR; INTRAVENOUS ONCE AS NEEDED
Status: DISCONTINUED | OUTPATIENT
Start: 2024-06-05 | End: 2024-06-11 | Stop reason: HOSPADM

## 2024-06-05 RX ORDER — CHLORHEXIDINE GLUCONATE 500 MG/1
1 CLOTH TOPICAL EVERY 24 HOURS
Status: DISCONTINUED | OUTPATIENT
Start: 2024-06-06 | End: 2024-06-10 | Stop reason: HOSPADM

## 2024-06-05 RX ORDER — ONDANSETRON 2 MG/ML
4 INJECTION INTRAMUSCULAR; INTRAVENOUS
Status: DISCONTINUED | OUTPATIENT
Start: 2024-06-05 | End: 2024-06-07 | Stop reason: SDUPTHER

## 2024-06-05 RX ORDER — CHLORHEXIDINE GLUCONATE 500 MG/1
1 CLOTH TOPICAL ONCE
Status: COMPLETED | OUTPATIENT
Start: 2024-06-05 | End: 2024-06-05

## 2024-06-05 RX ORDER — SODIUM CHLORIDE 9 MG/ML
40 INJECTION, SOLUTION INTRAVENOUS AS NEEDED
Status: DISCONTINUED | OUTPATIENT
Start: 2024-06-05 | End: 2024-06-11 | Stop reason: HOSPADM

## 2024-06-05 RX ORDER — ROCURONIUM BROMIDE 10 MG/ML
INJECTION, SOLUTION INTRAVENOUS AS NEEDED
Status: DISCONTINUED | OUTPATIENT
Start: 2024-06-05 | End: 2024-06-05 | Stop reason: SURG

## 2024-06-05 RX ORDER — SODIUM CHLORIDE 0.9 % (FLUSH) 0.9 %
10 SYRINGE (ML) INJECTION AS NEEDED
Status: DISCONTINUED | OUTPATIENT
Start: 2024-06-05 | End: 2024-06-11 | Stop reason: HOSPADM

## 2024-06-05 RX ORDER — CEFAZOLIN SODIUM 1 G/3ML
INJECTION, POWDER, FOR SOLUTION INTRAMUSCULAR; INTRAVENOUS AS NEEDED
Status: DISCONTINUED | OUTPATIENT
Start: 2024-06-05 | End: 2024-06-05 | Stop reason: SURG

## 2024-06-05 RX ORDER — HYDROCODONE BITARTRATE AND ACETAMINOPHEN 10; 325 MG/1; MG/1
1 TABLET ORAL ONCE AS NEEDED
Status: COMPLETED | OUTPATIENT
Start: 2024-06-05 | End: 2024-06-06

## 2024-06-05 RX ORDER — ONDANSETRON 2 MG/ML
INJECTION INTRAMUSCULAR; INTRAVENOUS AS NEEDED
Status: DISCONTINUED | OUTPATIENT
Start: 2024-06-05 | End: 2024-06-05 | Stop reason: SURG

## 2024-06-05 RX ORDER — ACETAMINOPHEN 325 MG/1
650 TABLET ORAL EVERY 4 HOURS PRN
Status: DISCONTINUED | OUTPATIENT
Start: 2024-06-05 | End: 2024-06-11 | Stop reason: HOSPADM

## 2024-06-05 RX ORDER — LABETALOL HYDROCHLORIDE 5 MG/ML
5 INJECTION, SOLUTION INTRAVENOUS
Status: DISCONTINUED | OUTPATIENT
Start: 2024-06-05 | End: 2024-06-11 | Stop reason: HOSPADM

## 2024-06-05 RX ORDER — SODIUM CHLORIDE, SODIUM LACTATE, POTASSIUM CHLORIDE, CALCIUM CHLORIDE 600; 310; 30; 20 MG/100ML; MG/100ML; MG/100ML; MG/100ML
INJECTION, SOLUTION INTRAVENOUS CONTINUOUS PRN
Status: DISCONTINUED | OUTPATIENT
Start: 2024-06-05 | End: 2024-06-05 | Stop reason: SURG

## 2024-06-05 RX ORDER — VANCOMYCIN HYDROCHLORIDE 1 G/20ML
INJECTION, POWDER, LYOPHILIZED, FOR SOLUTION INTRAVENOUS AS NEEDED
Status: DISCONTINUED | OUTPATIENT
Start: 2024-06-05 | End: 2024-06-05 | Stop reason: HOSPADM

## 2024-06-05 RX ORDER — FLUMAZENIL 0.1 MG/ML
0.2 INJECTION INTRAVENOUS AS NEEDED
Status: DISCONTINUED | OUTPATIENT
Start: 2024-06-05 | End: 2024-06-11 | Stop reason: HOSPADM

## 2024-06-05 RX ORDER — HYDROMORPHONE HYDROCHLORIDE 1 MG/ML
0.5 INJECTION, SOLUTION INTRAMUSCULAR; INTRAVENOUS; SUBCUTANEOUS
Status: DISCONTINUED | OUTPATIENT
Start: 2024-06-05 | End: 2024-06-06

## 2024-06-05 RX ORDER — SODIUM CHLORIDE 0.9 % (FLUSH) 0.9 %
3-10 SYRINGE (ML) INJECTION AS NEEDED
Status: CANCELLED | OUTPATIENT
Start: 2024-06-05

## 2024-06-05 RX ORDER — LIDOCAINE HYDROCHLORIDE 20 MG/ML
INJECTION, SOLUTION EPIDURAL; INFILTRATION; INTRACAUDAL; PERINEURAL AS NEEDED
Status: DISCONTINUED | OUTPATIENT
Start: 2024-06-05 | End: 2024-06-05 | Stop reason: SURG

## 2024-06-05 RX ORDER — SODIUM CHLORIDE 0.9 % (FLUSH) 0.9 %
10 SYRINGE (ML) INJECTION EVERY 12 HOURS SCHEDULED
Status: DISCONTINUED | OUTPATIENT
Start: 2024-06-05 | End: 2024-06-11 | Stop reason: HOSPADM

## 2024-06-05 RX ORDER — POLYETHYLENE GLYCOL 3350 17 G/17G
17 POWDER, FOR SOLUTION ORAL DAILY
Status: DISCONTINUED | OUTPATIENT
Start: 2024-06-05 | End: 2024-06-06 | Stop reason: SDUPTHER

## 2024-06-05 RX ORDER — FENTANYL CITRATE 50 UG/ML
INJECTION, SOLUTION INTRAMUSCULAR; INTRAVENOUS AS NEEDED
Status: DISCONTINUED | OUTPATIENT
Start: 2024-06-05 | End: 2024-06-05 | Stop reason: SURG

## 2024-06-05 RX ORDER — AMOXICILLIN 250 MG
2 CAPSULE ORAL 2 TIMES DAILY
Status: DISCONTINUED | OUTPATIENT
Start: 2024-06-05 | End: 2024-06-06 | Stop reason: SDUPTHER

## 2024-06-05 RX ORDER — PROPOFOL 10 MG/ML
INJECTION, EMULSION INTRAVENOUS AS NEEDED
Status: DISCONTINUED | OUTPATIENT
Start: 2024-06-05 | End: 2024-06-05 | Stop reason: SURG

## 2024-06-05 RX ORDER — ONDANSETRON 2 MG/ML
4 INJECTION INTRAMUSCULAR; INTRAVENOUS EVERY 6 HOURS PRN
Status: DISCONTINUED | OUTPATIENT
Start: 2024-06-05 | End: 2024-06-06 | Stop reason: SDUPTHER

## 2024-06-05 RX ORDER — BUPIVACAINE HCL/0.9 % NACL/PF 0.125 %
PLASTIC BAG, INJECTION (ML) EPIDURAL AS NEEDED
Status: DISCONTINUED | OUTPATIENT
Start: 2024-06-05 | End: 2024-06-05 | Stop reason: SURG

## 2024-06-05 RX ORDER — SODIUM CHLORIDE, SODIUM LACTATE, POTASSIUM CHLORIDE, CALCIUM CHLORIDE 600; 310; 30; 20 MG/100ML; MG/100ML; MG/100ML; MG/100ML
100 INJECTION, SOLUTION INTRAVENOUS CONTINUOUS
Status: CANCELLED | OUTPATIENT
Start: 2024-06-05

## 2024-06-05 RX ORDER — SODIUM CHLORIDE 9 MG/ML
40 INJECTION, SOLUTION INTRAVENOUS AS NEEDED
Status: CANCELLED | OUTPATIENT
Start: 2024-06-05

## 2024-06-05 RX ORDER — NALOXONE HCL 0.4 MG/ML
0.04 VIAL (ML) INJECTION AS NEEDED
Status: DISCONTINUED | OUTPATIENT
Start: 2024-06-05 | End: 2024-06-11 | Stop reason: HOSPADM

## 2024-06-05 RX ADMIN — Medication 10 ML: at 21:54

## 2024-06-05 RX ADMIN — ONDANSETRON 4 MG: 2 INJECTION INTRAMUSCULAR; INTRAVENOUS at 22:37

## 2024-06-05 RX ADMIN — FENTANYL CITRATE 50 MCG: 50 INJECTION, SOLUTION INTRAMUSCULAR; INTRAVENOUS at 23:11

## 2024-06-05 RX ADMIN — Medication 1 APPLICATION: at 14:36

## 2024-06-05 RX ADMIN — SODIUM CHLORIDE, POTASSIUM CHLORIDE, SODIUM LACTATE AND CALCIUM CHLORIDE: 600; 310; 30; 20 INJECTION, SOLUTION INTRAVENOUS at 20:32

## 2024-06-05 RX ADMIN — CHLORHEXIDINE GLUCONATE 1 APPLICATION: 500 CLOTH TOPICAL at 14:36

## 2024-06-05 RX ADMIN — Medication 1 APPLICATION: at 21:55

## 2024-06-05 RX ADMIN — GADOBENATE DIMEGLUMINE 20 ML: 529 INJECTION, SOLUTION INTRAVENOUS at 13:19

## 2024-06-05 RX ADMIN — FENTANYL CITRATE 50 MCG: 50 INJECTION, SOLUTION INTRAMUSCULAR; INTRAVENOUS at 20:23

## 2024-06-05 RX ADMIN — CEFAZOLIN 2 G: 330 INJECTION, POWDER, FOR SOLUTION INTRAMUSCULAR; INTRAVENOUS at 21:20

## 2024-06-05 RX ADMIN — ROCURONIUM BROMIDE 50 MG: 10 INJECTION, SOLUTION INTRAVENOUS at 20:23

## 2024-06-05 RX ADMIN — Medication 10 ML: at 14:36

## 2024-06-05 RX ADMIN — FENTANYL CITRATE 50 MCG: 50 INJECTION, SOLUTION INTRAMUSCULAR; INTRAVENOUS at 23:18

## 2024-06-05 RX ADMIN — PROPOFOL 150 MG: 10 INJECTION, EMULSION INTRAVENOUS at 20:23

## 2024-06-05 RX ADMIN — FENTANYL CITRATE 100 MCG: 50 INJECTION, SOLUTION INTRAMUSCULAR; INTRAVENOUS at 21:56

## 2024-06-05 RX ADMIN — SODIUM CHLORIDE, SODIUM LACTATE, POTASSIUM CHLORIDE, CALCIUM CHLORIDE: 600; 310; 30; 20 INJECTION, SOLUTION INTRAVENOUS at 20:20

## 2024-06-05 RX ADMIN — ROCURONIUM BROMIDE 50 MG: 10 INJECTION, SOLUTION INTRAVENOUS at 20:56

## 2024-06-05 RX ADMIN — FENTANYL CITRATE 50 MCG: 50 INJECTION, SOLUTION INTRAMUSCULAR; INTRAVENOUS at 22:54

## 2024-06-05 RX ADMIN — Medication 200 MCG: at 21:52

## 2024-06-05 RX ADMIN — Medication 100 MCG: at 22:20

## 2024-06-05 RX ADMIN — Medication 200 MCG: at 20:38

## 2024-06-05 RX ADMIN — LEVETIRACETAM 500 MG: 500 TABLET, FILM COATED ORAL at 14:31

## 2024-06-05 RX ADMIN — Medication 200 MCG: at 20:47

## 2024-06-05 RX ADMIN — LIDOCAINE HYDROCHLORIDE 100 MG: 20 INJECTION, SOLUTION EPIDURAL; INFILTRATION; INTRACAUDAL; PERINEURAL at 20:23

## 2024-06-05 RX ADMIN — FENTANYL CITRATE 150 MCG: 50 INJECTION, SOLUTION INTRAMUSCULAR; INTRAVENOUS at 21:23

## 2024-06-05 RX ADMIN — SUGAMMADEX 200 MG: 100 INJECTION, SOLUTION INTRAVENOUS at 23:19

## 2024-06-05 RX ADMIN — FENTANYL CITRATE 50 MCG: 50 INJECTION, SOLUTION INTRAMUSCULAR; INTRAVENOUS at 21:41

## 2024-06-05 NOTE — ANESTHESIA PREPROCEDURE EVALUATION
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      CODE STATUS:    Level Of Support Discussed With: Patient  Code Status (Patient has no pulse and is not breathing): CPR (Attempt to Resuscitate)  Medical Interventions (Patient has pulse or is breathing): Full Support

## 2024-06-05 NOTE — PATIENT INSTRUCTIONS
"DASH Eating Plan  DASH stands for Dietary Approaches to Stop Hypertension. The DASH eating plan is a healthy eating plan that has been shown to:  Lower high blood pressure (hypertension).  Reduce your risk for type 2 diabetes, heart disease, and stroke.  Help with weight loss.  What are tips for following this plan?  Reading food labels  Check food labels for the amount of salt (sodium) per serving. Choose foods with less than 5 percent of the Daily Value (DV) of sodium. In general, foods with less than 300 milligrams (mg) of sodium per serving fit into this eating plan.  To find whole grains, look for the word \"whole\" as the first word in the ingredient list.  Shopping  Buy products labeled as \"low-sodium\" or \"no salt added.\"  Buy fresh foods. Avoid canned foods and pre-made or frozen meals.  Cooking  Try not to add salt when you cook. Use salt-free seasonings or herbs instead of table salt or sea salt. Check with your health care provider or pharmacist before using salt substitutes.  Do not bishop foods. Cook foods in healthy ways, such as baking, boiling, grilling, roasting, or broiling.  Cook using oils that are good for your heart. These include olive, canola, avocado, soybean, and sunflower oil.  Meal planning    Eat a balanced diet. This should include:  4 or more servings of fruits and 4 or more servings of vegetables each day. Try to fill half of your plate with fruits and vegetables.  6-8 servings of whole grains each day.  6 or less servings of lean meat, poultry, or fish each day. 1 oz is 1 serving. A 3 oz (85 g) serving of meat is about the same size as the palm of your hand. One egg is 1 oz (28 g).  2-3 servings of low-fat dairy each day. One serving is 1 cup (237 mL).  1 serving of nuts, seeds, or beans 5 times each week.  2-3 servings of heart-healthy fats. Healthy fats called omega-3 fatty acids are found in foods such as walnuts, flaxseeds, fortified milks, and eggs. These fats are also found in " cold-water fish, such as sardines, salmon, and mackerel.  Limit how much you eat of:  Canned or prepackaged foods.  Food that is high in trans fat, such as fried foods.  Food that is high in saturated fat, such as fatty meat.  Desserts and other sweets, sugary drinks, and other foods with added sugar.  Full-fat dairy products.  Do not salt foods before eating.  Do not eat more than 4 egg yolks a week.  Try to eat at least 2 vegetarian meals a week.  Eat more home-cooked food and less restaurant, buffet, and fast food.  Lifestyle  When eating at a restaurant, ask if your food can be made with less salt or no salt.  If you drink alcohol:  Limit how much you have to:  0-1 drink a day if you are female.  0-2 drinks a day if you are male.  Know how much alcohol is in your drink. In the [place] one drink is one 12 oz bottle of beer (355 mL), one 5 oz glass of wine (148 mL), or one 1½ oz glass of hard liquor (44 mL).  General information  Avoid eating more than 2,300 mg of salt a day. If you have hypertension, you may need to reduce your sodium intake to 1,500 mg a day.  Work with your provider to stay at a healthy body weight or lose weight. Ask what the best weight range is for you.  On most days of the week, get at least 30 minutes of exercise that causes your heart to beat faster. This may include walking, swimming, or biking.  Work with your provider or dietitian to adjust your eating plan to meet your specific calorie needs.  What foods should I eat?  Fruits  All fresh, dried, or frozen fruit. Canned fruits that are in their natural juice and do not have sugar added to them.  Vegetables  Fresh or frozen vegetables that are raw, steamed, roasted, or grilled. Low-sodium or reduced-sodium tomato and vegetable juice. Low-sodium or reduced-sodium tomato sauce and tomato paste. Low-sodium or reduced-sodium canned vegetables.  Grains  Whole-grain or whole-wheat bread. Whole-grain or whole-wheat pasta. Brown rice. Oatmeal.  Quinoa. Bulgur. Whole-grain and low-sodium cereals. Abi bread. Low-fat, low-sodium crackers. Whole-wheat flour tortillas.  Meats and other proteins  Skinless chicken or turkey. Ground chicken or turkey. Pork with fat trimmed off. Fish and seafood. Egg whites. Dried beans, peas, or lentils. Unsalted nuts, nut butters, and seeds. Unsalted canned beans. Lean cuts of beef with fat trimmed off. Low-sodium, lean precooked or cured meat, such as sausages or meat loaves.  Dairy  Low-fat (1%) or fat-free (skim) milk. Reduced-fat, low-fat, or fat-free cheeses. Nonfat, low-sodium ricotta or cottage cheese. Low-fat or nonfat yogurt. Low-fat, low-sodium cheese.  Fats and oils  Soft margarine without trans fats. Vegetable oil. Reduced-fat, low-fat, or light mayonnaise and salad dressings (reduced-sodium). Canola, safflower, olive, avocado, soybean, and sunflower oils. Avocado.  Seasonings and condiments  Herbs. Spices. Seasoning mixes without salt.  Other foods  Unsalted popcorn and pretzels. Fat-free sweets.  The items listed above may not be all the foods and drinks you can have. Talk to a dietitian to learn more.  What foods should I avoid?  Fruits  Canned fruit in a light or heavy syrup. Fried fruit. Fruit in cream or butter sauce.  Vegetables  Creamed or fried vegetables. Vegetables in a cheese sauce. Regular canned vegetables that are not marked as low-sodium or reduced-sodium. Regular canned tomato sauce and paste that are not marked as low-sodium or reduced-sodium. Regular tomato and vegetable juices that are not marked as low-sodium or reduced-sodium. Pickles. Olives.  Grains  Baked goods made with fat, such as croissants, muffins, or some breads. Dry pasta or rice meal packs.  Meats and other proteins  Fatty cuts of meat. Ribs. Fried meat. Plaza. Bologna, salami, and other precooked or cured meats, such as sausages or meat loaves, that are not lean and low in sodium. Fat from the back of a pig (fatback). Viry.  Salted nuts and seeds. Canned beans with added salt. Canned or smoked fish. Whole eggs or egg yolks. Chicken or turkey with skin.  Dairy  Whole or 2% milk, cream, and half-and-half. Whole or full-fat cream cheese. Whole-fat or sweetened yogurt. Full-fat cheese. Nondairy creamers. Whipped toppings. Processed cheese and cheese spreads.  Fats and oils  Butter. Stick margarine. Lard. Shortening. Ghee. Plaza fat. Tropical oils, such as coconut, palm kernel, or palm oil.  Seasonings and condiments  Onion salt, garlic salt, seasoned salt, table salt, and sea salt. Worcestershire sauce. Tartar sauce. Barbecue sauce. Teriyaki sauce. Soy sauce, including reduced-sodium soy sauce. Steak sauce. Canned and packaged gravies. Fish sauce. Oyster sauce. Cocktail sauce. Store-bought horseradish. Ketchup. Mustard. Meat flavorings and tenderizers. Bouillon cubes. Hot sauces. Pre-made or packaged marinades. Pre-made or packaged taco seasonings. Relishes. Regular salad dressings.  Other foods  Salted popcorn and pretzels.  The items listed above may not be all the foods and drinks you should avoid. Talk to a dietitian to learn more.  Where to find more information  National Heart, Lung, and Blood [place] (NHLBI): nhlbi.nih.gov  American Heart Association (AHA): heart.org  Academy of Nutrition and Dietetics: eatright.org  National Kidney Foundation (NKF): kidney.org  This information is not intended to replace advice given to you by your health care provider. Make sure you discuss any questions you have with your health care provider.  Document Revised: 01/04/2024 Document Reviewed: 01/04/2024  Elsevier Patient Education © 2024 [employer]

## 2024-06-05 NOTE — H&P
NEUROSURGERY INITIAL HOSPITAL ENCOUNTER    Assessment/Plan:   Zina Armando is a 33 y.o. non-English-speaking  female with significant medical comorbidities to include  a Craniotomy Suboccipital With Stealth, Lumbar Drain Insertion External and Lumbar Drain Insertion External on 5/17/2024.  Ms. Armando has done fairly well since we last saw her.  Currently asymptomatic, denying headaches, visual disturbances, dizziness, nausea, or vomiting.  She does however endorse intermittent drainage from her incision that is thin in consistency and often yellow-tinged in color.  Otherwise, she is neurologically intact.  No recent imaging.      Differential Diagnosis:   Status post craniotomy for tumor  Epidermoid cyst  Postoperative incision infection versus CSF leak    Recommendations:  Admit to [facility] for close neurologic monitoring  Stat MRI brain with and without contrast  Routine labs to include CRP, ESR, and blood cultures x 2  Possible lumbar drain placement with collection of CSF  Continue neuroexam per policy.  Call for decline  Additional recommendations to follow labs and advanced imaging    I discussed the patients findings and my recommendations with patient    Thank you very much for this interesting consult.   _________________________________________    Chief Complaint: Postoperative incisions CSF leak versus infection      HPI: Zina Armando is a 33 y.o. non-English-speaking  female with a significant comorbidity to include a recent craniotomy for tumor on 5/17/2024.  Ms. Armando, from a symptom standpoint has done fairly well since we last saw her.  Minimal neck pain.  She does endorse incisional discomfort and neck stiffness, however denies fever, headaches, visual disturbances, dizziness, nausea, vomiting, or any new symptoms.  She does however endorse intermittent discharge from several areas of her incision over the past 7 to 10 days.  She states that fluid is thin and often yellow in color.   She currently rates the severity of her symptoms 0/10.     Review of Systems   Constitutional: Negative.    HENT: Negative.     Eyes: Negative.    Respiratory: Negative.     Cardiovascular: Negative.    Gastrointestinal: Negative.    Endocrine: Negative.    Genitourinary: Negative.    Musculoskeletal: Negative.    Skin:  Positive for wound.   Allergic/Immunologic: Negative.    Neurological: Negative.    Hematological: Negative.    Psychiatric/Behavioral: Negative.     All other systems reviewed and are negative.     Past Medical History:  has a past medical history of Cerebellar mass (05/2024), Miscarriage (04/2024), Miscarriage (02/2023), and Personal history of COVID-19 (2021).    Past Surgical History:  has a past surgical history that includes No past surgeries; Craniotomy (N/A, 5/17/2024); and Lumbar Drain Insertion External (N/A, 5/17/2024).    Family History: family history is not on file.    Social History:  reports that she has never smoked. She has never used smokeless tobacco. She reports that she does not drink alcohol and does not use drugs.    Allergies: Patient has no known allergies.    Home Medications:   Current Facility-Administered Medications:     acetaminophen (TYLENOL) tablet 650 mg, 650 mg, Oral, Q4H PRN **OR** acetaminophen (TYLENOL) suppository 325 mg, 325 mg, Rectal, Q4H PRN, Dominguez Narvaez APRN    Calcium Replacement - Follow Nurse / BPA Driven Protocol, , Does not apply, PRN, Dominguez Narvaez APRN    Chlorhexidine Gluconate Cloth 2 % pads 1 Application, 1 Application, Topical, Once, Dominguez Narvaez APRN    [START ON 6/6/2024] Chlorhexidine Gluconate Cloth 2 % pads 1 Application, 1 Application, Topical, Q24H, Dominguez Narvaez APRN    levETIRAcetam (KEPPRA) tablet 500 mg, 500 mg, Oral, Q12H, Dominguez Narvaez APRN    Magnesium Standard Dose Replacement - Follow Nurse / BPA Driven Protocol, , Does not apply, PRN, Dominguez Narvaez APRN    mupirocin (BACTROBAN) 2 % nasal ointment 1 Application, 1  Application, Each Nare, BID, Dominguez Narvaez, APRN    niCARdipine (CARDENE) 25 mg in 250 mL NS infusion kit, 5-15 mg/hr, Intravenous, Titrated, Dominguez Narvaez APRN    ondansetron ODT (ZOFRAN-ODT) disintegrating tablet 4 mg, 4 mg, Oral, Q6H PRN **OR** ondansetron (ZOFRAN) injection 4 mg, 4 mg, Intravenous, Q6H PRN, Dominguez Narvaez APRN    Phosphorus Replacement - Follow Nurse / BPA Driven Protocol, , Does not apply, PRN, Dominguez Narvaez, TERRENCE    polyethylene glycol (MIRALAX) packet 17 g, 17 g, Oral, Daily, Dominguez Narvaez, TERRENCE    Potassium Replacement - Follow Nurse / BPA Driven Protocol, , Does not apply, PRN, Dominguez Narvaez, TERRENCE    sennosides-docusate (PERICOLACE) 8.6-50 MG per tablet 2 tablet, 2 tablet, Oral, BID, Dominguez Narvaez APRN    sodium chloride 0.9 % flush 10 mL, 10 mL, Intravenous, Q12H, Dominguez Narvaez APRN    sodium chloride 0.9 % flush 10 mL, 10 mL, Intravenous, PRN, Dominguez Narvaez APRN    sodium chloride 0.9 % infusion 40 mL, 40 mL, Intravenous, PRN, Dominguez Narvaez, TERRENCE    Medications: Scheduled Meds:Chlorhexidine Gluconate Cloth, 1 Application, Topical, Once  [START ON 6/6/2024] Chlorhexidine Gluconate Cloth, 1 Application, Topical, Q24H  levETIRAcetam, 500 mg, Oral, Q12H  mupirocin, 1 Application, Each Nare, BID  polyethylene glycol, 17 g, Oral, Daily  senna-docusate sodium, 2 tablet, Oral, BID  sodium chloride, 10 mL, Intravenous, Q12H      Continuous Infusions:niCARdipine, 5-15 mg/hr      PRN Meds:.  acetaminophen **OR** acetaminophen    Calcium Replacement - Follow Nurse / BPA Driven Protocol    Magnesium Standard Dose Replacement - Follow Nurse / BPA Driven Protocol    ondansetron ODT **OR** ondansetron    Phosphorus Replacement - Follow Nurse / BPA Driven Protocol    Potassium Replacement - Follow Nurse / BPA Driven Protocol    sodium chloride    sodium chloride    Vital Signs  Temp:  [99.9 °F (37.7 °C)] 99.9 °F (37.7 °C)    Physical Exam  Physical Exam  Vitals and nursing note reviewed.    Constitutional:       General: She is not in acute distress.     Appearance: Normal appearance. She is well-developed and well-groomed. She is morbidly obese. She is not ill-appearing, toxic-appearing or diaphoretic.      Comments: BMI 42.21   HENT:      Head: Normocephalic and atraumatic.      Right Ear: Hearing normal.      Left Ear: Hearing normal.   Eyes:      Extraocular Movements: EOM normal.      Conjunctiva/sclera: Conjunctivae normal.      Pupils: Pupils are equal, round, and reactive to light.   Neck:      Trachea: Trachea normal.     Cardiovascular:      Rate and Rhythm: Normal rate and regular rhythm.   Pulmonary:      Effort: Pulmonary effort is normal. No tachypnea, bradypnea, accessory muscle usage or respiratory distress.   Abdominal:      Palpations: Abdomen is soft.   Musculoskeletal:      Cervical back: Full passive range of motion without pain and neck supple.   Skin:     General: Skin is warm and dry.   Neurological:      Mental Status: She is alert and oriented to person, place, and time.      GCS: GCS eye subscore is 4. GCS verbal subscore is 5. GCS motor subscore is 6.      Gait: Gait is intact.      Deep Tendon Reflexes:      Reflex Scores:       Tricep reflexes are 2+ on the right side and 2+ on the left side.       Bicep reflexes are 2+ on the right side and 2+ on the left side.       Brachioradialis reflexes are 2+ on the right side and 2+ on the left side.       Patellar reflexes are 2+ on the right side and 2+ on the left side.       Achilles reflexes are 2+ on the right side and 2+ on the left side.  Psychiatric:         Speech: Speech normal.         Behavior: Behavior normal. Behavior is cooperative.         Neurologic Exam     Mental Status   Oriented to person, place, and time.   Attention: normal. Concentration: normal.   Speech: speech is normal   Level of consciousness: alert    Cranial Nerves     CN II   Visual fields full to confrontation.     CN III, IV, VI   Pupils are  equal, round, and reactive to light.  Extraocular motions are normal.     CN V   Facial sensation intact.     CN VII   Facial expression full, symmetric.     CN VIII   CN VIII normal.     CN IX, X   CN IX normal.     CN XI   CN XI normal.     Motor Exam   Right arm tone: normal  Left arm tone: normal  Right arm pronator drift: absent  Left arm pronator drift: absent  Right leg tone: normal  Left leg tone: normal    Strength   Right deltoid: 5/5  Left deltoid: 5/5  Right biceps: 5/5  Left biceps: 5/5  Right triceps: 5/5  Left triceps: 5/5  Right wrist extension: 5/5  Left wrist extension: 5/5  Right iliopsoas: 5/5  Left iliopsoas: 5/5  Right quadriceps: 5/5  Left quadriceps: 5/5  Right anterior tibial: 5/5  Left anterior tibial: 5/5  Right gastroc: 5/5  Left gastroc: 5/5  No dysmetria  Right EHL 5/5  Left EHL 5/5       Sensory Exam   Right arm light touch: normal  Left arm light touch: normal  Right leg light touch: normal  Left leg light touch: normal    Gait, Coordination, and Reflexes     Gait  Gait: normal    Tremor   Resting tremor: absent  Intention tremor: absent  Action tremor: absent    Reflexes   Right brachioradialis: 2+  Left brachioradialis: 2+  Right biceps: 2+  Left biceps: 2+  Right triceps: 2+  Left triceps: 2+  Right patellar: 2+  Left patellar: 2+  Right achilles: 2+  Left achilles: 2+  Right plantar: normal  Left plantar: normal  Right Crockett: absent  Left Crockett: absent  Right ankle clonus: absent  Left ankle clonus: absent  Right pendular knee jerk: absent  Left pendular knee jerk: absent    Results Review:   Independent review and interpretation of imaging  Imaging Results (Last 24 Hours)       ** No results found for the last 24 hours. **          MRI brain:  MRI spine:   CT Head:  CT c-spine:  CT t-spine:  CT l-spine:  X-ray:    I reviewed the patient's new clinical results.  Lab Results (last 24 hours)       ** No results found for the last 24 hours. **          Dominguez Narvaez, APRN

## 2024-06-05 NOTE — PROGRESS NOTES
Chief complaint:   Chief Complaint   Patient presents with    Post-op     Pt is here for 2 wk PO visit.         Subjective      services used during this encounter.  : Bj, #[id number]    HPI:   Interval History: Zina Armando is a 33 y.o. non-English-speaking  female who presents today for post operative follow-up from a Craniotomy Suboccipital With Stealth, Lumbar Drain Insertion External and Lumbar Drain Insertion External on 5/17/2024.  Ms. Armando, from a symptom standpoint has done fairly well since we last saw her.  Minimal neck pain.  She does endorse incisional discomfort and neck stiffness, however denies fever, headaches, visual disturbances, dizziness, nausea, vomiting, or any new symptoms.  She does however endorse intermittent discharge from several areas of her incision over the past 7 to 10 days.  She states that fluid is thin and often yellow in color.  She currently rates the severity of her symptoms 0/10.    PFSH:  Past Medical History:   Diagnosis Date    Cerebellar mass 05/2024    Miscarriage 04/2024    Miscarriage 02/2023    Personal history of COVID-19 2021       Past Surgical History:   Procedure Laterality Date    CRANIOTOMY N/A 5/17/2024    Procedure: CRANIOTOMY SUBOCCIPITAL WITH STEALTH, LUMBAR DRAIN INSERTION EXTERNAL;  Surgeon: Rohith Alexander MD;  Location:  [facility] OR;  Service: Neurosurgery;  Laterality: N/A;    LUMBAR DRAIN INSERTION EXTERNAL N/A 5/17/2024    Procedure: LUMBAR DRAIN INSERTION EXTERNAL;  Surgeon: Rohith Alexander MD;  Location:  [facility] OR;  Service: Neurosurgery;  Laterality: N/A;    NO PAST SURGERIES         Objective      Current Outpatient Medications   Medication Sig Dispense Refill    acetaminophen (TYLENOL) 500 MG tablet Take 2 tablets by mouth Every 6 (Six) Hours As Needed for Mild Pain.      benzonatate (TESSALON) 200 MG capsule Take 1 capsule by mouth 3 (Three) Times a Day As Needed for Cough. 90 capsule 0     "butalbital-acetaminophen-caffeine (FIORICET, ESGIC) -40 MG per tablet Take 1 tablet by mouth Every 6 (Six) Hours As Needed for Headache. 12 tablet 0    cyclobenzaprine (FLEXERIL) 5 MG tablet Take 1 tablet by mouth 3 (Three) Times a Day As Needed for Muscle Spasms. 60 tablet 0    gabapentin (NEURONTIN) 300 MG capsule Take 1 capsule by mouth 3 (Three) Times a Day. 60 capsule 0    HYDROcodone-acetaminophen (NORCO) 5-325 MG per tablet Take 1 tablet by mouth Every 12 (Twelve) Hours As Needed for Severe Pain. 14 tablet 0    levETIRAcetam (KEPPRA) 500 MG tablet Take 1 tablet by mouth Every 12 (Twelve) Hours. 40 tablet 0    pantoprazole (Protonix) 40 MG EC tablet Take 1 tablet by mouth Daily. 30 tablet 0    sennosides-docusate (PERICOLACE) 8.6-50 MG per tablet Take 2 tablets by mouth 2 (Two) Times a Day. 60 tablet 0     No current facility-administered medications for this visit.     Vital Signs  Ht 149.9 cm (59\")   Wt 94.8 kg (209 lb)   LMP 05/18/2024 (Approximate)   BMI 42.21 kg/m²   Physical Exam  Vitals and nursing note reviewed.   Constitutional:       General: She is not in acute distress.     Appearance: Normal appearance. She is well-developed and well-groomed. She is morbidly obese. She is not ill-appearing, toxic-appearing or diaphoretic.      Comments: BMI 42.21   HENT:      Head: Normocephalic and atraumatic.      Right Ear: Hearing normal.      Left Ear: Hearing normal.   Eyes:      Extraocular Movements: EOM normal.      Conjunctiva/sclera: Conjunctivae normal.      Pupils: Pupils are equal, round, and reactive to light.   Neck:      Trachea: Trachea normal.     Cardiovascular:      Rate and Rhythm: Normal rate and regular rhythm.   Pulmonary:      Effort: Pulmonary effort is normal. No tachypnea, bradypnea, accessory muscle usage or respiratory distress.   Abdominal:      Palpations: Abdomen is soft.   Musculoskeletal:      Cervical back: Full passive range of motion without pain and neck supple. "   Skin:     General: Skin is warm and dry.   Neurological:      Mental Status: She is alert and oriented to person, place, and time.      GCS: GCS eye subscore is 4. GCS verbal subscore is 5. GCS motor subscore is 6.      Gait: Gait is intact.      Deep Tendon Reflexes:      Reflex Scores:       Tricep reflexes are 2+ on the right side and 2+ on the left side.       Bicep reflexes are 2+ on the right side and 2+ on the left side.       Brachioradialis reflexes are 2+ on the right side and 2+ on the left side.       Patellar reflexes are 2+ on the right side and 2+ on the left side.       Achilles reflexes are 2+ on the right side and 2+ on the left side.  Psychiatric:         Speech: Speech normal.         Behavior: Behavior normal. Behavior is cooperative.       Neurologic Exam     Mental Status   Oriented to person, place, and time.   Attention: normal. Concentration: normal.   Speech: speech is normal   Level of consciousness: alert    Cranial Nerves     CN II   Visual fields full to confrontation.     CN III, IV, VI   Pupils are equal, round, and reactive to light.  Extraocular motions are normal.     CN V   Facial sensation intact.     CN VII   Facial expression full, symmetric.     CN VIII   CN VIII normal.     CN IX, X   CN IX normal.     CN XI   CN XI normal.     Motor Exam   Right arm tone: normal  Left arm tone: normal  Right arm pronator drift: absent  Left arm pronator drift: absent  Right leg tone: normal  Left leg tone: normal    Strength   Right deltoid: 5/5  Left deltoid: 5/5  Right biceps: 5/5  Left biceps: 5/5  Right triceps: 5/5  Left triceps: 5/5  Right wrist extension: 5/5  Left wrist extension: 5/5  Right iliopsoas: 5/5  Left iliopsoas: 5/5  Right quadriceps: 5/5  Left quadriceps: 5/5  Right anterior tibial: 5/5  Left anterior tibial: 5/5  Right gastroc: 5/5  Left gastroc: 5/5  No dysmetria  Right EHL 5/5  Left EHL 5/5       Sensory Exam   Right arm light touch: normal  Left arm light touch:  "normal  Right leg light touch: normal  Left leg light touch: normal    Gait, Coordination, and Reflexes     Gait  Gait: normal    Tremor   Resting tremor: absent  Intention tremor: absent  Action tremor: absent    Reflexes   Right brachioradialis: 2+  Left brachioradialis: 2+  Right biceps: 2+  Left biceps: 2+  Right triceps: 2+  Left triceps: 2+  Right patellar: 2+  Left patellar: 2+  Right achilles: 2+  Left achilles: 2+  Right plantar: normal  Left plantar: normal  Right Crockett: absent  Left Crockett: absent  Right ankle clonus: absent  Left ankle clonus: absent  Right pendular knee jerk: absent  Left pendular knee jerk: absent    Incision: WOUND IMAGE - SP craniotomy for tumor (06/05/2024)   (Consent to obtain photo of postoperative site for documentation purposes only obtained verbally by Ms. Armando)    Results Review:   SURGICAL PATHOLOGY RESULTS  Lab Results   Component Value Date    FINALDX  05/17/2024     1,2. Brain tumor, biopsy:  - Squamous epithelium and keratinous debris, consistent with epidermoid cyst.      INTRAOP  05/17/2024       Specimen: Brain tumor, biopsy  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  Reported to Dr. Alexander on 5/17/2024 at 11:20 CDT by Itzel Sidhu MD.      GROSSDES  05/17/2024     1. Brain.  Received fresh for frozen section labeled with the patient name, date of birth, and designated brain tumor.  The specimen consists of a Telfa pad with fragments of gray-white, soft friable material.  A crush prep slide is made and submitted for H&E stain.  Representative sections of the remaining material is submitted for frozen section.  The frozen section remnant is wrapped in lens paper and submitted in block 1A.  The remaining material is wrapped in lens paper and submitted in block 1B.    2. Brain.   Received in a formalin filled container labeled with the patient's name, date of birth, and \"brain tumor\".  The specimen consists of multiple Telfa pads with " multiple fragments of yellow-gray soft and friable material aggregating to 5.1 x 4.3 x 1.3 cm.  Accompanying the friable material is a plate of possible dural tissue versus cyst wall measuring 3.5 x 2.3 cm and averaging 0.2 cm in depth.  The cut surface of the thickened areas within this fragment are yellow-pink with a slight edematous appearance.  The remaining material is friable and has a slight waxy appearance.  Representative sections are wrapped in lens paper and submitted in blocks 2A and 2B.        MICRO  08/31/2019     Sections of the umbilical cord reveal the usual three-vessel anatomy.  There is no evidence of funisitis.    Sections of placental membranes reveal no evidence of acute chorioamnionitis.  Macro phages are seen in the placental membranes containing granular rodriguez tan pigment.  The material in these macro phages demonstrates positive Prussian blue iron staining.  This supports the diagnosis of hemosiderin as the pigment.  The control stains appropriately.    Sections of placental tissue reveal mature chorionic villi compatible with third trimester gestation.  The villi appear adequately vascularized.  There is patchy intervillous fibrin deposition.           Assessment/Plan:   Status post craniotomy for tumor  Epidermoid cyst  Postoperative incision seroma versus infection versus CSF leak  Zina Armando is a 33 y.o. female whom presents today for follow-up from a Craniotomy Suboccipital With Stealth, Lumbar Drain Insertion External and Lumbar Drain Insertion External on 5/17/2024.  Ms. Armando has done fairly well since we last saw her.  Currently asymptomatic, denying headaches, visual disturbances, dizziness, nausea, or vomiting.  She does however endorse intermittent drainage from her incision that is thin in consistency and often yellow-tinged in color.  Otherwise, she is neurologically intact.  Given the appearance of her incision and concern either a CSF leak or infection, we will admit  her to the ICU for close neurologic monitoring.  Routine labs to include blood cultures, stat MRI of the brain with and without contrast, followed by possible placement of lumbar drain for CSF and to decrease intracranial pressure and antibiotics. Ms. Armando agrees with this plan of care.    Class 3 obesity (BMI >= 40) due to excessive calories with serious comorbidities BMI of 40.0-44.9 in adult  Body mass index is 42.21 kg/m². Information on the DASH diet provided in the AVS.  We will continue to provided diet and exercise information with the goal of weight loss at each scheduled appointment.     Diagnoses and all orders for this visit:    1. Cerebellar mass (Primary)    2. Status post craniotomy    3. Class 3 severe obesity due to excess calories with serious comorbidity and body mass index (BMI) of 40.0 to 44.9 in adult      Return for KEEP SCHEDULED APPT WITH DR. PARKS.    I discussed the patients findings and my recommendations with patient    TERRENCE Gutiérrez

## 2024-06-05 NOTE — NURSING NOTE
[facility]  INPATIENT WOUND & OSTOMY CARE    Today's Date: 06/05/24    Patient Name: Zina Armando  MRN: [id number]  [facility]: [id number]  PCP: Provider, No Known  Attending Provider: Rohith Alexander, *  Length of Stay: 0    I placed pressure injury prevention measure orders per protocol due to patient being at risk for skin breakdown and being admitted to the unit. Please reach out to wound care nurse if any skin issues arise.       This document has been electronically signed by Jennifer Aviles RN on 6/5/2024 14:45 CDT

## 2024-06-06 LAB
ANION GAP SERPL CALCULATED.3IONS-SCNC: 6 MMOL/L (ref 5–15)
BASOPHILS # BLD AUTO: 0.01 10*3/MM3 (ref 0–0.2)
BASOPHILS NFR BLD AUTO: 0.2 % (ref 0–1.5)
BUN SERPL-MCNC: 8 MG/DL (ref 6–20)
BUN/CREAT SERPL: 13.3 (ref 7–25)
CALCIUM SPEC-SCNC: 8.8 MG/DL (ref 8.6–10.5)
CHLORIDE SERPL-SCNC: 101 MMOL/L (ref 98–107)
CO2 SERPL-SCNC: 29 MMOL/L (ref 22–29)
CREAT SERPL-MCNC: 0.6 MG/DL (ref 0.57–1)
DEPRECATED RDW RBC AUTO: 42.9 FL (ref 37–54)
EGFRCR SERPLBLD CKD-EPI 2021: 121.7 ML/MIN/1.73
EOSINOPHIL # BLD AUTO: 0.09 10*3/MM3 (ref 0–0.4)
EOSINOPHIL NFR BLD AUTO: 1.4 % (ref 0.3–6.2)
ERYTHROCYTE [DISTWIDTH] IN BLOOD BY AUTOMATED COUNT: 13.3 % (ref 12.3–15.4)
GLUCOSE SERPL-MCNC: 101 MG/DL (ref 65–99)
HCT VFR BLD AUTO: 31.7 % (ref 34–46.6)
HGB BLD-MCNC: 9.6 G/DL (ref 12–15.9)
IMM GRANULOCYTES # BLD AUTO: 0.04 10*3/MM3 (ref 0–0.05)
IMM GRANULOCYTES NFR BLD AUTO: 0.6 % (ref 0–0.5)
LYMPHOCYTES # BLD AUTO: 1.47 10*3/MM3 (ref 0.7–3.1)
LYMPHOCYTES NFR BLD AUTO: 23.2 % (ref 19.6–45.3)
MCH RBC QN AUTO: 26.7 PG (ref 26.6–33)
MCHC RBC AUTO-ENTMCNC: 30.3 G/DL (ref 31.5–35.7)
MCV RBC AUTO: 88.1 FL (ref 79–97)
MONOCYTES # BLD AUTO: 0.67 10*3/MM3 (ref 0.1–0.9)
MONOCYTES NFR BLD AUTO: 10.6 % (ref 5–12)
MRSA DNA SPEC QL NAA+PROBE: NORMAL
NEUTROPHILS NFR BLD AUTO: 4.05 10*3/MM3 (ref 1.7–7)
NEUTROPHILS NFR BLD AUTO: 64 % (ref 42.7–76)
NRBC BLD AUTO-RTO: 0 /100 WBC (ref 0–0.2)
PLATELET # BLD AUTO: 465 10*3/MM3 (ref 140–450)
PMV BLD AUTO: 9.1 FL (ref 6–12)
POTASSIUM SERPL-SCNC: 3.9 MMOL/L (ref 3.5–5.2)
RBC # BLD AUTO: 3.6 10*6/MM3 (ref 3.77–5.28)
SODIUM SERPL-SCNC: 136 MMOL/L (ref 136–145)
WBC NRBC COR # BLD AUTO: 6.33 10*3/MM3 (ref 3.4–10.8)

## 2024-06-06 PROCEDURE — 87040 BLOOD CULTURE FOR BACTERIA: CPT | Performed by: INTERNAL MEDICINE

## 2024-06-06 PROCEDURE — 85025 COMPLETE CBC W/AUTO DIFF WBC: CPT | Performed by: NEUROLOGICAL SURGERY

## 2024-06-06 PROCEDURE — 99254 IP/OBS CNSLTJ NEW/EST MOD 60: CPT | Performed by: INTERNAL MEDICINE

## 2024-06-06 PROCEDURE — 25810000003 SODIUM CHLORIDE 0.9 % SOLUTION: Performed by: INTERNAL MEDICINE

## 2024-06-06 PROCEDURE — 25810000003 SODIUM CHLORIDE 0.9 % SOLUTION: Performed by: NEUROLOGICAL SURGERY

## 2024-06-06 PROCEDURE — 25010000002 HEPARIN (PORCINE) PER 1000 UNITS: Performed by: NEUROLOGICAL SURGERY

## 2024-06-06 PROCEDURE — 25010000002 CEFEPIME PER 500 MG: Performed by: NEUROLOGICAL SURGERY

## 2024-06-06 PROCEDURE — 80048 BASIC METABOLIC PNL TOTAL CA: CPT | Performed by: NEUROLOGICAL SURGERY

## 2024-06-06 PROCEDURE — 25010000002 VANCOMYCIN 10 G RECONSTITUTED SOLUTION: Performed by: NEUROLOGICAL SURGERY

## 2024-06-06 PROCEDURE — 94799 UNLISTED PULMONARY SVC/PX: CPT

## 2024-06-06 PROCEDURE — 99024 POSTOP FOLLOW-UP VISIT: CPT | Performed by: NURSE PRACTITIONER

## 2024-06-06 PROCEDURE — 97166 OT EVAL MOD COMPLEX 45 MIN: CPT | Performed by: OCCUPATIONAL THERAPIST

## 2024-06-06 PROCEDURE — 87641 MR-STAPH DNA AMP PROBE: CPT | Performed by: INTERNAL MEDICINE

## 2024-06-06 PROCEDURE — 97162 PT EVAL MOD COMPLEX 30 MIN: CPT

## 2024-06-06 PROCEDURE — 94761 N-INVAS EAR/PLS OXIMETRY MLT: CPT

## 2024-06-06 RX ORDER — HYDROCODONE BITARTRATE AND ACETAMINOPHEN 10; 325 MG/1; MG/1
1 TABLET ORAL ONCE
Status: COMPLETED | OUTPATIENT
Start: 2024-06-06 | End: 2024-06-06

## 2024-06-06 RX ORDER — HEPARIN SODIUM 5000 [USP'U]/ML
5000 INJECTION, SOLUTION INTRAVENOUS; SUBCUTANEOUS EVERY 12 HOURS SCHEDULED
Status: DISCONTINUED | OUTPATIENT
Start: 2024-06-06 | End: 2024-06-11 | Stop reason: HOSPADM

## 2024-06-06 RX ORDER — ONDANSETRON 2 MG/ML
4 INJECTION INTRAMUSCULAR; INTRAVENOUS EVERY 6 HOURS PRN
Status: DISCONTINUED | OUTPATIENT
Start: 2024-06-06 | End: 2024-06-11 | Stop reason: HOSPADM

## 2024-06-06 RX ORDER — VANCOMYCIN/0.9 % SOD CHLORIDE 1.5G/250ML
15 PLASTIC BAG, INJECTION (ML) INTRAVENOUS ONCE
Status: COMPLETED | OUTPATIENT
Start: 2024-06-06 | End: 2024-06-06

## 2024-06-06 RX ORDER — AMOXICILLIN 250 MG
2 CAPSULE ORAL 2 TIMES DAILY
Status: DISCONTINUED | OUTPATIENT
Start: 2024-06-06 | End: 2024-06-11 | Stop reason: HOSPADM

## 2024-06-06 RX ORDER — POLYETHYLENE GLYCOL 3350 17 G/17G
17 POWDER, FOR SOLUTION ORAL DAILY PRN
Status: DISCONTINUED | OUTPATIENT
Start: 2024-06-06 | End: 2024-06-11 | Stop reason: HOSPADM

## 2024-06-06 RX ORDER — CYCLOBENZAPRINE HCL 10 MG
5 TABLET ORAL 3 TIMES DAILY PRN
Status: DISCONTINUED | OUTPATIENT
Start: 2024-06-06 | End: 2024-06-11 | Stop reason: HOSPADM

## 2024-06-06 RX ORDER — BISACODYL 10 MG
10 SUPPOSITORY, RECTAL RECTAL DAILY PRN
Status: DISCONTINUED | OUTPATIENT
Start: 2024-06-06 | End: 2024-06-11 | Stop reason: HOSPADM

## 2024-06-06 RX ORDER — HYDROCODONE BITARTRATE AND ACETAMINOPHEN 7.5; 325 MG/1; MG/1
1 TABLET ORAL EVERY 4 HOURS PRN
Status: DISCONTINUED | OUTPATIENT
Start: 2024-06-06 | End: 2024-06-07

## 2024-06-06 RX ORDER — ONDANSETRON 4 MG/1
4 TABLET, ORALLY DISINTEGRATING ORAL EVERY 6 HOURS PRN
Status: DISCONTINUED | OUTPATIENT
Start: 2024-06-06 | End: 2024-06-11 | Stop reason: HOSPADM

## 2024-06-06 RX ORDER — SODIUM CHLORIDE 9 MG/ML
200 INJECTION, SOLUTION INTRAVENOUS CONTINUOUS
Status: DISCONTINUED | OUTPATIENT
Start: 2024-06-06 | End: 2024-06-10

## 2024-06-06 RX ORDER — BISACODYL 5 MG/1
5 TABLET, DELAYED RELEASE ORAL DAILY PRN
Status: DISCONTINUED | OUTPATIENT
Start: 2024-06-06 | End: 2024-06-11 | Stop reason: HOSPADM

## 2024-06-06 RX ORDER — HYDROCODONE BITARTRATE AND ACETAMINOPHEN 10; 325 MG/1; MG/1
1 TABLET ORAL EVERY 4 HOURS PRN
Status: DISCONTINUED | OUTPATIENT
Start: 2024-06-06 | End: 2024-06-07

## 2024-06-06 RX ADMIN — LEVETIRACETAM 500 MG: 500 TABLET, FILM COATED ORAL at 20:33

## 2024-06-06 RX ADMIN — HEPARIN SODIUM 5000 UNITS: 5000 INJECTION INTRAVENOUS; SUBCUTANEOUS at 20:34

## 2024-06-06 RX ADMIN — SODIUM CHLORIDE 200 ML/HR: 9 INJECTION, SOLUTION INTRAVENOUS at 12:31

## 2024-06-06 RX ADMIN — CYCLOBENZAPRINE HYDROCHLORIDE 5 MG: 5 TABLET, FILM COATED ORAL at 23:52

## 2024-06-06 RX ADMIN — ACETAMINOPHEN 650 MG: 325 TABLET, FILM COATED ORAL at 06:16

## 2024-06-06 RX ADMIN — HYDROCODONE BITARTRATE AND ACETAMINOPHEN 1 TABLET: 10; 325 TABLET ORAL at 08:22

## 2024-06-06 RX ADMIN — SODIUM CHLORIDE 1500 MG: 9 INJECTION, SOLUTION INTRAVENOUS at 02:43

## 2024-06-06 RX ADMIN — HYDROCODONE BITARTRATE AND ACETAMINOPHEN 1 TABLET: 10; 325 TABLET ORAL at 12:31

## 2024-06-06 RX ADMIN — SENNOSIDES AND DOCUSATE SODIUM 2 TABLET: 50; 8.6 TABLET ORAL at 20:34

## 2024-06-06 RX ADMIN — SODIUM CHLORIDE 1250 MG: 9 INJECTION, SOLUTION INTRAVENOUS at 20:33

## 2024-06-06 RX ADMIN — ACETAMINOPHEN 650 MG: 325 TABLET, FILM COATED ORAL at 11:37

## 2024-06-06 RX ADMIN — HYDROCODONE BITARTRATE AND ACETAMINOPHEN 1 TABLET: 10; 325 TABLET ORAL at 16:47

## 2024-06-06 RX ADMIN — HYDROCODONE BITARTRATE AND ACETAMINOPHEN 1 TABLET: 10; 325 TABLET ORAL at 02:53

## 2024-06-06 RX ADMIN — ACETAMINOPHEN 650 MG: 325 TABLET, FILM COATED ORAL at 01:25

## 2024-06-06 RX ADMIN — SENNOSIDES AND DOCUSATE SODIUM 2 TABLET: 50; 8.6 TABLET ORAL at 09:04

## 2024-06-06 RX ADMIN — LEVETIRACETAM 500 MG: 500 TABLET, FILM COATED ORAL at 08:22

## 2024-06-06 RX ADMIN — HEPARIN SODIUM 5000 UNITS: 5000 INJECTION INTRAVENOUS; SUBCUTANEOUS at 08:23

## 2024-06-06 RX ADMIN — Medication 1 APPLICATION: at 20:33

## 2024-06-06 RX ADMIN — CEFEPIME 2000 MG: 2 INJECTION, POWDER, FOR SOLUTION INTRAVENOUS at 17:46

## 2024-06-06 RX ADMIN — HYDROCODONE BITARTRATE AND ACETAMINOPHEN 1 TABLET: 10; 325 TABLET ORAL at 20:34

## 2024-06-06 RX ADMIN — HEPARIN SODIUM 5000 UNITS: 5000 INJECTION INTRAVENOUS; SUBCUTANEOUS at 01:53

## 2024-06-06 RX ADMIN — Medication 1 APPLICATION: at 08:22

## 2024-06-06 RX ADMIN — SODIUM CHLORIDE 1250 MG: 9 INJECTION, SOLUTION INTRAVENOUS at 12:00

## 2024-06-06 RX ADMIN — SODIUM CHLORIDE 200 ML/HR: 9 INJECTION, SOLUTION INTRAVENOUS at 18:43

## 2024-06-06 RX ADMIN — CEFEPIME 2000 MG: 2 INJECTION, POWDER, FOR SOLUTION INTRAVENOUS at 09:04

## 2024-06-06 RX ADMIN — ACETAMINOPHEN 650 MG: 325 TABLET, FILM COATED ORAL at 19:18

## 2024-06-06 RX ADMIN — Medication 10 ML: at 08:23

## 2024-06-06 RX ADMIN — CEFEPIME 2000 MG: 2 INJECTION, POWDER, FOR SOLUTION INTRAVENOUS at 01:53

## 2024-06-06 RX ADMIN — Medication 10 ML: at 21:00

## 2024-06-06 RX ADMIN — CHLORHEXIDINE GLUCONATE 1 APPLICATION: 500 CLOTH TOPICAL at 06:07

## 2024-06-06 NOTE — BRIEF OP NOTE
INCISION AND DRAINAGE WOUND, LUMBAR DRAIN INSERTION EXTERNAL  Progress Note    Zina Armando  6/5/2024    Pre-op Diagnosis:   Superficial postoperative wound infection [T81.49XA]       Post-Op Diagnosis Codes:     * Superficial postoperative wound infection [T81.49XA]    Procedure/CPT® Codes:        Procedure(s):  Lumbar Drain and Suboccipital Wound revision, possible removal of bone, possible revision of dural graft. HOLD ANTIBIOTICS UNTIL CSF OBTAINED  LUMBAR DRAIN INSERTION EXTERNAL              Surgeon(s):  Rohith Alexander MD    Anesthesia: General    Staff:   Circulator: Luis Hernandez RN  Scrub Person: Kala Marie; Peace Welsh         Estimated Blood Loss: 200ml    Urine Voided: * No values recorded between 6/5/2024  8:20 PM and 6/5/2024 10:34 PM *    Specimens:                Specimens       ID Source Type Tests Collected By Collected At Frozen?    1 CSF Reservoir Cerebrospinal Fluid GLUCOSE, CSF  PROTEIN, CSF  VDRL, CSF  CELL COUNT WITH DIFFERENTIAL, CSF  GRAM STAIN - NO CULTURE (Canceled)  CULTURE, CSF   Rohith Alexander MD 6/5/24 2120     Description: csf fluid from lumbar puncture.    2 Head Wound WOUND CULTURE   Rohith Alexander MD 6/5/24 2150     Description: suboccipital wound superficial    3 Head Wound WOUND CULTURE   Rohith Alexander MD 6/5/24 2206     Description: suboccipital wound deep    A Head Tissue TISSUE PATHOLOGY EXAM   Rohith Alexander MD 6/5/24 2226     Description: suboccipital tissue                  Drains:   Urethral Catheter Non-latex;Silicone 16 Fr. (Active)       [REMOVED] Urethral Catheter Non-latex;Silicone 16 Fr. (Removed)   Daily Indications Required activity restriction from trauma, surgery, (e.g. unstable spine, fracture, hemodynamics) 05/19/24 0400   Site Assessment Clean;Skin intact 05/19/24 0400   Collection Container Standard drainage bag 05/19/24 0400   Securement Method Securing device 05/19/24 0400   Catheter care  complete Yes 05/19/24 0400   Output (mL) 40 mL 05/19/24 0500       [REMOVED] External Urinary Catheter (Removed)   Daily Indications Daily output 05/19/24 0500   Site Assessment Clean;Skin intact 05/19/24 0500   Application/Removal external catheter applied;skin care provided 05/19/24 0500   Collection Container Wall suction 05/19/24 0500   Securement Method Securing device 05/19/24 0500       [REMOVED] Lumbar Drain (Removed)   Drain Status Clamped 05/20/24 0800   CSF Color Clear 05/20/24 0800   Site Description Unable to view 05/20/24 0800   Dressing Status Clean;Dry;Intact 05/20/24 0945   CSF Output (mL) 10 mL 05/20/24 0800       Findings: Superficial infection.  Dura appear fully intact        Complications: None          Rohith Alexander MD     Date: 6/5/2024  Time: 23:27 CDT

## 2024-06-06 NOTE — PROGRESS NOTES
"Pharmacy Dosing Service  Pharmacokinetics  Vancomycin Initial Evaluation  Assessment/Action/Plan:  Loading dose?: 1500 mg   Current Order: Vancomycin 1250 mg IVPB every 8 hours  Current end date:06/11/24  Levels: first trough 06/07  Additional antimicrobial agent(s): Cefepime  MRSA Nasal PCR ordered: No    Vancomycin dosage initiated based on population pharmacokinetic parameters. Per surgeon's request (Dr. Alexander), maintain trough levels above 20 mg/L for CNS penetration. Pharmacy will continue to follow daily and adjust dose accordingly.     Subjective:  Zina Armando is a 33 y.o. female with a Vancomycin \"Pharmacy to Dose\" consult for the treatment of CNS Infection , day 1 of 5 of treatment.    AUC Model Data:  Loading dose: N/A  Regimen: 1250 mg IV every 8 hours.  Start time: 15:43 on 06/06/2024  Exposure target: AUC24 (range)400-600 mg/L.hr   AUC24,ss: 672 mg/L.hr  PAUC*: 83 %  Ctrough,ss: 20.2 mg/L  Pconc*: 51 %  Tox.: 18 %    Objective:  Ht: 149.9 cm (59.02\"); Wt: 95.1 kg (209 lb 10.5 oz)  Estimated Creatinine Clearance: 141.7 mL/min (by C-G formula based on SCr of 0.6 mg/dL).   Creatinine   Date Value Ref Range Status   06/06/2024 0.60 0.57 - 1.00 mg/dL Final   06/05/2024 0.36 (L) 0.57 - 1.00 mg/dL Final   05/21/2024 0.39 (L) 0.57 - 1.00 mg/dL Final      Lab Results   Component Value Date    WBC 6.33 06/06/2024    WBC 5.83 06/05/2024    WBC 9.3 05/30/2024      Baseline culture results:  Microbiology Results (last 10 days)       Procedure Component Value - Date/Time    Culture, CSF - Cerebrospinal Fluid, CSF Reserv[place] [[id number]] Collected: 06/05/24 2120    Lab Status: Preliminary result Specimen: Cerebrospinal Fluid from CSF [place] Updated: 06/05/24 7113     Gram Stain Few (2+) WBCs seen      No organisms seen            Oscar Bland PharmD  06/06/24 03:50 CDT   "

## 2024-06-06 NOTE — NURSING NOTE
[facility]  INPATIENT WOUND & OSTOMY CARE    Today's Date: 06/06/24    Patient Name: Zina Armando  MRN: [id number]  [facility]: [id number]  PCP: Provider, No Known  Attending Provider: Rohith Alexander, *  Length of Stay: 1    Rounded on patient during multidisciplinary rounds. Patient is currently laying down resting in bed.     Turned patient to assess sacral spine, skin intact. I applied silicone foam dressing to sacral spine per policy.     Bilateral heels assessed, skin intact. Offloaded heels utilizing pillows.     Patient is currently on a comfort glide with an absorbant pad. Wedges are at bedside for nursing to utilizing for turning.    All pressure ulcer prevention measures have been ordered per protocol.      This document has been electronically signed by Jennifer Aviles RN on 6/6/2024 13:20 CDT

## 2024-06-06 NOTE — CASE MANAGEMENT/SOCIAL WORK
Discharge Planning Assessment  [facility]     Patient Name: Zina Armando  MRN: [id number]  Today's Date: 6/6/2024    Admit Date: 6/5/2024        Discharge Needs Assessment       Row Name 06/06/24 1039       Living Environment    People in Home child(katerin), dependent;spouse    Name(s) of People in Home Deshawn Hayden    Current Living Arrangements home    Primary Care Provided by self    Provides Primary Care For child(katerin)    Family Caregiver if Needed spouse    Quality of Family Relationships supportive    Able to Return to Prior Arrangements yes       Transition Planning    Patient/Family Anticipates Transition to home with family;home with help/services    Transportation Anticipated family or friend will provide       Discharge Needs Assessment    Readmission Within the Last 30 Days other (see comments)    Equipment Currently Used at Home none    Concerns to be Addressed discharge planning    Anticipated Changes Related to Illness none    Discharge Coordination/Progress Patient resides at home with her spouse and functions independently.  Patient recently admitted in May and discharged home with no needs.  ID is following during this admit.  SW following for plan/needs.                   Discharge Plan    No documentation.                 Continued Care and Services - Admitted Since 6/5/2024    No active coordination exists for this encounter.          Demographic Summary    No documentation.                  Functional Status    No documentation.                  Psychosocial    No documentation.                  Abuse/Neglect    No documentation.                  Legal    No documentation.                  Substance Abuse    No documentation.                  Patient Forms    No documentation.                     JOZEF Meyer

## 2024-06-06 NOTE — THERAPY EVALUATION
Patient Name: Zina Armando  : 1990    MRN: [id number]                              Today's Date: 2024       Admit Date: 2024    Visit Dx:     ICD-10-CM ICD-9-CM   1. Superficial postoperative wound infection  T81.49XA 998.59     Patient Active Problem List   Diagnosis    Cerebellar mass    Superficial postoperative wound infection     Past Medical History:   Diagnosis Date    Cerebellar mass 2024    Miscarriage 2024    Miscarriage 2023    Personal history of COVID-19      Past Surgical History:   Procedure Laterality Date    CRANIOTOMY N/A 2024    Procedure: CRANIOTOMY SUBOCCIPITAL WITH STEALTH, LUMBAR DRAIN INSERTION EXTERNAL;  Surgeon: Rohith Alexander MD;  Location:  [facility] OR;  Service: Neurosurgery;  Laterality: N/A;    INCISION AND DRAINAGE OF WOUND N/A 2024    Procedure: Lumbar Drain and Suboccipital Wound revision, possible removal of bone, possible revision of dural graft. HOLD ANTIBIOTICS UNTIL CSF OBTAINED;  Surgeon: Rohith Alexander MD;  Location:  [facility] OR;  Service: Neurosurgery;  Laterality: N/A;    LUMBAR DRAIN INSERTION EXTERNAL N/A 2024    Procedure: LUMBAR DRAIN INSERTION EXTERNAL;  Surgeon: Rohith Alexander MD;  Location:  [facility] OR;  Service: Neurosurgery;  Laterality: N/A;    LUMBAR DRAIN INSERTION EXTERNAL N/A 2024    Procedure: LUMBAR DRAIN INSERTION EXTERNAL;  Surgeon: Rohith Alexander MD;  Location:  [facility] OR;  Service: Neurosurgery;  Laterality: N/A;    NO PAST SURGERIES        General Information       Row Name 24 0950          OT Time and Intention    Document Type evaluation  Pt. s/p wound revision from previous suboccipital craniotomy with lumbar drain on 24.  -MAURO (camilla) MARIELA (courtney) MAURO (gene)     Mode of Treatment occupational therapy  -MAURO (camilla) MARIELA (courtney) MAURO (gene)       Row Name 24 0963          General Information    Patient Profile Reviewed yes  -MAURO (camilla) MARIELA (courtney) MAURO (gene)     Prior Level of Function  independent:;ADL's;all household mobility  -JJ (r) HH (t) JJ (c)     Barriers to Rehab medically complex  -JJ (r) HH (t) JJ (c)       Row Name 06/06/24 0950          Living Environment    People in Home spouse;child(katerin), dependent  -JJ (r) HH (t) JJ (c)       Row Name 06/06/24 0950          Cognition    Orientation Status (Cognition) oriented x 4  -JJ (r) HH (t) JJ (c)       Row Name 06/06/24 0950          Safety Issues, Functional Mobility    Impairments Affecting Function (Mobility) endurance/activity tolerance;pain  -JJ (r) HH (t) JJ (c)               User Key  (r) = Recorded By, (t) = Taken By, (c) = Cosigned By      Initials Name Provider Type    Dorota Cortez, OTR/L, CSRS Occupational Therapist    Maggy Dodge, OT Student OT Student                     Mobility/ADL's       Row Name 06/06/24 0950          Bed Mobility    Bed Mobility rolling left;rolling right;supine-sit;sit-supine  -JJ (r) HH (t) JJ (c)     Rolling Left [place] (Bed Mobility) minimum assist (75% patient effort)  -JJ (r) HH (t) JJ (c)     Rolling Right [place] (Bed Mobility) minimum assist (75% patient effort)  -JJ (r) HH (t) JJ (c)     Supine-Sit [place] (Bed Mobility) minimum assist (75% patient effort);moderate assist (50% patient effort)  -JJ (r) HH (t) JJ (c)     Sit-Supine [place] (Bed Mobility) minimum assist (75% patient effort)  -JJ (r) HH (t) JJ (c)     Assistive Device (Bed Mobility) bed rails;head of bed elevated  -JJ (r) HH (t) JJ (c)       Row Name 06/06/24 0950          Transfers    Transfers sit-stand transfer;stand-sit transfer  -JJ (r) HH (t) JJ (c)       Row Name 06/06/24 0950          Sit-Stand Transfer    Sit-Stand [place] (Transfers) contact guard  -JOSORIO (r) HH (t) JJ (c)       Row Name 06/06/24 0950          Stand-Sit Transfer    Stand-Sit [place] (Transfers) contact guard  -MAURO (r) MARIELA (t) MAURO (c)       Row Name 06/06/24 0950          Functional Mobility    Functional Mobility- Ind.  Level contact guard assist  -JJ (r) HH (t) JJ (c)       Row Name 06/06/24 0950          Activities of Daily Living    BADL Assessment/Intervention lower body dressing;toileting  -JJ (r) HH (t) JJ (c)       Row Name 06/06/24 0950          Lower Body Dressing Assessment/Training    [place] Level (Lower Body Dressing) doff;socks;maximum assist (25% patient effort)  -JJ (r) HH (t) JJ (c)     Position (Lower Body Dressing) unsupported sitting;edge of bed sitting  -JJ (r) HH (t) JJ (c)     Comment, (Lower Body Dressing) Max A likely due to pain  -JJ (r) HH (t) JJ (c)       Row Name 06/06/24 0950          Toileting Assessment/Training    [place] Level (Toileting) toileting skills;moderate assist (50% patient effort)  -JJ (r) HH (t) JJ (c)               User Key  (r) = Recorded By, (t) = Taken By, (c) = Cosigned By      Initials Name Provider Type    Dorota Cortez, OTR/L, CSRS Occupational Therapist     Maggy Zepeda, OT Student OT Student                   Obj/Interventions       Row Name 06/06/24 0950          Sensory Assessment (Somatosensory)    Sensory Assessment (Somatosensory) sensation intact  -JOSORIO (r) HH (t) JJ (c)       Row Name 06/06/24 0950          Vision Assessment/Intervention    Visual Impairment/Limitations WFL  -JJ (r) HH (t) JJ (c)       Row Name 06/06/24 0950          Range of Motion Comprehensive    General Range of Motion no range of motion deficits identified  -JJ (r) HH (t) JJ (c)       Row Name 06/06/24 0950          Strength Comprehensive (MMT)    General Manual Muscle Testing (MMT) Assessment no strength deficits identified  -JJ (r) HH (t) JJ (c)     Comment, General Manual Muscle Testing (MMT) Assessment BUE MMT 4-/5  -JJ (r) HH (t) JJ (c)       Row Name 06/06/24 0950          Balance    Balance Assessment sitting static balance;sitting dynamic balance;sit to stand dynamic balance;standing dynamic balance;standing static balance  -JJ (r) HH (t) JJ (c)     Static Sitting Balance  standby assist  -JJ (r) HH (t) JJ (c)     Dynamic Sitting Balance standby assist  -JJ (r) HH (t) JJ (c)     Position, Sitting Balance unsupported;sitting edge of bed  -JJ (r) HH (t) JJ (c)     Sit to Stand Dynamic Balance contact guard  -JJ (r) HH (t) JJ (c)     Static Standing Balance contact guard  -JJ (r) HH (t) JJ (c)     Dynamic Standing Balance contact guard  -JJ (r) HH (t) JJ (c)     Balance Interventions sitting;standing;sit to stand;static;dynamic;occupation based/functional task  -JJ (r) HH (t) JJ (c)               User Key  (r) = Recorded By, (t) = Taken By, (c) = Cosigned By      Initials Name Provider Type    Dorota Cortez, OTR/L, CSRS Occupational Therapist    Maggy Dodge, OT Student OT Student                   Goals/Plan    No documentation.                  Clinical Impression       Row Name 06/06/24 0947          Pain Assessment    Pretreatment Pain Rating 5/10  -JJ (r) HH (t) JJ (c)     Pain Location lower  -JJ (r) HH (t) JJ (c)     Pain Location - back;neck  -JJ (r) HH (t) JJ (c)     Pain Intervention(s) Medication (See MAR);Repositioned;Ambulation/increased activity  -JJ (r) HH (t) JJ (c)       Row Name 06/06/24 0939          Plan of Care Review    Plan of Care Reviewed With patient  -JJ (r) HH (t) JJ (c)     Progress no change  -JJ (r) HH (t) JJ (c)     Outcome Evaluation OT evaluation completed. Pt. A&Ox4. Pt. c/o of 5/10 pain in neck/lumbar area. Pt. demo Min-Mod A for bed mobility/toileting task and SBA-CGA for functional transfers/mobility. No LOB during functional tasks. However, pt. appears to fatigue quickly likely due to pain/discomfort s/p wound revision on 6/5. Pt. presents with no vision, coordination, strength, or ROM deficits. Anticipate pt. to return to baseline as pain decreases. At this time, recommend pt. return home with assist upon hospital discharge. OT to sign off.  -MAURO (r) MARIELA (t) MAURO (c)       Row Name 06/06/24 9835          Therapy Assessment/Plan (OT)     Criteria for Skilled Therapeutic Interventions Met (OT) no;does not meet criteria for skilled intervention  -JJ     Therapy Frequency (OT) evaluation only  -JJ       Row Name 06/06/24 0950          Therapy Plan Review/Discharge Plan (OT)    Anticipated Discharge Disposition (OT) home with assist  -JJ (r) HH (t) JJ (c)       Row Name 06/06/24 0950          Positioning and Restraints    Pre-Treatment Position in bed  -JJ (r) HH (t) JJ (c)     Post Treatment Position bed  -JJ (r) HH (t) JJ (c)     In Bed notified nsg;side lying right;call light within reach;encouraged to call for assist;pillow between legs  -JJ (r) HH (t) JJ (c)               User Key  (r) = Recorded By, (t) = Taken By, (c) = Cosigned By      Initials Name Provider Type    Dorota Cortez, OTR/L, CSRS Occupational Therapist    Maggy Dodge, OT Student OT Student                   Outcome Measures       Row Name 06/06/24 0950          How much help from another is currently needed...    Putting on and taking off regular lower body clothing? 3  -JJ (r) HH (t) JJ (c)     Bathing (including washing, rinsing, and drying) 3  -JJ (r) HH (t) JJ (c)     Toileting (which includes using toilet bed pan or urinal) 3  -JJ (r) HH (t) JJ (c)     Putting on and taking off regular upper body clothing 3  -JJ (r) HH (t) JJ (c)     Taking care of personal grooming (such as brushing teeth) 4  -JJ (r) HH (t) JJ (c)     Eating meals 4  -JJ (r) HH (t) JJ (c)     AM-PAC 6 Clicks Score (OT) 20  -JJ (r) HH (t)       Row Name 06/06/24 0848 06/06/24 0800       How much help from another person do you currently need...    Turning from your back to your side while in flat bed without using bedrails? 3  -JESSICA (r) HT (t) JESSICA (c) 3  -SP    Moving from lying on back to sitting on the side of a flat bed without bedrails? 2  -JESSICA (r) HT (t) JESSICA (c) 3  -SP    Moving to and from a bed to a chair (including a wheelchair)? 3  -JESSICA (r) HT (t) JESSICA (c) 3  -SP    Standing up from a chair using  your arms (e.g., wheelchair, bedside chair)? 3  -JESSICA (r) HT (t) JESSICA (c) 3  -SP    Climbing 3-5 steps with a railing? 3  -JESSICA (r) HT (t) JESSICA (c) 3  -SP    To walk in hospital room? 3  -JESSICA (r) HT (t) JESSICA (c) 3  -SP    AM-PAC 6 Clicks Score (PT) 17  -JESSICA (r) HT (t) 18  -SP    Highest Level of Mobility Goal 5 --> Static standing  -JESSICA (r) HT (t) 6 --> Walk 10 steps or more  -SP      Row Name 06/06/24 0950 06/06/24 0848       Functional Assessment    Outcome Measure Options AM-PAC 6 Clicks Daily Activity (OT)  -JJ (r) HH (t) JJ (c) AM-PAC 6 Clicks Basic Mobility (PT)  -JESSICA (r) HT (t) JESSICA (c)              User Key  (r) = Recorded By, (t) = Taken By, (c) = Cosigned By      Initials Name Provider Type    Villa Anderson, PT DPT Physical Therapist    Dorota Cortez, OTR/L, CSRS Occupational Therapist    Edna Cornejo, RN Registered Nurse    Maggy Key, PT Student PT Student    Maggy Dodge, OT Student OT Student                    Occupational Therapy Education       Title: PT OT SLP Therapies (In Progress)       Topic: Occupational Therapy (In Progress)       Point: ADL training (Not Started)       Description:   Instruct learner(s) on proper safety adaptation and remediation techniques during self care or transfers.   Instruct in proper use of assistive devices.                  Learner Progress:  Not documented in this visit.              Point: Precautions (Not Started)       Description:   Instruct learner(s) on prescribed precautions during self-care and functional transfers.                  Learner Progress:  Not documented in this visit.              Point: Body mechanics (Not Started)       Description:   Instruct learner(s) on proper positioning and spine alignment during self-care, functional mobility activities and/or exercises.                  Learner Progress:  Not documented in this visit.                                  OT Recommendation and Plan     Plan of Care Review  Plan of Care  Reviewed With: patient  Progress: no change  Outcome Evaluation: OT evaluation completed. Pt. A&Ox4. Pt. c/o of 5/10 pain in neck/lumbar area. Pt. demo Min-Mod A for bed mobility/toileting task and SBA-CGA for functional transfers/mobility. No LOB during functional tasks. However, pt. appears to fatigue quickly likely due to pain/discomfort s/p wound revision on 6/5. Pt. presents with no vision, coordination, strength, or ROM deficits. Anticipate pt. to return to baseline as pain decreases. At this time, recommend pt. return home with assist upon hospital discharge. OT to sign off.     Time Calculation:         Time Calculation- OT       Row Name 06/06/24 1031             Time Calculation- OT    OT Start Time 0950  +15 min chart review  -JJ (r) HH (t) JJ (c)      OT Stop Time 1015  -JJ (r) HH (t) JJ (c)      OT Time Calculation (min) 25 min  -JJ (r) HH (t)      OT Received On 06/06/24  -JJ (r) HH (t) JJ (c)         Untimed Charges    OT Eval/Re-eval Minutes 40  -JJ (r) HH (t) JJ (c)         Total Minutes    Untimed Charges Total Minutes 40  -JJ (r) HH (t)       Total Minutes 40  -JJ (r) HH (t)                User Key  (r) = Recorded By, (t) = Taken By, (c) = Cosigned By      Initials Name Provider Type    Dorota Cortez, OTR/L, CSRS Occupational Therapist    Maggy Dodge OT Student OT Student                           Maggy Zepeda OT Student  6/6/2024

## 2024-06-06 NOTE — ANESTHESIA PROCEDURE NOTES
Airway  Urgency: elective    Date/Time: 6/5/2024 8:24 PM  Airway not difficult    General Information and Staff    Patient location during procedure: OR  CRNA/CAA: Abhi Jon CRNA    Indications and Patient Condition  Indications for airway management: airway protection    Preoxygenated: yes  Mask difficulty assessment: 0 - not attempted    Final Airway Details  Final airway type: endotracheal airway      Successful airway: ETT  Cuffed: yes   Successful intubation technique: direct laryngoscopy and video laryngoscopy  Facilitating devices/methods: intubating stylet  Endotracheal tube insertion site: oral  Blade: [place]  Blade size: 3  ETT size (mm): 7.0  Cormack-Lehane Classification: grade I - full view of glottis  Placement verified by: capnometry   Measured from: lips  Number of attempts at approach: 1  Assessment: lips, teeth, and gum same as pre-op and atraumatic intubation

## 2024-06-06 NOTE — ANESTHESIA POSTPROCEDURE EVALUATION
[id number] used. Pt arrived to triage with c/o miscarriage and lower abdominal pain. Pt states she is 13 weeks pregnant. Pt states she soaked about 4 over night pads changing them every 5 minutes up until about 30 minutes ago. Pt states she was scheduled for a D&C for tomorrow but couldn't wait.    "Patient: Zina Armando    Procedure Summary       Date: 06/05/24 Room / [facility]:  [facility]  [facility]    Anesthesia Start: 2020 Anesthesia Stop: 2331    Procedures:       Lumbar Drain and Suboccipital Wound revision, possible removal of bone, possible revision of dural graft. HOLD ANTIBIOTICS UNTIL CSF OBTAINED      LUMBAR DRAIN INSERTION EXTERNAL (Spine Lumbar) Diagnosis:       Superficial postoperative wound infection      (Superficial postoperative wound infection [T81.49XA])    Surgeons: Rohith Alexander MD Provider: Abhi Jon CRNA    Anesthesia Type: general ASA Status: 3            Anesthesia Type: general    Vitals  Vitals Value Taken Time   BP 97/67 06/06/24 0011   Temp 98.3 °F (36.8 °C) 06/06/24 0010   Pulse 113 06/06/24 0013   Resp 16 06/06/24 0010   SpO2 100 % 06/06/24 0013   Vitals shown include unfiled device data.        Post Anesthesia Care and Evaluation    Patient location during evaluation: PACU  Patient participation: complete - patient participated  Level of consciousness: awake and alert  Pain management: adequate    Airway patency: patent  Anesthetic complications: No anesthetic complications  PONV Status: none  Cardiovascular status: acceptable and hemodynamically stable  Respiratory status: acceptable  Hydration status: acceptable    Comments: Blood pressure 108/96, pulse 108, temperature 98.2 °F (36.8 °C), temperature source Axillary, resp. rate 17, height 149.9 cm (59.02\"), weight 95.1 kg (209 lb 10.5 oz), last menstrual period 05/18/2024, SpO2 98%, not currently breastfeeding.    Patient discharged from PACU based upon Oliver score. Please see RN notes for further details    "

## 2024-06-06 NOTE — PLAN OF CARE
Goal Outcome Evaluation:  Plan of Care Reviewed With: patient           Outcome Evaluation: PT eval complete. Pt presented supine in bed s/p wound revision 6/5. Pt able to communicate through  and reports pain of 5-6/10 at incision sites. She requires modA to roll and t/f supine to sit EOB due to incision pain. She t/f STS CGA and amb to commode where she t/f STS CGA to urinate. Pt amb ~200 ft around the unit with Sha and demos forward flexed posture d/t pain. She reported being I with all ADLs prior to her sx/readmission. Her BLE strength and ROM WFL and she reported her LE sensation is intact. Pt does display a need for skilled therapy at this time as her deficits are surgical pain related. Anticipate d/c home w assist once medically stable. PT to sign off.      Anticipated Discharge Disposition (PT): home with assist

## 2024-06-06 NOTE — PROGRESS NOTES
Daily Progress Note    ASSESSMENT:   Zina Armando is a 33 y.o. non-English-speaking  female with significant medical comorbidities to include  a Craniotomy Suboccipital With Stealth, Lumbar Drain Insertion External and Lumbar Drain Insertion External on 5/17/2024.  Ms. Armando has done fairly well since we last saw her.  Currently asymptomatic, denying headaches, visual disturbances, dizziness, nausea, or vomiting.  She does however endorse intermittent drainage from her incision that is thin in consistency and often yellow-tinged in color.  Otherwise, she is neurologically intact.  No recent imaging.     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   Gil.  #[id number]    CHIEF COMPLAINT:  Status post craniotomy for tumor.  Concern for posterior cervical incision infection versus CSF leak    PLAN:   Neuro: Stable.  Intact/at baseline.   1 Day Post-Op (6/6/2024) lumbar drain placement and wound washout and revision   CSF clear.  Wound culture few 2+ WBCs.  No organisms TD   Lumbar drain = 40 mL, keep   ICU for close neurologic monitoring.  Neurochecks per policy.  Call for decline.      CV: Continuous cardiac monitoring.  Keep A-line.  Cardene to keep SBP <140  Pulm: Continuous pulse oximetry.  O2.  Maintaining O2 sat.  : Remove Robins ASAP.  Bladder scans and I/O cath per policy  FEN: Regular diet.  Advance as tolerated  GI: No acute issues  ID: Continue vancomycin BC NGTD  Heme:  DVT prophylaxis, SCDs  Pain: Tolerable at present  Dispo: PT/OT    Subjective  Symptoms stable    Temp:  [97.7 °F (36.5 °C)-101.3 °F (38.5 °C)] 101.3 °F (38.5 °C)  Heart Rate:  [] 126  Resp:  [10-23] 17  BP: ()/(46-96) 121/90    Objective:  Vital signs: (most recent):  "Blood pressure 124/90, pulse (!) 127, temperature (!) 101.3 °F (38.5 °C), temperature source Oral, resp. rate 17, height 149.9 cm (59.02\"), weight 95.1 kg (209 lb 10.5 oz), last menstrual period 05/18/2024, SpO2 97%, not currently breastfeeding.      Neurologic Exam     Mental Status   Oriented to person, place, and time.   Attention: normal. Concentration: normal.   Speech: speech is normal   Level of consciousness: alert    Cranial Nerves     CN II   Visual fields full to confrontation.     CN III, IV, VI   Pupils are equal, round, and reactive to light.  Extraocular motions are normal.     CN V   Facial sensation intact.     CN VII   Facial expression full, symmetric.     CN VIII   CN VIII normal.     CN IX, X   CN IX normal.     CN XI   CN XI normal.     Motor Exam   Right arm tone: normal  Left arm tone: normal  Right leg tone: normal  Left leg tone: normal    Strength   Right deltoid: 5/5  Left deltoid: 5/5  Right biceps: 5/5  Left biceps: 5/5  Right triceps: 5/5  Left triceps: 5/5  Right wrist extension: 5/5  Left wrist extension: 5/5  Right iliopsoas: 5/5  Left iliopsoas: 5/5  Right quadriceps: 5/5  Left quadriceps: 5/5  Right anterior tibial: 5/5  Left anterior tibial: 5/5  Right gastroc: 5/5  Left gastroc: 5/5  Right EHL 5/5  Left EHL 5/5       Sensory Exam   Right arm light touch: normal  Left arm light touch: normal  Right leg light touch: normal  Left leg light touch: normal    Gait, Coordination, and Reflexes     Tremor   Resting tremor: absent  Intention tremor: absent  Action tremor: absent    Incision: C, D, I.  8 retention sutures intact.    DRAINS:   External Urinary Catheter (Active)       Lumbar Drain (Active)   Drain Status Clamped 06/06/24 0800   CSF Color Clear 06/06/24 0800   Site Description Unable to view 06/06/24 0800   Dressing Status Clean;Dry;Intact 06/06/24 0800   CSF Output (mL) 20 mL 06/06/24 0700       [REMOVED] Urethral Catheter Non-latex;Silicone 16 Fr. (Removed)   Daily " "Indications Required activity restriction from trauma, surgery, (e.g. unstable spine, fracture, hemodynamics) 05/19/24 0400   Site Assessment Clean;Skin intact 05/19/24 0400   Collection Container Standard drainage bag 05/19/24 0400   Securement Method Securing device 05/19/24 0400   Catheter care complete Yes 05/19/24 0400   Output (mL) 40 mL 05/19/24 0500       [REMOVED] Urethral Catheter Non-latex;Silicone 16 Fr. (Removed)   Daily Indications Required activity restriction from trauma, surgery, (e.g. unstable spine, fracture, hemodynamics) 06/06/24 0500   Site Assessment Clean;Skin intact 06/06/24 0400   Collection Container Standard drainage bag 06/06/24 0500   Securement Method Securing device 06/06/24 0500   Output (mL) 150 mL 06/06/24 0400       [REMOVED] External Urinary Catheter (Removed)   Daily Indications Daily output 05/19/24 0500   Site Assessment Clean;Skin intact 05/19/24 0500   Application/Removal external catheter applied;skin care provided 05/19/24 0500   Collection Container Wall suction 05/19/24 0500   Securement Method Securing device 05/19/24 0500       [REMOVED] Lumbar Drain (Removed)   Drain Status Clamped 05/20/24 0800   CSF Color Clear 05/20/24 0800   Site Description Unable to view 05/20/24 0800   Dressing Status Clean;Dry;Intact 05/20/24 0945   CSF Output (mL) 10 mL 05/20/24 0800       LAB RESULTS:  ABG:     CBC:   Results from last 7 days   Lab Units 06/06/24  0233 06/05/24  1248 05/30/24  0926   WBC 10*3/mm3 6.33 5.83 9.3   HEMOGLOBIN g/dL 9.6* 11.2* 11.7   HEMATOCRIT % 31.7* 34.8 36.0   PLATELETS 10*3/mm3 465* 530* 289     BMP:  Results from last 7 days   Lab Units 06/06/24  0233 06/05/24  1248   SODIUM mmol/L 136 138   POTASSIUM mmol/L 3.9 3.9   CHLORIDE mmol/L 101 101   CO2 mmol/L 29.0 29.0   BUN mg/dL 8 6   CREATININE mg/dL 0.60 0.36*   GLUCOSE mg/dL 101* 91   CALCIUM mg/dL 8.8 9.7   ALT (SGPT) U/L  --  97*     Culture Results: No results found for: \"BLOODCX\", \"URINECX\", \"WOUNDCX\", " "\"MRSACX\", \"RESPCX\", \"STOOLCX\"    Imaging Results (Last 24 Hours)       Procedure Component Value Units Date/Time    XR Spine Lumbar 2 or 3 View [[id number]] Collected: 06/05/24 2147     Updated: 06/05/24 2151    Narrative:      EXAMINATION: XR SPINE LUMBAR 2 OR 3 VW-     6/5/2024 7:50 PM     HISTORY: lumbar drain insertion; T81.49XA-Infection following a  procedure, other surgical site, initial encounter     Number of fluoroscopic spot images obtained = 3.     Fluoroscopy dose in Air Kerma mGy = 6.0327.     Fluoroscopy total dose area product (Gycm2) = 1.2406.     Fluoroscopy total exposure time = 8.3 seconds.     Spot images were obtained at the time of lumbar drain suboccipital wound  revision.              This report was signed and finalized on 6/5/2024 9:48 PM by Dr. David Negron MD.       FL C Arm During Surgery [[id number]] Resulted: 06/05/24 2138     Updated: 06/05/24 2138    Narrative:      This procedure was auto-finalized with no dictation required.    MRI Brain With & Without Contrast [[id number]] Collected: 06/05/24 1404     Updated: 06/05/24 1426    Narrative:      MRI BRAIN W WO CONTRAST- 6/5/2024 11:58 AM     HISTORY: Status postcraniotomy for tumor.  Concern for CSF leak versus  infection.     TECHNIQUE: Multisequence, multiplanar MRI of the brain with and without  contrast. 20 cc MultiHance IV contrast.     COMPARISON: Brain MRIs dated 5/18/2024 and 5/13/2024     FINDINGS:     Recently resected fourth ventricle epidermoid cyst. On today's exam  there is a 1.9 cm focus of enhancement in the paramedian right  cerebellar hemisphere (series 801-image 38) which appears to be a focus  of subacute ischemia within the cerebellar hemisphere. This does not  appear to be cerebritis. No evidence of residual epidermoid cyst. No  intra-axial or extra-axial diffusion signal abnormality. No acute  intra-axial or extra-axial hemorrhage. No evidence of subdural empyema.  There is a 1.6 cm subcutaneous fluid " collection along the surgical tract  (series 701-image 3). The ADC signal within this collection is  facilitated as opposed to restricted and this does not image like  abscess by MRI. No supratentorial pathologic enhancement. Ventricles are  nondilated. Pituitary gland is unremarkable. Central arterial flow voids  are well preserved. Orbital contents are unremarkable.       Impression:         1.  Recently resected fourth ventricle epidermoid cyst. There is a 1.9  cm rounded focus of enhancement in the paramedian right cerebellar  hemisphere which I believe is a focus of subacute ischemia. This same  area demonstrated diffusion restriction on the immediate postoperative  MRI. After accounting for this focus of cerebellar enhancement, I do not  see any strong evidence for CNS infection.  2.  There is a 1.6 cm fluid collection along the surgical track  projecting in the subcutaneous fat (axial T2 image #3). This  demonstrates facilitated diffusion with high ADC signal and does not  image like an organized soft tissue abscess collection based on  diffusion signal.     This report was signed and finalized on 6/5/2024 2:23 PM by Dr Ho Burleson.             Lab Results (last 24 hours)       Procedure Component Value Units Date/Time    Blood Culture With LIZZ - Blood, Arm, Left [[id number]] Collected: 06/06/24 0823    Specimen: Blood from Arm, Left Updated: 06/06/24 0837    MRSA Screen, PCR (Inpatient) - Swab, Nares [[id number]] Collected: 06/06/24 0805    Specimen: Swab from Nares Updated: 06/06/24 0812    Wound Culture - Wound, Head [[id number]] Collected: 06/05/24 2206    Specimen: Wound from Head Updated: 06/06/24 0708     Gram Stain Few (2+) WBCs seen      No organisms seen    Wound Culture - Wound, Head [[id number]] Collected: 06/05/24 2150    Specimen: Wound from Head Updated: 06/06/24 0707     Gram Stain Few (2+) WBCs seen      No organisms seen    Basic Metabolic Panel [[id number]]  (Abnormal) Collected: 06/06/24  0233    Specimen: Blood Updated: 06/06/24 0349     Glucose 101 mg/dL      BUN 8 mg/dL      Creatinine 0.60 mg/dL      Sodium 136 mmol/L      Potassium 3.9 mmol/L      Comment: Slight hemolysis detected by analyzer. Result may be falsely elevated.        Chloride 101 mmol/L      CO2 29.0 mmol/L      Calcium 8.8 mg/dL      BUN/Creatinine Ratio 13.3     Anion Gap 6.0 mmol/L      eGFR 121.7 mL/min/1.73     Narrative:      GFR Normal >60  Chronic Kidney Disease <60  Kidney Failure <15      CBC Auto Differential [[id number]]  (Abnormal) Collected: 06/06/24 0233    Specimen: Blood Updated: 06/06/24 0326     WBC 6.33 10*3/mm3      RBC 3.60 10*6/mm3      Hemoglobin 9.6 g/dL      Hematocrit 31.7 %      MCV 88.1 fL      MCH 26.7 pg      MCHC 30.3 g/dL      RDW 13.3 %      RDW-SD 42.9 fl      MPV 9.1 fL      Platelets 465 10*3/mm3      Neutrophil % 64.0 %      Lymphocyte % 23.2 %      Monocyte % 10.6 %      Eosinophil % 1.4 %      Basophil % 0.2 %      Immature Grans % 0.6 %      Neutrophils, Absolute 4.05 10*3/mm3      Lymphocytes, Absolute 1.47 10*3/mm3      Monocytes, Absolute 0.67 10*3/mm3      Eosinophils, Absolute 0.09 10*3/mm3      Basophils, Absolute 0.01 10*3/mm3      Immature Grans, Absolute 0.04 10*3/mm3      nRBC 0.0 /100 WBC     Culture, CSF - Cerebrospinal Fluid, CSF Reservoir [[id number]] Collected: 06/05/24 2120    Specimen: Cerebrospinal Fluid from CSF [place] Updated: 06/05/24 2323     Gram Stain Few (2+) WBCs seen      No organisms seen    Cell Count With Differential, CSF [[id number]]  (Abnormal) Collected: 06/05/24 2120    Specimen: Cerebrospinal Fluid from CSF [place] Updated: 06/05/24 2156    Narrative:      The following orders were created for panel order Cell Count With Differential, CSF.  Procedure                               Abnormality         Status                     ---------                               -----------         ------                     Cell Count, CSF - Cerebr...[[id number]]   Abnormal            Final result                 Please view results for these tests on the individual orders.    Cell Count, CSF - Cerebrospinal Fluid, CSF Reservoir [[id number]]  (Abnormal) Collected: 06/05/24 2120    Specimen: Cerebrospinal Fluid from CSF [place] Updated: 06/05/24 2156     Color, CSF Colorless     Supernatant Color, CSF Colorless     Appearance, CSF Clear     RBC, CSF 38 /mm3      Nucleated Cells, CSF 5 /mm3      Volume, CSF 6.0 mL      Tube Number, CSF 1    Narrative:      Differential not indicated.    Glucose, CSF - Cerebrospinal Fluid, CSF Reservoir [[id number]]  (Normal) Collected: 06/05/24 2120    Specimen: Cerebrospinal Fluid from CSF [place] Updated: 06/05/24 2152     Glucose, CSF 46 mg/dL     Protein, CSF - Cerebrospinal Fluid, CSF Reservoir [[id number]]  (Normal) Collected: 06/05/24 2120    Specimen: Cerebrospinal Fluid from CSF [place] Updated: 06/05/24 2152     Protein, Total (CSF) 40.4 mg/dL     VDRL, CSF - Cerebrospinal Fluid, CSF [place] [[id number]] Collected: 06/05/24 2120    Specimen: Cerebrospinal Fluid from CSF [place] Updated: 06/05/24 2136    Urinalysis With Culture If Indicated - Urine, Clean Catch [[id number]]  (Normal) Collected: 06/05/24 1344    Specimen: Urine, Clean Catch Updated: 06/05/24 1403     Color, UA Yellow     Appearance, UA Clear     pH, UA 5.5     Specific Gravity, UA 1.021     Glucose, UA Negative     Ketones, UA Negative     Bilirubin, UA Negative     Blood, UA Negative     Protein, UA Negative     Leuk Esterase, UA Negative     Nitrite, UA Negative     Urobilinogen, UA 0.2 E.U./dL    Narrative:      In absence of clinical symptoms, the presence of pyuria, bacteria, and/or nitrites on the urinalysis result does not correlate with infection.  Urine microscopic not indicated.    Comprehensive Metabolic Panel [[id number]]  (Abnormal) Collected: 06/05/24 1248    Specimen: Blood Updated: 06/05/24 1327     Glucose 91 mg/dL      BUN 6 mg/dL      Creatinine  0.36 mg/dL      Sodium 138 mmol/L      Potassium 3.9 mmol/L      Chloride 101 mmol/L      CO2 29.0 mmol/L      Calcium 9.7 mg/dL      Total Protein 7.4 g/dL      Albumin 3.6 g/dL      ALT (SGPT) 97 U/L      AST (SGOT) 21 U/L      Alkaline Phosphatase 142 U/L      Total Bilirubin <0.2 mg/dL      Globulin 3.8 gm/dL      A/G Ratio 0.9 g/dL      BUN/Creatinine Ratio 16.7     Anion Gap 8.0 mmol/L      eGFR 137.7 mL/min/1.73     Narrative:      GFR Normal >60  Chronic Kidney Disease <60  Kidney Failure <15      C-reactive Protein [[id number]]  (Abnormal) Collected: 06/05/24 1248    Specimen: Blood Updated: 06/05/24 1327     C-Reactive Protein 3.54 mg/dL     aPTT [[id number]]  (Normal) Collected: 06/05/24 1248    Specimen: Blood Updated: 06/05/24 1314     PTT 32.5 seconds     Protime-INR [[id number]]  (Normal) Collected: 06/05/24 1248    Specimen: Blood Updated: 06/05/24 1314     Protime 14.3 Seconds      INR 1.06    Sedimentation Rate [[id number]]  (Abnormal) Collected: 06/05/24 1248    Specimen: Blood Updated: 06/05/24 1313     Sed Rate 114 mm/hr     CBC Auto Differential [[id number]]  (Abnormal) Collected: 06/05/24 1248    Specimen: Blood Updated: 06/05/24 1302     WBC 5.83 10*3/mm3      RBC 4.04 10*6/mm3      Hemoglobin 11.2 g/dL      Hematocrit 34.8 %      MCV 86.1 fL      MCH 27.7 pg      MCHC 32.2 g/dL      RDW 13.4 %      RDW-SD 41.8 fl      MPV 8.9 fL      Platelets 530 10*3/mm3      Neutrophil % 48.9 %      Lymphocyte % 36.7 %      Monocyte % 11.7 %      Eosinophil % 2.1 %      Basophil % 0.3 %      Immature Grans % 0.3 %      Neutrophils, Absolute 2.85 10*3/mm3      Lymphocytes, Absolute 2.14 10*3/mm3      Monocytes, Absolute 0.68 10*3/mm3      Eosinophils, Absolute 0.12 10*3/mm3      Basophils, Absolute 0.02 10*3/mm3      Immature Grans, Absolute 0.02 10*3/mm3      nRBC 0.0 /100 WBC     Blood Culture - Blood, Hand, Right [[id number]] Collected: 06/05/24 1248    Specimen: Blood from Hand, Right Updated: 06/05/24 5182     Blood Culture - Blood, Hand, Left [[id number]] Collected: 06/05/24 1248    Specimen: Blood from Hand, Left Updated: 06/05/24 1302          Dominguez Narvaez APRN

## 2024-06-06 NOTE — PLAN OF CARE
Goal Outcome Evaluation:  Plan of Care Reviewed With: patient        Progress: improving  Outcome Evaluation: A/O x4. VSS. ST 120s. Neuro intact, strong on all extremities. Lumbar drain output q2h/20ml, per order. Complaints of neck/head incisional pain, relief with prn meds. T max 102.7, ID aware. NS @ 100ml/hr per ID. Blood cultures redrawn per ID. Up to BSC to void.  line utilized. Spouse currently at bedside. Safety maintained. Turns q1/q2 hour for comfort and to assist lumbar drain in draining. Pt assists in turning herself. Preventative mepilex on coccyx.         Problem: Adult Inpatient Plan of Care  Goal: Plan of Care Review  Outcome: Ongoing, Progressing  Flowsheets (Taken 6/6/2024 4888)  Progress: improving  Plan of Care Reviewed With: patient  Outcome Evaluation: A/O x4. VSS. ST 120s. Neuro intact, strong on all extremities. Lumbar drain output q2h/20ml, per order. Complaints of neck/head incisional pain, relief with prn meds. T max 102.7, ID aware. NS @ 100ml/hr per ID. Blood cultures redrawn per ID. Up to BSC to void.  line utilized. Spouse currently at bedside. Safety maintained. Turns q1/q2 hour for comfort and to assist lumbar drain in draining. Pt assists in turning herself. Preventative mepilex on coccyx.  Goal: Patient-Specific Goal (Individualized)  Outcome: Ongoing, Progressing  Goal: Absence of Hospital-Acquired Illness or Injury  Outcome: Ongoing, Progressing  Intervention: Identify and Manage Fall Risk  Recent Flowsheet Documentation  Taken 6/6/2024 1700 by Edna Welsh, RN  Safety Promotion/Fall Prevention: safety round/check completed  Taken 6/6/2024 1600 by Edna Welsh, RN  Safety Promotion/Fall Prevention: safety round/check completed  Taken 6/6/2024 1500 by Edna Welsh, RN  Safety Promotion/Fall Prevention: safety round/check completed  Taken 6/6/2024 1400 by Edna Welsh, RN  Safety Promotion/Fall Prevention: safety round/check completed  Taken  6/6/2024 1300 by Edna Welsh RN  Safety Promotion/Fall Prevention: safety round/check completed  Taken 6/6/2024 1200 by Edna Welsh RN  Safety Promotion/Fall Prevention: safety round/check completed  Taken 6/6/2024 1100 by Edna Welsh RN  Safety Promotion/Fall Prevention: safety round/check completed  Taken 6/6/2024 1000 by Edna Welsh RN  Safety Promotion/Fall Prevention: safety round/check completed  Taken 6/6/2024 0900 by Edna Welsh RN  Safety Promotion/Fall Prevention: safety round/check completed  Taken 6/6/2024 0800 by Edna Welsh RN  Safety Promotion/Fall Prevention: safety round/check completed  Taken 6/6/2024 0700 by Edna Welsh RN  Safety Promotion/Fall Prevention: safety round/check completed  Intervention: Prevent Skin Injury  Recent Flowsheet Documentation  Taken 6/6/2024 1600 by Edna Welsh RN  Body Position:   turned   left  Taken 6/6/2024 1400 by Edna Welsh RN  Body Position:   turned   right  Taken 6/6/2024 1200 by Edna Welsh RN  Body Position:   turned   left  Taken 6/6/2024 1000 by Edna Welsh RN  Body Position:   turned   supine  Taken 6/6/2024 0900 by Edna Welsh RN  Body Position:   turned   right  Taken 6/6/2024 0800 by Edna Welsh RN  Body Position:   turned   left  Intervention: Prevent and Manage VTE (Venous Thromboembolism) Risk  Recent Flowsheet Documentation  Taken 6/6/2024 1600 by Edna Welsh RN  Activity Management: bedrest  Taken 6/6/2024 1500 by Edna Welsh RN  Activity Management: up to bedside commode  Taken 6/6/2024 1400 by Edna Welsh RN  Activity Management: bedrest  Taken 6/6/2024 1300 by Edna Welsh RN  Activity Management: up to bedside commode  Taken 6/6/2024 1200 by Edna Welsh RN  Activity Management: bedrest  Taken 6/6/2024 1000 by Edna Welsh RN  Activity Management: activity encouraged  Taken 6/6/2024 0800 by Edna Welsh RN  Activity Management: bedrest  Goal: Optimal  Comfort and Wellbeing  Outcome: Ongoing, Progressing  Intervention: Monitor Pain and Promote Comfort  Recent Flowsheet Documentation  Taken 6/6/2024 1647 by Edna Welsh RN  Pain Management Interventions: see MAR  Taken 6/6/2024 1300 by Edna Welsh RN  Pain Management Interventions: see MAR  Taken 6/6/2024 1231 by Edna Welsh RN  Pain Management Interventions: see MAR  Taken 6/6/2024 0822 by Edna Welsh RN  Pain Management Interventions: see MAR  Intervention: Provide Person-Centered Care  Recent Flowsheet Documentation  Taken 6/6/2024 1600 by Edna Welsh RN  Trust Relationship/Rapport: care explained  Taken 6/6/2024 1200 by Edna Welsh RN  Trust Relationship/Rapport: care explained  Taken 6/6/2024 0800 by Edna Welsh RN  Trust Relationship/Rapport:   care explained   questions answered   thoughts/feelings acknowledged  Goal: Readiness for Transition of Care  Outcome: Ongoing, Progressing

## 2024-06-06 NOTE — PLAN OF CARE
Goal Outcome Evaluation:  Plan of Care Reviewed With: patient        Progress: no change  Outcome Evaluation: OT evaluation completed. Pt. A&Ox4. Pt. c/o of 5/10 pain in neck/lumbar area. Pt. demo Min-Mod A for bed mobility/toileting task and SBA-CGA for functional transfers/mobility. No LOB during functional tasks. However, pt. appears to fatigue quickly likely due to pain/discomfort s/p wound revision on 6/5. Pt. presents with no vision, coordination, strength, or ROM deficits. Anticipate pt. to return to baseline as pain decreases. At this time, recommend pt. return home with assist upon hospital discharge. OT to sign off.      Anticipated Discharge Disposition (OT): home with assist

## 2024-06-06 NOTE — CONSULTS
INFECTIOUS DISEASES CONSULT NOTE    Patient:  Zina Armando 33 y.o. female  ROOM # I010/1  YOB: 1990  MRN: [id number]  CSN:  [id number]  Admit date: 6/5/2024   Admitting Physician: Rohith Alexander, *  Primary Care Physician: Provider, No Known  REFERRING PROVIDER: Rohith Alexander,*    Inpatient Infectious Diseases Consult  Consult performed by: Violet Pandya MD  Consult ordered by: Rohith Alexander MD        Medical  #[id number]    REASON FOR CONSULTATION : Cranial wound infection      HISTORY OF PRESENT ILLNESS: Patient is a pleasant 33-year-old female who underwent suboccipital craniotomy with lumbar drain on May 17, 2024.  She was seen in follow-up in neurosurgery office and had noted some intermittent drainage from her incision.    She denied any fever or shaking chills.  She did say she felt a little cold earlier but was not having any chills.    She underwent MRI where fluid collection was noted.  Concerns were for infection versus CSF leak.    Patient underwent lumbar drain and suboccipital wound revision on June 5, findings consisted of superficial infection.  Dura appeared fully intact.    Past Medical History:   Diagnosis Date    Cerebellar mass 05/2024    Miscarriage 04/2024    Miscarriage 02/2023    Personal history of COVID-19 2021     Past Surgical History:   Procedure Laterality Date    CRANIOTOMY N/A 5/17/2024    Procedure: CRANIOTOMY SUBOCCIPITAL WITH STEALTH, LUMBAR DRAIN INSERTION EXTERNAL;  Surgeon: Rohith Alexander MD;  Location:  [facility];  Service: Neurosurgery;  Laterality: N/A;    INCISION AND DRAINAGE OF WOUND N/A 6/5/2024    Procedure: Lumbar Drain and Suboccipital Wound revision, possible removal of bone, possible revision of dural graft. HOLD ANTIBIOTICS UNTIL CSF OBTAINED;  Surgeon: Rohith Alexander MD;  Location:  [facility];  Service: Neurosurgery;  Laterality: N/A;    LUMBAR DRAIN INSERTION EXTERNAL N/A  5/17/2024    Procedure: LUMBAR DRAIN INSERTION EXTERNAL;  Surgeon: Rohith Alexander MD;  Location:  [facility];  Service: Neurosurgery;  Laterality: N/A;    LUMBAR DRAIN INSERTION EXTERNAL N/A 6/5/2024    Procedure: LUMBAR DRAIN INSERTION EXTERNAL;  Surgeon: Rohith Alexander MD;  Location:  [facility];  Service: Neurosurgery;  Laterality: N/A;    NO PAST SURGERIES       History reviewed. No pertinent family history.  Social History     Socioeconomic History    Marital status: Single   Tobacco Use    Smoking status: Never    Smokeless tobacco: Never   Vaping Use    Vaping status: Never Used   Substance and Sexual Activity    Alcohol use: No    Drug use: No    Sexual activity: Yes     Partners: Male     Birth control/protection: None       Current Scheduled Medications:   cefepime, 2,000 mg, Intravenous, Q8H  Chlorhexidine Gluconate Cloth, 1 Application, Topical, Q24H  heparin (porcine), 5,000 Units, Subcutaneous, Q12H  levETIRAcetam, 500 mg, Oral, Q12H  mupirocin, 1 Application, Each Nare, BID  senna-docusate sodium, 2 tablet, Oral, BID  sodium chloride, 10 mL, Intravenous, Q12H  vancomycin, 1,250 mg, Intravenous, Q8H        Current PRN Medications:    acetaminophen **OR** acetaminophen    atropine    senna-docusate sodium **AND** polyethylene glycol **AND** bisacodyl **AND** bisacodyl    Calcium Replacement - Follow Nurse / BPA Driven Protocol    cyclobenzaprine    droperidol **OR** droperidol    fentanyl    flumazenil    HYDROmorphone    labetalol    Magnesium Standard Dose Replacement - Follow Nurse / BPA Driven Protocol    naloxone    ondansetron    ondansetron ODT **OR** ondansetron    Phosphorus Replacement - Follow Nurse / BPA Driven Protocol    Potassium Replacement - Follow Nurse / BPA Driven Protocol    sodium chloride    sodium chloride      niCARdipine, 5-15 mg/hr           No Known Allergies        Vital Signs:  /74   Pulse 118   Temp 98.2 °F (36.8 °C) (Axillary)   Resp 17   Ht 149.9  "cm (59.02\")   Wt 95.1 kg (209 lb 10.5 oz)   LMP 2024 (Approximate)   SpO2 94%   BMI 42.32 kg/m²   Temp (24hrs), Av.6 °F (37 °C), Min:97.7 °F (36.5 °C), Max:99.9 °F (37.7 °C)      Physical Exam    General: The patient is nontoxic-appearing lying on her right side in no acute distress.  Edna, RN at bedside.  HEENT: Sclera anicteric  Posterior wound intact.  No active drainage appreciated  Neuro: Alert, speech clear, communicated and answered questions appropriately through medical .  Psych: Pleasant cooperative          Results Review:    I reviewed the patient's new clinical results.    Lab Results:    CBC:   Lab Results   Lab 24  0926 24  1248 24  0233   WBC 9.3 5.83 6.33   HEMOGLOBIN 11.7 11.2* 9.6*   HEMATOCRIT 36.0 34.8 31.7*   PLATELETS 289 530* 465*        AutoDiff:   Lab Results   Lab 24  1248 24  0233   NEUTROPHIL % 48.9 64.0   LYMPHOCYTE % 36.7 23.2   MONOCYTES % 11.7 10.6   EOSINOPHIL % 2.1 1.4   BASOPHIL % 0.3 0.2   NEUTROS ABS 2.85 4.05   LYMPHS ABS 2.14 1.47   MONOS ABS 0.68 0.67   EOS ABS 0.12 0.09   BASOS ABS 0.02 0.01        Manual Diff:    Lab Results   Lab 24  1248 24  0233   NEUTROS ABS 2.85 4.05        CMP:   Lab Results   Lab 24  1248 24  0233   SODIUM 138 136   POTASSIUM 3.9 3.9   CHLORIDE 101 101   CO2 29.0 29.0   BUN 6 8   CREATININE 0.36* 0.60   CALCIUM 9.7 8.8   BILIRUBIN <0.2  --    ALK PHOS 142*  --    ALT (SGPT) 97*  --    AST (SGOT) 21  --    GLUCOSE 91 101*       Lab Results (last 72 hours)       Procedure Component Value Units Date/Time    Wound Culture - Wound, Head [[id number]] Collected: 24    Specimen: Wound from Head Updated: 06/06/24 0708     Gram Stain Few (2+) WBCs seen      No organisms seen    Wound Culture - Wound, Head [[id number]] Collected: 24    Specimen: Wound from Head Updated: 24 0707     Gram Stain Few (2+) WBCs seen      No organisms seen    Basic " Metabolic Panel [[id number]]  (Abnormal) Collected: 06/06/24 0233    Specimen: Blood Updated: 06/06/24 0349     Glucose 101 mg/dL      BUN 8 mg/dL      Creatinine 0.60 mg/dL      Sodium 136 mmol/L      Potassium 3.9 mmol/L      Comment: Slight hemolysis detected by analyzer. Result may be falsely elevated.        Chloride 101 mmol/L      CO2 29.0 mmol/L      Calcium 8.8 mg/dL      BUN/Creatinine Ratio 13.3     Anion Gap 6.0 mmol/L      eGFR 121.7 mL/min/1.73     Narrative:      GFR Normal >60  Chronic Kidney Disease <60  Kidney Failure <15      CBC Auto Differential [[id number]]  (Abnormal) Collected: 06/06/24 0233    Specimen: Blood Updated: 06/06/24 0326     WBC 6.33 10*3/mm3      RBC 3.60 10*6/mm3      Hemoglobin 9.6 g/dL      Hematocrit 31.7 %      MCV 88.1 fL      MCH 26.7 pg      MCHC 30.3 g/dL      RDW 13.3 %      RDW-SD 42.9 fl      MPV 9.1 fL      Platelets 465 10*3/mm3      Neutrophil % 64.0 %      Lymphocyte % 23.2 %      Monocyte % 10.6 %      Eosinophil % 1.4 %      Basophil % 0.2 %      Immature Grans % 0.6 %      Neutrophils, Absolute 4.05 10*3/mm3      Lymphocytes, Absolute 1.47 10*3/mm3      Monocytes, Absolute 0.67 10*3/mm3      Eosinophils, Absolute 0.09 10*3/mm3      Basophils, Absolute 0.01 10*3/mm3      Immature Grans, Absolute 0.04 10*3/mm3      nRBC 0.0 /100 WBC     Culture, CSF - Cerebrospinal Fluid, CSF Reservoir [[id number]] Collected: 06/05/24 2120    Specimen: Cerebrospinal Fluid from CSF [place] Updated: 06/05/24 2323     Gram Stain Few (2+) WBCs seen      No organisms seen    Cell Count With Differential, CSF [[id number]]  (Abnormal) Collected: 06/05/24 2120    Specimen: Cerebrospinal Fluid from CSF [place] Updated: 06/05/24 2156    Narrative:      The following orders were created for panel order Cell Count With Differential, CSF.  Procedure                               Abnormality         Status                     ---------                               -----------         ------                      Cell Count, CSF - Cerebr...[[id number]]  Abnormal            Final result                 Please view results for these tests on the individual orders.    Cell Count, CSF - Cerebrospinal Fluid, CSF Reservoir [[id number]]  (Abnormal) Collected: 06/05/24 2120    Specimen: Cerebrospinal Fluid from CSF [place] Updated: 06/05/24 2156     Color, CSF Colorless     Supernatant Color, CSF Colorless     Appearance, CSF Clear     RBC, CSF 38 /mm3      Nucleated Cells, CSF 5 /mm3      Volume, CSF 6.0 mL      Tube Number, CSF 1    Narrative:      Differential not indicated.    Glucose, CSF - Cerebrospinal Fluid, CSF Reservoir [[id number]]  (Normal) Collected: 06/05/24 2120    Specimen: Cerebrospinal Fluid from CSF [place] Updated: 06/05/24 2152     Glucose, CSF 46 mg/dL     Protein, CSF - Cerebrospinal Fluid, CSF Reservoir [[id number]]  (Normal) Collected: 06/05/24 2120    Specimen: Cerebrospinal Fluid from CSF [place] Updated: 06/05/24 2152     Protein, Total (CSF) 40.4 mg/dL     VDRL, CSF - Cerebrospinal Fluid, CSF [place]ervoir [[id number]] Collected: 06/05/24 2120    Specimen: Cerebrospinal Fluid from CSF [place] Updated: 06/05/24 2136    Urinalysis With Culture If Indicated - Urine, Clean Catch [[id number]]  (Normal) Collected: 06/05/24 1344    Specimen: Urine, Clean Catch Updated: 06/05/24 1403     Color, UA Yellow     Appearance, UA Clear     pH, UA 5.5     Specific Gravity, UA 1.021     Glucose, UA Negative     Ketones, UA Negative     Bilirubin, UA Negative     Blood, UA Negative     Protein, UA Negative     Leuk Esterase, UA Negative     Nitrite, UA Negative     Urobilinogen, UA 0.2 E.U./dL    Narrative:      In absence of clinical symptoms, the presence of pyuria, bacteria, and/or nitrites on the urinalysis result does not correlate with infection.  Urine microscopic not indicated.    Comprehensive Metabolic Panel [[id number]]  (Abnormal) Collected: 06/05/24 1248    Specimen: Blood Updated: 06/05/24  1327     Glucose 91 mg/dL      BUN 6 mg/dL      Creatinine 0.36 mg/dL      Sodium 138 mmol/L      Potassium 3.9 mmol/L      Chloride 101 mmol/L      CO2 29.0 mmol/L      Calcium 9.7 mg/dL      Total Protein 7.4 g/dL      Albumin 3.6 g/dL      ALT (SGPT) 97 U/L      AST (SGOT) 21 U/L      Alkaline Phosphatase 142 U/L      Total Bilirubin <0.2 mg/dL      Globulin 3.8 gm/dL      A/G Ratio 0.9 g/dL      BUN/Creatinine Ratio 16.7     Anion Gap 8.0 mmol/L      eGFR 137.7 mL/min/1.73     Narrative:      GFR Normal >60  Chronic Kidney Disease <60  Kidney Failure <15      C-reactive Protein [[id number]]  (Abnormal) Collected: 06/05/24 1248    Specimen: Blood Updated: 06/05/24 1327     C-Reactive Protein 3.54 mg/dL     aPTT [[id number]]  (Normal) Collected: 06/05/24 1248    Specimen: Blood Updated: 06/05/24 1314     PTT 32.5 seconds     Protime-INR [[id number]]  (Normal) Collected: 06/05/24 1248    Specimen: Blood Updated: 06/05/24 1314     Protime 14.3 Seconds      INR 1.06    Sedimentation Rate [[id number]]  (Abnormal) Collected: 06/05/24 1248    Specimen: Blood Updated: 06/05/24 1313     Sed Rate 114 mm/hr     CBC Auto Differential [[id number]]  (Abnormal) Collected: 06/05/24 1248    Specimen: Blood Updated: 06/05/24 1302     WBC 5.83 10*3/mm3      RBC 4.04 10*6/mm3      Hemoglobin 11.2 g/dL      Hematocrit 34.8 %      MCV 86.1 fL      MCH 27.7 pg      MCHC 32.2 g/dL      RDW 13.4 %      RDW-SD 41.8 fl      MPV 8.9 fL      Platelets 530 10*3/mm3      Neutrophil % 48.9 %      Lymphocyte % 36.7 %      Monocyte % 11.7 %      Eosinophil % 2.1 %      Basophil % 0.3 %      Immature Grans % 0.3 %      Neutrophils, Absolute 2.85 10*3/mm3      Lymphocytes, Absolute 2.14 10*3/mm3      Monocytes, Absolute 0.68 10*3/mm3      Eosinophils, Absolute 0.12 10*3/mm3      Basophils, Absolute 0.02 10*3/mm3      Immature Grans, Absolute 0.02 10*3/mm3      nRBC 0.0 /100 WBC     Blood Culture - Blood, Hand, Right [[id number]] Collected: 06/05/24 1244     Specimen: Blood from Hand, Right Updated: 06/05/24 1302    Blood Culture - Blood, Hand, Left [[id number]] Collected: 06/05/24 1248    Specimen: Blood from Hand, Left Updated: 06/05/24 1302            Estimated Creatinine Clearance: 141.7 mL/min (by C-G formula based on SCr of 0.6 mg/dL).    Culture Results:    Microbiology Results (last 10 days)       Procedure Component Value - Date/Time    Wound Culture - Wound, Head [[id number]] Collected: 06/05/24 2206    Lab Status: Preliminary result Specimen: Wound from Head Updated: 06/06/24 0708     Gram Stain Few (2+) WBCs seen      No organisms seen    Wound Culture - Wound, Head [[id number]] Collected: 06/05/24 2150    Lab Status: Preliminary result Specimen: Wound from Head Updated: 06/06/24 0707     Gram Stain Few (2+) WBCs seen      No organisms seen    Culture, CSF - Cerebrospinal Fluid, CSF Reservoir [[id number]] Collected: 06/05/24 2120    Lab Status: Preliminary result Specimen: Cerebrospinal Fluid from CSF [place] Updated: 06/05/24 2323     Gram Stain Few (2+) WBCs seen      No organisms seen           Final Diagnosis   1,2. Brain tumor, biopsy:  - Squamous epithelium and keratinous debris, consistent with epidermoid cyst.   Electronically signed by Katia Oswald MD on 5/20/2024 at 1642       Radiology:       MRI reviewed.  Small fluid collection noted in the subcutaneous tissues along the surgical site.    Imaging Results (Last 72 Hours)       Procedure Component Value Units Date/Time    XR Spine Lumbar 2 or 3 View [[id number]] Collected: 06/05/24 2147     Updated: 06/05/24 2151    Narrative:      EXAMINATION: XR SPINE LUMBAR 2 OR 3 VW-     6/5/2024 7:50 PM     HISTORY: lumbar drain insertion; T81.49XA-Infection following a  procedure, other surgical site, initial encounter     Number of fluoroscopic spot images obtained = 3.     Fluoroscopy dose in Air [place] mGy = 6.0327.     Fluoroscopy total dose area product (Gycm2) = 1.2406.     Fluoroscopy total  exposure time = 8.3 seconds.     Spot images were obtained at the time of lumbar drain suboccipital wound  revision.              This report was signed and finalized on 6/5/2024 9:48 PM by Dr. David Negron MD.       FL C Arm During Surgery [[id number]] Resulted: 06/05/24 2138     Updated: 06/05/24 2138    Narrative:      This procedure was auto-finalized with no dictation required.    MRI Brain With & Without Contrast [[id number]] Collected: 06/05/24 1404     Updated: 06/05/24 1426    Narrative:      MRI BRAIN W WO CONTRAST- 6/5/2024 11:58 AM     HISTORY: Status postcraniotomy for tumor.  Concern for CSF leak versus  infection.     TECHNIQUE: Multisequence, multiplanar MRI of the brain with and without  contrast. 20 cc MultiHance IV contrast.     COMPARISON: Brain MRIs dated 5/18/2024 and 5/13/2024     FINDINGS:     Recently resected fourth ventricle epidermoid cyst. On today's exam  there is a 1.9 cm focus of enhancement in the paramedian right  cerebellar hemisphere (series 801-image 38) which appears to be a focus  of subacute ischemia within the cerebellar hemisphere. This does not  appear to be cerebritis. No evidence of residual epidermoid cyst. No  intra-axial or extra-axial diffusion signal abnormality. No acute  intra-axial or extra-axial hemorrhage. No evidence of subdural empyema.  There is a 1.6 cm subcutaneous fluid collection along the surgical tract  (series 701-image 3). The ADC signal within this collection is  facilitated as opposed to restricted and this does not image like  abscess by MRI. No supratentorial pathologic enhancement. Ventricles are  nondilated. Pituitary gland is unremarkable. Central arterial flow voids  are well preserved. Orbital contents are unremarkable.       Impression:         1.  Recently resected fourth ventricle epidermoid cyst. There is a 1.9  cm rounded focus of enhancement in the paramedian right cerebellar  hemisphere which I believe is a focus of subacute ischemia.  This same  area demonstrated diffusion restriction on the immediate postoperative  MRI. After accounting for this focus of cerebellar enhancement, I do not  see any strong evidence for CNS infection.  2.  There is a 1.6 cm fluid collection along the surgical track  projecting in the subcutaneous fat (axial T2 image #3). This  demonstrates facilitated diffusion with high ADC signal and does not  image like an organized soft tissue abscess collection based on  diffusion signal.     This report was signed and finalized on 6/5/2024 2:23 PM by Dr Ho Burleson.                 HOSPITAL PROBLEM LIST:      Superficial postoperative wound infection      IMPRESSION:  Provisional postoperative wound infection and patient that this post resection of fourth ventricle epidermoid cyst May 17.  Patient with history of drainage from wound and small fluid collection noted on MRI.  Patient was taken to surgery and underwent lumbar drain in suboccipital wound revision.  Cultures pending.  Gram stain with 2+ WBCs but no organisms.  CSF with 5 nucleated cells.  Glucose and protein normal.    Elevated sed rate at 114    Elevated CRP at 3.54      RECOMMENDATION:   Empiric vancomycin  Continue empiric cefepime  Continue to monitor pending cultures  Check MRSA nasal screen      Discussed with KEVIN Bishop  Reviewed neurosurgery admission note  Reviewed operative note    Violet Pandya MD  06/06/24  07:41 CDT

## 2024-06-06 NOTE — PLAN OF CARE
Goal Outcome Evaluation:  Plan of Care Reviewed With: patient        Progress: improving          Alert & Ox4.VSS. Post op lumbar drain. Drain 20 clear every 2hrs. Neuro checks per ordered. C/O Headache relieved tylenol and Norco 10 pt able to rest between assessment  Safety maintined

## 2024-06-06 NOTE — THERAPY DISCHARGE NOTE
Patient Name: Zina Armando  : 1990    MRN: [id number]                              Today's Date: 2024       Admit Date: 2024    Visit Dx:     ICD-10-CM ICD-9-CM   1. Superficial postoperative wound infection  T81.49XA 998.59     Patient Active Problem List   Diagnosis    Cerebellar mass    Superficial postoperative wound infection     Past Medical History:   Diagnosis Date    Cerebellar mass 2024    Miscarriage 2024    Miscarriage 2023    Personal history of COVID-19      Past Surgical History:   Procedure Laterality Date    CRANIOTOMY N/A 2024    Procedure: CRANIOTOMY SUBOCCIPITAL WITH STEALTH, LUMBAR DRAIN INSERTION EXTERNAL;  Surgeon: Rohith Alexander MD;  Location:  [facility];  Service: Neurosurgery;  Laterality: N/A;    INCISION AND DRAINAGE OF WOUND N/A 2024    Procedure: Lumbar Drain and Suboccipital Wound revision, possible removal of bone, possible revision of dural graft. HOLD ANTIBIOTICS UNTIL CSF OBTAINED;  Surgeon: Rohith Alexander MD;  Location:  [facility];  Service: Neurosurgery;  Laterality: N/A;    LUMBAR DRAIN INSERTION EXTERNAL N/A 2024    Procedure: LUMBAR DRAIN INSERTION EXTERNAL;  Surgeon: Rohith Alexander MD;  Location:  [facility];  Service: Neurosurgery;  Laterality: N/A;    LUMBAR DRAIN INSERTION EXTERNAL N/A 2024    Procedure: LUMBAR DRAIN INSERTION EXTERNAL;  Surgeon: Rohith Alexander MD;  Location:  [facility];  Service: Neurosurgery;  Laterality: N/A;    NO PAST SURGERIES        General Information       Row Name 24 0848          Physical Therapy Time and Intention    Document Type evaluation  CC: intermittent drainage from incision s/p craniotomy for tumor adn lumbar drain insertion external 24. Dx: superficial post-op woulnd infection - wound revision 24. PMH: cerebellar mass, misacarriagex2, COVID-19  -JESSICA (r) HT (t) JESSICA (c)     Mode of Treatment physical therapy  -JESSICA (r) HT (t) JESSICA (c)       Row Name  06/06/24 0848          General Information    Patient Profile Reviewed yes  -JESSICA (r) HT (t) JESSICA (c)     Prior Level of Function independent:;all household mobility;ADL's;home management  -JESSICA (r) HT (t) JESSICA (c)     Existing Precautions/Restrictions other (see comments)  lumbar external drain  -JESSICA (r) HT (t) JESSICA (c)     Barriers to Rehab medically complex;language barrier  Persian speaker  -JESSICA       Row Name 06/06/24 0848          Living Environment    People in Home child(katerin), dependent;spouse  -JESSICA (r) HT (t) JESSICA (c)       Row Name 06/06/24 0848          Cognition    Orientation Status (Cognition) unable/difficult to assess  -JESSICA (r) HT (t) JESSICA (c)       Row Name 06/06/24 0848          Safety Issues, Functional Mobility    Impairments Affecting Function (Mobility) pain;endurance/activity tolerance  -JESSICA (r) HT (t) JESSICA (c)               User Key  (r) = Recorded By, (t) = Taken By, (c) = Cosigned By      Initials Name Provider Type    Villa Anderson, PT DPT Physical Therapist    Maggy Key, PT Student PT Student                   Mobility       Row Name 06/06/24 0848          Bed Mobility    Bed Mobility rolling left;rolling right;supine-sit;sit-supine  -JESSICA (r) HT (t) JESSICA (c)     Rolling Left [place] (Bed Mobility) moderate assist (50% patient effort)  -JESSICA (r) HT (t) JESSICA (c)     Rolling Right [place] (Bed Mobility) moderate assist (50% patient effort)  -JESSICA (r) HT (t) JESSICA (c)     Supine-Sit [place] (Bed Mobility) moderate assist (50% patient effort)  -JESSICA (r) HT (t) JESSICA (c)     Sit-Supine [place] (Bed Mobility) minimum assist (75% patient effort);contact guard  -JESSICA (r) HT (t) JESSICA (c)     Assistive Device (Bed Mobility) bed rails  -JESSICA (r) HT (t) JESSICA (c)       Row Name 06/06/24 0848          Sit-Stand Transfer    Sit-Stand [place] (Transfers) contact guard  -JESSICA (r) HT (t) JESSICA (c)       Row Name 06/06/24 0848          Gait/Stairs (Locomotion)    [place] Level (Gait) minimum assist (75% patient  effort);contact guard  -JESSICA (r) HT (t) JESSICA (c)     Assistive Device (Gait) other (see comments)  HHA  -JESSICA (r) HT (t) JESSICA (c)     Distance in Feet (Gait) 200  -JESSICA (r) HT (t) JESSICA (c)     Bilateral Gait Deviations forward flexed posture  -JESSICA (r) HT (t) JESSICA (c)               User Key  (r) = Recorded By, (t) = Taken By, (c) = Cosigned By      Initials Name Provider Type    Villa Anderson, PT DPT Physical Therapist     Maggy Garza, PT Student PT Student                   Obj/Interventions       Row Name 06/06/24 0848          Range of Motion Comprehensive    General Range of Motion bilateral lower extremity ROM WFL  -JESSICA (r) HT (t) JESSICA (c)       Row Name 06/06/24 0848          Strength Comprehensive (MMT)    Comment, General Manual Muscle Testing (MMT) Assessment B LE strength grossly 4+/5  -JESSICA (r) HT (t) JESSICA (c)       Row Name 06/06/24 0848          Balance    Balance Assessment sitting static balance;sitting dynamic balance;standing static balance;standing dynamic balance  -JESSICA (r) HT (t) JESSICA (c)     Static Sitting Balance standby assist  -JESSICA (r) HT (t) JESSICA (c)     Dynamic Sitting Balance standby assist  -JESSICA (r) HT (t) JESSICA (c)     Position, Sitting Balance unsupported;sitting edge of bed  -JESSICA (r) HT (t) JESSICA (c)     Static Standing Balance standby assist  -JESSICA (r) HT (t) JESSICA (c)     Dynamic Standing Balance contact guard  -JESSICA (r) HT (t) JESSICA (c)     Position/Device Used, Standing Balance unsupported  -JESSICA (r) HT (t) JESSICA (c)       Row Name 06/06/24 0848          Sensory Assessment (Somatosensory)    Sensory Assessment (Somatosensory) LE sensation intact  -JESSICA (r) HT (t) JESSICA (c)               User Key  (r) = Recorded By, (t) = Taken By, (c) = Cosigned By      Initials Name Provider Type    Villa Anderson, PT DPT Physical Therapist     Maggy Garza, PT Student PT Student                   Goals/Plan    No documentation.                  Clinical Impression       Row Name 06/06/24 0848          Pain    Pretreatment Pain  Rating 6/10  5-6/10 pain  -JESSICA (r) HT (t) JESSICA (c)     Pain Location incisional  -JESSICA (r) HT (t) JESSICA (c)     Pain Location - back;neck  -JESSICA (r) HT (t) JESSICA (c)     Pain Intervention(s) Medication (See MAR);Repositioned;Ambulation/increased activity  -JESSICA (r) HT (t) JESSICA (c)       Row Name 06/06/24 0848          Plan of Care Review    Plan of Care Reviewed With patient  -JESSICA (r) HT (t) JESSICA (c)     Outcome Evaluation PT eval complete. Pt presented supine in bed s/p wound revision 6/5. Pt able to communicate through  and reports pain of 5-6/10 at incision sites. She requires modA to roll and t/f supine to sit EOB due to incision pain. She t/f STS CGA and amb to commode where she t/f STS CGA to urinate. Pt amb ~200 ft around the unit with Sha and demos forward flexed posture d/t pain. She reported being I with all ADLs prior to her sx/readmission. Her BLE strength and ROM WFL and she reported her LE sensation is intact. Pt does display a need for skilled therapy at this time as her deficits are surgical pain related. Anticipate d/c home w assist once medically stable. PT to sign off.  -JESSICA (r) HT (t) JESSICA (c)       Row Name 06/06/24 0890          Therapy Assessment/Plan (PT)    Patient/Family Therapy Goals Statement (PT) return home  -JESSICA (r) HT (t) JESSICA (c)     Criteria for Skilled Interventions Met (PT) no;no problems identified which require skilled intervention  -JESSICA (r) HT (t) JESSICA (c)     Therapy Frequency (PT) evaluation only  -JESSICA       Row Name 06/06/24 0848          Positioning and Restraints    Pre-Treatment Position in bed  -JESSICA (r) HT (t) JESSICA (c)     Post Treatment Position bed  -JESSICA (r) HT (t) JESSICA (c)     In Bed notified nsg;supine;call light within reach;encouraged to call for assist;patient within staff view;with nsg  -JESSICA (r) HT (t) JESSICA (c)               User Key  (r) = Recorded By, (t) = Taken By, (c) = Cosigned By      Initials Name Provider Type    Villa Anderson, PT DPT Physical Therapist    ARMIN Garza,  Maggy, PT Student PT Student                   Outcome Measures       Row Name 06/06/24 0848 06/06/24 0800       How much help from another person do you currently need...    Turning from your back to your side while in flat bed without using bedrails? 3  -JESSICA (r) HT (t) JESSICA (c) 3  -SP    Moving from lying on back to sitting on the side of a flat bed without bedrails? 2  -JESSICA (r) HT (t) JESSICA (c) 3  -SP    Moving to and from a bed to a chair (including a wheelchair)? 3  -JESSICA (r) HT (t) JESSICA (c) 3  -SP    Standing up from a chair using your arms (e.g., wheelchair, bedside chair)? 3  -JESSICA (r) HT (t) JESSICA (c) 3  -SP    Climbing 3-5 steps with a railing? 3  -JESSICA (r) HT (t) JESSICA (c) 3  -SP    To walk in hospital room? 3  -JESSICA (r) HT (t) JESSICA (c) 3  -SP    AM-PAC 6 Clicks Score (PT) 17  -JESSICA (r) HT (t) 18  -SP    Highest Level of Mobility Goal 5 --> Static standing  -JESSICA (r) HT (t) 6 --> Walk 10 steps or more  -SP      Row Name 06/06/24 0950 06/06/24 0848       Functional Assessment    Outcome Measure Options AM-PAC 6 Clicks Daily Activity (OT) (P)   - AM-PAC 6 Clicks Basic Mobility (PT)  -JESSICA (r) HT (t) JESSICA (c)              User Key  (r) = Recorded By, (t) = Taken By, (c) = Cosigned By      Initials Name Provider Type    Villa Anderson, PT DPT Physical Therapist    Edna Cornejo, RN Registered Nurse    Maggy Key, PT Student PT Student    Maggy Dodge, OT Student OT Student                  Physical Therapy Education       Title: PT OT SLP Therapies (In Progress)       Topic: Physical Therapy (Resolved)       Point: Mobility training (Resolved)       Learning Progress Summary             Patient Acceptance, E, VU by  at 6/6/2024 0848    Comment: Pt educated on role of PT and PT POC.                         Point: Home exercise program (Resolved)       Learner Progress:  Not documented in this visit.              Point: Body mechanics (Resolved)       Learner Progress:  Not documented in this visit.               Point: Precautions (Resolved)       Learner Progress:  Not documented in this visit.                              User Key       Initials Effective Dates Name Provider Type Discipline     04/15/24 -  Maggy Garza, PT Student PT Student PT                  PT Recommendation and Plan     Plan of Care Reviewed With: patient  Outcome Evaluation: PT eval complete. Pt presented supine in bed s/p wound revision 6/5. Pt able to communicate through  and reports pain of 5-6/10 at incision sites. She requires modA to roll and t/f supine to sit EOB due to incision pain. She t/f STS CGA and amb to commode where she t/f STS CGA to urinate. Pt amb ~200 ft around the unit with Sha and demos forward flexed posture d/t pain. She reported being I with all ADLs prior to her sx/readmission. Her BLE strength and ROM WFL and she reported her LE sensation is intact. Pt does display a need for skilled therapy at this time as her deficits are surgical pain related. Anticipate d/c home w assist once medically stable. PT to sign off.     Time Calculation:         PT Charges       Row Name 06/06/24 0848             Time Calculation    Start Time 0950  -JESSICA (r) HT (t) JESSICA (c)      Stop Time 1022  CR: 848-911= 23 min (total time 55 min)  -JESSICA (r) HT (t) JESSICA (c)      Time Calculation (min) 32 min  -JESSICA (r) HT (t)      PT Received On 06/06/24  -JESSICA (r) HT (t) JESSICA (c)         Untimed Charges    PT Eval/Re-eval Minutes 55  -JESSICA         Total Minutes    Untimed Charges Total Minutes 55  -JESSICA       Total Minutes 55  -JESSICA                User Key  (r) = Recorded By, (t) = Taken By, (c) = Cosigned By      Initials Name Provider Type    Villa Anderson, PT DPT Physical Therapist     Maggy Garza, PT Student PT Student                      PT G-Codes  Outcome Measure Options: (P) AM-PAC 6 Clicks Daily Activity (OT)  AM-PAC 6 Clicks Score (PT): 17  AM-PAC 6 Clicks Score (OT): (P) 20    PT Discharge Summary  Anticipated Discharge Disposition  (PT): home with assist    Maggy Garza, PT Student  6/6/2024

## 2024-06-07 LAB
CYTO UR: NORMAL
LAB AP CASE REPORT: NORMAL
Lab: NORMAL
PATH REPORT.FINAL DX SPEC: NORMAL
PATH REPORT.GROSS SPEC: NORMAL
VANCOMYCIN TROUGH SERPL-MCNC: 11.3 MCG/ML (ref 5–20)

## 2024-06-07 PROCEDURE — 99024 POSTOP FOLLOW-UP VISIT: CPT | Performed by: PHYSICIAN ASSISTANT

## 2024-06-07 PROCEDURE — 25010000002 CEFEPIME PER 500 MG: Performed by: NEUROLOGICAL SURGERY

## 2024-06-07 PROCEDURE — 25010000002 MEROPENEM PER 100 MG: Performed by: INTERNAL MEDICINE

## 2024-06-07 PROCEDURE — 25810000003 SODIUM CHLORIDE 0.9 % SOLUTION: Performed by: INTERNAL MEDICINE

## 2024-06-07 PROCEDURE — 25810000003 SODIUM CHLORIDE 0.9 % SOLUTION: Performed by: NEUROLOGICAL SURGERY

## 2024-06-07 PROCEDURE — 80202 ASSAY OF VANCOMYCIN: CPT | Performed by: NEUROLOGICAL SURGERY

## 2024-06-07 PROCEDURE — 99232 SBSQ HOSP IP/OBS MODERATE 35: CPT | Performed by: INTERNAL MEDICINE

## 2024-06-07 PROCEDURE — 25010000002 VANCOMYCIN 10 G RECONSTITUTED SOLUTION: Performed by: NEUROLOGICAL SURGERY

## 2024-06-07 PROCEDURE — 25010000002 HEPARIN (PORCINE) PER 1000 UNITS: Performed by: NEUROLOGICAL SURGERY

## 2024-06-07 RX ORDER — OXYCODONE HYDROCHLORIDE 5 MG/1
5 TABLET ORAL EVERY 4 HOURS PRN
Status: DISCONTINUED | OUTPATIENT
Start: 2024-06-07 | End: 2024-06-11 | Stop reason: HOSPADM

## 2024-06-07 RX ORDER — BUTALBITAL, ACETAMINOPHEN AND CAFFEINE 50; 325; 40 MG/1; MG/1; MG/1
1 TABLET ORAL EVERY 6 HOURS PRN
Status: DISCONTINUED | OUTPATIENT
Start: 2024-06-07 | End: 2024-06-11 | Stop reason: HOSPADM

## 2024-06-07 RX ADMIN — Medication 1 APPLICATION: at 09:05

## 2024-06-07 RX ADMIN — HYDROCODONE BITARTRATE AND ACETAMINOPHEN 1 TABLET: 10; 325 TABLET ORAL at 00:38

## 2024-06-07 RX ADMIN — MUPIROCIN 1 APPLICATION: 20 OINTMENT TOPICAL at 21:04

## 2024-06-07 RX ADMIN — MEROPENEM 2000 MG: 1 INJECTION, POWDER, FOR SOLUTION INTRAVENOUS at 16:10

## 2024-06-07 RX ADMIN — LEVETIRACETAM 500 MG: 500 TABLET, FILM COATED ORAL at 09:06

## 2024-06-07 RX ADMIN — CHLORHEXIDINE GLUCONATE 1 APPLICATION: 500 CLOTH TOPICAL at 04:11

## 2024-06-07 RX ADMIN — BUTALBITAL, ACETAMINOPHEN, AND CAFFEINE 1 TABLET: 50; 325; 40 TABLET ORAL at 20:14

## 2024-06-07 RX ADMIN — SODIUM CHLORIDE 1250 MG: 9 INJECTION, SOLUTION INTRAVENOUS at 04:07

## 2024-06-07 RX ADMIN — Medication 1 APPLICATION: at 20:46

## 2024-06-07 RX ADMIN — HYDROCODONE BITARTRATE AND ACETAMINOPHEN 1 TABLET: 10; 325 TABLET ORAL at 11:17

## 2024-06-07 RX ADMIN — OXYCODONE HYDROCHLORIDE 5 MG: 5 TABLET ORAL at 23:40

## 2024-06-07 RX ADMIN — LEVETIRACETAM 500 MG: 500 TABLET, FILM COATED ORAL at 20:13

## 2024-06-07 RX ADMIN — OXYCODONE HYDROCHLORIDE 5 MG: 5 TABLET ORAL at 17:06

## 2024-06-07 RX ADMIN — HEPARIN SODIUM 5000 UNITS: 5000 INJECTION INTRAVENOUS; SUBCUTANEOUS at 09:05

## 2024-06-07 RX ADMIN — SENNOSIDES AND DOCUSATE SODIUM 2 TABLET: 50; 8.6 TABLET ORAL at 09:05

## 2024-06-07 RX ADMIN — SENNOSIDES AND DOCUSATE SODIUM 2 TABLET: 50; 8.6 TABLET ORAL at 20:13

## 2024-06-07 RX ADMIN — Medication 10 ML: at 09:06

## 2024-06-07 RX ADMIN — CEFEPIME 2000 MG: 2 INJECTION, POWDER, FOR SOLUTION INTRAVENOUS at 01:11

## 2024-06-07 RX ADMIN — MEROPENEM 2000 MG: 1 INJECTION, POWDER, FOR SOLUTION INTRAVENOUS at 21:03

## 2024-06-07 RX ADMIN — CEFEPIME 2000 MG: 2 INJECTION, POWDER, FOR SOLUTION INTRAVENOUS at 09:05

## 2024-06-07 RX ADMIN — Medication 10 ML: at 21:04

## 2024-06-07 RX ADMIN — MUPIROCIN 1 APPLICATION: 20 OINTMENT TOPICAL at 13:20

## 2024-06-07 RX ADMIN — HYDROCODONE BITARTRATE AND ACETAMINOPHEN 1 TABLET: 10; 325 TABLET ORAL at 04:37

## 2024-06-07 RX ADMIN — ACETAMINOPHEN 650 MG: 325 TABLET, FILM COATED ORAL at 12:15

## 2024-06-07 RX ADMIN — SODIUM CHLORIDE 1250 MG: 9 INJECTION, SOLUTION INTRAVENOUS at 12:12

## 2024-06-07 RX ADMIN — SODIUM CHLORIDE 200 ML/HR: 9 INJECTION, SOLUTION INTRAVENOUS at 00:47

## 2024-06-07 RX ADMIN — HEPARIN SODIUM 5000 UNITS: 5000 INJECTION INTRAVENOUS; SUBCUTANEOUS at 20:14

## 2024-06-07 NOTE — PLAN OF CARE
Problem: Adult Inpatient Plan of Care  Goal: Plan of Care Review  Outcome: Ongoing, Progressing  Flowsheets (Taken 6/7/2024 0640)  Outcome Evaluation: A&Ox4. VSS. ST 110s. Tmax 100.4. Neuro intact, strong in all extremities. Lumbar drain output q2h/20mL per order. PRN pain meds given for neck/head pain. Adequate UOP. Up x2 assist to bsc. Turned q2h. Safety maintained.  Goal: Patient-Specific Goal (Individualized)  Outcome: Ongoing, Progressing  Goal: Absence of Hospital-Acquired Illness or Injury  Outcome: Ongoing, Progressing  Intervention: Identify and Manage Fall Risk  Recent Flowsheet Documentation  Taken 6/6/2024 1900 by Elly Jon RN  Safety Promotion/Fall Prevention:   safety round/check completed   fall prevention program maintained  Intervention: Prevent Infection  Recent Flowsheet Documentation  Taken 6/6/2024 1900 by Elly Jon RN  Infection Prevention: single patient room provided  Goal: Optimal Comfort and Wellbeing  Outcome: Ongoing, Progressing  Goal: Readiness for Transition of Care  Outcome: Ongoing, Progressing     Problem: Fall Injury Risk  Goal: Absence of Fall and Fall-Related Injury  Outcome: Ongoing, Progressing  Intervention: Identify and Manage Contributors  Recent Flowsheet Documentation  Taken 6/6/2024 1900 by Elly Jon RN  Medication Review/Management: medications reviewed  Intervention: Promote Injury-Free Environment  Recent Flowsheet Documentation  Taken 6/6/2024 1900 by Elly Jon RN  Safety Promotion/Fall Prevention:   safety round/check completed   fall prevention program maintained   Goal Outcome Evaluation:              Outcome Evaluation: A&Ox4. VSS. ST 110s. Tmax 100.4. Neuro intact, strong in all extremities. Lumbar drain output q2h/20mL per order. PRN pain meds given for neck/head pain. Adequate UOP. Up x2 assist to bsc. Turned q2h. Safety maintained.

## 2024-06-07 NOTE — PROGRESS NOTES
"INFECTIOUS DISEASES PROGRESS NOTE    Patient:  Zina Armando  YOB: 1990  MRN: [id number]   Admit date: 2024   Admitting Physician: Rohith Alexander, *  Primary Care Physician: Provider, No Known    Chief Complaint: Headache        Interval History: Continues to have headache but says it is improved.  She had fever again today to over 102.  Patient denied cough, dysuria, diarrhea.    She did complain of some itching pointed to her back with a lumbar drain is in place.        Allergies: No Known Allergies    Current Scheduled Medications:   cefepime, 2,000 mg, Intravenous, Q8H  Chlorhexidine Gluconate Cloth, 1 Application, Topical, Q24H  heparin (porcine), 5,000 Units, Subcutaneous, Q12H  levETIRAcetam, 500 mg, Oral, Q12H  mupirocin, 1 Application, Each Nare, BID  mupirocin, 1 Application, Topical, Q12H  senna-docusate sodium, 2 tablet, Oral, BID  sodium chloride, 10 mL, Intravenous, Q12H      Current PRN Medications:    acetaminophen **OR** acetaminophen    atropine    senna-docusate sodium **AND** polyethylene glycol **AND** bisacodyl **AND** bisacodyl    butalbital-acetaminophen-caffeine    Calcium Replacement - Follow Nurse / BPA Driven Protocol    cyclobenzaprine    droperidol **OR** droperidol    fentanyl    flumazenil    labetalol    Magnesium Standard Dose Replacement - Follow Nurse / BPA Driven Protocol    naloxone    ondansetron ODT **OR** ondansetron    oxyCODONE    Phosphorus Replacement - Follow Nurse / BPA Driven Protocol    Potassium Replacement - Follow Nurse / BPA Driven Protocol    sodium chloride    sodium chloride    niCARdipine, 5-15 mg/hr  sodium chloride, 200 mL/hr, Last Rate: Stopped (24 0745)           Objective     Vital Signs:  Temp (24hrs), Av.9 °F (37.7 °C), Min:98.6 °F (37 °C), Max:102.4 °F (39.1 °C)      /70   Pulse 117   Temp 98.8 °F (37.1 °C) (Oral)   Resp 22   Ht 149.9 cm (59.02\")   Wt 97.8 kg (215 lb 9.8 oz)   LMP 2024 " (Approximate)   SpO2 95%   BMI 43.52 kg/m²         Physical Exam:  General: Patient is lying in bed on her right side in no acute distress.  She was evaluated with the assistance of KEVIN Bishop and medical  #[id number]  HEENT: Sclera anicteric  Posterior head/neck incision is intact.  No active drainage is appreciated.  Some increased erythema noted along the incision line.  Neuro: Alert and oriented, speech clear  Psych: Pleasant cooperative      Results Review:    I reviewed the patient's new clinical results.    Lab Results:    CBC:   Lab Results   Lab 06/05/24  1248 06/06/24  0233   WBC 5.83 6.33   HEMOGLOBIN 11.2* 9.6*   HEMATOCRIT 34.8 31.7*   PLATELETS 530* 465*        AutoDiff:   Lab Results   Lab 06/05/24  1248 06/06/24  0233   NEUTROPHIL % 48.9 64.0   LYMPHOCYTE % 36.7 23.2   MONOCYTES % 11.7 10.6   EOSINOPHIL % 2.1 1.4   BASOPHIL % 0.3 0.2   NEUTROS ABS 2.85 4.05   LYMPHS ABS 2.14 1.47   MONOS ABS 0.68 0.67   EOS ABS 0.12 0.09   BASOS ABS 0.02 0.01        Manual Diff:    Lab Results   Lab 06/05/24  1248 06/06/24  0233   NEUTROS ABS 2.85 4.05           CMP:   Lab Results   Lab 06/05/24  1248 06/06/24  0233   SODIUM 138 136   POTASSIUM 3.9 3.9   CHLORIDE 101 101   CO2 29.0 29.0   BUN 6 8   CREATININE 0.36* 0.60   CALCIUM 9.7 8.8   BILIRUBIN <0.2  --    ALK PHOS 142*  --    ALT (SGPT) 97*  --    AST (SGOT) 21  --    GLUCOSE 91 101*       Estimated Creatinine Clearance: 144 mL/min (by C-G formula based on SCr of 0.6 mg/dL).    Culture Results:    Microbiology Results (last 10 days)       Procedure Component Value - Date/Time    Blood Culture With LIZZ - Blood, Hand, Right [[id number]]  (Normal) Collected: 06/06/24 1250    Lab Status: Preliminary result Specimen: Blood from Hand, Right Updated: 06/07/24 1315     Blood Culture No growth at 24 hours    Blood Culture With LIZZ - Blood, Arm, Left [[id number]]  (Normal) Collected: 06/06/24 0826    Lab Status: Preliminary result Specimen: Blood from  Arm, Left Updated: 06/07/24 0845     Blood Culture No growth at 24 hours    MRSA Screen, PCR (Inpatient) - Swab, Nares [[id number]]  (Normal) Collected: 06/06/24 0805    Lab Status: Final result Specimen: Swab from Nares Updated: 06/06/24 0934     MRSA PCR No MRSA Detected    Narrative:      The negative predictive value of this diagnostic test is high and should only be used to consider de-escalating anti-MRSA therapy. A positive result may indicate colonization with MRSA and must be correlated clinically.    Wound Culture - Wound, Head [[id number]]  (Abnormal) Collected: 06/05/24 2206    Lab Status: Preliminary result Specimen: Wound from Head Updated: 06/07/24 1305     Wound Culture Light growth (2+) Gram Negative Bacilli     Gram Stain Few (2+) WBCs seen      No organisms seen    Wound Culture - Wound, Head [[id number]]  (Abnormal) Collected: 06/05/24 2150    Lab Status: Preliminary result Specimen: Wound from Head Updated: 06/07/24 1306     Wound Culture Scant growth (1+) Gram Negative Bacilli     Gram Stain Few (2+) WBCs seen      No organisms seen    Culture, CSF - Cerebrospinal Fluid, CSF Reservoir [[id number]] Collected: 06/05/24 2120    Lab Status: Preliminary result Specimen: Cerebrospinal Fluid from CSF [place] Updated: 06/07/24 1311     CSF Culture No growth     Gram Stain Few (2+) WBCs seen      No organisms seen    Blood Culture - Blood, Hand, Left [[id number]]  (Normal) Collected: 06/05/24 1248    Lab Status: Preliminary result Specimen: Blood from Hand, Left Updated: 06/07/24 1315     Blood Culture No growth at 2 days    Blood Culture - Blood, Hand, Right [[id number]]  (Normal) Collected: 06/05/24 1248    Lab Status: Preliminary result Specimen: Blood from Hand, Right Updated: 06/07/24 1315     Blood Culture No growth at 2 days                 Radiology:   Imaging Results (Last 72 Hours)       Procedure Component Value Units Date/Time    XR Spine Lumbar 2 or 3 View [[id number]] Collected: 06/05/24 2147      Updated: 06/05/24 2151    Narrative:      EXAMINATION: XR SPINE LUMBAR 2 OR 3 VW-     6/5/2024 7:50 PM     HISTORY: lumbar drain insertion; T81.49XA-Infection following a  procedure, other surgical site, initial encounter     Number of fluoroscopic spot images obtained = 3.     Fluoroscopy dose in Air Kerma mGy = 6.0327.     Fluoroscopy total dose area product (Gycm2) = 1.2406.     Fluoroscopy total exposure time = 8.3 seconds.     Spot images were obtained at the time of lumbar drain suboccipital wound  revision.              This report was signed and finalized on 6/5/2024 9:48 PM by Dr. David Negron MD.       FL C Arm During Surgery [[id number]] Resulted: 06/05/24 2138     Updated: 06/05/24 2138    Narrative:      This procedure was auto-finalized with no dictation required.    MRI Brain With & Without Contrast [[id number]] Collected: 06/05/24 1404     Updated: 06/05/24 1426    Narrative:      MRI BRAIN W WO CONTRAST- 6/5/2024 11:58 AM     HISTORY: Status postcraniotomy for tumor.  Concern for CSF leak versus  infection.     TECHNIQUE: Multisequence, multiplanar MRI of the brain with and without  contrast. 20 cc MultiHance IV contrast.     COMPARISON: Brain MRIs dated 5/18/2024 and 5/13/2024     FINDINGS:     Recently resected fourth ventricle epidermoid cyst. On today's exam  there is a 1.9 cm focus of enhancement in the paramedian right  cerebellar hemisphere (series 801-image 38) which appears to be a focus  of subacute ischemia within the cerebellar hemisphere. This does not  appear to be cerebritis. No evidence of residual epidermoid cyst. No  intra-axial or extra-axial diffusion signal abnormality. No acute  intra-axial or extra-axial hemorrhage. No evidence of subdural empyema.  There is a 1.6 cm subcutaneous fluid collection along the surgical tract  (series 701-image 3). The ADC signal within this collection is  facilitated as opposed to restricted and this does not image like  abscess by MRI. No  supratentorial pathologic enhancement. Ventricles are  nondilated. Pituitary gland is unremarkable. Central arterial flow voids  are well preserved. Orbital contents are unremarkable.       Impression:         1.  Recently resected fourth ventricle epidermoid cyst. There is a 1.9  cm rounded focus of enhancement in the paramedian right cerebellar  hemisphere which I believe is a focus of subacute ischemia. This same  area demonstrated diffusion restriction on the immediate postoperative  MRI. After accounting for this focus of cerebellar enhancement, I do not  see any strong evidence for CNS infection.  2.  There is a 1.6 cm fluid collection along the surgical track  projecting in the subcutaneous fat (axial T2 image #3). This  demonstrates facilitated diffusion with high ADC signal and does not  image like an organized soft tissue abscess collection based on  diffusion signal.     This report was signed and finalized on 6/5/2024 2:23 PM by Dr Ho Burleson.                   Active Hospital Problems    Diagnosis     **Superficial postoperative wound infection        IMPRESSION:  Postoperative wound infection and patient that is status post resection of fourth ventricle epidermoid cyst on May 17.  Patient is status post suboccipital wound revision and lumbar drain placement June 5.  Gram-negative bacilli on cultures  Fever-persist despite review of systems otherwise negative concerning possible resistant gram-negative bacilli  Increasing creatinine-although in the normal range was 0.36 and 0.6 yesterday.  Elevated sed rate  Elevated CRP      RECOMMENDATION:   Dc cefepime  Start meropenem  Follow-up on surgical cultures with gram-negative bacilli.  Follow creatinine-will order BMP for a.m.  Order CBC for a.m.  Follow-up on blood cultures obtained with temperature spike June 6.    Discussed with KEVIN Bishop  Reviewed note from JUSTIN Tanner MD  06/07/24  14:46 CDT

## 2024-06-07 NOTE — PROGRESS NOTES
Daily Progress Note    ASSESSMENT:   Zina Armando is a 33 y.o. non-English-speaking  female with significant medical comorbidities to include  a Craniotomy Suboccipital With Stealth, Lumbar Drain Insertion External and Lumbar Drain Insertion External on 5/17/2024.  Ms. Armando has done fairly well since we last saw her.  Currently asymptomatic, denying headaches, visual disturbances, dizziness, nausea, or vomiting.  She does however endorse intermittent drainage from her incision that is thin in consistency and often yellow-tinged in color.  Otherwise, she is neurologically intact.  No recent imaging.     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   Gil.  #[id number]    CHIEF COMPLAINT:  Status post craniotomy for tumor.  Concern for posterior cervical incision infection versus CSF leak.  Complains today of a throbbing frontal headache.  She rates it as 6.  Pain is not well covered with hydrocodone.  She is requiring Tylenol for fever and nursing expresses concerns over increased acetaminophen dose per 24 hours.  She currently has low-grade temp reported at 100.  Blood cultures have not shown any growth at this point.  Nursing indicates that drain is slower to empty.  It was last emptied before I saw the patient and 15 cc were recovered.    PLAN:   Neuro: Stable.  Intact/at baseline.   2 Days Post-Op (6/6/2024) lumbar drain placement and wound washout and revision   CSF clear.  Wound culture few 2+ WBCs.  No organisms TD   Lumbar drain = 40 mL, keep   ICU for close neurologic monitoring.  Neurochecks per policy.  Call for decline.              D/C Hydrocodone/Acetaminophen              Start Fiorcet po Q 6 hours, prn headache   Start Roxicodone 4 mg p.o.  "every 4 hours as needed pain   Bactroban ointment twice a day to posterior cervical incision              Modify drain output to 20cc N0kgptr               Start ambulation    CV: Continuous cardiac monitoring.  Keep A-line.  Cardene to keep SBP <140  Pulm: Continuous pulse oximetry.  O2.  Maintaining O2 sat.  : Voiding  FEN: Regular diet.  Advance as tolerated  GI: No acute issues  ID: Continue empiric Vancomycin and Cefepime, per ID  Heme:  DVT prophylaxis, SCDs  Pain: medication changes as noted above  Dispo: PT/OT    Subjective  Symptoms stable    Temp:  [98.6 °F (37 °C)-102.4 °F (39.1 °C)] 102.4 °F (39.1 °C)  Heart Rate:  [105-139] 121  Resp:  [16-24] 22  BP: ()/(55-82) 101/79    Objective:  Vital signs: (most recent): Blood pressure 101/79, pulse (!) 121, temperature (!) 102.4 °F (39.1 °C), temperature source Oral, resp. rate 22, height 149.9 cm (59.02\"), weight 97.8 kg (215 lb 9.8 oz), last menstrual period 05/18/2024, SpO2 98%, not currently breastfeeding.      Neurologic Exam     Mental Status   Oriented to person, place, and time.   Attention: normal. Concentration: normal.   Speech: speech is normal   Level of consciousness: alert    Cranial Nerves     CN II   Visual fields full to confrontation.     CN III, IV, VI   Pupils are equal, round, and reactive to light.  Extraocular motions are normal.     CN V   Facial sensation intact.     CN VII   Facial expression full, symmetric.     CN VIII   CN VIII normal.     CN IX, X   CN IX normal.     CN XI   CN XI normal.     Motor Exam   Right arm tone: normal  Left arm tone: normal  Right leg tone: normal  Left leg tone: normal    Strength   Right deltoid: 5/5  Left deltoid: 5/5  Right biceps: 5/5  Left biceps: 5/5  Right triceps: 5/5  Left triceps: 5/5  Right wrist extension: 5/5  Left wrist extension: 5/5  Right iliopsoas: 5/5  Left iliopsoas: 5/5  Right quadriceps: 5/5  Left quadriceps: 5/5  Right anterior tibial: 5/5  Left anterior tibial: 5/5  Right " gastroc: 5/5  Left gastroc: 5/5  Right EHL 5/5  Left EHL 5/5       Sensory Exam   Right arm light touch: normal  Left arm light touch: normal  Right leg light touch: normal  Left leg light touch: normal    Gait, Coordination, and Reflexes     Tremor   Resting tremor: absent  Intention tremor: absent  Action tremor: absent    Incision: C, D, I.  8 retention sutures intact.    DRAINS:   External Urinary Catheter (Active)       Lumbar Drain (Active)   Drain Status Clamped 06/06/24 0800   CSF Color Clear 06/06/24 0800   Site Description Unable to view 06/06/24 0800   Dressing Status Clean;Dry;Intact 06/06/24 0800   CSF Output (mL) 20 mL 06/06/24 0700       [REMOVED] Urethral Catheter Non-latex;Silicone 16 Fr. (Removed)   Daily Indications Required activity restriction from trauma, surgery, (e.g. unstable spine, fracture, hemodynamics) 05/19/24 0400   Site Assessment Clean;Skin intact 05/19/24 0400   Collection Container Standard drainage bag 05/19/24 0400   Securement Method Securing device 05/19/24 0400   Catheter care complete Yes 05/19/24 0400   Output (mL) 40 mL 05/19/24 0500       [REMOVED] Urethral Catheter Non-latex;Silicone 16 Fr. (Removed)   Daily Indications Required activity restriction from trauma, surgery, (e.g. unstable spine, fracture, hemodynamics) 06/06/24 0500   Site Assessment Clean;Skin intact 06/06/24 0400   Collection Container Standard drainage bag 06/06/24 0500   Securement Method Securing device 06/06/24 0500   Output (mL) 150 mL 06/06/24 0400       [REMOVED] External Urinary Catheter (Removed)   Daily Indications Daily output 05/19/24 0500   Site Assessment Clean;Skin intact 05/19/24 0500   Application/Removal external catheter applied;skin care provided 05/19/24 0500   Collection Container Wall suction 05/19/24 0500   Securement Method Securing device 05/19/24 0500       [REMOVED] Lumbar Drain (Removed)   Drain Status Clamped 05/20/24 0800   CSF Color Clear 05/20/24 0800   Site Description  "Unable to view 05/20/24 0800   Dressing Status Clean;Dry;Intact 05/20/24 0945   CSF Output (mL) 10 mL 05/20/24 0800       LAB RESULTS:  ABG:     CBC:   Results from last 7 days   Lab Units 06/06/24  0233 06/05/24  1248   WBC 10*3/mm3 6.33 5.83   HEMOGLOBIN g/dL 9.6* 11.2*   HEMATOCRIT % 31.7* 34.8   PLATELETS 10*3/mm3 465* 530*     BMP:  Results from last 7 days   Lab Units 06/06/24  0233 06/05/24  1248   SODIUM mmol/L 136 138   POTASSIUM mmol/L 3.9 3.9   CHLORIDE mmol/L 101 101   CO2 mmol/L 29.0 29.0   BUN mg/dL 8 6   CREATININE mg/dL 0.60 0.36*   GLUCOSE mg/dL 101* 91   CALCIUM mg/dL 8.8 9.7   ALT (SGPT) U/L  --  97*     Culture Results: No results found for: \"BLOODCX\", \"URINECX\", \"WOUNDCX\", \"MRSACX\", \"RESPCX\", \"STOOLCX\"    Imaging Results (Last 24 Hours)       ** No results found for the last 24 hours. **          Lab Results (last 24 hours)       Procedure Component Value Units Date/Time    Blood Culture With LIZZ - Blood, Arm, Left [[id number]]  (Normal) Collected: 06/06/24 0823    Specimen: Blood from Arm, Left Updated: 06/07/24 0845     Blood Culture No growth at 24 hours    Vancomycin, Trough Please draw 30-60 minutes prior to 0300 dose. [[id number]]  (Normal) Collected: 06/07/24 0229    Specimen: Blood Updated: 06/07/24 0342     Vancomycin Trough 11.30 mcg/mL     Blood Culture With LIZZ - Blood, Hand, Right [[id number]]  (Normal) Collected: 06/06/24 1250    Specimen: Blood from Hand, Right Updated: 06/07/24 0115     Blood Culture No growth at less than 24 hours    Blood Culture - Blood, Hand, Left [[id number]]  (Normal) Collected: 06/05/24 1248    Specimen: Blood from Hand, Left Updated: 06/06/24 1315     Blood Culture No growth at 24 hours    Blood Culture - Blood, Hand, Right [[id number]]  (Normal) Collected: 06/05/24 1248    Specimen: Blood from Hand, Right Updated: 06/06/24 1315     Blood Culture No growth at 24 hours          Flores Eden PA-C  "

## 2024-06-07 NOTE — PLAN OF CARE
Goal Outcome Evaluation:  Plan of Care Reviewed With: patient        Progress: improving  Outcome Evaluation: A/O x4. Strong all extremities. Intermittent HA with relief from prn meds. T max 102.4, Abx changed per ID. Bactroban per orders in incision per NS. Lumbar drain 20ml q4h. Up to BSC to void.  used. Appetite improving slowly.           Problem: Adult Inpatient Plan of Care  Goal: Plan of Care Review  Outcome: Ongoing, Progressing  Flowsheets (Taken 6/7/2024 1835)  Progress: improving  Plan of Care Reviewed With: patient  Outcome Evaluation: A/O x4. Strong all extremities. Intermittent HA with relief from prn meds. T max 102.4, Abx changed per ID. Bactroban per orders in incision per NS. Lumbar drain 20ml q4h. Up to BSC to void.  used. Appetite improving slowly.  Goal: Patient-Specific Goal (Individualized)  Outcome: Ongoing, Progressing  Goal: Absence of Hospital-Acquired Illness or Injury  Outcome: Ongoing, Progressing  Intervention: Identify and Manage Fall Risk  Recent Flowsheet Documentation  Taken 6/7/2024 1800 by Edna Welsh RN  Safety Promotion/Fall Prevention: safety round/check completed  Taken 6/7/2024 1706 by Edna Welsh RN  Safety Promotion/Fall Prevention: safety round/check completed  Taken 6/7/2024 1600 by Edna Welsh RN  Safety Promotion/Fall Prevention: safety round/check completed  Taken 6/7/2024 1500 by Edna Welsh RN  Safety Promotion/Fall Prevention: safety round/check completed  Taken 6/7/2024 1400 by Edna Welsh RN  Safety Promotion/Fall Prevention: safety round/check completed  Taken 6/7/2024 1300 by Edna Welsh RN  Safety Promotion/Fall Prevention: safety round/check completed  Taken 6/7/2024 1200 by Edna Welsh RN  Safety Promotion/Fall Prevention: safety round/check completed  Taken 6/7/2024 1100 by Edna Welsh RN  Safety Promotion/Fall Prevention: safety round/check completed  Taken 6/7/2024 1000 by Edna Welsh  RN  Safety Promotion/Fall Prevention: safety round/check completed  Taken 6/7/2024 0900 by Edna Welsh RN  Safety Promotion/Fall Prevention: safety round/check completed  Taken 6/7/2024 0800 by Edna Welsh RN  Safety Promotion/Fall Prevention: safety round/check completed  Taken 6/7/2024 0700 by Edna Welsh RN  Safety Promotion/Fall Prevention: safety round/check completed  Intervention: Prevent Skin Injury  Recent Flowsheet Documentation  Taken 6/7/2024 1800 by Edna Welsh RN  Body Position:   turned   supine  Taken 6/7/2024 1600 by Edna Welsh RN  Body Position:   turned   right  Taken 6/7/2024 1400 by Edna Welsh RN  Body Position:   turned   supine  Taken 6/7/2024 1200 by Edna Welsh RN  Body Position:   turned   right  Taken 6/7/2024 1000 by Edna Welsh RN  Body Position:   turned   left  Taken 6/7/2024 0800 by Edna Welsh RN  Body Position:   turned   supine   position changed independently  Intervention: Prevent and Manage VTE (Venous Thromboembolism) Risk  Recent Flowsheet Documentation  Taken 6/7/2024 1800 by Edna Welsh RN  Activity Management: bedrest  Taken 6/7/2024 1600 by Edna Welsh RN  Activity Management: up to bedside commode  Taken 6/7/2024 1400 by Edna Welsh RN  Activity Management: bedrest  Taken 6/7/2024 1200 by Edna Welsh RN  Activity Management: bedrest  Taken 6/7/2024 1000 by Edna Welsh RN  Activity Management: bedrest  Taken 6/7/2024 0800 by Edna Welsh RN  Activity Management: up to bedside commode  VTE Prevention/Management: (see mar) other (see comments)  Goal: Optimal Comfort and Wellbeing  Outcome: Ongoing, Progressing  Intervention: Monitor Pain and Promote Comfort  Recent Flowsheet Documentation  Taken 6/7/2024 1706 by Edna Welsh RN  Pain Management Interventions: see MAR  Taken 6/7/2024 1117 by Edna Welsh RN  Pain Management Interventions: see MAR  Intervention: Provide Person-Centered  Care  Recent Flowsheet Documentation  Taken 6/7/2024 1600 by Edna Welsh, RN  Trust Relationship/Rapport: care explained  Taken 6/7/2024 1200 by Edna Welsh, RN  Trust Relationship/Rapport: care explained  Taken 6/7/2024 0800 by Edna Welsh, RN  Trust Relationship/Rapport:   care explained   questions answered  Goal: Readiness for Transition of Care  Outcome: Ongoing, Progressing

## 2024-06-08 LAB
ANION GAP SERPL CALCULATED.3IONS-SCNC: 8 MMOL/L (ref 5–15)
BASOPHILS # BLD AUTO: 0.03 10*3/MM3 (ref 0–0.2)
BASOPHILS NFR BLD AUTO: 0.4 % (ref 0–1.5)
BUN SERPL-MCNC: 4 MG/DL (ref 6–20)
BUN/CREAT SERPL: 9.5 (ref 7–25)
CALCIUM SPEC-SCNC: 8.6 MG/DL (ref 8.6–10.5)
CHLORIDE SERPL-SCNC: 99 MMOL/L (ref 98–107)
CO2 SERPL-SCNC: 27 MMOL/L (ref 22–29)
CREAT SERPL-MCNC: 0.42 MG/DL (ref 0.57–1)
DEPRECATED RDW RBC AUTO: 42.5 FL (ref 37–54)
EGFRCR SERPLBLD CKD-EPI 2021: 132.6 ML/MIN/1.73
EOSINOPHIL # BLD AUTO: 0.1 10*3/MM3 (ref 0–0.4)
EOSINOPHIL NFR BLD AUTO: 1.2 % (ref 0.3–6.2)
ERYTHROCYTE [DISTWIDTH] IN BLOOD BY AUTOMATED COUNT: 13.6 % (ref 12.3–15.4)
GLUCOSE SERPL-MCNC: 87 MG/DL (ref 65–99)
HCT VFR BLD AUTO: 30 % (ref 34–46.6)
HGB BLD-MCNC: 9.2 G/DL (ref 12–15.9)
IMM GRANULOCYTES # BLD AUTO: 0.17 10*3/MM3 (ref 0–0.05)
IMM GRANULOCYTES NFR BLD AUTO: 2.1 % (ref 0–0.5)
LYMPHOCYTES # BLD AUTO: 2.08 10*3/MM3 (ref 0.7–3.1)
LYMPHOCYTES NFR BLD AUTO: 25.2 % (ref 19.6–45.3)
MCH RBC QN AUTO: 26.5 PG (ref 26.6–33)
MCHC RBC AUTO-ENTMCNC: 30.7 G/DL (ref 31.5–35.7)
MCV RBC AUTO: 86.5 FL (ref 79–97)
MONOCYTES # BLD AUTO: 0.84 10*3/MM3 (ref 0.1–0.9)
MONOCYTES NFR BLD AUTO: 10.2 % (ref 5–12)
NEUTROPHILS NFR BLD AUTO: 5.05 10*3/MM3 (ref 1.7–7)
NEUTROPHILS NFR BLD AUTO: 60.9 % (ref 42.7–76)
NRBC BLD AUTO-RTO: 0 /100 WBC (ref 0–0.2)
PLATELET # BLD AUTO: 426 10*3/MM3 (ref 140–450)
PMV BLD AUTO: 9.5 FL (ref 6–12)
POTASSIUM SERPL-SCNC: 3.3 MMOL/L (ref 3.5–5.2)
POTASSIUM SERPL-SCNC: 4.5 MMOL/L (ref 3.5–5.2)
RBC # BLD AUTO: 3.47 10*6/MM3 (ref 3.77–5.28)
SODIUM SERPL-SCNC: 134 MMOL/L (ref 136–145)
WBC NRBC COR # BLD AUTO: 8.27 10*3/MM3 (ref 3.4–10.8)

## 2024-06-08 PROCEDURE — 25010000002 HEPARIN (PORCINE) PER 1000 UNITS: Performed by: NEUROLOGICAL SURGERY

## 2024-06-08 PROCEDURE — 80048 BASIC METABOLIC PNL TOTAL CA: CPT | Performed by: INTERNAL MEDICINE

## 2024-06-08 PROCEDURE — 84132 ASSAY OF SERUM POTASSIUM: CPT | Performed by: NURSE PRACTITIONER

## 2024-06-08 PROCEDURE — 85025 COMPLETE CBC W/AUTO DIFF WBC: CPT | Performed by: INTERNAL MEDICINE

## 2024-06-08 PROCEDURE — 99232 SBSQ HOSP IP/OBS MODERATE 35: CPT | Performed by: INTERNAL MEDICINE

## 2024-06-08 PROCEDURE — 25010000002 MEROPENEM PER 100 MG: Performed by: INTERNAL MEDICINE

## 2024-06-08 RX ORDER — POTASSIUM CHLORIDE 750 MG/1
40 CAPSULE, EXTENDED RELEASE ORAL EVERY 4 HOURS
Status: COMPLETED | OUTPATIENT
Start: 2024-06-08 | End: 2024-06-08

## 2024-06-08 RX ADMIN — BUTALBITAL, ACETAMINOPHEN, AND CAFFEINE 1 TABLET: 50; 325; 40 TABLET ORAL at 15:51

## 2024-06-08 RX ADMIN — Medication 1 APPLICATION: at 20:22

## 2024-06-08 RX ADMIN — POTASSIUM CHLORIDE 40 MEQ: 750 CAPSULE, EXTENDED RELEASE ORAL at 06:08

## 2024-06-08 RX ADMIN — OXYCODONE HYDROCHLORIDE 5 MG: 5 TABLET ORAL at 06:07

## 2024-06-08 RX ADMIN — ACETAMINOPHEN 650 MG: 325 TABLET, FILM COATED ORAL at 09:06

## 2024-06-08 RX ADMIN — Medication 10 ML: at 21:41

## 2024-06-08 RX ADMIN — OXYCODONE HYDROCHLORIDE 5 MG: 5 TABLET ORAL at 13:06

## 2024-06-08 RX ADMIN — MEROPENEM 2000 MG: 1 INJECTION, POWDER, FOR SOLUTION INTRAVENOUS at 05:19

## 2024-06-08 RX ADMIN — LEVETIRACETAM 500 MG: 500 TABLET, FILM COATED ORAL at 20:22

## 2024-06-08 RX ADMIN — MEROPENEM 2000 MG: 1 INJECTION, POWDER, FOR SOLUTION INTRAVENOUS at 13:43

## 2024-06-08 RX ADMIN — Medication 10 ML: at 09:06

## 2024-06-08 RX ADMIN — Medication 1 APPLICATION: at 09:06

## 2024-06-08 RX ADMIN — OXYCODONE HYDROCHLORIDE 5 MG: 5 TABLET ORAL at 18:53

## 2024-06-08 RX ADMIN — SENNOSIDES AND DOCUSATE SODIUM 2 TABLET: 50; 8.6 TABLET ORAL at 09:06

## 2024-06-08 RX ADMIN — POTASSIUM CHLORIDE 40 MEQ: 750 CAPSULE, EXTENDED RELEASE ORAL at 11:15

## 2024-06-08 RX ADMIN — LEVETIRACETAM 500 MG: 500 TABLET, FILM COATED ORAL at 09:06

## 2024-06-08 RX ADMIN — BUTALBITAL, ACETAMINOPHEN, AND CAFFEINE 1 TABLET: 50; 325; 40 TABLET ORAL at 22:10

## 2024-06-08 RX ADMIN — MEROPENEM 2000 MG: 1 INJECTION, POWDER, FOR SOLUTION INTRAVENOUS at 21:40

## 2024-06-08 RX ADMIN — HEPARIN SODIUM 5000 UNITS: 5000 INJECTION INTRAVENOUS; SUBCUTANEOUS at 09:06

## 2024-06-08 RX ADMIN — MUPIROCIN 1 APPLICATION: 20 OINTMENT TOPICAL at 21:42

## 2024-06-08 RX ADMIN — BUTALBITAL, ACETAMINOPHEN, AND CAFFEINE 1 TABLET: 50; 325; 40 TABLET ORAL at 02:24

## 2024-06-08 RX ADMIN — CHLORHEXIDINE GLUCONATE 1 APPLICATION: 500 CLOTH TOPICAL at 04:18

## 2024-06-08 RX ADMIN — HEPARIN SODIUM 5000 UNITS: 5000 INJECTION INTRAVENOUS; SUBCUTANEOUS at 20:22

## 2024-06-08 RX ADMIN — SENNOSIDES AND DOCUSATE SODIUM 2 TABLET: 50; 8.6 TABLET ORAL at 20:22

## 2024-06-08 RX ADMIN — MUPIROCIN 1 APPLICATION: 20 OINTMENT TOPICAL at 08:21

## 2024-06-08 NOTE — PLAN OF CARE
Goal Outcome Evaluation:  Plan of Care Reviewed With: patient        Progress: improving  Outcome Evaluation: A/O x4. Strong all extremities. Lumbar drain 20ml q4h. T max 99.4. Up to BSC.  Appetite improving. Intermittent head/neck incision pain with relief with prn meds.  at bedside and participates in care. Satfety maintained.      Problem: Adult Inpatient Plan of Care  Goal: Plan of Care Review  Outcome: Ongoing, Progressing  Flowsheets (Taken 6/8/2024 1848)  Progress: improving  Outcome Evaluation: A/O x4. Strong all extremities. Lumbar drain 20ml q4h. T max 99.4. Up to BSC.  Appetite improving. Intermittent head/neck incision pain with relief with prn meds.  at bedside and participates in care. Satfety maintained.  Goal: Patient-Specific Goal (Individualized)  Outcome: Ongoing, Progressing  Goal: Absence of Hospital-Acquired Illness or Injury  Outcome: Ongoing, Progressing  Intervention: Identify and Manage Fall Risk  Recent Flowsheet Documentation  Taken 6/8/2024 1800 by Edna Welsh RN  Safety Promotion/Fall Prevention: safety round/check completed  Taken 6/8/2024 1700 by Edna Welsh RN  Safety Promotion/Fall Prevention: safety round/check completed  Taken 6/8/2024 1600 by Edna Welsh RN  Safety Promotion/Fall Prevention: safety round/check completed  Taken 6/8/2024 1500 by Edna Welsh RN  Safety Promotion/Fall Prevention: safety round/check completed  Taken 6/8/2024 1400 by Edna Welsh RN  Safety Promotion/Fall Prevention: safety round/check completed  Taken 6/8/2024 1306 by Edna Welsh RN  Safety Promotion/Fall Prevention: safety round/check completed  Taken 6/8/2024 1200 by Edna Welsh RN  Safety Promotion/Fall Prevention: safety round/check completed  Taken 6/8/2024 1100 by Edna Welsh RN  Safety Promotion/Fall Prevention: safety round/check completed  Taken 6/8/2024 1000 by Edna Welsh RN  Safety Promotion/Fall Prevention: safety round/check  completed  Taken 6/8/2024 0800 by Edna Welsh RN  Safety Promotion/Fall Prevention: safety round/check completed  Taken 6/8/2024 0700 by Edna Welsh RN  Safety Promotion/Fall Prevention: safety round/check completed  Intervention: Prevent Skin Injury  Recent Flowsheet Documentation  Taken 6/8/2024 1800 by Edna Welsh RN  Body Position:   turned   right  Taken 6/8/2024 1600 by Edna Welsh RN  Body Position:   turned   left  Taken 6/8/2024 1400 by Edna Welsh RN  Body Position:   turned   right  Taken 6/8/2024 1200 by Edna Welsh RN  Body Position:   turned   supine  Taken 6/8/2024 1000 by Edna Welsh RN  Body Position:   turned   left  Taken 6/8/2024 0800 by Edna Welsh RN  Body Position:   position changed independently   turned   right  Intervention: Prevent and Manage VTE (Venous Thromboembolism) Risk  Recent Flowsheet Documentation  Taken 6/8/2024 1800 by Edna Welsh RN  Activity Management: bedrest  Taken 6/8/2024 1600 by Edna Welsh RN  Activity Management: bedrest  Taken 6/8/2024 1400 by Edna Welsh RN  Activity Management: up to bedside commode  Taken 6/8/2024 1200 by Edna Welsh RN  Activity Management: bedrest  Taken 6/8/2024 1000 by Edna Welsh RN  Activity Management: up to bedside commode  Taken 6/8/2024 0800 by Edna Welsh RN  Activity Management: bedrest  VTE Prevention/Management: (see mar) other (see comments)  Goal: Optimal Comfort and Wellbeing  Outcome: Ongoing, Progressing  Intervention: Monitor Pain and Promote Comfort  Recent Flowsheet Documentation  Taken 6/8/2024 1600 by Edna Welsh RN  Pain Management Interventions: see MAR  Intervention: Provide Person-Centered Care  Recent Flowsheet Documentation  Taken 6/8/2024 1600 by Edna Welsh RN  Trust Relationship/Rapport: care explained  Taken 6/8/2024 1200 by Edna Welsh RN  Trust Relationship/Rapport: care explained  Taken 6/8/2024 0800 by Edna Welsh  RN  Trust Relationship/Rapport: care explained  Goal: Readiness for Transition of Care  Outcome: Ongoing, Progressing

## 2024-06-08 NOTE — PROGRESS NOTES
"Zina Armando  33 y.o.      Chief complaint:   Headache    Subjective  Patient is doing very well today and really has no headache when she is laying flat although she does get some head pressure when sitting up.  The drain is still working and otherwise she really has no neurologic complaints.    Temp:  [98.8 °F (37.1 °C)-100 °F (37.8 °C)] 99.4 °F (37.4 °C)  Heart Rate:  [] 99  Resp:  [16-23] 23  BP: ()/(56-96) 98/74      Objective:  Vital signs: (most recent): Blood pressure 98/74, pulse 99, temperature 99.4 °F (37.4 °C), temperature source Oral, resp. rate 23, height 149.9 cm (59.02\"), weight 97.8 kg (215 lb 9.8 oz), last menstrual period 05/18/2024, SpO2 96%, not currently breastfeeding.        Neurologic Exam     Mental Status   Oriented to person, place, and time.   Attention: normal. Concentration: normal.   Speech: speech is normal   Level of consciousness: alert  Knowledge: good.   Normal comprehension.     Cranial Nerves   Cranial nerves II through XII intact.     Motor Exam     Strength   Strength 5/5 throughout.     Sensory Exam   Light touch normal.     Gait, Coordination, and Reflexes     Gait  Gait: normal      Lab Results (last 24 hours)       Procedure Component Value Units Date/Time    Culture, CSF - Cerebrospinal Fluid, CSF Reservoir [[id number]] Collected: 06/05/24 2120    Specimen: Cerebrospinal Fluid from CSF Eleva Updated: 06/08/24 1021     CSF Culture No growth at 2 days     Gram Stain Few (2+) WBCs seen      No organisms seen    Wound Culture - Wound, Head [[id number]]  (Abnormal)  (Susceptibility) Collected: 06/05/24 2150    Specimen: Wound from Head Updated: 06/08/24 1002     Wound Culture Scant growth (1+) Serratia marcescens     Gram Stain Few (2+) WBCs seen      No organisms seen    Narrative:      Pip/cash results to follow.    Susceptibility        Serratia marcescens      ADÁN (Preliminary)      Amoxicillin + Clavulanate Resistant      Cefepime Susceptible      " Ceftazidime Susceptible      Ceftriaxone Susceptible      Gentamicin Susceptible      Levofloxacin Susceptible      Tetracycline Resistant      Trimethoprim + Sulfamethoxazole Susceptible                       Susceptibility Comments       Serratia marcescens    Cefazolin sensitivity will not be reported for Enterobacteriaceae in non-urine isolates. If cefazolin is preferred, please call the microbiology lab to request an E-test.  With the exception of urinary-sourced infections, aminoglycosides should not be used as monotherapy.               Wound Culture - Wound, Head [[id number]]  (Abnormal) Collected: 06/05/24 2206    Specimen: Wound from Head Updated: 06/08/24 1001     Wound Culture Light growth (2+) Serratia marcescens     Gram Stain Few (2+) WBCs seen      No organisms seen    Narrative:      Refer to previous wound culture collected on 06/05/2024 1410 for MICS.      Blood Culture With LIZZ - Blood, Arm, Left [[id number]]  (Normal) Collected: 06/06/24 0823    Specimen: Blood from Arm, Left Updated: 06/08/24 0845     Blood Culture No growth at 2 days    Basic Metabolic Panel [[id number]]  (Abnormal) Collected: 06/08/24 0327    Specimen: Blood Updated: 06/08/24 0516     Glucose 87 mg/dL      BUN 4 mg/dL      Creatinine 0.42 mg/dL      Sodium 134 mmol/L      Potassium 3.3 mmol/L      Chloride 99 mmol/L      CO2 27.0 mmol/L      Calcium 8.6 mg/dL      BUN/Creatinine Ratio 9.5     Anion Gap 8.0 mmol/L      eGFR 132.6 mL/min/1.73     Narrative:      GFR Normal >60  Chronic Kidney Disease <60  Kidney Failure <15      CBC & Differential [[id number]]  (Abnormal) Collected: 06/08/24 0327    Specimen: Blood Updated: 06/08/24 0454    Narrative:      The following orders were created for panel order CBC & Differential.  Procedure                               Abnormality         Status                     ---------                               -----------         ------                     CBC Auto Differential[[id number]]         Abnormal            Final result                 Please view results for these tests on the individual orders.    CBC Auto Differential [[id number]]  (Abnormal) Collected: 06/08/24 0327    Specimen: Blood Updated: 06/08/24 0454     WBC 8.27 10*3/mm3      RBC 3.47 10*6/mm3      Hemoglobin 9.2 g/dL      Hematocrit 30.0 %      MCV 86.5 fL      MCH 26.5 pg      MCHC 30.7 g/dL      RDW 13.6 %      RDW-SD 42.5 fl      MPV 9.5 fL      Platelets 426 10*3/mm3      Neutrophil % 60.9 %      Lymphocyte % 25.2 %      Monocyte % 10.2 %      Eosinophil % 1.2 %      Basophil % 0.4 %      Immature Grans % 2.1 %      Neutrophils, Absolute 5.05 10*3/mm3      Lymphocytes, Absolute 2.08 10*3/mm3      Monocytes, Absolute 0.84 10*3/mm3      Eosinophils, Absolute 0.10 10*3/mm3      Basophils, Absolute 0.03 10*3/mm3      Immature Grans, Absolute 0.17 10*3/mm3      nRBC 0.0 /100 WBC     Tissue Pathology Exam [[id number]] Collected: 06/05/24 2226    Specimen: Tissue from Head Updated: 06/07/24 1521     Note to Patients --     This report may contain a detailed description of human tissue sent by a health care provider to the laboratory for pathologic evaluation. The content of this report is essential for diagnosis and may provide important critical findings. This information may be unfamiliar to patients to review without a medical professional present. It is advised that the patient review this report in the presence of a health care provider who can answer questions and explain the results.       Case Report --     Surgical Pathology Report                         Case: [id number]                                  Authorizing Provider:  Rohith Alexander MD Collected:           06/05/2024 10:26 PM          Ordering Location:     [facility] OR  Received:            06/06/2024 09:38 AM          Pathologist:           Nita Morin MD                                                         Specimen:    Head,  "suboccipital tissue                                                                   Final Diagnosis --     Suboccipital skin, excision:  Consistent with ruptured follicular infundibular cyst with exuberant inflammation extending into underlying fat.       Gross Description --     1. Head.   Received in a formalin filled container labeled with the patient's name, date of birth, and \"suboccipital tissue\".  The specimen consists of 3 skin covered tissue fragments ranging from 0.8 x 0.5 cm 4.8 x 0.7 cm and averaging 0.8 cm in depth.  The skin surface is marked by a possible raised scar measuring 3.5 cm in length by 0.3 cm in diameter.  The skin surface on the smaller fragments is red-brown, erythematous and dusky.  The cut surfaces show yellow-red fat necrosis with blood clot.  Representative sections of each fragment are submitted in block 1A.         Microscopic Description --     Sections show a biopsy of skin with subcutaneous tissue.  The epidermis is benign.  There is edema and patchy inflammation in the dermis, and a dilated hair follicle extends downward into the tissue with associated acute inflammation.  In the underlying fat, there is neutrophilic inflammation and fat necrosis along with scattered multinucleated histiocytes.  Evidence of malignancy is not identified.      Blood Culture - Blood, Hand, Left [[id number]]  (Normal) Collected: 06/05/24 1248    Specimen: Blood from Hand, Left Updated: 06/07/24 1315     Blood Culture No growth at 2 days    Blood Culture - Blood, Hand, Right [[id number]]  (Normal) Collected: 06/05/24 1248    Specimen: Blood from Hand, Right Updated: 06/07/24 1315     Blood Culture No growth at 2 days    Blood Culture With LIZZ - Blood, Hand, Right [[id number]]  (Normal) Collected: 06/06/24 1250    Specimen: Blood from Hand, Right Updated: 06/07/24 1315     Blood Culture No growth at 24 hours                Plan:   Continue drain as ordered for now and we will keep following her blood " cultures tissue cultures.  No signs of infection from CSF or blood at this time.  This seems to represent just a superficial infection.    Superficial postoperative wound infection        Terrence Lai, DO

## 2024-06-08 NOTE — PROGRESS NOTES
INFECTIOUS DISEASES PROGRESS NOTE    Patient:  Zina Armando  YOB: 1990  MRN: [id number]   Admit date: 2024   Admitting Physician: Rohith Alexander, *  Primary Care Physician: Provider, No Known    Chief Complaint: headache better      Interval History: Patient says her headache is better.  She is not aware of having any fever last night.  She generally complains of being cold when she has a fever.    She inquired about the infection and asked if we knew what was causing it yet.    Via    # [id number]    Allergies: No Known Allergies    Current Scheduled Medications:   Chlorhexidine Gluconate Cloth, 1 Application, Topical, Q24H  heparin (porcine), 5,000 Units, Subcutaneous, Q12H  levETIRAcetam, 500 mg, Oral, Q12H  meropenem, 2,000 mg, Intravenous, Q8H  mupirocin, 1 Application, Each Nare, BID  mupirocin, 1 Application, Topical, Q12H  potassium chloride ER, 40 mEq, Oral, Q4H  senna-docusate sodium, 2 tablet, Oral, BID  sodium chloride, 10 mL, Intravenous, Q12H      Current PRN Medications:    acetaminophen **OR** acetaminophen    atropine    senna-docusate sodium **AND** polyethylene glycol **AND** bisacodyl **AND** bisacodyl    butalbital-acetaminophen-caffeine    Calcium Replacement - Follow Nurse / BPA Driven Protocol    cyclobenzaprine    droperidol **OR** droperidol    fentanyl    flumazenil    labetalol    Magnesium Standard Dose Replacement - Follow Nurse / BPA Driven Protocol    naloxone    ondansetron ODT **OR** ondansetron    oxyCODONE    Phosphorus Replacement - Follow Nurse / BPA Driven Protocol    Potassium Replacement - Follow Nurse / BPA Driven Protocol    sodium chloride    sodium chloride    niCARdipine, 5-15 mg/hr  sodium chloride, 200 mL/hr, Last Rate: Stopped (24 0745)           Objective     Vital Signs:  Temp (24hrs), Av.9 °F (37.7 °C), Min:98.8 °F (37.1 °C), Max:102.4 °F (39.1 °C)      /74   Pulse 109   Temp 99.6 °F  "(37.6 °C) (Oral)   Resp 17   Ht 149.9 cm (59.02\")   Wt 97.8 kg (215 lb 9.8 oz)   LMP 05/18/2024 (Approximate)   SpO2 98%   BMI 43.52 kg/m²         Physical Exam:    General: The patient is lying in bed on her right side.  Her spouse was at bedside assisting with feeding her breakfast  HEENT: Sclera anicteric and noninjected  Surgical incision intact.  There are a few drops of serosanguineous drainage on her pillow  Lumbar drain in place  Neuro: Alert, oriented, communicative without difficulty via .  Psych: Pleasant cooperative    Results Review:    I reviewed the patient's new clinical results.    Lab Results:    CBC:   Lab Results   Lab 06/05/24 1248 06/06/24 0233 06/08/24 0327   WBC 5.83 6.33 8.27   HEMOGLOBIN 11.2* 9.6* 9.2*   HEMATOCRIT 34.8 31.7* 30.0*   PLATELETS 530* 465* 426        AutoDiff:   Lab Results   Lab 06/05/24 1248 06/06/24 0233 06/08/24 0327   NEUTROPHIL % 48.9 64.0 60.9   LYMPHOCYTE % 36.7 23.2 25.2   MONOCYTES % 11.7 10.6 10.2   EOSINOPHIL % 2.1 1.4 1.2   BASOPHIL % 0.3 0.2 0.4   NEUTROS ABS 2.85 4.05 5.05   LYMPHS ABS 2.14 1.47 2.08   MONOS ABS 0.68 0.67 0.84   EOS ABS 0.12 0.09 0.10   BASOS ABS 0.02 0.01 0.03        Manual Diff:    Lab Results   Lab 06/05/24 1248 06/06/24  0233 06/08/24  0327   NEUTROS ABS 2.85 4.05 5.05           CMP:   Lab Results   Lab 06/05/24 1248 06/06/24 0233 06/08/24  0327   SODIUM 138 136 134*   POTASSIUM 3.9 3.9 3.3*   CHLORIDE 101 101 99   CO2 29.0 29.0 27.0   BUN 6 8 4*   CREATININE 0.36* 0.60 0.42*   CALCIUM 9.7 8.8 8.6   BILIRUBIN <0.2  --   --    ALK PHOS 142*  --   --    ALT (SGPT) 97*  --   --    AST (SGOT) 21  --   --    GLUCOSE 91 101* 87       Estimated Creatinine Clearance: 205.7 mL/min (A) (by C-G formula based on SCr of 0.42 mg/dL (L)).    Culture Results:    Microbiology Results (last 10 days)       Procedure Component Value - Date/Time    Blood Culture With LIZZ - Blood, Hand, Right [[id number]]  (Normal) Collected: " 06/06/24 1250    Lab Status: Preliminary result Specimen: Blood from Hand, Right Updated: 06/07/24 1315     Blood Culture No growth at 24 hours    Blood Culture With LIZZ - Blood, Arm, Left [[id number]]  (Normal) Collected: 06/06/24 0823    Lab Status: Preliminary result Specimen: Blood from Arm, Left Updated: 06/07/24 0845     Blood Culture No growth at 24 hours    MRSA Screen, PCR (Inpatient) - Swab, Nares [[id number]]  (Normal) Collected: 06/06/24 0805    Lab Status: Final result Specimen: Swab from Nares Updated: 06/06/24 0934     MRSA PCR No MRSA Detected    Narrative:      The negative predictive value of this diagnostic test is high and should only be used to consider de-escalating anti-MRSA therapy. A positive result may indicate colonization with MRSA and must be correlated clinically.    Wound Culture - Wound, Head [[id number]]  (Abnormal) Collected: 06/05/24 2206    Lab Status: Preliminary result Specimen: Wound from Head Updated: 06/07/24 1305     Wound Culture Light growth (2+) Gram Negative Bacilli     Gram Stain Few (2+) WBCs seen      No organisms seen    Wound Culture - Wound, Head [[id number]]  (Abnormal) Collected: 06/05/24 2150    Lab Status: Preliminary result Specimen: Wound from Head Updated: 06/07/24 1306     Wound Culture Scant growth (1+) Gram Negative Bacilli     Gram Stain Few (2+) WBCs seen      No organisms seen    Culture, CSF - Cerebrospinal Fluid, CSF Reservoir [[id number]] Collected: 06/05/24 2120    Lab Status: Preliminary result Specimen: Cerebrospinal Fluid from CSF [place] Updated: 06/07/24 1311     CSF Culture No growth     Gram Stain Few (2+) WBCs seen      No organisms seen    Blood Culture - Blood, Hand, Left [[id number]]  (Normal) Collected: 06/05/24 1248    Lab Status: Preliminary result Specimen: Blood from Hand, Left Updated: 06/07/24 1315     Blood Culture No growth at 2 days    Blood Culture - Blood, Hand, Right [[id number]]  (Normal) Collected: 06/05/24 1248    Lab Status:  Preliminary result Specimen: Blood from Hand, Right Updated: 06/07/24 1315     Blood Culture No growth at 2 days                 Radiology:   Imaging Results (Last 72 Hours)       Procedure Component Value Units Date/Time    XR Spine Lumbar 2 or 3 View [[id number]] Collected: 06/05/24 2147     Updated: 06/05/24 2151    Narrative:      EXAMINATION: XR SPINE LUMBAR 2 OR 3 VW-     6/5/2024 7:50 PM     HISTORY: lumbar drain insertion; T81.49XA-Infection following a  procedure, other surgical site, initial encounter     Number of fluoroscopic spot images obtained = 3.     Fluoroscopy dose in Air Kerma mGy = 6.0327.     Fluoroscopy total dose area product (Gycm2) = 1.2406.     Fluoroscopy total exposure time = 8.3 seconds.     Spot images were obtained at the time of lumbar drain suboccipital wound  revision.              This report was signed and finalized on 6/5/2024 9:48 PM by Dr. David Negron MD.       FL C Arm During Surgery [[id number]] Resulted: 06/05/24 2138     Updated: 06/05/24 2138    Narrative:      This procedure was auto-finalized with no dictation required.    MRI Brain With & Without Contrast [[id number]] Collected: 06/05/24 1404     Updated: 06/05/24 1426    Narrative:      MRI BRAIN W WO CONTRAST- 6/5/2024 11:58 AM     HISTORY: Status postcraniotomy for tumor.  Concern for CSF leak versus  infection.     TECHNIQUE: Multisequence, multiplanar MRI of the brain with and without  contrast. 20 cc MultiHance IV contrast.     COMPARISON: Brain MRIs dated 5/18/2024 and 5/13/2024     FINDINGS:     Recently resected fourth ventricle epidermoid cyst. On today's exam  there is a 1.9 cm focus of enhancement in the paramedian right  cerebellar hemisphere (series 801-image 38) which appears to be a focus  of subacute ischemia within the cerebellar hemisphere. This does not  appear to be cerebritis. No evidence of residual epidermoid cyst. No  intra-axial or extra-axial diffusion signal abnormality. No  acute  intra-axial or extra-axial hemorrhage. No evidence of subdural empyema.  There is a 1.6 cm subcutaneous fluid collection along the surgical tract  (series 701-image 3). The ADC signal within this collection is  facilitated as opposed to restricted and this does not image like  abscess by MRI. No supratentorial pathologic enhancement. Ventricles are  nondilated. Pituitary gland is unremarkable. Central arterial flow voids  are well preserved. Orbital contents are unremarkable.       Impression:         1.  Recently resected fourth ventricle epidermoid cyst. There is a 1.9  cm rounded focus of enhancement in the paramedian right cerebellar  hemisphere which I believe is a focus of subacute ischemia. This same  area demonstrated diffusion restriction on the immediate postoperative  MRI. After accounting for this focus of cerebellar enhancement, I do not  see any strong evidence for CNS infection.  2.  There is a 1.6 cm fluid collection along the surgical track  projecting in the subcutaneous fat (axial T2 image #3). This  demonstrates facilitated diffusion with high ADC signal and does not  image like an organized soft tissue abscess collection based on  diffusion signal.     This report was signed and finalized on 6/5/2024 2:23 PM by Dr Ho Burleson.                   Active Hospital Problems    Diagnosis     **Superficial postoperative wound infection        IMPRESSION:  Postoperative wound infection and patient that is status post resection of fourth ventricle epidermoid cyst on May 17.  Patient is status post suboccipital wound revision and lumbar drain placement June 5.  Gram-negative bacilli on cultures  Fever-.  Antibiotics altered yesterday.No temperature overnight however patient tends to spike her temperatures around noon   Mild hypokalemia-potassium supplemented  Increasing creatinine-although in the normal range was 0.36 then 0.6.  Back down today.  Elevated sed rate  Elevated  CRP      RECOMMENDATION:   Continue meropenem  Follow-up on surgical cultures with gram-negative bacilli.  There has not been an update yet today.  They have been updating them around 1:00 daily.  Follow-up on blood cultures obtained with temperature spike June 6.        Violet Pandya MD  06/08/24  08:01 CDT

## 2024-06-08 NOTE — OP NOTE
NEUROSURGERY OPERATIVE NOTE    Zina Armando  OR Date: 6/6/2024    Pre-op Diagnosis:   Superficial postoperative wound infection [T81.49XA]    Post-op Diagnosis:     Post-Op Diagnosis Codes:     * Superficial postoperative wound infection [T81.49XA]          Surgeon(s):  Rohith Alexander MD    Anesthesia: General    Staff:   Circulator: Luis Hernandez RN  Scrub Person: Kala Marie; Peace Welsh    Procedure(s):  Lumbar Drain and Suboccipital Wound revision, possible removal of bone, possible revision of dural graft. HOLD ANTIBIOTICS UNTIL CSF OBTAINED  LUMBAR DRAIN INSERTION EXTERNAL    INDICATIONS FOR PROCEDURE:   The patient is a 33 y.o. 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 Imaging showed subcutaneous contrast-enhancing fluid collection consistent with deep tissue abscess but no evidence of intracranial infection.  White count was normal but CRP was elevated.  Neurological findings of nausea vomiting and headache but neurologically intact.    I discussed the risks of lumbar drain placement and incision and drainage of postoperative wound.  Including but not limited to 14% chance of respiratory depression, 1.5% chance of severe centerogenic apnea resulting in death, CSF leak, herniation of the cerebellar hemispheres, and vascular injury to the PICA, 20% chance of recurrence of symptoms.  infection, bleeding, damage to local structures, hydrocephalus, weakness, numbness, paralysis, stroke, coma, MI, or death.      DESCRIPTION OF OPERATION AND FINDINGS:   On the day of surgery, the patient was brought to the preoperative holding area where IV access was obtained. Prophylactic intravenous antibiotics and age appropriate Decadron were administered. The patient was then brought to the major operative suite. While on the [facility], she underwent an uneventful induction of general anesthetic with  placement of endotracheal tube. TEDs, SCD hoses, and a Robins catheter were applied.      We began by inserting a lumbar drain.  The back was prepped with ChloraPrep.  The patient was placed in the right lateral decubitus position.  Fluoroscopy was brought into the field AP and lateral were used to identify the L4/5 interspace.  The Touhy needle was passed into the L4/5 and interspace.  There was good flow CSF this appeared clear.  Sample was collected and sent for routine CSF analysis and culture.  Next the inner cannula Touhy needle was removed and the catheter was threaded until approximately 15 cm of tubing was inside the body.  The Touhy needle was then removed and 1 walker motion.  There was egress of CSF out of the catheter.  Catheter was connected to a LimiTorr collection system and clamped.  Biopatch was placed around the exit site of the lumbar drain.  The lumbar drain was then stapled into place with the retention loop.  And a large Tegaderm was placed over the incision.    Attention was then turned to the postoperative wound debridement.  The patient was placed in the prone position on a standard operating table with gel chest rolls.  The patient was placed in a Arauz 3-point fixation head reno to at least 60 lbs.  All pressure points were inspected and appropriately padded, and she was secured to the table in flexion with at least 1 finger breadth between chin and chest.      The hair was then clipped from the occipital protuberance to the cervical region.  The previous incision was identified prepped with ChloraPrep and DuraPrep and allowed to dry for 5 minutes.  The patient was then draped in the usual fashion.  At this point a WHO surgical timeout was performed with all members of the surgical team.      The skin was infiltrated with quarter percent Marcaine with epinephrine based on the patient's weight.  Using a #10 scalpel the previous incision was excised.  We cut this incision back until we  found fresh healthy tissue.  This was carried into the subcutaneous fat.  There was a rush of purulent fluid superficially.  This was then sent for wound culture.  Hemostasis was achieved and we placed several retractors in the deep.  There was a small defect going through the fascial closure but on the whole the fascia appeared to be more or less intact and unaffected by infection.  Fascial sutures were then open as were the muscle sutures.  Down low there was a small area of mildly inflammatory purulent drainage.  This was then washed.  Fortunately the dural closure appeared to be intact and there is no egress of CSF.  Given the lack of vascular supply to the cranial flap we elected to remove this.  Standard plating system was brought into the field and the previous plates and bone flap were removed.  A titanium mesh was then cut to size to approximate the occiput region.  This was was then screwed into place after 3 L of irrigation were washed through the wound.  Hemostasis was achieved.  Vancomycin powder was left in the wound.  No drain was left.    The cervical muscles were closed in multiple layers and the fascia was closed with interrupted 2-0 Vicryl sutures in a watertight manner.  4-0 Monocryl was used for skin closure.  6 horizontal mattress sutures with nylon 3 oh were placed for retention sutures.  Incision was dressed with bacitracin ointment.    The patient was then removed from the Arauz head reno.  All pin sites were inspected for bleeding.  The patient was awoken and found to be at her neurological baseline.    There were no complications of the surgery per se. The needle count, sponge count, and the cottonoid count were correct.      Estimated Blood Loss: 200 cc    Complications: None    Implants:   Implant Name Type Inv. Item Serial No.  Lot No. LRB No. Used Action   HEMOST ABS SURGIFOAM  8X12 10MM - [id number] Implant HEMOST ABS SURGIFOAM  8X12 10MM  ETH[employer]  EFREN  AND J [id number] N/A 1 Implanted   KT HEMOST ABS SURGIFOAM PORCN 1GRAM - [id number] Implant KT HEMOST ABS SURGIFOAM PORCN 1GRAM  ETHICON  DIV OF J AND J [id number] N/A 1 Implanted   HEMOST ABS SURGICEL ORIG 4X8IN STRL - [id number] Implant HEMOST ABS SURGICEL ORIG 4X8IN STRL  ETHICON  DIV OF J AND J [id number] N/A 1 Implanted   KT SEAL HEMOS ABS FLOSEAL MATRX FAST/PREP 10ML - [id number] Implant KT SEAL HEMOS ABS FLOSEAL MATRX FAST/PREP 10ML  luma-id [id number] N/A 2 Implanted   SCRW PLT NEURO LEFORTE L/P SLF/DRL 1.4X3.7MM SLVR - [id number] Implant SCRW PLT NEURO LEFORTE L/P SLF/DRL 1.4X3.7MM SLVR  JTS SURGICAL  N/A 6 Implanted   MESH NEURO LEFORTE DYN 3D L/P [id number] SLVR - [id number] Implant MESH NEURO LEFORTE DYN 3D L/P [id number] SLVR  JTS SURGICAL  N/A 1 Implanted       Specimens:                Specimens       ID Source Type Tests Collected By Collected At Frozen?    1 CSF Reservoir Cerebrospinal Fluid GLUCOSE, CSF  PROTEIN, CSF  VDRL, CSF  CELL COUNT WITH DIFFERENTIAL, CSF  GRAM STAIN - NO CULTURE (Canceled)  CULTURE, CSF   Rohith Alexander MD 6/5/24 2120     Description: csf fluid from lumbar puncture.    2 Head Wound WOUND CULTURE   Rohith Alexander MD 6/5/24 2150     Description: suboccipital wound superficial    3 Head Wound WOUND CULTURE   Rohith Alexander MD 6/5/24 2206     Description: suboccipital wound deep    A Head Tissue TISSUE PATHOLOGY EXAM   Rohith Alexander MD 6/5/24 2226 No    Description: suboccipital tissue              Drains:   Lumbar Drain (Active)   Drain Status Clamped 06/08/24 0800   CSF Color Clear 06/08/24 0800   Site Description Unable to view 06/08/24 0800   Dressing Status Clean;Dry;Intact 06/08/24 0800   CSF Output (mL) 20 mL 06/08/24 0800       [REMOVED] Urethral Catheter Non-latex;Silicone 16 Fr. (Removed)   Daily Indications Required activity restriction from trauma, surgery, (e.g. unstable spine, fracture, hemodynamics) 05/19/24 0400   Site  Assessment Clean;Skin intact 05/19/24 0400   Collection Container Standard drainage bag 05/19/24 0400   Securement Method Securing device 05/19/24 0400   Catheter care complete Yes 05/19/24 0400   Output (mL) 40 mL 05/19/24 0500       [REMOVED] Urethral Catheter Non-latex;Silicone 16 Fr. (Removed)   Daily Indications Required activity restriction from trauma, surgery, (e.g. unstable spine, fracture, hemodynamics) 06/06/24 0500   Site Assessment Clean;Skin intact 06/06/24 0400   Collection Container Standard drainage bag 06/06/24 0500   Securement Method Securing device 06/06/24 0500   Output (mL) 150 mL 06/06/24 0400       [REMOVED] External Urinary Catheter (Removed)   Daily Indications Daily output 05/19/24 0500   Site Assessment Clean;Skin intact 05/19/24 0500   Application/Removal external catheter applied;skin care provided 05/19/24 0500   Collection Container Wall suction 05/19/24 0500   Securement Method Securing device 05/19/24 0500       [REMOVED] External Urinary Catheter (Removed)       [REMOVED] Lumbar Drain (Removed)   Drain Status Clamped 05/20/24 0800   CSF Color Clear 05/20/24 0800   Site Description Unable to view 05/20/24 0800   Dressing Status Clean;Dry;Intact 05/20/24 0945   CSF Output (mL) 10 mL 05/20/24 0800

## 2024-06-08 NOTE — PLAN OF CARE
Goal Outcome Evaluation:  Plan of Care Reviewed With: patient, spouse        Progress: no change            VSS. Alert & Ox4. . Intermittent headache PRN meds effective for pain relief. Lumbar drained per ordered clear. Highest temp 100. ATB given as ordered.. Up to BSC with assist x1. Safety maintain

## 2024-06-09 LAB
BACTERIA SPEC AEROBE CULT: ABNORMAL
BACTERIA SPEC AEROBE CULT: ABNORMAL
BACTERIA SPEC AEROBE CULT: NORMAL
GRAM STN SPEC: ABNORMAL
GRAM STN SPEC: NORMAL
GRAM STN SPEC: NORMAL

## 2024-06-09 PROCEDURE — 25010000002 HEPARIN (PORCINE) PER 1000 UNITS: Performed by: NEUROLOGICAL SURGERY

## 2024-06-09 PROCEDURE — 25010000002 MEROPENEM PER 100 MG: Performed by: INTERNAL MEDICINE

## 2024-06-09 PROCEDURE — 99232 SBSQ HOSP IP/OBS MODERATE 35: CPT | Performed by: INTERNAL MEDICINE

## 2024-06-09 RX ADMIN — BUTALBITAL, ACETAMINOPHEN, AND CAFFEINE 1 TABLET: 50; 325; 40 TABLET ORAL at 05:32

## 2024-06-09 RX ADMIN — OXYCODONE HYDROCHLORIDE 5 MG: 5 TABLET ORAL at 09:06

## 2024-06-09 RX ADMIN — MEROPENEM 2000 MG: 1 INJECTION, POWDER, FOR SOLUTION INTRAVENOUS at 05:32

## 2024-06-09 RX ADMIN — Medication 10 ML: at 20:53

## 2024-06-09 RX ADMIN — MEROPENEM 2000 MG: 1 INJECTION, POWDER, FOR SOLUTION INTRAVENOUS at 21:52

## 2024-06-09 RX ADMIN — OXYCODONE HYDROCHLORIDE 5 MG: 5 TABLET ORAL at 20:47

## 2024-06-09 RX ADMIN — MUPIROCIN 1 APPLICATION: 20 OINTMENT TOPICAL at 20:52

## 2024-06-09 RX ADMIN — ACETAMINOPHEN 650 MG: 325 TABLET, FILM COATED ORAL at 17:00

## 2024-06-09 RX ADMIN — MUPIROCIN 1 APPLICATION: 20 OINTMENT TOPICAL at 09:06

## 2024-06-09 RX ADMIN — LEVETIRACETAM 500 MG: 500 TABLET, FILM COATED ORAL at 09:06

## 2024-06-09 RX ADMIN — LEVETIRACETAM 500 MG: 500 TABLET, FILM COATED ORAL at 20:47

## 2024-06-09 RX ADMIN — HEPARIN SODIUM 5000 UNITS: 5000 INJECTION INTRAVENOUS; SUBCUTANEOUS at 20:47

## 2024-06-09 RX ADMIN — CHLORHEXIDINE GLUCONATE 1 APPLICATION: 500 CLOTH TOPICAL at 03:40

## 2024-06-09 RX ADMIN — OXYCODONE HYDROCHLORIDE 5 MG: 5 TABLET ORAL at 02:13

## 2024-06-09 RX ADMIN — Medication 1 APPLICATION: at 09:06

## 2024-06-09 RX ADMIN — Medication 10 ML: at 09:06

## 2024-06-09 RX ADMIN — Medication 1 APPLICATION: at 20:53

## 2024-06-09 RX ADMIN — OXYCODONE HYDROCHLORIDE 5 MG: 5 TABLET ORAL at 14:11

## 2024-06-09 RX ADMIN — MEROPENEM 2000 MG: 1 INJECTION, POWDER, FOR SOLUTION INTRAVENOUS at 14:11

## 2024-06-09 RX ADMIN — HEPARIN SODIUM 5000 UNITS: 5000 INJECTION INTRAVENOUS; SUBCUTANEOUS at 09:06

## 2024-06-09 NOTE — PLAN OF CARE
Goal Outcome Evaluation:  Plan of Care Reviewed With: patient, spouse        Progress: improving  Outcome Evaluation: A/O x4. Strong on all extremities. Lumbar drain q4h 20ml. T max 99.2. Up to BSC to void. HA/neck pain intermittent with relief from prn meds.  at bedside and participates in care. Safety maintained.           Problem: Adult Inpatient Plan of Care  Goal: Plan of Care Review  Outcome: Ongoing, Progressing  Flowsheets (Taken 6/9/2024 1807)  Progress: improving  Plan of Care Reviewed With:   patient   spouse  Outcome Evaluation: A/O x4. Strong on all extremities. Lumbar drain q4h 20ml. T max 99.2. Up to BSC to void. HA/neck pain intermittent with relief from prn meds.  at bedside and participates in care. Safety maintained.  Goal: Patient-Specific Goal (Individualized)  Outcome: Ongoing, Progressing  Goal: Absence of Hospital-Acquired Illness or Injury  Outcome: Ongoing, Progressing  Intervention: Identify and Manage Fall Risk  Recent Flowsheet Documentation  Taken 6/9/2024 1800 by Edna Welsh RN  Safety Promotion/Fall Prevention: safety round/check completed  Taken 6/9/2024 1700 by Edna Welsh RN  Safety Promotion/Fall Prevention: safety round/check completed  Taken 6/9/2024 1600 by Edna Welsh RN  Safety Promotion/Fall Prevention: safety round/check completed  Taken 6/9/2024 1500 by Edna Welsh RN  Safety Promotion/Fall Prevention: safety round/check completed  Taken 6/9/2024 1411 by Edna Welsh RN  Safety Promotion/Fall Prevention: safety round/check completed  Taken 6/9/2024 1300 by Edna Welsh RN  Safety Promotion/Fall Prevention: safety round/check completed  Taken 6/9/2024 1200 by Edna Welsh RN  Safety Promotion/Fall Prevention: safety round/check completed  Taken 6/9/2024 1100 by Edna Welsh RN  Safety Promotion/Fall Prevention: safety round/check completed  Taken 6/9/2024 1000 by Edna Welsh RN  Safety Promotion/Fall Prevention: safety  round/check completed  Taken 6/9/2024 0906 by Edna Welsh RN  Safety Promotion/Fall Prevention: safety round/check completed  Taken 6/9/2024 0800 by Edna Welsh RN  Safety Promotion/Fall Prevention: safety round/check completed  Taken 6/9/2024 0700 by Edna Welsh RN  Safety Promotion/Fall Prevention: safety round/check completed  Intervention: Prevent Skin Injury  Recent Flowsheet Documentation  Taken 6/9/2024 1800 by Edna Welsh RN  Body Position: position changed independently  Taken 6/9/2024 1600 by Edna Welsh RN  Body Position:   position changed independently   side-lying   right  Taken 6/9/2024 1200 by Edna Welsh RN  Body Position: position changed independently  Taken 6/9/2024 1000 by Edna Welsh RN  Body Position:   position changed independently   side-lying   right  Taken 6/9/2024 0800 by Edna Welsh RN  Body Position: position changed independently  Intervention: Prevent and Manage VTE (Venous Thromboembolism) Risk  Recent Flowsheet Documentation  Taken 6/9/2024 1800 by Edna Welsh RN  Activity Management: bedrest  Taken 6/9/2024 1600 by Edna Welsh RN  Activity Management: bedrest  Taken 6/9/2024 1200 by Edna Welsh RN  Activity Management: bedrest  Taken 6/9/2024 1000 by Edna Welsh RN  Activity Management: bedrest  Taken 6/9/2024 0800 by Edna Welsh RN  Activity Management: bedrest  VTE Prevention/Management: (see mar) other (see comments)  Taken 6/9/2024 0700 by Edna Welsh RN  Activity Management: up to bedside commode  Goal: Optimal Comfort and Wellbeing  Outcome: Ongoing, Progressing  Intervention: Monitor Pain and Promote Comfort  Recent Flowsheet Documentation  Taken 6/9/2024 1700 by Edna Welsh RN  Pain Management Interventions: see MAR  Taken 6/9/2024 0906 by Edna Welsh RN  Pain Management Interventions: see MAR  Intervention: Provide Person-Centered Care  Recent Flowsheet Documentation  Taken 6/9/2024 1600 by  Edan Welsh, RN  Trust Relationship/Rapport: care explained  Taken 6/9/2024 1200 by Edna Welsh, RN  Trust Relationship/Rapport: care explained  Taken 6/9/2024 0800 by Edna Welsh, RN  Trust Relationship/Rapport: care explained  Goal: Readiness for Transition of Care  Outcome: Ongoing, Progressing

## 2024-06-09 NOTE — PROGRESS NOTES
"INFECTIOUS DISEASES PROGRESS NOTE    Patient:  Zina Armando  YOB: 1990  MRN: [id number]   Admit date: 2024   Admitting Physician: Rohith Alexander, *  Primary Care Physician: Provider, No Known    Chief Complaint: None offered    Interval History: Patient denies headache currently.  She has no rash or diarrhea.  Tolerating antibiotics.  She did inquire when she would be able to go home.      Via    # [id number]    Allergies: No Known Allergies    Current Scheduled Medications:   Chlorhexidine Gluconate Cloth, 1 Application, Topical, Q24H  heparin (porcine), 5,000 Units, Subcutaneous, Q12H  levETIRAcetam, 500 mg, Oral, Q12H  meropenem, 2,000 mg, Intravenous, Q8H  mupirocin, 1 Application, Each Nare, BID  mupirocin, 1 Application, Topical, Q12H  senna-docusate sodium, 2 tablet, Oral, BID  sodium chloride, 10 mL, Intravenous, Q12H      Current PRN Medications:    acetaminophen **OR** acetaminophen    atropine    senna-docusate sodium **AND** polyethylene glycol **AND** bisacodyl **AND** bisacodyl    butalbital-acetaminophen-caffeine    Calcium Replacement - Follow Nurse / BPA Driven Protocol    cyclobenzaprine    droperidol **OR** droperidol    fentanyl    flumazenil    labetalol    Magnesium Standard Dose Replacement - Follow Nurse / BPA Driven Protocol    naloxone    ondansetron ODT **OR** ondansetron    oxyCODONE    Phosphorus Replacement - Follow Nurse / BPA Driven Protocol    Potassium Replacement - Follow Nurse / BPA Driven Protocol    sodium chloride    sodium chloride    niCARdipine, 5-15 mg/hr  sodium chloride, 200 mL/hr, Last Rate: Stopped (24 0745)           Objective     Vital Signs:  Temp (24hrs), Av.7 °F (37.1 °C), Min:98.4 °F (36.9 °C), Max:99.4 °F (37.4 °C)      /75 (BP Location: Right arm, Patient Position: Lying)   Pulse 99   Temp 98.4 °F (36.9 °C) (Oral)   Resp 19   Ht 149.9 cm (59.02\")   Wt 98.4 kg (217 lb)   LMP " 05/18/2024 (Approximate)   SpO2 99%   BMI 43.80 kg/m²         Physical Exam:    General: Patient is nontoxic-appearing lying in bed.  Her  is at bedside.  Respiratory: Effort even and unlabored  Incision, clean dry and intact.  No erythema appreciated-this is improved. no drainage appreciated    Results Review:    I reviewed the patient's new clinical results.    Lab Results:    CBC:   Lab Results   Lab 06/05/24  1248 06/06/24  0233 06/08/24  0327   WBC 5.83 6.33 8.27   HEMOGLOBIN 11.2* 9.6* 9.2*   HEMATOCRIT 34.8 31.7* 30.0*   PLATELETS 530* 465* 426        AutoDiff:   Lab Results   Lab 06/05/24  1248 06/06/24  0233 06/08/24  0327   NEUTROPHIL % 48.9 64.0 60.9   LYMPHOCYTE % 36.7 23.2 25.2   MONOCYTES % 11.7 10.6 10.2   EOSINOPHIL % 2.1 1.4 1.2   BASOPHIL % 0.3 0.2 0.4   NEUTROS ABS 2.85 4.05 5.05   LYMPHS ABS 2.14 1.47 2.08   MONOS ABS 0.68 0.67 0.84   EOS ABS 0.12 0.09 0.10   BASOS ABS 0.02 0.01 0.03        Manual Diff:    Lab Results   Lab 06/05/24  1248 06/06/24 0233 06/08/24  0327   NEUTROS ABS 2.85 4.05 5.05           CMP:   Lab Results   Lab 06/05/24  1248 06/06/24  0233 06/08/24  0327 06/08/24  1555   SODIUM 138 136 134*  --    POTASSIUM 3.9 3.9 3.3* 4.5   CHLORIDE 101 101 99  --    CO2 29.0 29.0 27.0  --    BUN 6 8 4*  --    CREATININE 0.36* 0.60 0.42*  --    CALCIUM 9.7 8.8 8.6  --    BILIRUBIN <0.2  --   --   --    ALK PHOS 142*  --   --   --    ALT (SGPT) 97*  --   --   --    AST (SGOT) 21  --   --   --    GLUCOSE 91 101* 87  --        Estimated Creatinine Clearance: 206.3 mL/min (A) (by C-G formula based on SCr of 0.42 mg/dL (L)).    Culture Results:    Microbiology Results (last 10 days)       Procedure Component Value - Date/Time    Blood Culture With LIZZ - Blood, Hand, Right [[id number]]  (Normal) Collected: 06/06/24 1250    Lab Status: Preliminary result Specimen: Blood from Hand, Right Updated: 06/08/24 1315     Blood Culture No growth at 2 days    Blood Culture With LIZZ - Blood, Arm,  Left [[id number]]  (Normal) Collected: 06/06/24 0823    Lab Status: Preliminary result Specimen: Blood from Arm, Left Updated: 06/08/24 0845     Blood Culture No growth at 2 days    MRSA Screen, PCR (Inpatient) - Swab, Nares [[id number]]  (Normal) Collected: 06/06/24 0805    Lab Status: Final result Specimen: Swab from Nares Updated: 06/06/24 0934     MRSA PCR No MRSA Detected    Narrative:      The negative predictive value of this diagnostic test is high and should only be used to consider de-escalating anti-MRSA therapy. A positive result may indicate colonization with MRSA and must be correlated clinically.    Wound Culture - Wound, Head [[id number]]  (Abnormal) Collected: 06/05/24 2206    Lab Status: Final result Specimen: Wound from Head Updated: 06/09/24 0650     Wound Culture Light growth (2+) Serratia marcescens     Gram Stain Few (2+) WBCs seen      No organisms seen    Narrative:      Refer to previous wound culture collected on 06/05/2024 2250 for [facility].      Wound Culture - Wound, Head [[id number]]  (Abnormal)  (Susceptibility) Collected: 06/05/24 2150    Lab Status: Final result Specimen: Wound from Head Updated: 06/09/24 0649     Wound Culture Scant growth (1+) Serratia marcescens     Gram Stain Few (2+) WBCs seen      No organisms seen    Susceptibility        Serratia marcescens      ADÁN      Amoxicillin + Clavulanate Resistant      Cefepime Susceptible      Ceftazidime Susceptible      Ceftriaxone Susceptible      Gentamicin Susceptible      Levofloxacin Susceptible      Piperacillin + Tazobactam Susceptible  [1]       Tetracycline Resistant      Trimethoprim + Sulfamethoxazole Susceptible                   [1]  Appended report. These results have been appended to a previously preliminary verified report.                Susceptibility Comments       Serratia marcescens    Cefazolin sensitivity will not be reported for Enterobacteriaceae in non-urine isolates. If cefazolin is preferred, please call the  "microbiology lab to request an E-test.  With the exception of urinary-sourced infections, aminoglycosides should not be used as monotherapy.               Culture, CSF - Cerebrospinal Fluid, CSF Reservoir [[id number]] Collected: 06/05/24 2120    Lab Status: Final result Specimen: Cerebrospinal Fluid from CSF [place] Updated: 06/09/24 0808     CSF Culture No growth at 3 days     Gram Stain Few (2+) WBCs seen      No organisms seen    Blood Culture - Blood, Hand, Left [[id number]]  (Normal) Collected: 06/05/24 1248    Lab Status: Preliminary result Specimen: Blood from Hand, Left Updated: 06/08/24 1315     Blood Culture No growth at 3 days    Blood Culture - Blood, Hand, Right [[id number]]  (Normal) Collected: 06/05/24 1248    Lab Status: Preliminary result Specimen: Blood from Hand, Right Updated: 06/08/24 1315     Blood Culture No growth at 3 days                 Radiology:   Imaging Results (Last 72 Hours)       ** No results found for the last 72 hours. 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  On June 5 patient underwent suboccipital wound revision.  Cultures positive for Serratia marcescens  Fever-.  Resolved.  Unclear if it is related to antibiotic change as isolate is susceptible to the antibiotic patient was on.  Doubt drug fever but possible.  Increasing creatinine-although in the normal range was 0.36 then 0.6.  Back down today.  Elevated sed rate  Elevated CRP      RECOMMENDATION:   Continue meropenem through today-  Will dose with ceftriaxone tomorrow, make sure patient does not have fever indicative of \"drug fever\" which will afford us oral antibiotic choice upon discharge.  Continue to monitor blood cultures to completion        Violet Pandya MD  06/09/24  08:13 CDT      "

## 2024-06-09 NOTE — PROGRESS NOTES
"Zina Armando  33 y.o.      Chief complaint:   Headache    Subjective  Patient is doing very well today and really has no headache to speak of.,  Drain is still draining and no other neurologic complaints at this time.    Temp:  [98.4 °F (36.9 °C)-99.4 °F (37.4 °C)] 98.4 °F (36.9 °C)  Heart Rate:  [] 110  Resp:  [19-23] 19  BP: ()/(64-88) 122/88      Objective:  Vital signs: (most recent): Blood pressure 122/88, pulse 110, temperature 98.4 °F (36.9 °C), temperature source Oral, resp. rate 19, height 149.9 cm (59.02\"), weight 98.4 kg (217 lb), last menstrual period 05/18/2024, SpO2 96%, not currently breastfeeding.        Neurologic Exam alert and oriented x 4, cranial nerves II through XII are grossly intact and patient is moving all extremities.    Lab Results (last 24 hours)       Procedure Component Value Units Date/Time    Blood Culture With LIZZ - Blood, Arm, Left [[id number]]  (Normal) Collected: 06/06/24 0823    Specimen: Blood from Arm, Left Updated: 06/09/24 0845     Blood Culture No growth at 3 days    Culture, CSF - Cerebrospinal Fluid, CSF Reservoir [[id number]] Collected: 06/05/24 2120    Specimen: Cerebrospinal Fluid from CSF Center Updated: 06/09/24 0808     CSF Culture No growth at 3 days     Gram Stain Few (2+) WBCs seen      No organisms seen    Wound Culture - Wound, Head [[id number]]  (Abnormal) Collected: 06/05/24 2206    Specimen: Wound from Head Updated: 06/09/24 0650     Wound Culture Light growth (2+) Serratia marcescens     Gram Stain Few (2+) WBCs seen      No organisms seen    Narrative:      Refer to previous wound culture collected on 06/05/2024 2250 for [facility].      Wound Culture - Wound, Head [[id number]]  (Abnormal)  (Susceptibility) Collected: 06/05/24 2150    Specimen: Wound from Head Updated: 06/09/24 0649     Wound Culture Scant growth (1+) Serratia marcescens     Gram Stain Few (2+) WBCs seen      No organisms seen    Susceptibility        Serratia marcescens      ADÁN  "     Amoxicillin + Clavulanate Resistant      Cefepime Susceptible      Ceftazidime Susceptible      Ceftriaxone Susceptible      Gentamicin Susceptible      Levofloxacin Susceptible      Piperacillin + Tazobactam Susceptible  [1]       Tetracycline Resistant      Trimethoprim + Sulfamethoxazole Susceptible                   [1]  Appended report. These results have been appended to a previously preliminary verified report.                Susceptibility Comments       Serratia marcescens    Cefazolin sensitivity will not be reported for Enterobacteriaceae in non-urine isolates. If cefazolin is preferred, please call the microbiology lab to request an E-test.  With the exception of urinary-sourced infections, aminoglycosides should not be used as monotherapy.               Potassium [[id number]]  (Normal) Collected: 06/08/24 1555    Specimen: Blood Updated: 06/08/24 1623     Potassium 4.5 mmol/L      Comment: Slight hemolysis detected by analyzer. Result may be falsely elevated.       Blood Culture - Blood, Hand, Left [[id number]]  (Normal) Collected: 06/05/24 1248    Specimen: Blood from Hand, Left Updated: 06/08/24 1315     Blood Culture No growth at 3 days    Blood Culture - Blood, Hand, Right [[id number]]  (Normal) Collected: 06/05/24 1248    Specimen: Blood from Hand, Right Updated: 06/08/24 1315     Blood Culture No growth at 3 days    Blood Culture With LIZZ - Blood, Hand, Right [[id number]]  (Normal) Collected: 06/06/24 1250    Specimen: Blood from Hand, Right Updated: 06/08/24 1315     Blood Culture No growth at 2 days                Plan:   Will continue lumbar drain for at least 1 more day and reevaluate in a.m.      Superficial postoperative wound infection        Terrence Lai,

## 2024-06-09 NOTE — PLAN OF CARE
Goal Outcome Evaluation:   Tmax 99 F. PERRAL. A&O x4. VSS. 20ml of CSF drained q4. Fluid is clear. Fioricet given x2 and jose roberto 5mg x 1.    Problem: Adult Inpatient Plan of Care  Goal: Plan of Care Review  6/9/2024 0511 by Tere Hernandez RN  Outcome: Ongoing, Progressing  6/9/2024 0511 by Tere Hernandez RN  Outcome: Ongoing, Progressing  Goal: Patient-Specific Goal (Individualized)  6/9/2024 0511 by Tere Hernandez RN  Outcome: Ongoing, Progressing  6/9/2024 0511 by Tere Hernandez RN  Outcome: Ongoing, Progressing  Goal: Absence of Hospital-Acquired Illness or Injury  6/9/2024 0511 by Tere Hernandez RN  Outcome: Ongoing, Progressing  6/9/2024 0511 by Tere Hernandez RN  Outcome: Ongoing, Progressing  Intervention: Identify and Manage Fall Risk  Recent Flowsheet Documentation  Taken 6/9/2024 0500 by Tere Hernandez RN  Safety Promotion/Fall Prevention: safety round/check completed  Taken 6/9/2024 0400 by Tere Hernandez RN  Safety Promotion/Fall Prevention: safety round/check completed  Taken 6/9/2024 0300 by Tere Hernandez RN  Safety Promotion/Fall Prevention: safety round/check completed  Taken 6/9/2024 0200 by Tere Hernandez RN  Safety Promotion/Fall Prevention: safety round/check completed  Taken 6/9/2024 0100 by Tere Hernandez RN  Safety Promotion/Fall Prevention: safety round/check completed  Taken 6/9/2024 0000 by Tere Hernandez RN  Safety Promotion/Fall Prevention: safety round/check completed  Taken 6/8/2024 2300 by Tere Hernandez RN  Safety Promotion/Fall Prevention: safety round/check completed  Taken 6/8/2024 2200 by Tere Hernandez RN  Safety Promotion/Fall Prevention: safety round/check completed  Taken 6/8/2024 2100 by Tere Hernandez RN  Safety Promotion/Fall Prevention: safety round/check completed  Taken 6/8/2024 2000 by Tere Hernandez RN  Safety Promotion/Fall Prevention: safety round/check completed  Taken 6/8/2024 1900 by Mary, Tere T, RN  Safety Promotion/Fall Prevention: safety round/check  completed  Intervention: Prevent Skin Injury  Recent Flowsheet Documentation  Taken 6/9/2024 0400 by Tere Hernandez RN  Body Position: weight shifting  Taken 6/9/2024 0200 by Tere Hernandez RN  Body Position: position changed independently  Taken 6/9/2024 0000 by Tere Hernandez RN  Body Position: position changed independently  Taken 6/8/2024 2200 by Tere Hernandez RN  Body Position: position changed independently  Taken 6/8/2024 2100 by Tere Hernandez RN  Body Position: position changed independently  Taken 6/8/2024 2000 by Tere Hernandez RN  Body Position: position changed independently  Intervention: Prevent and Manage VTE (Venous Thromboembolism) Risk  Recent Flowsheet Documentation  Taken 6/9/2024 0400 by Tere Hernandez RN  Activity Management: bedrest  Taken 6/9/2024 0200 by Tere Hernandez RN  Activity Management: bedrest  Taken 6/9/2024 0000 by Tere Hernandez RN  Activity Management: bedrest  Taken 6/8/2024 2200 by Tere Hernandez RN  Activity Management: bedrest  Taken 6/8/2024 2000 by Tere Hernandez RN  Activity Management: bedrest  Goal: Optimal Comfort and Wellbeing  6/9/2024 0511 by Tere Hernandez RN  Outcome: Ongoing, Progressing  6/9/2024 0511 by Tere Hernandez RN  Outcome: Ongoing, Progressing  Intervention: Monitor Pain and Promote Comfort  Recent Flowsheet Documentation  Taken 6/8/2024 2200 by Tere Hernandez RN  Pain Management Interventions: (meds requested) see MAR  Goal: Readiness for Transition of Care  6/9/2024 0511 by Tere Hernandez RN  Outcome: Ongoing, Progressing  6/9/2024 0511 by Tere Hernandez RN  Outcome: Ongoing, Progressing     Problem: Fall Injury Risk  Goal: Absence of Fall and Fall-Related Injury  6/9/2024 0511 by Tere Hernandez RN  Outcome: Ongoing, Progressing  6/9/2024 0511 by Tere Hernandez RN  Outcome: Ongoing, Progressing  Intervention: Promote Injury-Free Environment  Recent Flowsheet Documentation  Taken 6/9/2024 0500 by Tere Hernandez RN  Safety Promotion/Fall Prevention:  safety round/check completed  Taken 6/9/2024 0400 by Tere Hernandez RN  Safety Promotion/Fall Prevention: safety round/check completed  Taken 6/9/2024 0300 by Tere Hernandez RN  Safety Promotion/Fall Prevention: safety round/check completed  Taken 6/9/2024 0200 by Tere Hernandez RN  Safety Promotion/Fall Prevention: safety round/check completed  Taken 6/9/2024 0100 by Tere Hernandez RN  Safety Promotion/Fall Prevention: safety round/check completed  Taken 6/9/2024 0000 by Tere Hernandez RN  Safety Promotion/Fall Prevention: safety round/check completed  Taken 6/8/2024 2300 by Tere Hernandez RN  Safety Promotion/Fall Prevention: safety round/check completed  Taken 6/8/2024 2200 by Tere Hernandez RN  Safety Promotion/Fall Prevention: safety round/check completed  Taken 6/8/2024 2100 by Tere Hernandez RN  Safety Promotion/Fall Prevention: safety round/check completed  Taken 6/8/2024 2000 by eTre Hernandez RN  Safety Promotion/Fall Prevention: safety round/check completed  Taken 6/8/2024 1900 by Tere Hernandez RN  Safety Promotion/Fall Prevention: safety round/check completed     Problem: Fever  Goal: Body Temperature in Desired Range  Outcome: Ongoing, Progressing

## 2024-06-10 LAB
BACTERIA SPEC AEROBE CULT: NORMAL
BACTERIA SPEC AEROBE CULT: NORMAL
REAGIN AB CSF QL: NON REACTIVE

## 2024-06-10 PROCEDURE — 99024 POSTOP FOLLOW-UP VISIT: CPT | Performed by: NEUROLOGICAL SURGERY

## 2024-06-10 PROCEDURE — 99232 SBSQ HOSP IP/OBS MODERATE 35: CPT | Performed by: INTERNAL MEDICINE

## 2024-06-10 PROCEDURE — 25010000002 CEFTRIAXONE PER 250 MG: Performed by: INTERNAL MEDICINE

## 2024-06-10 PROCEDURE — 94799 UNLISTED PULMONARY SVC/PX: CPT

## 2024-06-10 PROCEDURE — 94761 N-INVAS EAR/PLS OXIMETRY MLT: CPT

## 2024-06-10 PROCEDURE — 25010000002 HEPARIN (PORCINE) PER 1000 UNITS: Performed by: NEUROLOGICAL SURGERY

## 2024-06-10 RX ADMIN — Medication 10 ML: at 09:02

## 2024-06-10 RX ADMIN — OXYCODONE HYDROCHLORIDE 5 MG: 5 TABLET ORAL at 20:39

## 2024-06-10 RX ADMIN — LEVETIRACETAM 500 MG: 500 TABLET, FILM COATED ORAL at 09:02

## 2024-06-10 RX ADMIN — CHLORHEXIDINE GLUCONATE 1 APPLICATION: 500 CLOTH TOPICAL at 06:07

## 2024-06-10 RX ADMIN — Medication 10 ML: at 20:39

## 2024-06-10 RX ADMIN — SENNOSIDES AND DOCUSATE SODIUM 2 TABLET: 50; 8.6 TABLET ORAL at 08:08

## 2024-06-10 RX ADMIN — OXYCODONE HYDROCHLORIDE 5 MG: 5 TABLET ORAL at 14:57

## 2024-06-10 RX ADMIN — HEPARIN SODIUM 5000 UNITS: 5000 INJECTION INTRAVENOUS; SUBCUTANEOUS at 20:38

## 2024-06-10 RX ADMIN — OXYCODONE HYDROCHLORIDE 5 MG: 5 TABLET ORAL at 10:54

## 2024-06-10 RX ADMIN — LEVETIRACETAM 500 MG: 500 TABLET, FILM COATED ORAL at 21:40

## 2024-06-10 RX ADMIN — HEPARIN SODIUM 5000 UNITS: 5000 INJECTION INTRAVENOUS; SUBCUTANEOUS at 08:08

## 2024-06-10 RX ADMIN — OXYCODONE HYDROCHLORIDE 5 MG: 5 TABLET ORAL at 04:50

## 2024-06-10 RX ADMIN — MUPIROCIN 1 APPLICATION: 20 OINTMENT TOPICAL at 20:39

## 2024-06-10 RX ADMIN — CEFTRIAXONE SODIUM 2000 MG: 2 INJECTION, POWDER, FOR SOLUTION INTRAMUSCULAR; INTRAVENOUS at 06:43

## 2024-06-10 RX ADMIN — MUPIROCIN 1 APPLICATION: 20 OINTMENT TOPICAL at 08:52

## 2024-06-10 RX ADMIN — CYCLOBENZAPRINE HYDROCHLORIDE 5 MG: 5 TABLET, FILM COATED ORAL at 20:38

## 2024-06-10 NOTE — PLAN OF CARE
Goal Outcome Evaluation:           Problem: Adult Inpatient Plan of Care  Goal: Plan of Care Review  Outcome: Ongoing, Progressing  Goal: Patient-Specific Goal (Individualized)  Outcome: Ongoing, Progressing  Goal: Absence of Hospital-Acquired Illness or Injury  Outcome: Ongoing, Progressing  Intervention: Identify and Manage Fall Risk  Recent Flowsheet Documentation  Taken 6/10/2024 0600 by Kranthi Pérez RN  Safety Promotion/Fall Prevention:   safety round/check completed   room organization consistent   clutter free environment maintained  Taken 6/10/2024 0500 by Kranthi Pérez RN  Safety Promotion/Fall Prevention:   safety round/check completed   room organization consistent   clutter free environment maintained  Taken 6/10/2024 0400 by Kranthi Pérez RN  Safety Promotion/Fall Prevention:   safety round/check completed   room organization consistent   clutter free environment maintained  Taken 6/10/2024 0300 by Kranthi Pérez RN  Safety Promotion/Fall Prevention:   safety round/check completed   room organization consistent   clutter free environment maintained  Taken 6/10/2024 0200 by Kranthi Pérez RN  Safety Promotion/Fall Prevention:   safety round/check completed   room organization consistent   clutter free environment maintained  Taken 6/10/2024 0100 by Kranthi Pérez RN  Safety Promotion/Fall Prevention:   safety round/check completed   room organization consistent   clutter free environment maintained  Taken 6/10/2024 0000 by Kranthi Pérez RN  Safety Promotion/Fall Prevention:   safety round/check completed   room organization consistent   clutter free environment maintained  Taken 6/9/2024 2300 by Kranthi Pérez RN  Safety Promotion/Fall Prevention:   safety round/check completed   room organization consistent   clutter free environment maintained  Taken 6/9/2024 2200 by Kranthi Pérez RN  Safety Promotion/Fall Prevention:   safety round/check completed   room organization consistent   clutter free  environment maintained  Taken 6/9/2024 2100 by Kranthi Pérez RN  Safety Promotion/Fall Prevention:   safety round/check completed   room organization consistent   clutter free environment maintained  Taken 6/9/2024 2000 by Kranthi Pérez RN  Safety Promotion/Fall Prevention:   safety round/check completed   room organization consistent   clutter free environment maintained  Taken 6/9/2024 1900 by Kranthi Pérez RN  Safety Promotion/Fall Prevention:   safety round/check completed   room organization consistent   clutter free environment maintained  Intervention: Prevent Skin Injury  Recent Flowsheet Documentation  Taken 6/10/2024 0600 by Kranthi Pérez RN  Body Position:   position changed independently   weight shifting  Taken 6/10/2024 0400 by Kranthi Pérez RN  Body Position:   position changed independently   weight shifting  Taken 6/10/2024 0200 by Kranthi Pérez RN  Body Position:   position changed independently   weight shifting  Taken 6/10/2024 0000 by Kranthi Pérez RN  Body Position:   position changed independently   weight shifting  Taken 6/9/2024 2200 by Kranthi Pérez RN  Body Position:   position changed independently   weight shifting   side-lying  Taken 6/9/2024 2000 by Kranthi Pérez RN  Body Position:   position changed independently   weight shifting  Intervention: Prevent and Manage VTE (Venous Thromboembolism) Risk  Recent Flowsheet Documentation  Taken 6/10/2024 0400 by Kranthi Pérez RN  VTE Prevention/Management: (see MAR) other (see comments)  Taken 6/10/2024 0000 by Kranthi Pérez RN  VTE Prevention/Management: other (see comments)  Taken 6/9/2024 2000 by Kranthi Pérez RN  VTE Prevention/Management: (see MAR) other (see comments)  Taken 6/9/2024 1900 by Kranthi Pérez RN  Activity Management: bedrest  Goal: Optimal Comfort and Wellbeing  Outcome: Ongoing, Progressing  Intervention: Provide Person-Centered Care  Recent Flowsheet Documentation  Taken 6/10/2024 0400 by Kranthi Pérez  RN  Trust Relationship/Rapport:   care explained   choices provided   reassurance provided   thoughts/feelings acknowledged  Taken 6/10/2024 0000 by Kranthi Pérez RN  Trust Relationship/Rapport:   care explained   choices provided   reassurance provided   thoughts/feelings acknowledged  Taken 6/9/2024 2000 by Kranthi Pérez RN  Trust Relationship/Rapport:   care explained   choices provided   reassurance provided   thoughts/feelings acknowledged  Goal: Readiness for Transition of Care  Outcome: Ongoing, Progressing     Problem: Fall Injury Risk  Goal: Absence of Fall and Fall-Related Injury  Outcome: Ongoing, Progressing  Intervention: Identify and Manage Contributors  Recent Flowsheet Documentation  Taken 6/10/2024 0600 by Kranthi Pérez RN  Medication Review/Management: medications reviewed  Taken 6/10/2024 0500 by Kranthi Pérez RN  Medication Review/Management: medications reviewed  Taken 6/10/2024 0400 by Kranthi Pérez RN  Medication Review/Management: medications reviewed  Taken 6/10/2024 0300 by Kranthi Pérez RN  Medication Review/Management: medications reviewed  Taken 6/10/2024 0200 by Kranthi Pérez RN  Medication Review/Management: medications reviewed  Taken 6/10/2024 0100 by Kranthi Pérez RN  Medication Review/Management: medications reviewed  Taken 6/10/2024 0000 by Kranthi Pérez RN  Medication Review/Management: medications reviewed  Taken 6/9/2024 2300 by Kranthi Pérez RN  Medication Review/Management: medications reviewed  Taken 6/9/2024 2200 by Kranthi Pérez RN  Medication Review/Management: medications reviewed  Taken 6/9/2024 2100 by Kranthi Pérez RN  Medication Review/Management: medications reviewed  Taken 6/9/2024 2000 by Kranthi Pérez RN  Medication Review/Management: medications reviewed  Taken 6/9/2024 1900 by Kranthi Pérez RN  Medication Review/Management: medications reviewed  Intervention: Promote Injury-Free Environment  Recent Flowsheet Documentation  Taken 6/10/2024 0600 by  [place], Kranthi, RN  Safety Promotion/Fall Prevention:   safety round/check completed   room organization consistent   clutter free environment maintained  Taken 6/10/2024 0500 by Kranthi Pérez RN  Safety Promotion/Fall Prevention:   safety round/check completed   room organization consistent   clutter free environment maintained  Taken 6/10/2024 0400 by Kranthi Pérez RN  Safety Promotion/Fall Prevention:   safety round/check completed   room organization consistent   clutter free environment maintained  Taken 6/10/2024 0300 by Kranthi Pérez RN  Safety Promotion/Fall Prevention:   safety round/check completed   room organization consistent   clutter free environment maintained  Taken 6/10/2024 0200 by Kranthi Pérez RN  Safety Promotion/Fall Prevention:   safety round/check completed   room organization consistent   clutter free environment maintained  Taken 6/10/2024 0100 by Kranthi Pérez RN  Safety Promotion/Fall Prevention:   safety round/check completed   room organization consistent   clutter free environment maintained  Taken 6/10/2024 0000 by Kranthi Pérez RN  Safety Promotion/Fall Prevention:   safety round/check completed   room organization consistent   clutter free environment maintained  Taken 6/9/2024 2300 by Kranthi Pérez RN  Safety Promotion/Fall Prevention:   safety round/check completed   room organization consistent   clutter free environment maintained  Taken 6/9/2024 2200 by Kranthi Pérez RN  Safety Promotion/Fall Prevention:   safety round/check completed   room organization consistent   clutter free environment maintained  Taken 6/9/2024 2100 by Kranthi Pérez RN  Safety Promotion/Fall Prevention:   safety round/check completed   room organization consistent   clutter free environment maintained  Taken 6/9/2024 2000 by Kranthi Pérez RN  Safety Promotion/Fall Prevention:   safety round/check completed   room organization consistent   clutter free environment maintained  Taken 6/9/2024 1900  by Kranthi Pérez, RN  Safety Promotion/Fall Prevention:   safety round/check completed   room organization consistent   clutter free environment maintained     Problem: Fever  Goal: Body Temperature in Desired Range  Outcome: Ongoing, Progressing

## 2024-06-10 NOTE — PROGRESS NOTES
Daily Progress Note    ASSESSMENT:   Zina Armando is a 33 y.o. non-English-speaking  female with significant medical comorbidities to include  a Craniotomy Suboccipital With Stealth, Lumbar Drain Insertion External and Lumbar Drain Insertion External on 5/17/2024.  Ms. Armando has done fairly well since we last saw her.  Currently asymptomatic, denying headaches, visual disturbances, dizziness, nausea, or vomiting.  She does however endorse intermittent drainage from her incision that is thin in consistency and often yellow-tinged in color.  Otherwise, she is neurologically intact.  No recent imaging.     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   Gil.  #[id number]    CHIEF COMPLAINT:  Status post craniotomy for tumor.  Concern for posterior cervical incision infection versus CSF leak.  Complains today of a throbbing frontal headache.  She rates it as 6.  Pain is not well covered with hydrocodone.  She is requiring Tylenol for fever and nursing expresses concerns over increased acetaminophen dose per 24 hours.  She currently has low-grade temp reported at 100.  Blood cultures have not shown any growth at this point.  Nursing indicates that drain is slower to empty.  It was last emptied before I saw the patient and 15 cc were recovered.    PLAN:   Neuro: Stable.  Intact/at baseline.   5 Days Post-Op (6/6/2024) lumbar drain placement and wound washout and revision   CSF clear.  No growth TD.   Lumbar drain DC'ed at bedside   Transfer to floor              Start Fiorcet po Q 6 hours, prn headache   Start Roxicodone 4 mg p.o. every 4 hours as needed pain   Bactroban ointment twice a day to posterior cervical incision  Start ambulation    CV: Continuous  "cardiac monitoring.  Keep A-line.  Cardene to keep SBP <140  Pulm: Continuous pulse oximetry.  O2.  Maintaining O2 sat.  : Voiding  FEN: Regular diet.  Advance as tolerated  GI: No acute issues  ID: Continue empiric Vancomycin and Cefepime, per ID  Heme:  DVT prophylaxis, SCDs  Pain: medication changes as noted above  Dispo: PT/OT   Anticipate home tomorrow.    Subjective  Symptoms stable    Temp:  [98.4 °F (36.9 °C)-99.2 °F (37.3 °C)] 99.2 °F (37.3 °C)  Heart Rate:  [] 95  Resp:  [16-19] 19  BP: ()/(60-91) 104/76    Objective:  Vital signs: (most recent): Blood pressure 104/76, pulse 95, temperature 99.2 °F (37.3 °C), temperature source Oral, resp. rate 19, height 149.9 cm (59.02\"), weight 98.4 kg (217 lb), last menstrual period 05/18/2024, SpO2 92%, not currently breastfeeding.      Neurologic Exam     Mental Status   Oriented to person, place, and time.   Attention: normal. Concentration: normal.   Speech: speech is normal   Level of consciousness: alert    Cranial Nerves     CN II   Visual fields full to confrontation.     CN III, IV, VI   Pupils are equal, round, and reactive to light.  Extraocular motions are normal.     CN V   Facial sensation intact.     CN VII   Facial expression full, symmetric.     CN VIII   CN VIII normal.     CN IX, X   CN IX normal.     CN XI   CN XI normal.     Motor Exam   Right arm tone: normal  Left arm tone: normal  Right leg tone: normal  Left leg tone: normal    Strength   Right deltoid: 5/5  Left deltoid: 5/5  Right biceps: 5/5  Left biceps: 5/5  Right triceps: 5/5  Left triceps: 5/5  Right wrist extension: 5/5  Left wrist extension: 5/5  Right iliopsoas: 5/5  Left iliopsoas: 5/5  Right quadriceps: 5/5  Left quadriceps: 5/5  Right anterior tibial: 5/5  Left anterior tibial: 5/5  Right gastroc: 5/5  Left gastroc: 5/5  Right EHL 5/5  Left EHL 5/5       Sensory Exam   Right arm light touch: normal  Left arm light touch: normal  Right leg light touch: normal  Left leg " light touch: normal    Gait, Coordination, and Reflexes     Tremor   Resting tremor: absent  Intention tremor: absent  Action tremor: absent    Incision: C, D, I.  8 retention sutures intact.    DRAINS:   External Urinary Catheter (Active)       Lumbar Drain (Active)   Drain Status Clamped 06/06/24 0800   CSF Color Clear 06/06/24 0800   Site Description Unable to view 06/06/24 0800   Dressing Status Clean;Dry;Intact 06/06/24 0800   CSF Output (mL) 20 mL 06/06/24 0700       [REMOVED] Urethral Catheter Non-latex;Silicone 16 Fr. (Removed)   Daily Indications Required activity restriction from trauma, surgery, (e.g. unstable spine, fracture, hemodynamics) 05/19/24 0400   Site Assessment Clean;Skin intact 05/19/24 0400   Collection Container Standard drainage bag 05/19/24 0400   Securement Method Securing device 05/19/24 0400   Catheter care complete Yes 05/19/24 0400   Output (mL) 40 mL 05/19/24 0500       [REMOVED] Urethral Catheter Non-latex;Silicone 16 Fr. (Removed)   Daily Indications Required activity restriction from trauma, surgery, (e.g. unstable spine, fracture, hemodynamics) 06/06/24 0500   Site Assessment Clean;Skin intact 06/06/24 0400   Collection Container Standard drainage bag 06/06/24 0500   Securement Method Securing device 06/06/24 0500   Output (mL) 150 mL 06/06/24 0400       [REMOVED] External Urinary Catheter (Removed)   Daily Indications Daily output 05/19/24 0500   Site Assessment Clean;Skin intact 05/19/24 0500   Application/Removal external catheter applied;skin care provided 05/19/24 0500   Collection Container Wall suction 05/19/24 0500   Securement Method Securing device 05/19/24 0500       [REMOVED] Lumbar Drain (Removed)   Drain Status Clamped 05/20/24 0800   CSF Color Clear 05/20/24 0800   Site Description Unable to view 05/20/24 0800   Dressing Status Clean;Dry;Intact 05/20/24 0945   CSF Output (mL) 10 mL 05/20/24 0800       LAB RESULTS:  ABG:     CBC:   Results from last 7 days   Lab  "Units 06/08/24  0327 06/06/24  0233 06/05/24  1248   WBC 10*3/mm3 8.27 6.33 5.83   HEMOGLOBIN g/dL 9.2* 9.6* 11.2*   HEMATOCRIT % 30.0* 31.7* 34.8   PLATELETS 10*3/mm3 426 465* 530*     BMP:  Results from last 7 days   Lab Units 06/08/24  1555 06/08/24  0327 06/06/24  0233 06/05/24  1248   SODIUM mmol/L  --  134* 136 138   POTASSIUM mmol/L 4.5 3.3* 3.9 3.9   CHLORIDE mmol/L  --  99 101 101   CO2 mmol/L  --  27.0 29.0 29.0   BUN mg/dL  --  4* 8 6   CREATININE mg/dL  --  0.42* 0.60 0.36*   GLUCOSE mg/dL  --  87 101* 91   CALCIUM mg/dL  --  8.6 8.8 9.7   ALT (SGPT) U/L  --   --   --  97*     Culture Results: No results found for: \"BLOODCX\", \"URINECX\", \"WOUNDCX\", \"MRSACX\", \"RESPCX\", \"STOOLCX\"    Imaging Results (Last 24 Hours)       ** No results found for the last 24 hours. **          Lab Results (last 24 hours)       Procedure Component Value Units Date/Time    Blood Culture - Blood, Hand, Left [[id number]]  (Normal) Collected: 06/05/24 1248    Specimen: Blood from Hand, Left Updated: 06/09/24 1315     Blood Culture No growth at 4 days    Blood Culture - Blood, Hand, Right [[id number]]  (Normal) Collected: 06/05/24 1248    Specimen: Blood from Hand, Right Updated: 06/09/24 1315     Blood Culture No growth at 4 days    Blood Culture With LIZZ - Blood, Hand, Right [[id number]]  (Normal) Collected: 06/06/24 1250    Specimen: Blood from Hand, Right Updated: 06/09/24 1315     Blood Culture No growth at 3 days    Blood Culture With LIZZ - Blood, Arm, Left [[id number]]  (Normal) Collected: 06/06/24 0823    Specimen: Blood from Arm, Left Updated: 06/09/24 0845     Blood Culture No growth at 3 days    Culture, CSF - Cerebrospinal Fluid, CSF Reservoir [[id number]] Collected: 06/05/24 2120    Specimen: Cerebrospinal Fluid from CSF [place] Updated: 06/09/24 0808     CSF Culture No growth at 3 days     Gram Stain Few (2+) WBCs seen      No organisms seen          Rohith Alexander MD  "

## 2024-06-10 NOTE — PROGRESS NOTES
"INFECTIOUS DISEASES PROGRESS NOTE    Patient:  Zina Armando  YOB: 1990  MRN: [id number]   Admit date: 2024   Admitting Physician: Rohith Alexander, *  Primary Care Physician: Provider, No Known    Chief Complaint: None offered    Interval History: Interval history and exam facilitated with  #[id number]    The patient denied headache, diarrhea, rash  She inquired about when she might be moving out of the intensive care unit to a regular room        Allergies: No Known Allergies    Current Scheduled Medications:   cefTRIAXone, 2,000 mg, Intravenous, Q24H  Chlorhexidine Gluconate Cloth, 1 Application, Topical, Q24H  heparin (porcine), 5,000 Units, Subcutaneous, Q12H  levETIRAcetam, 500 mg, Oral, Q12H  mupirocin, 1 Application, Topical, Q12H  senna-docusate sodium, 2 tablet, Oral, BID  sodium chloride, 10 mL, Intravenous, Q12H      Current PRN Medications:    acetaminophen **OR** acetaminophen    atropine    senna-docusate sodium **AND** polyethylene glycol **AND** bisacodyl **AND** bisacodyl    butalbital-acetaminophen-caffeine    Calcium Replacement - Follow Nurse / BPA Driven Protocol    cyclobenzaprine    droperidol **OR** droperidol    fentanyl    flumazenil    labetalol    Magnesium Standard Dose Replacement - Follow Nurse / BPA Driven Protocol    naloxone    ondansetron ODT **OR** ondansetron    oxyCODONE    Phosphorus Replacement - Follow Nurse / BPA Driven Protocol    Potassium Replacement - Follow Nurse / BPA Driven Protocol    sodium chloride    sodium chloride    niCARdipine, 5-15 mg/hr  sodium chloride, 200 mL/hr, Last Rate: Stopped (24 0745)           Objective     Vital Signs:  Temp (24hrs), Av.9 °F (37.2 °C), Min:98.4 °F (36.9 °C), Max:99.2 °F (37.3 °C)      /76   Pulse 95   Temp 99.2 °F (37.3 °C) (Oral)   Resp 19   Ht 149.9 cm (59.02\")   Wt 98.4 kg (217 lb)   LMP 2024 (Approximate)   SpO2 92%   BMI 43.80 kg/m² "         Physical Exam:    General: Patient is lying in bed on her right side in no acute distress  Respiratory: Effort even unlabored  Surgical incision clean dry and intact    Results Review:    I reviewed the patient's new clinical results.    Lab Results:    CBC:   Lab Results   Lab 06/05/24  1248 06/06/24 0233 06/08/24 0327   WBC 5.83 6.33 8.27   HEMOGLOBIN 11.2* 9.6* 9.2*   HEMATOCRIT 34.8 31.7* 30.0*   PLATELETS 530* 465* 426        AutoDiff:   Lab Results   Lab 06/05/24  1248 06/06/24 0233 06/08/24 0327   NEUTROPHIL % 48.9 64.0 60.9   LYMPHOCYTE % 36.7 23.2 25.2   MONOCYTES % 11.7 10.6 10.2   EOSINOPHIL % 2.1 1.4 1.2   BASOPHIL % 0.3 0.2 0.4   NEUTROS ABS 2.85 4.05 5.05   LYMPHS ABS 2.14 1.47 2.08   MONOS ABS 0.68 0.67 0.84   EOS ABS 0.12 0.09 0.10   BASOS ABS 0.02 0.01 0.03        Manual Diff:    Lab Results   Lab 06/05/24  1248 06/06/24  0233 06/08/24  0327   NEUTROS ABS 2.85 4.05 5.05           CMP:   Lab Results   Lab 06/05/24  1248 06/06/24 0233 06/08/24 0327 06/08/24  1555   SODIUM 138 136 134*  --    POTASSIUM 3.9 3.9 3.3* 4.5   CHLORIDE 101 101 99  --    CO2 29.0 29.0 27.0  --    BUN 6 8 4*  --    CREATININE 0.36* 0.60 0.42*  --    CALCIUM 9.7 8.8 8.6  --    BILIRUBIN <0.2  --   --   --    ALK PHOS 142*  --   --   --    ALT (SGPT) 97*  --   --   --    AST (SGOT) 21  --   --   --    GLUCOSE 91 101* 87  --        Estimated Creatinine Clearance: 206.3 mL/min (A) (by C-G formula based on SCr of 0.42 mg/dL (L)).      C-Reactive Protein   Date Value Ref Range Status   06/05/2024 3.54 (H) 0.00 - 0.50 mg/dL Final     Sed Rate   Date Value Ref Range Status   06/05/2024 114 (H) 0 - 20 mm/hr Final       Culture Results:    Microbiology Results (last 10 days)       Procedure Component Value - Date/Time    Blood Culture With LIZZ - Blood, Hand, Right [[id number]]  (Normal) Collected: 06/06/24 1250    Lab Status: Preliminary result Specimen: Blood from Hand, Right Updated: 06/09/24 1315     Blood Culture No  growth at 3 days    Blood Culture With LIZZ - Blood, Arm, Left [[id number]]  (Normal) Collected: 06/06/24 0823    Lab Status: Preliminary result Specimen: Blood from Arm, Left Updated: 06/09/24 0845     Blood Culture No growth at 3 days    MRSA Screen, PCR (Inpatient) - Swab, Nares [[id number]]  (Normal) Collected: 06/06/24 0805    Lab Status: Final result Specimen: Swab from Nares Updated: 06/06/24 0934     MRSA PCR No MRSA Detected    Narrative:      The negative predictive value of this diagnostic test is high and should only be used to consider de-escalating anti-MRSA therapy. A positive result may indicate colonization with MRSA and must be correlated clinically.    Wound Culture - Wound, Head [[id number]]  (Abnormal) Collected: 06/05/24 2206    Lab Status: Final result Specimen: Wound from Head Updated: 06/09/24 0650     Wound Culture Light growth (2+) Serratia marcescens     Gram Stain Few (2+) WBCs seen      No organisms seen    Narrative:      Refer to previous wound culture collected on 06/05/2024 2250 for MICS.      Wound Culture - Wound, Head [[id number]]  (Abnormal)  (Susceptibility) Collected: 06/05/24 2150    Lab Status: Final result Specimen: Wound from Head Updated: 06/09/24 0649     Wound Culture Scant growth (1+) Serratia marcescens     Gram Stain Few (2+) WBCs seen      No organisms seen    Susceptibility        Serratia marcescens      ADÁN      Amoxicillin + Clavulanate Resistant      Cefepime Susceptible      Ceftazidime Susceptible      Ceftriaxone Susceptible      Gentamicin Susceptible      Levofloxacin Susceptible      Piperacillin + Tazobactam Susceptible  [1]       Tetracycline Resistant      Trimethoprim + Sulfamethoxazole Susceptible                   [1]  Appended report. These results have been appended to a previously preliminary verified report.                Susceptibility Comments       Serratia marcescens    Cefazolin sensitivity will not be reported for Enterobacteriaceae in  non-urine isolates. If cefazolin is preferred, please call the microbiology lab to request an E-test.  With the exception of urinary-sourced infections, aminoglycosides should not be used as monotherapy.               Culture, CSF - Cerebrospinal Fluid, CSF Reservoir [[id number]] Collected: 06/05/24 2120    Lab Status: Final result Specimen: Cerebrospinal Fluid from CSF [place] Updated: 06/09/24 0808     CSF Culture No growth at 3 days     Gram Stain Few (2+) WBCs seen      No organisms seen    Blood Culture - Blood, Hand, Left [[id number]]  (Normal) Collected: 06/05/24 1248    Lab Status: Preliminary result Specimen: Blood from Hand, Left Updated: 06/09/24 1315     Blood Culture No growth at 4 days    Blood Culture - Blood, Hand, Right [[id number]]  (Normal) Collected: 06/05/24 1248    Lab Status: Preliminary result Specimen: Blood from Hand, Right Updated: 06/09/24 1315     Blood Culture No growth at 4 days                 Radiology:   Imaging Results (Last 72 Hours)       ** No results found for the last 72 hours. 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  Late on June 5th, patient underwent suboccipital wound revision.  Cultures positive for Serratia marcescens  Fever- Resolved.  Unclear if it is related to antibiotic change as isolate is susceptible to the antibiotic patient was on.  Doubt drug fever but possible.  Restarted ceftriaxone today.  (Had previously changed from ceftriaxone to meropenem given persistent fever)  Elevated sed rate  Elevated CRP      RECOMMENDATION:   Continue ceftriaxone patient is postop day #5-may not need additional antibiotic therapy upon discharge given superficial infection.  Continue to monitor blood cultures to completion  Add CRP to blood in lab        Violet Pandya  MD  06/10/24  06:56 CDT

## 2024-06-10 NOTE — CASE MANAGEMENT/SOCIAL WORK
Continued Stay Note  ADAMA Granado     Patient Name: Zina Armando  MRN: [id number]  Today's Date: 6/10/2024    Admit Date: 6/5/2024    Plan: Home   Discharge Plan       Row Name 06/10/24 1010       Plan    Plan Home    Plan Comments Patient plans to return home upon discharge.  SW will follow for any needs.                   Discharge Codes    No documentation.                       JOZEF Meyer

## 2024-06-10 NOTE — PLAN OF CARE
Goal Outcome Evaluation:  Plan of Care Reviewed With: patient        Progress: improving  Outcome Evaluation: Pt received from ICU to room 348. C/o pain throughout shift. Medicated with PO pain meds per pt request. Safety maintained. IV IID. Incision to posterior neck SCARLETT, no drainage noted. Steristip x2/bandaid in place to lumbar. Limited speaking english, phone  available when needed. Up with asst x1. Voiding per BRP. Cont to monitor.

## 2024-06-11 ENCOUNTER — DOCUMENTATION (OUTPATIENT)
Dept: HOME HEALTH SERVICES | Facility: HOME HEALTHCARE | Age: 34
End: 2024-06-11
Payer: MEDICAID

## 2024-06-11 ENCOUNTER — READMISSION MANAGEMENT (OUTPATIENT)
Dept: CALL CENTER | Facility: HOSPITAL | Age: 34
End: 2024-06-11
Payer: MEDICAID

## 2024-06-11 ENCOUNTER — HOME HEALTH ADMISSION (OUTPATIENT)
Dept: HOME HEALTH SERVICES | Facility: HOME HEALTHCARE | Age: 34
End: 2024-06-11
Payer: MEDICAID

## 2024-06-11 VITALS
BODY MASS INDEX: 41.89 KG/M2 | SYSTOLIC BLOOD PRESSURE: 105 MMHG | RESPIRATION RATE: 16 BRPM | OXYGEN SATURATION: 98 % | TEMPERATURE: 99.1 F | HEIGHT: 59 IN | HEART RATE: 100 BPM | WEIGHT: 207.8 LBS | DIASTOLIC BLOOD PRESSURE: 69 MMHG

## 2024-06-11 LAB
BACTERIA SPEC AEROBE CULT: NORMAL
BACTERIA SPEC AEROBE CULT: NORMAL
CRP SERPL-MCNC: 17.71 MG/DL (ref 0–0.5)

## 2024-06-11 PROCEDURE — 86140 C-REACTIVE PROTEIN: CPT | Performed by: INTERNAL MEDICINE

## 2024-06-11 PROCEDURE — 99024 POSTOP FOLLOW-UP VISIT: CPT | Performed by: NEUROLOGICAL SURGERY

## 2024-06-11 PROCEDURE — 25010000002 CEFTRIAXONE PER 250 MG: Performed by: INTERNAL MEDICINE

## 2024-06-11 PROCEDURE — 99232 SBSQ HOSP IP/OBS MODERATE 35: CPT | Performed by: INTERNAL MEDICINE

## 2024-06-11 PROCEDURE — 25010000002 HEPARIN (PORCINE) PER 1000 UNITS: Performed by: NEUROLOGICAL SURGERY

## 2024-06-11 RX ORDER — BUTALBITAL, ACETAMINOPHEN AND CAFFEINE 50; 325; 40 MG/1; MG/1; MG/1
1 TABLET ORAL EVERY 6 HOURS PRN
Start: 2024-06-11

## 2024-06-11 RX ORDER — CEFDINIR 300 MG/1
300 CAPSULE ORAL EVERY 12 HOURS SCHEDULED
Qty: 14 CAPSULE | Refills: 0 | Status: SHIPPED | OUTPATIENT
Start: 2024-06-12 | End: 2024-06-19

## 2024-06-11 RX ORDER — NALOXONE HYDROCHLORIDE 4 MG/.1ML
SPRAY NASAL
Qty: 2 EACH | Refills: 0 | Status: SHIPPED | OUTPATIENT
Start: 2024-06-11

## 2024-06-11 RX ORDER — OXYCODONE HYDROCHLORIDE 5 MG/1
5 TABLET ORAL EVERY 6 HOURS PRN
Start: 2024-06-11 | End: 2024-06-12

## 2024-06-11 RX ORDER — BUTALBITAL, ACETAMINOPHEN AND CAFFEINE 50; 325; 40 MG/1; MG/1; MG/1
1 TABLET ORAL EVERY 6 HOURS PRN
Qty: 24 TABLET | Refills: 0 | Status: SHIPPED | OUTPATIENT
Start: 2024-06-11 | End: 2024-06-17

## 2024-06-11 RX ORDER — CEFDINIR 300 MG/1
300 CAPSULE ORAL EVERY 12 HOURS SCHEDULED
Status: DISCONTINUED | OUTPATIENT
Start: 2024-06-12 | End: 2024-06-11 | Stop reason: HOSPADM

## 2024-06-11 RX ORDER — OXYCODONE HYDROCHLORIDE 5 MG/1
5 TABLET ORAL EVERY 6 HOURS PRN
Qty: 12 TABLET | Refills: 0 | Status: SHIPPED | OUTPATIENT
Start: 2024-06-11

## 2024-06-11 RX ADMIN — HEPARIN SODIUM 5000 UNITS: 5000 INJECTION INTRAVENOUS; SUBCUTANEOUS at 09:07

## 2024-06-11 RX ADMIN — LEVETIRACETAM 500 MG: 500 TABLET, FILM COATED ORAL at 09:07

## 2024-06-11 RX ADMIN — CEFTRIAXONE SODIUM 2000 MG: 2 INJECTION, POWDER, FOR SOLUTION INTRAMUSCULAR; INTRAVENOUS at 05:09

## 2024-06-11 RX ADMIN — OXYCODONE HYDROCHLORIDE 5 MG: 5 TABLET ORAL at 05:11

## 2024-06-11 RX ADMIN — Medication 10 ML: at 09:07

## 2024-06-11 RX ADMIN — MUPIROCIN 1 APPLICATION: 20 OINTMENT TOPICAL at 11:06

## 2024-06-11 RX ADMIN — SENNOSIDES AND DOCUSATE SODIUM 2 TABLET: 50; 8.6 TABLET ORAL at 09:07

## 2024-06-11 NOTE — PROGRESS NOTES
Received HH referral from [facility] for SN. Dr. Alexander will follow. Spoke with pt who states she is agreeable to services and demographics.

## 2024-06-11 NOTE — CASE MANAGEMENT/SOCIAL WORK
Continued Stay Note  [facility]     Patient Name: Zina Armando  MRN: [id number]  Today's Date: 6/11/2024    Admit Date: 6/5/2024    Plan: Home   Discharge Plan       Row Name 06/11/24 1517       Plan    Final Discharge Disposition Code 06 - home with home health care    Final Note Pt is being dc'd home today. Orders for HH received. [facility] will be able to accept pt but cannot admit until Monday. HH orders placed in basket.   did check with the 2 other HH agencies in [facility] and [facility] cannot accept new referrals at this time. [facility] does not accept pt Medicaid.                    Discharge Codes    No documentation.                 Expected Discharge Date and Time       Expected Discharge Date Expected Discharge Time    Jun 11, 2024               SANTOS Roberson

## 2024-06-11 NOTE — PROGRESS NOTES
"Daily Progress Note    ASSESSMENT:   Zina Armando is a 33 y.o. non-English-speaking  female with significant medical comorbidities to include  a Craniotomy Suboccipital With Stealth, Lumbar Drain Insertion External and Lumbar Drain Insertion External on 5/17/2024.  Ms. Armando has done fairly well since we last saw her.  Currently asymptomatic, denying headaches, visual disturbances, dizziness, nausea, or vomiting.  She does however endorse intermittent drainage from her incision that is thin in consistency and often yellow-tinged in color.  Otherwise, she is neurologically intact.  No recent imaging.     Past Medical History:   Diagnosis Date    Cerebellar mass 05/2024    Miscarriage 04/2024    Miscarriage 02/2023    Personal history of COVID-19 2021     Active Hospital Problems    Diagnosis     **Superficial postoperative wound infection        PLAN:   Neuro: Stable.  Intact/at baseline.   6 Days Post-Op (6/6/2024) lumbar drain placement and wound washout and revision   CSF clear.  No growth TD.   Lumbar drain DC'ed              Start Fiorcet po Q 6 hours, prn headache   Roxicodone 4 mg p.o. every 4 hours as needed pain   Bactroban ointment twice a day to posterior cervical incision   Daily CHG bath    CV: Continuous cardiac monitoring.   Pulm: IS  : Voiding  FEN: Regular diet.  Advance as tolerated  GI: No acute issues  ID: Continue empiric Vancomycin and Cefepime, per ID  Heme:  DVT prophylaxis, SCDs  Pain: medication changes as noted above  Dispo: PT/OT   Anticipate home today  Oral Abx  Home health nursing.    Subjective  Symptoms stable    Temp:  [98.2 °F (36.8 °C)-99.6 °F (37.6 °C)] 98.4 °F (36.9 °C)  Heart Rate:  [100-123] 100  Resp:  [16-18] 16  BP: (104-123)/(62-85) 104/70    Objective:  Vital signs: (most recent): Blood pressure 104/70, pulse 100, temperature 98.4 °F (36.9 °C), temperature source Oral, resp. rate 16, height 149.9 cm (59.02\"), weight 94.3 kg (207 lb 12.8 oz), last menstrual " period 05/18/2024, SpO2 95%, not currently breastfeeding.      Neurologic Exam     Mental Status   Oriented to person, place, and time.   Attention: normal. Concentration: normal.   Speech: speech is normal   Level of consciousness: alert    Cranial Nerves     CN II   Visual fields full to confrontation.     CN III, IV, VI   Pupils are equal, round, and reactive to light.  Extraocular motions are normal.     CN V   Facial sensation intact.     CN VII   Facial expression full, symmetric.     CN VIII   CN VIII normal.     CN IX, X   CN IX normal.     CN XI   CN XI normal.     Motor Exam   Right arm tone: normal  Left arm tone: normal  Right leg tone: normal  Left leg tone: normal    Strength   Right deltoid: 5/5  Left deltoid: 5/5  Right biceps: 5/5  Left biceps: 5/5  Right triceps: 5/5  Left triceps: 5/5  Right wrist extension: 5/5  Left wrist extension: 5/5  Right iliopsoas: 5/5  Left iliopsoas: 5/5  Right quadriceps: 5/5  Left quadriceps: 5/5  Right anterior tibial: 5/5  Left anterior tibial: 5/5  Right gastroc: 5/5  Left gastroc: 5/5  Right EHL 5/5  Left EHL 5/5       Sensory Exam   Right arm light touch: normal  Left arm light touch: normal  Right leg light touch: normal  Left leg light touch: normal    Gait, Coordination, and Reflexes     Tremor   Resting tremor: absent  Intention tremor: absent  Action tremor: absent    Incision: C, D, I.  8 retention sutures intact.    DRAINS:   External Urinary Catheter (Active)       Lumbar Drain (Active)   Drain Status Clamped 06/06/24 0800   CSF Color Clear 06/06/24 0800   Site Description Unable to view 06/06/24 0800   Dressing Status Clean;Dry;Intact 06/06/24 0800   CSF Output (mL) 20 mL 06/06/24 0700       [REMOVED] Urethral Catheter Non-latex;Silicone 16 Fr. (Removed)   Daily Indications Required activity restriction from trauma, surgery, (e.g. unstable spine, fracture, hemodynamics) 05/19/24 0400   Site Assessment Clean;Skin intact 05/19/24 0400   Collection Container  "Standard drainage bag 05/19/24 0400   Securement Method Securing device 05/19/24 0400   Catheter care complete Yes 05/19/24 0400   Output (mL) 40 mL 05/19/24 0500       [REMOVED] Urethral Catheter Non-latex;Silicone 16 Fr. (Removed)   Daily Indications Required activity restriction from trauma, surgery, (e.g. unstable spine, fracture, hemodynamics) 06/06/24 0500   Site Assessment Clean;Skin intact 06/06/24 0400   Collection Container Standard drainage bag 06/06/24 0500   Securement Method Securing device 06/06/24 0500   Output (mL) 150 mL 06/06/24 0400       [REMOVED] External Urinary Catheter (Removed)   Daily Indications Daily output 05/19/24 0500   Site Assessment Clean;Skin intact 05/19/24 0500   Application/Removal external catheter applied;skin care provided 05/19/24 0500   Collection Container Wall suction 05/19/24 0500   Securement Method Securing device 05/19/24 0500       [REMOVED] Lumbar Drain (Removed)   Drain Status Clamped 05/20/24 0800   CSF Color Clear 05/20/24 0800   Site Description Unable to view 05/20/24 0800   Dressing Status Clean;Dry;Intact 05/20/24 0945   CSF Output (mL) 10 mL 05/20/24 0800       LAB RESULTS:  ABG:     CBC:   Results from last 7 days   Lab Units 06/08/24  0327 06/06/24  0233 06/05/24  1248   WBC 10*3/mm3 8.27 6.33 5.83   HEMOGLOBIN g/dL 9.2* 9.6* 11.2*   HEMATOCRIT % 30.0* 31.7* 34.8   PLATELETS 10*3/mm3 426 465* 530*     BMP:  Results from last 7 days   Lab Units 06/08/24  1555 06/08/24  0327 06/06/24  0233 06/05/24  1248   SODIUM mmol/L  --  134* 136 138   POTASSIUM mmol/L 4.5 3.3* 3.9 3.9   CHLORIDE mmol/L  --  99 101 101   CO2 mmol/L  --  27.0 29.0 29.0   BUN mg/dL  --  4* 8 6   CREATININE mg/dL  --  0.42* 0.60 0.36*   GLUCOSE mg/dL  --  87 101* 91   CALCIUM mg/dL  --  8.6 8.8 9.7   ALT (SGPT) U/L  --   --   --  97*     Culture Results: No results found for: \"BLOODCX\", \"URINECX\", \"WOUNDCX\", \"MRSACX\", \"RESPCX\", \"STOOLCX\"    Imaging Results (Last 24 Hours)       ** No " results found for the last 24 hours. **          Lab Results (last 24 hours)       Procedure Component Value Units Date/Time    Blood Culture With LIZZ - Blood, Arm, Left [[id number]]  (Normal) Collected: 06/06/24 0823    Specimen: Blood from Arm, Left Updated: 06/11/24 0845     Blood Culture No growth at 5 days    C-reactive Protein [[id number]]  (Abnormal) Collected: 06/11/24 0449    Specimen: Blood Updated: 06/11/24 0559     C-Reactive Protein 17.71 mg/dL     VDRL, CSF - Cerebrospinal Fluid, CSF Reservoir [[id number]] Collected: 06/05/24 2120    Specimen: Cerebrospinal Fluid from CSF [place] Updated: 06/10/24 1511     VDRL, Quantitative, CSF Non Reactive    Narrative:      Performed at:  [address] [place] [address]  [id number]  : Nati Lozada MD, Phone:  [phone]    Blood Culture - Blood, Hand, Left [[id number]]  (Normal) Collected: 06/05/24 1248    Specimen: Blood from Hand, Left Updated: 06/10/24 1315     Blood Culture No growth at 5 days    Blood Culture - Blood, Hand, Right [[id number]]  (Normal) Collected: 06/05/24 1248    Specimen: Blood from Hand, Right Updated: 06/10/24 1315     Blood Culture No growth at 5 days    Blood Culture With LIZZ - Blood, Hand, Right [[id number]]  (Normal) Collected: 06/06/24 1250    Specimen: Blood from Hand, Right Updated: 06/10/24 1315     Blood Culture No growth at 4 days          Rohith Alexander MD

## 2024-06-11 NOTE — DISCHARGE SUMMARY
Date of Discharge:  6/11/2024    Discharge Diagnosis: Superficial Postoperative Wound Infection    Presenting Problem/History of Present Illness  Status post craniotomy [Z98.890]  Superficial postoperative wound infection [T81.49XA]       Hospital Course  Patient is a 33 y.o. Non-English Speaking female who is S/P suboccipital craniotomy 5/17/24 who presented on 6/5/24 to the office with post-op wound drainage.  She was admitted and taken to the OR same day for wound washout and revision and lumbar drain placement. ID was consulted. Intra-op cultures +Serratia marcescens.  Headache was managed during the hospitalization with oral medication.  Lumbar drain was DC'd 6/10/2024.  She was ambulating in the room independently, taking p.o. and voiding without difficulty.  It was felt she could be safely discharged home with home health nursing involved in her medication management and wound care.      Procedures Performed  Procedure(s):  Lumbar Drain and Suboccipital Wound revision, possible removal of bone, possible revision of dural graft. HOLD ANTIBIOTICS UNTIL CSF OBTAINED  LUMBAR DRAIN INSERTION EXTERNAL       Consults:   Consults       Date and Time Order Name Status Description    6/5/2024 11:37 PM Inpatient Infectious Diseases Consult Completed               Condition on Discharge: Good    Vital Signs  Temp:  [98.2 °F (36.8 °C)-99.6 °F (37.6 °C)] 99.1 °F (37.3 °C)  Heart Rate:  [100-123] 100  Resp:  [16-18] 16  BP: (104-117)/(62-76) 105/69    Physical Exam:   Physical Exam  Constitutional:       Appearance: Normal appearance. She is well-developed.   HENT:      Head: Normocephalic.   Eyes:      General: Lids are normal.      Conjunctiva/sclera: Conjunctivae normal.      Pupils: Pupils are equal, round, and reactive to light.   Cardiovascular:      Rate and Rhythm: Normal rate.   Pulmonary:      Effort: Pulmonary effort is normal.      Breath sounds: Normal breath sounds.   Musculoskeletal:         General: Normal  range of motion.      Cervical back: Normal range of motion.   Skin:     General: Skin is warm and dry.      Comments: Posterior cervical incision, sutures are CDI   Neurological:      Mental Status: She is alert and oriented to person, place, and time.      GCS: GCS eye subscore is 4. GCS verbal subscore is 5. GCS motor subscore is 6.      Cranial Nerves: No cranial nerve deficit.      Sensory: No sensory deficit.      Motor: Motor strength is normal.No weakness.      Gait: Gait is intact. Gait normal.      Deep Tendon Reflexes: Reflexes are normal and symmetric. Reflexes normal.   Psychiatric:         Speech: Speech normal.         Behavior: Behavior normal.         Thought Content: Thought content normal.          Neurologic Exam     Mental Status   Oriented to person, place, and time.   Speech: speech is normal   Knowledge: good.     Cranial Nerves     CN III, IV, VI   Pupils are equal, round, and reactive to light.    Motor Exam     Strength   Strength 5/5 throughout.     Sensory Exam   Light touch normal.     Gait, Coordination, and Reflexes     Gait  Gait: normal         Discharge Disposition  [place] [place] [facility]    Discharge Medications     Discharge Medications        New Medications        Instructions Start Date   butalbital-acetaminophen-caffeine -40 MG per tablet  Commonly known as: FIORICET, ESGIC   1 tablet, Oral, Every 6 Hours PRN      butalbital-acetaminophen-caffeine -40 MG per tablet  Commonly known as: FIORICET, ESGIC   1 tablet, Oral, Every 6 Hours PRN      cefdinir 300 MG capsule  Commonly known as: OMNICEF   300 mg, Oral, Every 12 Hours Scheduled   Start Date: June 12, 2024     mupirocin 2 % ointment  Commonly known as: BACTROBAN   1 Application, Topical, Every 12 Hours Scheduled      naloxone 4 MG/0.1ML nasal spray  Commonly known as: NARCAN   Call 911. Don't prime. [place] in 1 nostril for overdose. Repeat in 2-3 minutes in other nostril if no or minimal  breathing/responsiveness.      oxyCODONE 5 MG immediate release tablet  Commonly known as: ROXICODONE   5 mg, Oral, Every 6 Hours PRN      oxyCODONE 5 MG immediate release tablet  Commonly known as: Roxicodone   5 mg, Oral, Every 6 Hours PRN             Continue These Medications        Instructions Start Date   cyclobenzaprine 5 MG tablet  Commonly known as: FLEXERIL   5 mg, Oral, 3 Times Daily PRN      levETIRAcetam 500 MG tablet  Commonly known as: KEPPRA   500 mg, Oral, Every 12 Hours Scheduled               Discharge Diet:   Diet Instructions       Diet: Regular/House Diet; Regular Texture (IDDSI 7); Thin (IDDSI 0)      Discharge Diet: Regular/House Diet    Texture: Regular Texture (IDDSI 7)    Fluid Consistency: Thin (IDDSI 0)            Activity at Discharge:   Activity Instructions       Other Instructions (Specify)      Activity Instructions: No strenuous activity, no driving            Follow-up Appointments  Future Appointments   Date Time Provider Department Center   6/19/2024  9:45 AM Dominguez Narvaez APRN [facility]   7/3/2024 10:00 AM PAD MRI 2 [facility] PAD MRI PAD   7/3/2024 11:15 AM Rohith Parks MD [facility] PAD   7/30/2024 10:15 AM Nichol Nova APRN [facility]  PAD     Additional Instructions for the Follow-ups that You Need to Schedule       Ambulatory Referral to Home Health   As directed      Face to Face Visit Date: 6/11/2024   Follow-up provider for Plan of Care?: I will be treating the patient on an ongoing basis.  Please send me the Plan of Care for signature.   Follow-up provider: ROHITH PARKS [[id number]]   Reason/Clinical Findings: Home Health safety assessment, nursing wound check and medication management   Describe mobility limitations that make leaving home difficult: Pt does not drive. Primary language is Romanian   Nursing/Therapeutic Services Requested: Home Monitoring ([employer] order from panel below) Skilled Nursing   Skilled nursing orders:  Medication education Wound care dressing/changes   Frequency: 1 Week 1        Call MD With Problems / Concerns   As directed      Instructions: Worsening headache, drainage from wound, fever, confusion    Order Comments: Instructions: Worsening headache, drainage from wound, fever, confusion         Discharge Follow-up with Specialty: TERRENCE Allen; 1 Week   As directed      Specialty: TERRENCE Allen   Follow Up: 1 Week   Follow Up Details: suture removal                Test Results Pending at Discharge  Pending Labs       Order Current Status    Blood Culture With LIZZ - Blood, Hand, Right Preliminary result             Flores Eden PA-C  06/11/24  12:38 CDT    Time: Discharge 35 min

## 2024-06-11 NOTE — PLAN OF CARE
Goal Outcome Evaluation:  Plan of Care Reviewed With: patient, spouse   Pt A&O, RA, VSS. Up x1 assist. Pt of  decent, but speaks English, spouse at bedside. Incision noted to posterior neck down to back, closed with sutures. Edema and mild redness noted. Drain site on lower L lumbar closed with staples, and covered with band aid. Educated pt/spouse on s/s of infection to assess and report, such as: fever, drainage increased swelling or redness. Educated to keep all follow up appts, and take all meds as prescribed. Pt and spouse voiced understanding.

## 2024-06-11 NOTE — PROGRESS NOTES
"INFECTIOUS DISEASES PROGRESS NOTE    Patient:  Zina Armando  YOB: 1990  MRN: [id number]   Admit date: 2024   Admitting Physician: Rohith Alexander, *  Primary Care Physician: Provider, No Known    Chief Complaint: No current complaints but asked about pain medication for discharge    Interval History: Patient denies significant headache currently.  No vomiting, diarrhea or rash     #[id number].  Almost finished with conversation and got disconnected so second  was #[id number]    Allergies: No Known Allergies    Current Scheduled Medications:   cefTRIAXone, 2,000 mg, Intravenous, Q24H  heparin (porcine), 5,000 Units, Subcutaneous, Q12H  levETIRAcetam, 500 mg, Oral, Q12H  mupirocin, 1 Application, Topical, Q12H  senna-docusate sodium, 2 tablet, Oral, BID  sodium chloride, 10 mL, Intravenous, Q12H      Current PRN Medications:    acetaminophen **OR** acetaminophen    atropine    senna-docusate sodium **AND** polyethylene glycol **AND** bisacodyl **AND** bisacodyl    butalbital-acetaminophen-caffeine    Calcium Replacement - Follow Nurse / BPA Driven Protocol    cyclobenzaprine    droperidol **OR** droperidol    flumazenil    labetalol    Magnesium Standard Dose Replacement - Follow Nurse / BPA Driven Protocol    naloxone    ondansetron ODT **OR** ondansetron    oxyCODONE    Phosphorus Replacement - Follow Nurse / BPA Driven Protocol    Potassium Replacement - Follow Nurse / BPA Driven Protocol    sodium chloride    sodium chloride              Objective     Vital Signs:  Temp (24hrs), Av.7 °F (37.1 °C), Min:98.2 °F (36.8 °C), Max:99.6 °F (37.6 °C)      /70 (BP Location: Right arm, Patient Position: Lying)   Pulse 100   Temp 98.4 °F (36.9 °C) (Oral)   Resp 16   Ht 149.9 cm (59.02\")   Wt 94.3 kg (207 lb 12.8 oz)   LMP 2024 (Approximate)   SpO2 95%   BMI 41.95 kg/m²         Physical Exam:  General: The patient is nontoxic-appearing sitting up at bedside " eating breakfast in no acute distress  Surgical incision clean dry and intact.  No erythema, no drainage  Neuro: No issues communicating via .      Results Review:    I reviewed the patient's new clinical results.    Lab Results:    CBC:   Lab Results   Lab 06/05/24 1248 06/06/24 0233 06/08/24  0327   WBC 5.83 6.33 8.27   HEMOGLOBIN 11.2* 9.6* 9.2*   HEMATOCRIT 34.8 31.7* 30.0*   PLATELETS 530* 465* 426        AutoDiff:   Lab Results   Lab 06/05/24  1248 06/06/24 0233 06/08/24  0327   NEUTROPHIL % 48.9 64.0 60.9   LYMPHOCYTE % 36.7 23.2 25.2   MONOCYTES % 11.7 10.6 10.2   EOSINOPHIL % 2.1 1.4 1.2   BASOPHIL % 0.3 0.2 0.4   NEUTROS ABS 2.85 4.05 5.05   LYMPHS ABS 2.14 1.47 2.08   MONOS ABS 0.68 0.67 0.84   EOS ABS 0.12 0.09 0.10   BASOS ABS 0.02 0.01 0.03        Manual Diff:    Lab Results   Lab 06/05/24  1248 06/06/24 0233 06/08/24  0327   NEUTROS ABS 2.85 4.05 5.05           CMP:   Lab Results   Lab 06/05/24 1248 06/06/24 0233 06/08/24 0327 06/08/24  1555   SODIUM 138 136 134*  --    POTASSIUM 3.9 3.9 3.3* 4.5   CHLORIDE 101 101 99  --    CO2 29.0 29.0 27.0  --    BUN 6 8 4*  --    CREATININE 0.36* 0.60 0.42*  --    CALCIUM 9.7 8.8 8.6  --    BILIRUBIN <0.2  --   --   --    ALK PHOS 142*  --   --   --    ALT (SGPT) 97*  --   --   --    AST (SGOT) 21  --   --   --    GLUCOSE 91 101* 87  --        Estimated Creatinine Clearance: 201.5 mL/min (A) (by C-G formula based on SCr of 0.42 mg/dL (L)).          Culture Results:    Microbiology Results (last 10 days)       Procedure Component Value - Date/Time    Blood Culture With LIZZ - Blood, Hand, Right [[id number]]  (Normal) Collected: 06/06/24 1250    Lab Status: Preliminary result Specimen: Blood from Hand, Right Updated: 06/10/24 1315     Blood Culture No growth at 4 days    Blood Culture With LIZZ - Blood, Arm, Left [[id number]]  (Normal) Collected: 06/06/24 0823    Lab Status: Preliminary result Specimen: Blood from Arm, Left Updated: 06/10/24 0845      Blood Culture No growth at 4 days    MRSA Screen, PCR (Inpatient) - Swab, Nares [[id number]]  (Normal) Collected: 06/06/24 0805    Lab Status: Final result Specimen: Swab from Nares Updated: 06/06/24 0934     MRSA PCR No MRSA Detected    Narrative:      The negative predictive value of this diagnostic test is high and should only be used to consider de-escalating anti-MRSA therapy. A positive result may indicate colonization with MRSA and must be correlated clinically.    Wound Culture - Wound, Head [[id number]]  (Abnormal) Collected: 06/05/24 2206    Lab Status: Final result Specimen: Wound from Head Updated: 06/09/24 0650     Wound Culture Light growth (2+) Serratia marcescens     Gram Stain Few (2+) WBCs seen      No organisms seen    Narrative:      Refer to previous wound culture collected on 06/05/2024 2250 for MICS.      Wound Culture - Wound, Head [[id number]]  (Abnormal)  (Susceptibility) Collected: 06/05/24 2150    Lab Status: Final result Specimen: Wound from Head Updated: 06/09/24 0649     Wound Culture Scant growth (1+) Serratia marcescens     Gram Stain Few (2+) WBCs seen      No organisms seen    Susceptibility        Serratia marcescens      ADÁN      Amoxicillin + Clavulanate Resistant      Cefepime Susceptible      Ceftazidime Susceptible      Ceftriaxone Susceptible      Gentamicin Susceptible      Levofloxacin Susceptible      Piperacillin + Tazobactam Susceptible  [1]       Tetracycline Resistant      Trimethoprim + Sulfamethoxazole Susceptible                   [1]  Appended report. These results have been appended to a previously preliminary verified report.                Susceptibility Comments       Serratia marcescens    Cefazolin sensitivity will not be reported for Enterobacteriaceae in non-urine isolates. If cefazolin is preferred, please call the microbiology lab to request an E-test.  With the exception of urinary-sourced infections, aminoglycosides should not be used as monotherapy.                Culture, CSF - Cerebrospinal Fluid, CSF Reservoir [[id number]] Collected: 06/05/24 2120    Lab Status: Final result Specimen: Cerebrospinal Fluid from CSF [place] Updated: 06/09/24 0808     CSF Culture No growth at 3 days     Gram Stain Few (2+) WBCs seen      No organisms seen    Blood Culture - Blood, Hand, Left [[id number]]  (Normal) Collected: 06/05/24 1248    Lab Status: Final result Specimen: Blood from Hand, Left Updated: 06/10/24 1315     Blood Culture No growth at 5 days    Blood Culture - Blood, Hand, Right [[id number]]  (Normal) Collected: 06/05/24 1248    Lab Status: Final result Specimen: Blood from Hand, Right Updated: 06/10/24 1315     Blood Culture No growth at 5 days                 Radiology:   Imaging Results (Last 72 Hours)       ** No results found for the last 72 hours. 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  Patient underwent suboccipital wound revision evening of June 5.  Cultures positive for Serratia marcescens  Fever- Resolved.  Did have temp noted at 99.6 yesterday.  Reviewed as needed medications and she did not receive any fever reducing medications.  No further elevation.    RECOMMENDATION:   Received ceftriaxone dose today  Will place in orders for cefdinir 300 mg p.o. every 12 hours for 2 more days to start tomorrow.  This should be adequate total coverage for superficial wound infection.    Discussed with Dr. Alexander.      Violet Pandya MD  06/11/24  08:32 CDT

## 2024-06-11 NOTE — PLAN OF CARE
Goal Outcome Evaluation:  Plan of Care Reviewed With: patient, spouse        Progress: no change  Outcome Evaluation: A&Ox4. Pain controlled with prn pain med. SCD RA. Incision to back of head/neck, stephanie, wound care provided.  at bs.  Appears to be sleeping well. Up x1 to RR.

## 2024-06-12 NOTE — OUTREACH NOTE
Prep Survey      Flowsheet Row Responses   [facility] patient discharged from? [facility]   Is LACE score < 7 ? No   Eligibility Readm Mgmt   Discharge diagnosis Lumbar drain and suboccipital wound revision   Does the patient have one of the following disease processes/diagnoses(primary or secondary)? General Surgery   Does the patient have Home health ordered? Yes   What is the Home health agency?  [facility]   Is there a DME ordered? No   Prep survey completed? Yes            BARBER DEL CID - Registered Nurse

## 2024-06-17 ENCOUNTER — HOME CARE VISIT (OUTPATIENT)
Dept: HOME HEALTH SERVICES | Facility: CLINIC | Age: 34
End: 2024-06-17
Payer: MEDICAID

## 2024-06-17 DIAGNOSIS — G93.89 CEREBELLAR MASS: Primary | ICD-10-CM

## 2024-06-17 DIAGNOSIS — Z98.890 STATUS POST CRANIOTOMY: ICD-10-CM

## 2024-06-18 ENCOUNTER — READMISSION MANAGEMENT (OUTPATIENT)
Dept: CALL CENTER | Facility: HOSPITAL | Age: 34
End: 2024-06-18
Payer: MEDICAID

## 2024-06-18 NOTE — OUTREACH NOTE
General Surgery Week 1 Survey      Flowsheet Row Responses   [facility] patient discharged from? [facility]   Does the patient have one of the following disease processes/diagnoses(primary or secondary)? General Surgery   Week 1 attempt successful? Yes   Call start time 1302   Call end time 1306   Discharge diagnosis Lumbar drain and suboccipital wound revision   If primary language is not English what is the name and relationship or agency of  used? [facility] [id number]   Meds reviewed with patient/caregiver? Yes   Is the patient having any side effects they believe may be caused by any medication additions or changes? No   Does the patient have all medications related to this admission filled (includes all antibiotics, pain medications, etc.) Yes   Is the patient taking all medications as directed (includes completed medication regime)? Yes   Does the patient have a follow up appointment scheduled with their surgeon? Yes   Has the patient kept scheduled appointments due by today? Yes   Psychosocial issues? No   What is the patient's perception of their health status since discharge? Improving   Nursing interventions Nurse provided patient education   Is the patient/caregiver able to teach back signs and symptoms of incisional infection? Increased redness, swelling or pain at the incisonal site, Increased drainage or bleeding, Incisional warmth, Fever   If the patient is a current smoker, are they able to teach back resources for cessation? Not a smoker   Is the patient/caregiver able to teach back the hierarchy of who to call/visit for symptoms/problems? PCP, Specialist, Home health nurse, Urgent Care, ED, 911 Yes   Week 1 call completed? Yes   Is the patient interested in additional calls from an ambulatory ? No   Would this patient benefit from a Referral to Amb Social Work? No   Wrap up additional comments Patient doing well, denies S/S of infection. Completed surgery follow up    Call end time [id number]            Borden T - Registered Nurse

## 2024-06-19 ENCOUNTER — OFFICE VISIT (OUTPATIENT)
Dept: NEUROSURGERY | Facility: CLINIC | Age: 34
End: 2024-06-19
Payer: MEDICAID

## 2024-06-19 VITALS — HEIGHT: 59 IN | WEIGHT: 207 LBS | BODY MASS INDEX: 41.73 KG/M2

## 2024-06-19 DIAGNOSIS — E66.01 CLASS 3 SEVERE OBESITY DUE TO EXCESS CALORIES WITH SERIOUS COMORBIDITY AND BODY MASS INDEX (BMI) OF 40.0 TO 44.9 IN ADULT: ICD-10-CM

## 2024-06-19 DIAGNOSIS — G93.89 CEREBELLAR MASS: Primary | ICD-10-CM

## 2024-06-19 DIAGNOSIS — Z51.89 VISIT FOR WOUND CHECK: ICD-10-CM

## 2024-06-19 DIAGNOSIS — Z75.8 SPANISH LANGUAGE INTERPRETER NEEDED: ICD-10-CM

## 2024-06-19 DIAGNOSIS — Z98.890 STATUS POST CRANIOTOMY: ICD-10-CM

## 2024-06-19 PROCEDURE — 1159F MED LIST DOCD IN RCRD: CPT | Performed by: NURSE PRACTITIONER

## 2024-06-19 PROCEDURE — 99024 POSTOP FOLLOW-UP VISIT: CPT | Performed by: NURSE PRACTITIONER

## 2024-06-19 PROCEDURE — 1160F RVW MEDS BY RX/DR IN RCRD: CPT | Performed by: NURSE PRACTITIONER

## 2024-06-19 NOTE — PROGRESS NOTES
Chief complaint:   Chief Complaint   Patient presents with    Wound Check     Pt is here for wound check.        services used during this encounter.   #[id number]    Subjective     HPI:   Interval History: Zina Armando is a 33 y.o.  female who presents today for post operative follow-up from a wound washout and revision performed on 6/5/2024 following a suboccipital craniotomy for tumor performed on 5/17/2024.    Ms. Armando has done well since we last saw her.  She recently completed antibiotic as prescribed by ID.  Her overall neck pain continues to improve.  Zina denies seizure-like activity, visual disturbances, headaches, difficulty with words or word finding, facial asymmetry or dysesthesias, unilateral weakness, difficulty with gait or balance, nausea, and vomiting.  She additionally denies fevers, chills, or concern for postoperative incision infection.  She currently rates the severity of her symptoms 0/10.    PFSH:  Past Medical History:   Diagnosis Date    Cerebellar mass 05/2024    Miscarriage 04/2024    Miscarriage 02/2023    Personal history of COVID-19 2021     Past Surgical History:   Procedure Laterality Date    CRANIOTOMY N/A 5/17/2024    Procedure: CRANIOTOMY SUBOCCIPITAL WITH STEALTH, LUMBAR DRAIN INSERTION EXTERNAL;  Surgeon: Rohith Alexander MD;  Location:  [facility];  Service: Neurosurgery;  Laterality: N/A;    INCISION AND DRAINAGE OF WOUND N/A 6/5/2024    Procedure: Lumbar Drain and Suboccipital Wound revision, possible removal of bone, possible revision of dural graft. HOLD ANTIBIOTICS UNTIL CSF OBTAINED;  Surgeon: Rohith Alexander MD;  Location:  [facility] OR;  Service: Neurosurgery;  Laterality: N/A;    LUMBAR DRAIN INSERTION EXTERNAL N/A 5/17/2024    Procedure: LUMBAR DRAIN INSERTION EXTERNAL;  Surgeon: Rohith Alexander MD;  Location:  [facility];  Service: Neurosurgery;  Laterality: N/A;    LUMBAR DRAIN INSERTION EXTERNAL N/A 6/5/2024     "Procedure: LUMBAR DRAIN INSERTION EXTERNAL;  Surgeon: Rohith Alexander MD;  Location: [facility];  Service: Neurosurgery;  Laterality: N/A;    NO PAST SURGERIES       Objective      Current Outpatient Medications   Medication Sig Dispense Refill    butalbital-acetaminophen-caffeine (FIORICET, ESGIC) -40 MG per tablet Take 1 tablet by mouth Every 6 (Six) Hours As Needed for Headache.      cefdinir (OMNICEF) 300 MG capsule Take 1 capsule by mouth Every 12 (Twelve) Hours for 7 days. Indications: Infection of the Skin and/or Soft Tissue 14 capsule 0    cyclobenzaprine (FLEXERIL) 5 MG tablet Take 1 tablet by mouth 3 (Three) Times a Day As Needed for Muscle Spasms. 60 tablet 0    levETIRAcetam (KEPPRA) 500 MG tablet Take 1 tablet by mouth Every 12 (Twelve) Hours. 40 tablet 0    mupirocin (BACTROBAN) 2 % ointment Apply 1 Application topically to the appropriate area as directed Every 12 (Twelve) Hours. 22 g 0    naloxone (NARCAN) 4 MG/0.1ML nasal spray Call 911. Don't prime. [place] in 1 nostril for overdose. Repeat in 2-3 minutes in other nostril if no or minimal breathing/responsiveness. 2 each 0    oxyCODONE (Roxicodone) 5 MG immediate release tablet Take 1 tablet by mouth Every 6 (Six) Hours As Needed for Moderate Pain. 12 tablet 0     No current facility-administered medications for this visit.     Vital Signs  Ht 149.9 cm (59.02\")   Wt 93.9 kg (207 lb)   LMP 05/18/2024 (Approximate)   BMI 41.78 kg/m²   Physical Exam  Vitals and nursing note reviewed.   Constitutional:       General: She is not in acute distress.     Appearance: Normal appearance. She is well-developed and well-groomed. She is morbidly obese. She is not ill-appearing, toxic-appearing or diaphoretic.      Comments: BMI 41.78   HENT:      Head: Normocephalic and atraumatic.      Right Ear: Hearing normal.      Left Ear: Hearing normal.   Eyes:      Extraocular Movements: EOM normal.      Conjunctiva/sclera: Conjunctivae normal.      Pupils: " Pupils are equal, round, and reactive to light.   Neck:      Trachea: Trachea normal.     Cardiovascular:      Rate and Rhythm: Normal rate and regular rhythm.   Pulmonary:      Effort: Pulmonary effort is normal. No tachypnea, bradypnea, accessory muscle usage or respiratory distress.   Abdominal:      Palpations: Abdomen is soft.   Musculoskeletal:      Cervical back: No edema or erythema.   Skin:     General: Skin is warm and dry.   Neurological:      Mental Status: She is alert and oriented to person, place, and time.      GCS: GCS eye subscore is 4. GCS verbal subscore is 5. GCS motor subscore is 6.      Gait: Gait is intact.      Deep Tendon Reflexes:      Reflex Scores:       Tricep reflexes are 2+ on the right side and 2+ on the left side.       Bicep reflexes are 2+ on the right side and 2+ on the left side.       Brachioradialis reflexes are 2+ on the right side and 2+ on the left side.       Patellar reflexes are 2+ on the right side and 2+ on the left side.       Achilles reflexes are 2+ on the right side and 2+ on the left side.  Psychiatric:         Speech: Speech normal.         Behavior: Behavior normal. Behavior is cooperative.       Neurologic Exam     Mental Status   Oriented to person, place, and time.   Attention: normal. Concentration: normal.   Speech: speech is normal   Level of consciousness: alert    Cranial Nerves     CN II   Visual fields full to confrontation.     CN III, IV, VI   Pupils are equal, round, and reactive to light.  Extraocular motions are normal.     CN V   Facial sensation intact.     CN VII   Facial expression full, symmetric.     CN VIII   CN VIII normal.     CN IX, X   CN IX normal.     CN XI   CN XI normal.     Motor Exam   Right arm tone: normal  Left arm tone: normal  Right arm pronator drift: absent  Left arm pronator drift: absent  Right leg tone: normal  Left leg tone: normal    Strength   Right deltoid: 5/5  Left deltoid: 5/5  Right biceps: 5/5  Left biceps:  5/5  Right triceps: 5/5  Left triceps: 5/5  Right wrist extension: 5/5  Left wrist extension: 5/5  Right iliopsoas: 5/5  Left iliopsoas: 5/5  Right quadriceps: 5/5  Left quadriceps: 5/5  Right anterior tibial: 5/5  Left anterior tibial: 5/5  Right gastroc: 5/5  Left gastroc: 5/5  No dysmetria  Right EHL 5/5  Left EHL 5/5       Sensory Exam   Right arm light touch: normal  Left arm light touch: normal  Right leg light touch: normal  Left leg light touch: normal    Gait, Coordination, and Reflexes     Gait  Gait: normal    Tremor   Resting tremor: absent  Intention tremor: absent  Action tremor: absent    Reflexes   Right brachioradialis: 2+  Left brachioradialis: 2+  Right biceps: 2+  Left biceps: 2+  Right triceps: 2+  Left triceps: 2+  Right patellar: 2+  Left patellar: 2+  Right achilles: 2+  Left achilles: 2+  Right plantar: normal  Left plantar: normal  Right Crockett: absent  Left Crockett: absent  Right ankle clonus: absent  Left ankle clonus: absent  Right pendular knee jerk: absent  Left pendular knee jerk: absent    Incision: WOUND IMAGE - SP wound revision (06/19/2024)   (Consent to obtain photo of postoperative site for documentation purposes only obtained verbally by Ms. Armando)    Results Review:   MRI Brain With & Without Contrast    Result Date: 6/5/2024   1.  Recently resected fourth ventricle epidermoid cyst. There is a 1.9 cm rounded focus of enhancement in the paramedian right cerebellar hemisphere which I believe is a focus of subacute ischemia. This same area demonstrated diffusion restriction on the immediate postoperative MRI. After accounting for this focus of cerebellar enhancement, I do not see any strong evidence for CNS infection. 2.  There is a 1.6 cm fluid collection along the surgical track projecting in the subcutaneous fat (axial T2 image #3). This demonstrates facilitated diffusion with high ADC signal and does not image like an organized soft tissue abscess collection based on  "diffusion signal.  This report was signed and finalized on 6/5/2024 2:23 PM by Dr Ho Burleson.       SURGICAL PATHOLOGY RESULTS  Lab Results   Component Value Date    FINALDX  06/05/2024     Suboccipital skin, excision:  Consistent with ruptured follicular infundibular cyst with exuberant inflammation extending into underlying fat.      INTRAOP  05/17/2024       Specimen: Brain tumor, biopsy  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  Reported to Dr. Alexander on 5/17/2024 at 11:20 CDT by Itzel Sidhu MD.      GROSSDES  06/05/2024     1. Head.   Received in a formalin filled container labeled with the patient's name, date of birth, and \"suboccipital tissue\".  The specimen consists of 3 skin covered tissue fragments ranging from 0.8 x 0.5 cm 4.8 x 0.7 cm and averaging 0.8 cm in depth.  The skin surface is marked by a possible raised scar measuring 3.5 cm in length by 0.3 cm in diameter.  The skin surface on the smaller fragments is red-brown, erythematous and dusky.  The cut surfaces show yellow-red fat necrosis with blood clot.  Representative sections of each fragment are submitted in block 1A.        MICRO  06/05/2024     Sections show a biopsy of skin with subcutaneous tissue.  The epidermis is benign.  There is edema and patchy inflammation in the dermis, and a dilated hair follicle extends downward into the tissue with associated acute inflammation.  In the underlying fat, there is neutrophilic inflammation and fat necrosis along with scattered multinucleated histiocytes.  Evidence of malignancy is not identified.           Assessment/Plan:   Status post wound washout and revision (6/5/2024)  Cultures positive for Serratia marcescens   Status post suboccipital craniotomy for tumor (5/17/2024)  Zina Armando is a 33 y.o. non-English-speaking  female whom presents today for follow-up from a wound washout and revision performed on 6/5/2024 following a suboccipital craniotomy for " tumor performed on 5/17/2024.  Ms. Armando has done well since we last saw her.  She recently completed antibiotic as prescribed, however does not currently have a follow-up appointment with ID.  For continuity of care, ambulatory referral to ID, Dr. Carl Harper has been placed.  Otherwise, her symptoms are stable and improving.  Her postoperative incision is clean, dry, and intact.  8 posterior cervical retention sutures and 2 staples to the lower lumbar spine were removed.   Ms. Armando tolerated this well.  We discussed the signs and symptoms of a soft tissue infection and I recommended she call immediately for any concerns.  I would like her to return as previously scheduled with Dr. Alexander on 7/3/2024.  Ms. Armando knows she may contact the neurosurgical clinic to return sooner for any new or additional concerns and agrees to this plan of care.    Class 3 obesity (BMI >= 40) due to excessive calories without serious comorbidities BMI of 40.0-44.9 in adult  Body mass index is 41.78 kg/m². Information on the DASH diet provided in the AVS.  We will continue to provided diet and exercise information with the goal of weight loss at each scheduled appointment.     Diagnoses and all orders for this visit:    1. Cerebellar mass (Primary)    2. Status post craniotomy  -     Ambulatory Referral to Infectious Disease    3. Visit for wound check  -     Ambulatory Referral to Infectious Disease    4. Class 3 severe obesity due to excess calories with serious comorbidity and body mass index (BMI) of 40.0 to 44.9 in adult    5. [place]  needed      Return for KEEP SCHEDULED APPT WITH DR. ALEXANDER.    I discussed the patients findings and my recommendations with patient    TERRENCE Gutiérrez

## 2024-06-19 NOTE — PATIENT INSTRUCTIONS
"DASH Eating Plan  DASH stands for Dietary Approaches to Stop Hypertension. The DASH eating plan is a healthy eating plan that has been shown to:  Lower high blood pressure (hypertension).  Reduce your risk for type 2 diabetes, heart disease, and stroke.  Help with weight loss.  What are tips for following this plan?  Reading food labels  Check food labels for the amount of salt (sodium) per serving. Choose foods with less than 5 percent of the Daily Value (DV) of sodium. In general, foods with less than 300 milligrams (mg) of sodium per serving fit into this eating plan.  To find whole grains, look for the word \"whole\" as the first word in the ingredient list.  Shopping  Buy products labeled as \"low-sodium\" or \"no salt added.\"  Buy fresh foods. Avoid canned foods and pre-made or frozen meals.  Cooking  Try not to add salt when you cook. Use salt-free seasonings or herbs instead of table salt or sea salt. Check with your health care provider or pharmacist before using salt substitutes.  Do not bishop foods. Cook foods in healthy ways, such as baking, boiling, grilling, roasting, or broiling.  Cook using oils that are good for your heart. These include olive, canola, avocado, soybean, and sunflower oil.  Meal planning    Eat a balanced diet. This should include:  4 or more servings of fruits and 4 or more servings of vegetables each day. Try to fill half of your plate with fruits and vegetables.  6-8 servings of whole grains each day.  6 or less servings of lean meat, poultry, or fish each day. 1 oz is 1 serving. A 3 oz (85 g) serving of meat is about the same size as the palm of your hand. One egg is 1 oz (28 g).  2-3 servings of low-fat dairy each day. One serving is 1 cup (237 mL).  1 serving of nuts, seeds, or beans 5 times each week.  2-3 servings of heart-healthy fats. Healthy fats called omega-3 fatty acids are found in foods such as walnuts, flaxseeds, fortified milks, and eggs. These fats are also found in " cold-water fish, such as sardines, salmon, and mackerel.  Limit how much you eat of:  Canned or prepackaged foods.  Food that is high in trans fat, such as fried foods.  Food that is high in saturated fat, such as fatty meat.  Desserts and other sweets, sugary drinks, and other foods with added sugar.  Full-fat dairy products.  Do not salt foods before eating.  Do not eat more than 4 egg yolks a week.  Try to eat at least 2 vegetarian meals a week.  Eat more home-cooked food and less restaurant, buffet, and fast food.  Lifestyle  When eating at a restaurant, ask if your food can be made with less salt or no salt.  If you drink alcohol:  Limit how much you have to:  0-1 drink a day if you are female.  0-2 drinks a day if you are male.  Know how much alcohol is in your drink. In the [place] one drink is one 12 oz bottle of beer (355 mL), one 5 oz glass of wine (148 mL), or one 1½ oz glass of hard liquor (44 mL).  General information  Avoid eating more than 2,300 mg of salt a day. If you have hypertension, you may need to reduce your sodium intake to 1,500 mg a day.  Work with your provider to stay at a healthy body weight or lose weight. Ask what the best weight range is for you.  On most days of the week, get at least 30 minutes of exercise that causes your heart to beat faster. This may include walking, swimming, or biking.  Work with your provider or dietitian to adjust your eating plan to meet your specific calorie needs.  What foods should I eat?  Fruits  All fresh, dried, or frozen fruit. Canned fruits that are in their natural juice and do not have sugar added to them.  Vegetables  Fresh or frozen vegetables that are raw, steamed, roasted, or grilled. Low-sodium or reduced-sodium tomato and vegetable juice. Low-sodium or reduced-sodium tomato sauce and tomato paste. Low-sodium or reduced-sodium canned vegetables.  Grains  Whole-grain or whole-wheat bread. Whole-grain or whole-wheat pasta. Brown rice. Oatmeal.  Quinoa. Bulgur. Whole-grain and low-sodium cereals. Abi bread. Low-fat, low-sodium crackers. Whole-wheat flour tortillas.  Meats and other proteins  Skinless chicken or turkey. Ground chicken or turkey. Pork with fat trimmed off. Fish and seafood. Egg whites. Dried beans, peas, or lentils. Unsalted nuts, nut butters, and seeds. Unsalted canned beans. Lean cuts of beef with fat trimmed off. Low-sodium, lean precooked or cured meat, such as sausages or meat loaves.  Dairy  Low-fat (1%) or fat-free (skim) milk. Reduced-fat, low-fat, or fat-free cheeses. Nonfat, low-sodium ricotta or cottage cheese. Low-fat or nonfat yogurt. Low-fat, low-sodium cheese.  Fats and oils  Soft margarine without trans fats. Vegetable oil. Reduced-fat, low-fat, or light mayonnaise and salad dressings (reduced-sodium). Canola, safflower, olive, avocado, soybean, and sunflower oils. Avocado.  Seasonings and condiments  Herbs. Spices. Seasoning mixes without salt.  Other foods  Unsalted popcorn and pretzels. Fat-free sweets.  The items listed above may not be all the foods and drinks you can have. Talk to a dietitian to learn more.  What foods should I avoid?  Fruits  Canned fruit in a light or heavy syrup. Fried fruit. Fruit in cream or butter sauce.  Vegetables  Creamed or fried vegetables. Vegetables in a cheese sauce. Regular canned vegetables that are not marked as low-sodium or reduced-sodium. Regular canned tomato sauce and paste that are not marked as low-sodium or reduced-sodium. Regular tomato and vegetable juices that are not marked as low-sodium or reduced-sodium. Pickles. Olives.  Grains  Baked goods made with fat, such as croissants, muffins, or some breads. Dry pasta or rice meal packs.  Meats and other proteins  Fatty cuts of meat. Ribs. Fried meat. Plaza. Bologna, salami, and other precooked or cured meats, such as sausages or meat loaves, that are not lean and low in sodium. Fat from the back of a pig (fatback). Viry.  Salted nuts and seeds. Canned beans with added salt. Canned or smoked fish. Whole eggs or egg yolks. Chicken or turkey with skin.  Dairy  Whole or 2% milk, cream, and half-and-half. Whole or full-fat cream cheese. Whole-fat or sweetened yogurt. Full-fat cheese. Nondairy creamers. Whipped toppings. Processed cheese and cheese spreads.  Fats and oils  Butter. Stick margarine. Lard. Shortening. Ghee. Plaza fat. Tropical oils, such as coconut, palm kernel, or palm oil.  Seasonings and condiments  Onion salt, garlic salt, seasoned salt, table salt, and sea salt. Worcestershire sauce. Tartar sauce. Barbecue sauce. Teriyaki sauce. Soy sauce, including reduced-sodium soy sauce. Steak sauce. Canned and packaged gravies. Fish sauce. Oyster sauce. Cocktail sauce. Store-bought horseradish. Ketchup. Mustard. Meat flavorings and tenderizers. Bouillon cubes. Hot sauces. Pre-made or packaged marinades. Pre-made or packaged taco seasonings. Relishes. Regular salad dressings.  Other foods  Salted popcorn and pretzels.  The items listed above may not be all the foods and drinks you should avoid. Talk to a dietitian to learn more.  Where to find more information  National Heart, Lung, and Blood [place] (NHLBI): nhlbi.nih.gov  American Heart Association (AHA): heart.org  Academy of Nutrition and Dietetics: eatright.org  National Kidney Foundation (NKF): kidney.org  This information is not intended to replace advice given to you by your health care provider. Make sure you discuss any questions you have with your health care provider.  Document Revised: 01/04/2024 Document Reviewed: 01/04/2024  Elsevier Patient Education © 2024 Elsevier Inc.

## 2024-06-27 ENCOUNTER — READMISSION MANAGEMENT (OUTPATIENT)
Dept: CALL CENTER | Facility: HOSPITAL | Age: 34
End: 2024-06-27
Payer: MEDICAID

## 2024-06-27 NOTE — OUTREACH NOTE
General Surgery Week 2 Survey      Flowsheet Row Responses   [facility] patient discharged from? [place]   Does the patient have one of the following disease processes/diagnoses(primary or secondary)? General Surgery   Week 2 attempt successful? Yes   Call start time 1116   Call end time 1119   Discharge diagnosis Lumbar drain and suboccipital wound revision   Meds reviewed with patient/caregiver? Yes   Is the patient taking all medications as directed (includes completed medication regime)? Yes   Does the patient have a follow up appointment scheduled with their surgeon? Yes   Has the patient kept scheduled appointments due by today? Yes  [Neuro sx FU apt on 6/19/24]   Comments last wed   Home health comments HH is complete per pt   What is the patient's perception of their health status since discharge? Improving   Is the patient/caregiver able to teach back signs and symptoms of incisional infection? Increased redness, swelling or pain at the incisonal site, Increased drainage or bleeding, Pus or odor from incision   Week 2 call completed? Yes   Graduated Yes   Graduated/Revoked comments Pt states she is doing well-has had a FU apt with her neurosurgeon's office (6/19/24)   Call end time 1119            Tamara LAUREANO - Registered Nurse

## 2024-07-03 ENCOUNTER — HOSPITAL ENCOUNTER (OUTPATIENT)
Dept: MRI IMAGING | Facility: HOSPITAL | Age: 34
Discharge: HOME | End: 2024-07-03
Admitting: NURSE PRACTITIONER

## 2024-07-03 ENCOUNTER — OFFICE VISIT (OUTPATIENT)
Dept: NEUROSURGERY | Facility: CLINIC | Age: 34
End: 2024-07-03

## 2024-07-03 VITALS — HEIGHT: 59 IN | WEIGHT: 217 LBS | BODY MASS INDEX: 43.75 KG/M2

## 2024-07-03 DIAGNOSIS — Z75.8 SPANISH LANGUAGE INTERPRETER NEEDED: ICD-10-CM

## 2024-07-03 DIAGNOSIS — G93.89 CEREBELLAR MASS: ICD-10-CM

## 2024-07-03 DIAGNOSIS — Z98.890 STATUS POST CRANIOTOMY: ICD-10-CM

## 2024-07-03 DIAGNOSIS — E66.01 CLASS 3 SEVERE OBESITY DUE TO EXCESS CALORIES WITH SERIOUS COMORBIDITY AND BODY MASS INDEX (BMI) OF 40.0 TO 44.9 IN ADULT: ICD-10-CM

## 2024-07-03 DIAGNOSIS — G93.0 EPIDERMOID CYST OF BRAIN: ICD-10-CM

## 2024-07-03 DIAGNOSIS — Z98.890 STATUS POST CRANIOTOMY: Primary | ICD-10-CM

## 2024-07-03 PROCEDURE — 0 GADOBENATE DIMEGLUMINE 529 MG/ML SOLUTION: Performed by: NURSE PRACTITIONER

## 2024-07-03 PROCEDURE — A9577 INJ MULTIHANCE: HCPCS | Performed by: NURSE PRACTITIONER

## 2024-07-03 PROCEDURE — 70553 MRI BRAIN STEM W/O & W/DYE: CPT

## 2024-07-03 RX ADMIN — GADOBENATE DIMEGLUMINE 20 ML: 529 INJECTION, SOLUTION INTRAVENOUS at 10:56

## 2024-07-03 NOTE — PROGRESS NOTES
Chief complaint:   Chief Complaint   Patient presents with    Brain Tumor     Patient here for follow up with MRI today for review. She had a crani on 5/17/24 and a lumbar drain with wound revision on 6/5/24    * used for registration:  12:11pm - 12:19pm  Champ #[id number]  * used for visit:  12:22pm-12:35pm  #[id number]       services used during this encounter.   #[id number]    Subjective     HPI:   Interval History: Zina Armando is a 33 y.o.  female who presents today for post operative follow-up from a wound washout and revision performed on 6/5/2024 following a suboccipital craniotomy for tumor performed on 5/17/2024.    Ms. Armando has done well since we last saw her.  She recently completed antibiotic as prescribed by ID.  Her overall neck pain continues to improve.  Zina denies seizure-like activity, visual disturbances, headaches, difficulty with words or word finding, facial asymmetry or dysesthesias, unilateral weakness, difficulty with gait or balance, nausea, and vomiting.  She additionally denies fevers, chills, or concern for postoperative incision infection.  She currently rates the severity of her symptoms 0/10.    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 HOLD ANTIBIOTICS UNTIL CSF OBTAINED;  Surgeon: Rohith Alexander MD;  Location: [facility] OR;  Service: Neurosurgery;  Laterality: N/A;    LUMBAR DRAIN  "INSERTION EXTERNAL N/A 5/17/2024    Procedure: LUMBAR DRAIN INSERTION EXTERNAL;  Surgeon: Rohith Alexander MD;  Location:  [facility] OR;  Service: Neurosurgery;  Laterality: N/A;    LUMBAR DRAIN INSERTION EXTERNAL N/A 6/5/2024    Procedure: LUMBAR DRAIN INSERTION EXTERNAL;  Surgeon: Rohith Alexander MD;  Location:  [facility] OR;  Service: Neurosurgery;  Laterality: N/A;    NO PAST SURGERIES       Objective      Current Outpatient Medications   Medication Sig Dispense Refill    butalbital-acetaminophen-caffeine (FIORICET, ESGIC) -40 MG per tablet Take 1 tablet by mouth Every 6 (Six) Hours As Needed for Headache. (Patient not taking: Reported on 7/3/2024)      cyclobenzaprine (FLEXERIL) 5 MG tablet Take 1 tablet by mouth 3 (Three) Times a Day As Needed for Muscle Spasms. (Patient not taking: Reported on 7/3/2024) 60 tablet 0    levETIRAcetam (KEPPRA) 500 MG tablet Take 1 tablet by mouth Every 12 (Twelve) Hours. (Patient not taking: Reported on 7/3/2024) 40 tablet 0    mupirocin (BACTROBAN) 2 % ointment Apply 1 Application topically to the appropriate area as directed Every 12 (Twelve) Hours. (Patient not taking: Reported on 7/3/2024) 22 g 0    naloxone (NARCAN) 4 MG/0.1ML nasal spray Call 911. Don't prime. [place] in 1 nostril for overdose. Repeat in 2-3 minutes in other nostril if no or minimal breathing/responsiveness. (Patient not taking: Reported on 7/3/2024) 2 each 0    oxyCODONE (Roxicodone) 5 MG immediate release tablet Take 1 tablet by mouth Every 6 (Six) Hours As Needed for Moderate Pain. (Patient not taking: Reported on 7/3/2024) 12 tablet 0     No current facility-administered medications for this visit.     Vital Signs  Ht 149.9 cm (59\")   Wt 98.4 kg (217 lb)   BMI 43.83 kg/m²   Physical Exam  Vitals and nursing note reviewed.   Constitutional:       General: She is not in acute distress.     Appearance: Normal appearance. She is well-developed and well-groomed. She is morbidly obese. She is " not ill-appearing, toxic-appearing or diaphoretic.      Comments: BMI 41.78   HENT:      Head: Normocephalic and atraumatic.      Right Ear: Hearing normal.      Left Ear: Hearing normal.   Eyes:      Extraocular Movements: EOM normal.      Conjunctiva/sclera: Conjunctivae normal.      Pupils: Pupils are equal, round, and reactive to light.   Neck:      Trachea: Trachea normal.     Cardiovascular:      Rate and Rhythm: Normal rate and regular rhythm.   Pulmonary:      Effort: Pulmonary effort is normal. No tachypnea, bradypnea, accessory muscle usage or respiratory distress.   Abdominal:      Palpations: Abdomen is soft.   Musculoskeletal:      Cervical back: No edema or erythema.   Skin:     General: Skin is warm and dry.   Neurological:      Mental Status: She is alert and oriented to person, place, and time.      GCS: GCS eye subscore is 4. GCS verbal subscore is 5. GCS motor subscore is 6.      Gait: Gait is intact.      Deep Tendon Reflexes:      Reflex Scores:       Tricep reflexes are 2+ on the right side and 2+ on the left side.       Bicep reflexes are 2+ on the right side and 2+ on the left side.       Brachioradialis reflexes are 2+ on the right side and 2+ on the left side.       Patellar reflexes are 2+ on the right side and 2+ on the left side.       Achilles reflexes are 2+ on the right side and 2+ on the left side.  Psychiatric:         Speech: Speech normal.         Behavior: Behavior normal. Behavior is cooperative.       Neurologic Exam     Mental Status   Oriented to person, place, and time.   Attention: normal. Concentration: normal.   Speech: speech is normal   Level of consciousness: alert    Cranial Nerves     CN II   Visual fields full to confrontation.     CN III, IV, VI   Pupils are equal, round, and reactive to light.  Extraocular motions are normal.     CN V   Facial sensation intact.     CN VII   Facial expression full, symmetric.     CN VIII   CN VIII normal.     CN IX, X   CN IX  normal.     CN XI   CN XI normal.     Motor Exam   Right arm tone: normal  Left arm tone: normal  Right arm pronator drift: absent  Left arm pronator drift: absent  Right leg tone: normal  Left leg tone: normal    Strength   Right deltoid: 5/5  Left deltoid: 5/5  Right biceps: 5/5  Left biceps: 5/5  Right triceps: 5/5  Left triceps: 5/5  Right wrist extension: 5/5  Left wrist extension: 5/5  Right iliopsoas: 5/5  Left iliopsoas: 5/5  Right quadriceps: 5/5  Left quadriceps: 5/5  Right anterior tibial: 5/5  Left anterior tibial: 5/5  Right gastroc: 5/5  Left gastroc: 5/5  No dysmetria  Right EHL 5/5  Left EHL 5/5       Sensory Exam   Right arm light touch: normal  Left arm light touch: normal  Right leg light touch: normal  Left leg light touch: normal    Gait, Coordination, and Reflexes     Gait  Gait: normal    Tremor   Resting tremor: absent  Intention tremor: absent  Action tremor: absent    Reflexes   Right brachioradialis: 2+  Left brachioradialis: 2+  Right biceps: 2+  Left biceps: 2+  Right triceps: 2+  Left triceps: 2+  Right patellar: 2+  Left patellar: 2+  Right achilles: 2+  Left achilles: 2+  Right plantar: normal  Left plantar: normal  Right Crockett: absent  Left Crockett: absent  Right ankle clonus: absent  Left ankle clonus: absent  Right pendular knee jerk: absent  Left pendular knee jerk: absent    Incision: CDI    Results Review:   MRI Brain With & Without Contrast    Result Date: 6/5/2024   1.  Recently resected fourth ventricle epidermoid cyst. There is a 1.9 cm rounded focus of enhancement in the paramedian right cerebellar hemisphere which I believe is a focus of subacute ischemia. This same area demonstrated diffusion restriction on the immediate postoperative MRI. After accounting for this focus of cerebellar enhancement, I do not see any strong evidence for CNS infection. 2.  There is a 1.6 cm fluid collection along the surgical track projecting in the subcutaneous fat (axial T2 image #3).  "This demonstrates facilitated diffusion with high ADC signal and does not image like an organized soft tissue abscess collection based on diffusion signal.  This report was signed and finalized on 6/5/2024 2:23 PM by Dr Ho Burleson.       SURGICAL PATHOLOGY RESULTS  Lab Results   Component Value Date    FINALDX  06/05/2024     Suboccipital skin, excision:  Consistent with ruptured follicular infundibular cyst with exuberant inflammation extending into underlying fat.      INTRAOP  05/17/2024       Specimen: Brain tumor, biopsy  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  Reported to Dr. Alexander on 5/17/2024 at 11:20 CDT by Itzel Sidhu MD.      GROSSDES  06/05/2024     1. Head.   Received in a formalin filled container labeled with the patient's name, date of birth, and \"suboccipital tissue\".  The specimen consists of 3 skin covered tissue fragments ranging from 0.8 x 0.5 cm 4.8 x 0.7 cm and averaging 0.8 cm in depth.  The skin surface is marked by a possible raised scar measuring 3.5 cm in length by 0.3 cm in diameter.  The skin surface on the smaller fragments is red-brown, erythematous and dusky.  The cut surfaces show yellow-red fat necrosis with blood clot.  Representative sections of each fragment are submitted in block 1A.        MICRO  06/05/2024     Sections show a biopsy of skin with subcutaneous tissue.  The epidermis is benign.  There is edema and patchy inflammation in the dermis, and a dilated hair follicle extends downward into the tissue with associated acute inflammation.  In the underlying fat, there is neutrophilic inflammation and fat necrosis along with scattered multinucleated histiocytes.  Evidence of malignancy is not identified.           Assessment/Plan:   Status post wound washout and revision (6/5/2024)  Cultures positive for Serratia marcescens   Status post suboccipital craniotomy for tumor (5/17/2024)  Zina Armando is a 33 y.o. non-English-speaking  " female whom presents today for follow-up from a wound washout and revision performed on 6/5/2024 following a suboccipital craniotomy for tumor performed on 5/17/2024.  Ms. Armando has done well since we last saw her.  She recently completed antibiotic as prescribed.  Currently she has no signs of infection remains neurologically intact.  Her MRI today shows no evidence of persistent infection.  Her incision is clean dry and intact.  I would like to see her back in 6 months with an MRI with and without contrast.  If everything looks good at this point then she may be discharged to follow-up as needed.      Class 3 obesity (BMI >= 40) due to excessive calories without serious comorbidities BMI of 40.0-44.9 in adult  Body mass index is 43.83 kg/m². Information on the DASH diet provided in the AVS.  We will continue to provided diet and exercise information with the goal of weight loss at each scheduled appointment.     Diagnoses and all orders for this visit:    1. Status post craniotomy (Primary)  -     MRI Brain With & Without Contrast; Future    2. Uzbek  needed    3. Class 3 severe obesity due to excess calories with serious comorbidity and body mass index (BMI) of 40.0 to 44.9 in adult    4. Epidermoid cyst of brain  -     MRI Brain With & Without Contrast; Future        Return in about 6 months (around 1/3/2025) for FOLLOW UP W/ SCAN-DR PARKS.    I discussed the patients findings and my recommendations with patient    Rohith Parks MD

## 2024-07-03 NOTE — PROGRESS NOTES
[facility] [place] [place]  : [id number] [place]    Patient:  Zina Armando 33 y.o. female  YOB: 1990  MRN: [id number]  Primary Care Physician: Provider, No Known  REFERRING PROVIDER: Dominguez Narvaez APRN    Chief Complaint  Status post craniotomy   >>> patient does not have any complaints       History of Present Illness  Zina Armando presents to [facility] for follow-up after hospitalization for postcraniotomy superficial infection with Serratia marcescens.  Patient back to surgery for drainage of infection, received IV antibiotics and was subsequently discharged on oral antibiotics.    She reports a little bit of numbness and tenderness to the right of the incision however no significant pain.  She has had no fevers, chills, drainage from wound          Patient last saw Rohith Alexander MD on 7/3/2024     Next appointment is on 1/6/2025            Patient last saw TERRENCE Allen on 6/19/2024     Next appointment is on 7/3/2024           Current Outpatient Medications on File Prior to Visit   Medication Sig    butalbital-acetaminophen-caffeine (FIORICET, ESGIC) -40 MG per tablet Take 1 tablet by mouth Every 6 (Six) Hours As Needed for Headache. (Patient not taking: Reported on 7/9/2024)    cyclobenzaprine (FLEXERIL) 5 MG tablet Take 1 tablet by mouth 3 (Three) Times a Day As Needed for Muscle Spasms. (Patient not taking: Reported on 7/9/2024)    levETIRAcetam (KEPPRA) 500 MG tablet Take 1 tablet by mouth Every 12 (Twelve) Hours. (Patient not taking: Reported on 7/9/2024)    mupirocin (BACTROBAN) 2 % ointment Apply 1 Application topically to the appropriate area as directed Every 12 (Twelve) Hours. (Patient not taking: Reported on 7/9/2024)    naloxone (NARCAN) 4 MG/0.1ML nasal spray Call 911. Don't prime. [place] in 1 nostril for overdose. Repeat in 2-3 minutes in other nostril if no or minimal breathing/responsiveness.  "(Patient not taking: Reported on 7/9/2024)    oxyCODONE (Roxicodone) 5 MG immediate release tablet Take 1 tablet by mouth Every 6 (Six) Hours As Needed for Moderate Pain. (Patient not taking: Reported on 7/3/2024)     No current facility-administered medications on file prior to visit.     No Known Allergies  Social History     Socioeconomic History    Marital status: Single   Tobacco Use    Smoking status: Never     Passive exposure: Never    Smokeless tobacco: Never   Vaping Use    Vaping status: Never Used   Substance and Sexual Activity    Alcohol use: No    Drug use: No    Sexual activity: Yes     Partners: Male     Birth control/protection: None       Venous Access Review  Line/IV site: No current IV Access    Antimicrobial Review  Currently on antibiotics/antifungals: no  Start Date of Therapy: cefdinir 6/12/2024  If therapy completed, date complete: 6/18/2024  Estimated end of treatment date (EOT): 6/18/2024    Objective   Vital Signs:  /82 (BP Location: Left arm, Patient Position: Sitting, Cuff Size: Adult)   Pulse 88   Temp 98.7 °F (37.1 °C) (Oral)   Ht 157.5 cm (62\")   Wt 99.8 kg (220 lb)   SpO2 98%   BMI 40.24 kg/m²   Estimated body mass index is 40.24 kg/m² as calculated from the following:    Height as of this encounter: 157.5 cm (62\").    Weight as of this encounter: 99.8 kg (220 lb).           Physical Exam  General: Patient was evaluated with the assistance of iPad [id number] [place] LEDBETTERENT: Sclera anicteric and noninjected  Neck: Posterior cervical incision clean dry and intact.  No drainage.  No erythema.  No tenderness to palpation around the incision.  Neuro: Alert and oriented, speech clear  Psych: Pleasant cooperative        DATA REVIEWED:  The following data was reviewed by: Violet Pandya MD on 07/09/2024:        Basic Metabolic Panel (06/08/2024 03:27)   CBC & Differential (06/08/2024 03:27)   Tissue Pathology Exam (06/05/2024 22:26)   Wound Culture - Wound, Head " (06/05/2024 21:50)   Sedimentation Rate (06/05/2024 12:48)   C-reactive Protein (06/05/2024 12:48)                     MRI Brain With & Without Contrast (06/05/2024 13:31)       IMPRESSION:     1.  Recently resected fourth ventricle epidermoid cyst. There is a 1.9  cm rounded focus of enhancement in the paramedian right cerebellar  hemisphere which I believe is a focus of subacute ischemia. This same  area demonstrated diffusion restriction on the immediate postoperative  MRI. After accounting for this focus of cerebellar enhancement, I do not  see any strong evidence for CNS infection.  2.  There is a 1.6 cm fluid collection along the surgical track  projecting in the subcutaneous fat (axial T2 image #3). This  demonstrates facilitated diffusion with high ADC signal and does not  image like an organized soft tissue abscess collection based on  diffusion signal.     This report was signed and finalized on 6/5/2024 2:23 PM by Dr Ho Burleson.             Reviewed note from [facility]   Progress Notes by Rohith Alexander MD (07/03/2024 11:15)                  Assessment and Plan   Diagnoses and all orders for this visit:    1. Superficial postoperative wound infection (Primary)    2. Serratia marcescens infection    3. Epidermoid cyst of brain          There are no Patient Instructions on file for this visit.          Follow Up   Return if symptoms worsen or fail to improve.  Patient was given instructions and counseling regarding her condition or for health maintenance advice. Please see specific information pulled into the AVS if appropriate.

## 2024-07-03 NOTE — PATIENT INSTRUCTIONS
"    DASH Eating Plan  DASH stands for Dietary Approaches to Stop Hypertension. The DASH eating plan is a healthy eating plan that has been shown to:  Lower high blood pressure (hypertension).  Reduce your risk for type 2 diabetes, heart disease, and stroke.  Help with weight loss.  What are tips for following this plan?  Reading food labels  Check food labels for the amount of salt (sodium) per serving. Choose foods with less than 5 percent of the Daily Value (DV) of sodium. In general, foods with less than 300 milligrams (mg) of sodium per serving fit into this eating plan.  To find whole grains, look for the word \"whole\" as the first word in the ingredient list.  Shopping  Buy products labeled as \"low-sodium\" or \"no salt added.\"  Buy fresh foods. Avoid canned foods and pre-made or frozen meals.  Cooking  Try not to add salt when you cook. Use salt-free seasonings or herbs instead of table salt or sea salt. Check with your health care provider or pharmacist before using salt substitutes.  Do not bishop foods. Cook foods in healthy ways, such as baking, boiling, grilling, roasting, or broiling.  Cook using oils that are good for your heart. These include olive, canola, avocado, soybean, and sunflower oil.  Meal planning    Eat a balanced diet. This should include:  4 or more servings of fruits and 4 or more servings of vegetables each day. Try to fill half of your plate with fruits and vegetables.  6-8 servings of whole grains each day.  6 or less servings of lean meat, poultry, or fish each day. 1 oz is 1 serving. A 3 oz (85 g) serving of meat is about the same size as the palm of your hand. One egg is 1 oz (28 g).  2-3 servings of low-fat dairy each day. One serving is 1 cup (237 mL).  1 serving of nuts, seeds, or beans 5 times each week.  2-3 servings of heart-healthy fats. Healthy fats called omega-3 fatty acids are found in foods such as walnuts, flaxseeds, fortified milks, and eggs. These fats are also found in " cold-water fish, such as sardines, salmon, and mackerel.  Limit how much you eat of:  Canned or prepackaged foods.  Food that is high in trans fat, such as fried foods.  Food that is high in saturated fat, such as fatty meat.  Desserts and other sweets, sugary drinks, and other foods with added sugar.  Full-fat dairy products.  Do not salt foods before eating.  Do not eat more than 4 egg yolks a week.  Try to eat at least 2 vegetarian meals a week.  Eat more home-cooked food and less restaurant, buffet, and fast food.  Lifestyle  When eating at a restaurant, ask if your food can be made with less salt or no salt.  If you drink alcohol:  Limit how much you have to:  0-1 drink a day if you are female.  0-2 drinks a day if you are male.  Know how much alcohol is in your drink. In the [place] one drink is one 12 oz bottle of beer (355 mL), one 5 oz glass of wine (148 mL), or one 1½ oz glass of hard liquor (44 mL).  General information  Avoid eating more than 2,300 mg of salt a day. If you have hypertension, you may need to reduce your sodium intake to 1,500 mg a day.  Work with your provider to stay at a healthy body weight or lose weight. Ask what the best weight range is for you.  On most days of the week, get at least 30 minutes of exercise that causes your heart to beat faster. This may include walking, swimming, or biking.  Work with your provider or dietitian to adjust your eating plan to meet your specific calorie needs.  What foods should I eat?  Fruits  All fresh, dried, or frozen fruit. Canned fruits that are in their natural juice and do not have sugar added to them.  Vegetables  Fresh or frozen vegetables that are raw, steamed, roasted, or grilled. Low-sodium or reduced-sodium tomato and vegetable juice. Low-sodium or reduced-sodium tomato sauce and tomato paste. Low-sodium or reduced-sodium canned vegetables.  Grains  Whole-grain or whole-wheat bread. Whole-grain or whole-wheat pasta. Brown rice. Oatmeal.  Quinoa. Bulgur. Whole-grain and low-sodium cereals. Abi bread. Low-fat, low-sodium crackers. Whole-wheat flour tortillas.  Meats and other proteins  Skinless chicken or turkey. Ground chicken or turkey. Pork with fat trimmed off. Fish and seafood. Egg whites. Dried beans, peas, or lentils. Unsalted nuts, nut butters, and seeds. Unsalted canned beans. Lean cuts of beef with fat trimmed off. Low-sodium, lean precooked or cured meat, such as sausages or meat loaves.  Dairy  Low-fat (1%) or fat-free (skim) milk. Reduced-fat, low-fat, or fat-free cheeses. Nonfat, low-sodium ricotta or cottage cheese. Low-fat or nonfat yogurt. Low-fat, low-sodium cheese.  Fats and oils  Soft margarine without trans fats. Vegetable oil. Reduced-fat, low-fat, or light mayonnaise and salad dressings (reduced-sodium). Canola, safflower, olive, avocado, soybean, and sunflower oils. Avocado.  Seasonings and condiments  Herbs. Spices. Seasoning mixes without salt.  Other foods  Unsalted popcorn and pretzels. Fat-free sweets.  The items listed above may not be all the foods and drinks you can have. Talk to a dietitian to learn more.  What foods should I avoid?  Fruits  Canned fruit in a light or heavy syrup. Fried fruit. Fruit in cream or butter sauce.  Vegetables  Creamed or fried vegetables. Vegetables in a cheese sauce. Regular canned vegetables that are not marked as low-sodium or reduced-sodium. Regular canned tomato sauce and paste that are not marked as low-sodium or reduced-sodium. Regular tomato and vegetable juices that are not marked as low-sodium or reduced-sodium. Pickles. Olives.  Grains  Baked goods made with fat, such as croissants, muffins, or some breads. Dry pasta or rice meal packs.  Meats and other proteins  Fatty cuts of meat. Ribs. Fried meat. Plaza. Bologna, salami, and other precooked or cured meats, such as sausages or meat loaves, that are not lean and low in sodium. Fat from the back of a pig (fatback). Viry.  Salted nuts and seeds. Canned beans with added salt. Canned or smoked fish. Whole eggs or egg yolks. Chicken or turkey with skin.  Dairy  Whole or 2% milk, cream, and half-and-half. Whole or full-fat cream cheese. Whole-fat or sweetened yogurt. Full-fat cheese. Nondairy creamers. Whipped toppings. Processed cheese and cheese spreads.  Fats and oils  Butter. Stick margarine. Lard. Shortening. Ghee. Plaza fat. Tropical oils, such as coconut, palm kernel, or palm oil.  Seasonings and condiments  Onion salt, garlic salt, seasoned salt, table salt, and sea salt. Worcestershire sauce. Tartar sauce. Barbecue sauce. Teriyaki sauce. Soy sauce, including reduced-sodium soy sauce. Steak sauce. Canned and packaged gravies. Fish sauce. Oyster sauce. Cocktail sauce. Store-bought horseradish. Ketchup. Mustard. Meat flavorings and tenderizers. Bouillon cubes. Hot sauces. Pre-made or packaged marinades. Pre-made or packaged taco seasonings. Relishes. Regular salad dressings.  Other foods  Salted popcorn and pretzels.  The items listed above may not be all the foods and drinks you should avoid. Talk to a dietitian to learn more.  Where to find more information  National Heart, Lung, and Blood [place] (NHLBI): nhlbi.nih.gov  American Heart Association (AHA): heart.org  Academy of Nutrition and Dietetics: eatright.org  National Kidney Foundation (NKF): kidney.org  This information is not intended to replace advice given to you by your health care provider. Make sure you discuss any questions you have with your health care provider.  Document Revised: 01/04/2024 Document Reviewed: 01/04/2024  Elsevier Patient Education © 2024 [employer]

## 2024-07-09 ENCOUNTER — OFFICE VISIT (OUTPATIENT)
Age: 34
End: 2024-07-09

## 2024-07-09 VITALS
SYSTOLIC BLOOD PRESSURE: 117 MMHG | WEIGHT: 220 LBS | HEART RATE: 88 BPM | TEMPERATURE: 98.7 F | BODY MASS INDEX: 40.48 KG/M2 | DIASTOLIC BLOOD PRESSURE: 82 MMHG | OXYGEN SATURATION: 98 % | HEIGHT: 62 IN

## 2024-07-09 DIAGNOSIS — A48.8 SERRATIA MARCESCENS INFECTION: ICD-10-CM

## 2024-07-09 DIAGNOSIS — T81.49XA SUPERFICIAL POSTOPERATIVE WOUND INFECTION: Primary | ICD-10-CM

## 2024-07-09 DIAGNOSIS — G93.0 EPIDERMOID CYST OF BRAIN: ICD-10-CM

## 2024-09-24 ENCOUNTER — TELEPHONE (OUTPATIENT)
Dept: NEUROSURGERY | Facility: CLINIC | Age: 34
End: 2024-09-24

## 2024-09-25 ENCOUNTER — TELEPHONE (OUTPATIENT)
Dept: VASCULAR SURGERY | Facility: CLINIC | Age: 34
End: 2024-09-25

## 2024-09-25 NOTE — TELEPHONE ENCOUNTER
Caller: ROBYN HERNANDEZ  Relationship to patient: SELF  Best call back number: [phone]   Service:[place]  Is  needs updated in registration: YES  Date of Appointment: 11-19-24  Time of Appointment:10:15

## 2024-10-03 ENCOUNTER — OFFICE VISIT (OUTPATIENT)
Dept: NEUROSURGERY | Facility: CLINIC | Age: 34
End: 2024-10-03
Payer: MEDICAID

## 2024-10-03 VITALS — WEIGHT: 220 LBS | BODY MASS INDEX: 40.48 KG/M2 | HEIGHT: 62 IN

## 2024-10-03 DIAGNOSIS — E66.01 CLASS 3 SEVERE OBESITY DUE TO EXCESS CALORIES WITHOUT SERIOUS COMORBIDITY WITH BODY MASS INDEX (BMI) OF 40.0 TO 44.9 IN ADULT: ICD-10-CM

## 2024-10-03 DIAGNOSIS — G93.0 EPIDERMOID CYST OF BRAIN: Primary | ICD-10-CM

## 2024-10-03 DIAGNOSIS — Z98.890 STATUS POST CRANIOTOMY: ICD-10-CM

## 2024-10-03 DIAGNOSIS — R20.3 HYPERESTHESIA: ICD-10-CM

## 2024-10-03 DIAGNOSIS — E66.813 CLASS 3 SEVERE OBESITY DUE TO EXCESS CALORIES WITHOUT SERIOUS COMORBIDITY WITH BODY MASS INDEX (BMI) OF 40.0 TO 44.9 IN ADULT: ICD-10-CM

## 2024-10-03 DIAGNOSIS — Z75.8 SPANISH LANGUAGE INTERPRETER NEEDED: ICD-10-CM

## 2024-10-03 NOTE — PATIENT INSTRUCTIONS
"DASH Eating Plan  DASH stands for Dietary Approaches to Stop Hypertension. The DASH eating plan is a healthy eating plan that has been shown to:  Lower high blood pressure (hypertension).  Reduce your risk for type 2 diabetes, heart disease, and stroke.  Help with weight loss.  What are tips for following this plan?  Reading food labels  Check food labels for the amount of salt (sodium) per serving. Choose foods with less than 5 percent of the Daily Value (DV) of sodium. In general, foods with less than 300 milligrams (mg) of sodium per serving fit into this eating plan.  To find whole grains, look for the word \"whole\" as the first word in the ingredient list.  Shopping  Buy products labeled as \"low-sodium\" or \"no salt added.\"  Buy fresh foods. Avoid canned foods and pre-made or frozen meals.  Cooking  Try not to add salt when you cook. Use salt-free seasonings or herbs instead of table salt or sea salt. Check with your health care provider or pharmacist before using salt substitutes.  Do not bishop foods. Cook foods in healthy ways, such as baking, boiling, grilling, roasting, or broiling.  Cook using oils that are good for your heart. These include olive, canola, avocado, soybean, and sunflower oil.  Meal planning    Eat a balanced diet. This should include:  4 or more servings of fruits and 4 or more servings of vegetables each day. Try to fill half of your plate with fruits and vegetables.  6-8 servings of whole grains each day.  6 or less servings of lean meat, poultry, or fish each day. 1 oz is 1 serving. A 3 oz (85 g) serving of meat is about the same size as the palm of your hand. One egg is 1 oz (28 g).  2-3 servings of low-fat dairy each day. One serving is 1 cup (237 mL).  1 serving of nuts, seeds, or beans 5 times each week.  2-3 servings of heart-healthy fats. Healthy fats called omega-3 fatty acids are found in foods such as walnuts, flaxseeds, fortified milks, and eggs. These fats are also found in " cold-water fish, such as sardines, salmon, and mackerel.  Limit how much you eat of:  Canned or prepackaged foods.  Food that is high in trans fat, such as fried foods.  Food that is high in saturated fat, such as fatty meat.  Desserts and other sweets, sugary drinks, and other foods with added sugar.  Full-fat dairy products.  Do not salt foods before eating.  Do not eat more than 4 egg yolks a week.  Try to eat at least 2 vegetarian meals a week.  Eat more home-cooked food and less restaurant, buffet, and fast food.  Lifestyle  When eating at a restaurant, ask if your food can be made with less salt or no salt.  If you drink alcohol:  Limit how much you have to:  0-1 drink a day if you are female.  0-2 drinks a day if you are male.  Know how much alcohol is in your drink. In the [place] one drink is one 12 oz bottle of beer (355 mL), one 5 oz glass of wine (148 mL), or one 1½ oz glass of hard liquor (44 mL).  General information  Avoid eating more than 2,300 mg of salt a day. If you have hypertension, you may need to reduce your sodium intake to 1,500 mg a day.  Work with your provider to stay at a healthy body weight or lose weight. Ask what the best weight range is for you.  On most days of the week, get at least 30 minutes of exercise that causes your heart to beat faster. This may include walking, swimming, or biking.  Work with your provider or dietitian to adjust your eating plan to meet your specific calorie needs.  What foods should I eat?  Fruits  All fresh, dried, or frozen fruit. Canned fruits that are in their natural juice and do not have sugar added to them.  Vegetables  Fresh or frozen vegetables that are raw, steamed, roasted, or grilled. Low-sodium or reduced-sodium tomato and vegetable juice. Low-sodium or reduced-sodium tomato sauce and tomato paste. Low-sodium or reduced-sodium canned vegetables.  Grains  Whole-grain or whole-wheat bread. Whole-grain or whole-wheat pasta. Brown rice. Oatmeal.  Quinoa. Bulgur. Whole-grain and low-sodium cereals. Abi bread. Low-fat, low-sodium crackers. Whole-wheat flour tortillas.  Meats and other proteins  Skinless chicken or turkey. Ground chicken or turkey. Pork with fat trimmed off. Fish and seafood. Egg whites. Dried beans, peas, or lentils. Unsalted nuts, nut butters, and seeds. Unsalted canned beans. Lean cuts of beef with fat trimmed off. Low-sodium, lean precooked or cured meat, such as sausages or meat loaves.  Dairy  Low-fat (1%) or fat-free (skim) milk. Reduced-fat, low-fat, or fat-free cheeses. Nonfat, low-sodium ricotta or cottage cheese. Low-fat or nonfat yogurt. Low-fat, low-sodium cheese.  Fats and oils  Soft margarine without trans fats. Vegetable oil. Reduced-fat, low-fat, or light mayonnaise and salad dressings (reduced-sodium). Canola, safflower, olive, avocado, soybean, and sunflower oils. Avocado.  Seasonings and condiments  Herbs. Spices. Seasoning mixes without salt.  Other foods  Unsalted popcorn and pretzels. Fat-free sweets.  The items listed above may not be all the foods and drinks you can have. Talk to a dietitian to learn more.  What foods should I avoid?  Fruits  Canned fruit in a light or heavy syrup. Fried fruit. Fruit in cream or butter sauce.  Vegetables  Creamed or fried vegetables. Vegetables in a cheese sauce. Regular canned vegetables that are not marked as low-sodium or reduced-sodium. Regular canned tomato sauce and paste that are not marked as low-sodium or reduced-sodium. Regular tomato and vegetable juices that are not marked as low-sodium or reduced-sodium. Pickles. Olives.  Grains  Baked goods made with fat, such as croissants, muffins, or some breads. Dry pasta or rice meal packs.  Meats and other proteins  Fatty cuts of meat. Ribs. Fried meat. Plaza. Bologna, salami, and other precooked or cured meats, such as sausages or meat loaves, that are not lean and low in sodium. Fat from the back of a pig (fatback). Viry.  Salted nuts and seeds. Canned beans with added salt. Canned or smoked fish. Whole eggs or egg yolks. Chicken or turkey with skin.  Dairy  Whole or 2% milk, cream, and half-and-half. Whole or full-fat cream cheese. Whole-fat or sweetened yogurt. Full-fat cheese. Nondairy creamers. Whipped toppings. Processed cheese and cheese spreads.  Fats and oils  Butter. Stick margarine. Lard. Shortening. Ghee. Plaza fat. Tropical oils, such as coconut, palm kernel, or palm oil.  Seasonings and condiments  Onion salt, garlic salt, seasoned salt, table salt, and sea salt. Worcestershire sauce. Tartar sauce. Barbecue sauce. Teriyaki sauce. Soy sauce, including reduced-sodium soy sauce. Steak sauce. Canned and packaged gravies. Fish sauce. Oyster sauce. Cocktail sauce. Store-bought horseradish. Ketchup. Mustard. Meat flavorings and tenderizers. Bouillon cubes. Hot sauces. Pre-made or packaged marinades. Pre-made or packaged taco seasonings. Relishes. Regular salad dressings.  Other foods  Salted popcorn and pretzels.  The items listed above may not be all the foods and drinks you should avoid. Talk to a dietitian to learn more.  Where to find more information  National Heart, Lung, and Blood [place] (NHLBI): nhlbi.nih.gov  American Heart Association (AHA): heart.org  Academy of Nutrition and Dietetics: eatright.org  National Kidney Foundation (NKF): kidney.org  This information is not intended to replace advice given to you by your health care provider. Make sure you discuss any questions you have with your health care provider.  Document Revised: 01/04/2024 Document Reviewed: 01/04/2024  Elsevier Patient Education © 2024 [employer].

## 2024-10-03 NOTE — PROGRESS NOTES
"Chief complaint:   Chief Complaint   Patient presents with    Headache     Pt is here for worsening headaches.     Subjective     HPI:    services used in this counter.  : G[facility].   #[id number]    Zina Armando is a 33 y.o. non-English-speaking  female who presents today for continued evaluation following a suboccipital craniotomy for tumor performed on 5/17/2024.    Ms. Armando was doing well at her last appointment on 7/3/2024.  She presents today with a complaint of a 2-3-week onset of an intermittent \"burning\" sensation, as well as numbness and tingling dysesthesias to the proximal/upper occipital aspect of her incision.  Ms. Armando denies fevers, chills, drainage or discharge from her incision, headaches, confusion, visual disturbances, difficulty with words or word finding, facial asymmetry or dysesthesias, unilateral weakness, nausea, or vomiting.  Her only other complaint is intermittent dizziness that occurs randomly with standing and is brief in duration.    ROS  Review of Systems   Constitutional: Negative.    HENT: Negative.     Eyes: Negative.    Respiratory: Negative.     Cardiovascular: Negative.    Gastrointestinal: Negative.    Endocrine: Negative.    Genitourinary: Negative.    Musculoskeletal: Negative.    Skin: Negative.    Allergic/Immunologic: Negative.    Neurological:  Positive for dizziness.   Hematological: Negative.    Psychiatric/Behavioral: Negative.     All other systems reviewed and are negative.    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 HOLD ANTIBIOTICS UNTIL CSF OBTAINED;  Surgeon: Rohith Alexander MD;  Location:  [facility] OR;  Service: Neurosurgery;  Laterality: N/A;    LUMBAR DRAIN INSERTION EXTERNAL N/A 5/17/2024    Procedure: LUMBAR DRAIN INSERTION EXTERNAL;  Surgeon: Rohith Alexander MD;  Location:  [facility] OR;  Service: Neurosurgery;  Laterality: N/A;    LUMBAR DRAIN INSERTION EXTERNAL N/A 6/5/2024    Procedure: LUMBAR DRAIN INSERTION EXTERNAL;  Surgeon: Rohith Alexander MD;  Location:  [facility] OR;  Service: Neurosurgery;  Laterality: N/A;    NO PAST SURGERIES         Objective      Current Outpatient Medications   Medication Sig Dispense Refill    butalbital-acetaminophen-caffeine (FIORICET, ESGIC) -40 MG per tablet Take 1 tablet by mouth Every 6 (Six) Hours As Needed for Headache. (Patient not taking: Reported on 7/9/2024)      cyclobenzaprine (FLEXERIL) 5 MG tablet Take 1 tablet by mouth 3 (Three) Times a Day As Needed for Muscle Spasms. (Patient not taking: Reported on 7/9/2024) 60 tablet 0    levETIRAcetam (KEPPRA) 500 MG tablet Take 1 tablet by mouth Every 12 (Twelve) Hours. (Patient not taking: Reported on 7/9/2024) 40 tablet 0    mupirocin (BACTROBAN) 2 % ointment Apply 1 Application topically to the appropriate area as directed Every 12 (Twelve) Hours. (Patient not taking: Reported on 7/9/2024) 22 g 0    naloxone (NARCAN) 4 MG/0.1ML nasal spray Call 911. Don't prime. [place] in 1 nostril for overdose. Repeat in 2-3 minutes in other nostril if no or minimal breathing/responsiveness. (Patient not taking: Reported on 7/9/2024) 2 each 0    oxyCODONE (Roxicodone) 5 MG immediate release tablet Take 1 tablet by mouth Every 6 (Six) Hours As Needed for Moderate Pain. (Patient not taking: Reported on 7/3/2024) 12 tablet 0     No current facility-administered medications for  "this visit.       Vital Signs  Ht 157.5 cm (62\")   Wt 99.8 kg (220 lb)   BMI 40.24 kg/m²   Physical Exam  Vitals and nursing note reviewed.   Constitutional:       General: She is not in acute distress.     Appearance: Normal appearance. She is well-developed and well-groomed. She is morbidly obese. She is not ill-appearing, toxic-appearing or diaphoretic.      Comments: BMI 40.24   HENT:      Head: Normocephalic and atraumatic.      Right Ear: Hearing normal.      Left Ear: Hearing normal.   Eyes:      General: Lids are normal.      Extraocular Movements: Extraocular movements intact.      Conjunctiva/sclera: Conjunctivae normal.      Pupils: Pupils are equal, round, and reactive to light.   Neck:      Trachea: Trachea normal.     Cardiovascular:      Rate and Rhythm: Normal rate and regular rhythm.   Pulmonary:      Effort: Pulmonary effort is normal. No tachypnea, bradypnea, accessory muscle usage or respiratory distress.   Abdominal:      Palpations: Abdomen is soft.   Musculoskeletal:      Cervical back: Full passive range of motion without pain and neck supple.   Skin:     General: Skin is warm and dry.   Neurological:      GCS: GCS eye subscore is 4. GCS verbal subscore is 5. GCS motor subscore is 6.      Coordination: Coordination is intact.      Deep Tendon Reflexes:      Reflex Scores:       Tricep reflexes are 2+ on the right side and 2+ on the left side.       Bicep reflexes are 2+ on the right side and 2+ on the left side.       Brachioradialis reflexes are 2+ on the right side and 2+ on the left side.       Patellar reflexes are 2+ on the right side and 2+ on the left side.       Achilles reflexes are 2+ on the right side and 2+ on the left side.  Psychiatric:         Speech: Speech normal.         Behavior: Behavior normal. Behavior is cooperative.       Neurological Exam  Mental Status  Awake, alert and oriented to person, place and time. Speech is normal. Language is fluent with no aphasia. " Attention and concentration are normal.    Cranial Nerves  CN I: Sense of smell is normal.  CN II: Visual acuity is normal.  CN III, IV, VI: Extraocular movements intact bilaterally. Normal lids and orbits bilaterally. Pupils equal round and reactive to light bilaterally.  CN V: Facial sensation is normal.  CN VII: Full and symmetric facial movement.  CN IX, X: Palate elevates symmetrically  CN XI: Shoulder shrug strength is normal.  CN XII: Tongue midline without atrophy or fasciculations.    Motor  Normal muscle bulk throughout. Normal muscle tone. No pronator drift.                                             Right                     Left  Toe extension                        5                          5                                             Right                     Left  Deltoid                                   5                          5   Biceps                                   5                          5   Triceps                                  5                          5   Wrist extensor                       5                          5   Iliopsoas                               5                          5   Quadriceps                           5                          5   Anterior tibialis                      5                          5   Posterior tibialis                    5                          5    Sensory  Sensation is intact to light touch, pinprick, vibration and proprioception in all four extremities.    Reflexes                                            Right                      Left  Brachioradialis                    2+                         2+  Biceps                                 2+                         2+  Triceps                                2+                         2+  Patellar                                2+                         2+  Achilles                                2+                         2+  Right Plantar: downgoing  Left Plantar:  "downgoing    Right pathological reflexes: Jacinta's absent. Ankle clonus absent.  Left pathological reflexes: Jacinta's absent. Ankle clonus absent.    Coordination    Finger-to-nose, rapid alternating movements and heel-to-shin normal bilaterally without dysmetria.    Gait  Casual gait is normal including stance, stride, and arm swing.  (12 bullet pts)    Results Review:   MRI Brain With & Without Contrast     Result Date: 6/5/2024   1.  Recently resected fourth ventricle epidermoid cyst. There is a 1.9 cm rounded focus of enhancement in the paramedian right cerebellar hemisphere which I believe is a focus of subacute ischemia. This same area demonstrated diffusion restriction on the immediate postoperative MRI. After accounting for this focus of cerebellar enhancement, I do not see any strong evidence for CNS infection. 2.  There is a 1.6 cm fluid collection along the surgical track projecting in the subcutaneous fat (axial T2 image #3). This demonstrates facilitated diffusion with high ADC signal and does not image like an organized soft tissue abscess collection based on diffusion signal.  This report was signed and finalized on 6/5/2024 2:23 PM by Dr Ho Burleson.        SURGICAL PATHOLOGY RESULTS         Lab Results   Component Value Date     FINALDX   06/05/2024       Suboccipital skin, excision:  Consistent with ruptured follicular infundibular cyst with exuberant inflammation extending into underlying fat.        INTRAOP   05/17/2024          Specimen: Brain tumor, biopsy  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  Reported to Dr. Alexander on 5/17/2024 at 11:20 CDT by Itzel Sidhu MD.        GROSSDES   06/05/2024       1. Head.   Received in a formalin filled container labeled with the patient's name, date of birth, and \"suboccipital tissue\".  The specimen consists of 3 skin covered tissue fragments ranging from 0.8 x 0.5 cm 4.8 x 0.7 cm and averaging 0.8 cm in depth.  The skin " surface is marked by a possible raised scar measuring 3.5 cm in length by 0.3 cm in diameter.  The skin surface on the smaller fragments is red-brown, erythematous and dusky.  The cut surfaces show yellow-red fat necrosis with blood clot.  Representative sections of each fragment are submitted in block 1A.           MICRO   06/05/2024       Sections show a biopsy of skin with subcutaneous tissue.  The epidermis is benign.  There is edema and patchy inflammation in the dermis, and a dilated hair follicle extends downward into the tissue with associated acute inflammation.  In the underlying fat, there is neutrophilic inflammation and fat necrosis along with scattered multinucleated histiocytes.  Evidence of malignancy is not identified.            Assessment/Plan:   Zina Armando is a 33 y.o. female with significant medical comorbidities to include recurrent miscarriages and obesity.   She presents with a new problem of burning dysesthesias and hyperesthesias to the proximal aspect of her operative incision. Physical exam findings of no evidence of soft tissue infection, hyperesthesias noted with palpation to upper aspect incision, otherwise neurologically intact.  No recent imaging.  Prior imaging shows no evidence of persistent infection.    Recommendations:  Status post wound washout and revision (6/5/2024)  Cultures positive for Serratia marcescens   Status post suboccipital craniotomy for tumor (5/17/2024)  New complaint of hyperesthesias and dysesthesias to upper aspect of incision  Zina Armando is a 33 y.o. non-English-speaking  female whom presents today with a new complaint of burning dysesthesias and hyperesthesias to the proximal aspect of her operative incision.  Upon physical exam there is no evidence of soft tissue infection, however hyperesthesias noted with palpation to upper aspect incision.  Currently, I do not believe there is any new course of action for complaint.  I recommended she  continue to monitor her symptoms closely.  If her symptoms change and/or worsen, or if she develops fevers or chills associated with incisional redness, swelling, or discharge she should contact neurosurgical clinic immediately to return for reevaluation.  Otherwise, I advise she keep previously scheduled appointment with Dr. Parks.  Ms. Armando agrees with this plan of care.    Class 3 obesity (BMI >= 40) due to excessive calories without serious comorbidities BMI of 40.0-44.9 in adult  Body mass index is 40.24 kg/m². Information on the DASH diet provided in the AVS.  We will continue to provided diet and exercise information with the goal of weight loss at each scheduled appointment.     Diagnoses and all orders for this visit:    1. Epidermoid cyst of brain (Primary)    2. Status post craniotomy    3. [place]  needed    4. Hyperesthesia    5. Class 3 severe obesity due to excess calories without serious comorbidity with body mass index (BMI) of 40.0 to 44.9 in adult        Return for KEEP SCHEDULED APPT WITH DR. PARKS.    Thank you, for allowing me to continue to participate in the care of this patient.    Sincerely,  TERRENCE Gutiérrez

## 2024-10-23 ENCOUNTER — OFFICE VISIT (OUTPATIENT)
Age: 34
End: 2024-10-23
Payer: MEDICAID

## 2024-10-23 VITALS
HEIGHT: 62 IN | WEIGHT: 222 LBS | DIASTOLIC BLOOD PRESSURE: 70 MMHG | SYSTOLIC BLOOD PRESSURE: 100 MMHG | BODY MASS INDEX: 40.85 KG/M2

## 2024-10-23 DIAGNOSIS — N96 HISTORY OF RECURRENT MISCARRIAGES: ICD-10-CM

## 2024-10-23 DIAGNOSIS — R10.2 PELVIC PAIN: Primary | ICD-10-CM

## 2024-10-23 NOTE — PROGRESS NOTES
"Subjective     Zina Armando is a 34 y.o. female    History of Present Illness  Patient here for gyn visit for pelvic pain that started a few days ago. Describes the pain as very low in the pelvis, intermittent, and can become sharp. Reports LMP as 10/03/2024. Periods have been regular since her miscarriage in May 2024. She is healed from her surgery. She has a desire to become pregnant.         /70   Ht 157.5 cm (62\")   Wt 101 kg (222 lb)   LMP 10/03/2024 (Exact Date)   BMI 40.60 kg/m²     Outpatient Encounter Medications as of 10/23/2024   Medication Sig Dispense Refill    [DISCONTINUED] butalbital-acetaminophen-caffeine (FIORICET, ESGIC) -40 MG per tablet Take 1 tablet by mouth Every 6 (Six) Hours As Needed for Headache. (Patient not taking: Reported on 7/9/2024)      [DISCONTINUED] cyclobenzaprine (FLEXERIL) 5 MG tablet Take 1 tablet by mouth 3 (Three) Times a Day As Needed for Muscle Spasms. (Patient not taking: Reported on 7/9/2024) 60 tablet 0    [DISCONTINUED] levETIRAcetam (KEPPRA) 500 MG tablet Take 1 tablet by mouth Every 12 (Twelve) Hours. (Patient not taking: Reported on 7/9/2024) 40 tablet 0    [DISCONTINUED] mupirocin (BACTROBAN) 2 % ointment Apply 1 Application topically to the appropriate area as directed Every 12 (Twelve) Hours. (Patient not taking: Reported on 7/9/2024) 22 g 0    [DISCONTINUED] naloxone (NARCAN) 4 MG/0.1ML nasal spray Call 911. Don't prime. [place] in 1 nostril for overdose. Repeat in 2-3 minutes in other nostril if no or minimal breathing/responsiveness. (Patient not taking: Reported on 7/9/2024) 2 each 0    [DISCONTINUED] oxyCODONE (Roxicodone) 5 MG immediate release tablet Take 1 tablet by mouth Every 6 (Six) Hours As Needed for Moderate Pain. (Patient not taking: Reported on 7/3/2024) 12 tablet 0     No facility-administered encounter medications on file as of 10/23/2024.       Surgical History  Past Surgical History:   Procedure Laterality Date    CRANIOTOMY N/A " 5/17/2024    Procedure: CRANIOTOMY SUBOCCIPITAL WITH STEALTH, LUMBAR DRAIN INSERTION EXTERNAL;  Surgeon: Rohith Alexander MD;  Location: [facility] OR;  Service: Neurosurgery;  Laterality: N/A;    INCISION AND DRAINAGE OF WOUND N/A 6/5/2024    Procedure: Lumbar Drain and Suboccipital Wound revision, possible removal of bone, possible revision of dural graft. HOLD ANTIBIOTICS UNTIL CSF OBTAINED;  Surgeon: Rohith Alexander MD;  Location:  [facility] OR;  Service: Neurosurgery;  Laterality: N/A;    LUMBAR DRAIN INSERTION EXTERNAL N/A 5/17/2024    Procedure: LUMBAR DRAIN INSERTION EXTERNAL;  Surgeon: Rohith Alexander MD;  Location:  [facility] OR;  Service: Neurosurgery;  Laterality: N/A;    LUMBAR DRAIN INSERTION EXTERNAL N/A 6/5/2024    Procedure: LUMBAR DRAIN INSERTION EXTERNAL;  Surgeon: Rohith Alexander MD;  Location:  [facility] OR;  Service: Neurosurgery;  Laterality: N/A;    NO PAST SURGERIES         Family History  History reviewed. No pertinent family history.    The following portions of the patient's history were reviewed and updated as appropriate: allergies, current medications, past family history, past medical history, past social history, past surgical history, and problem list.    Review of Systems   Constitutional:  Negative for fatigue.   Respiratory:  Negative for chest tightness.    Cardiovascular:  Negative for chest pain and leg swelling.   Gastrointestinal:  Negative for abdominal pain, nausea and vomiting.   Endocrine: Negative for cold intolerance and heat intolerance.   Genitourinary:  Positive for pelvic pain. Negative for difficulty urinating, dysuria, menstrual problem, vaginal bleeding and vaginal discharge.   Musculoskeletal:  Negative for back pain.   Skin:  Negative for rash and wound.   Allergic/Immunologic: Negative for environmental allergies.   Neurological:  Negative for dizziness and headache.   Hematological:  Does not bruise/bleed easily.   Psychiatric/Behavioral:   Negative for dysphoric mood, self-injury, suicidal ideas and depressed mood. The patient is not nervous/anxious.        Objective   Physical Exam  Vitals and nursing note reviewed.   Constitutional:       General: She is not in acute distress.     Appearance: Normal appearance. She is well-developed and well-groomed. She is morbidly obese. She is not ill-appearing or diaphoretic.   HENT:      Head: Normocephalic and atraumatic.   Eyes:      General: Lids are normal. No scleral icterus.  Neck:      Trachea: Phonation normal.   Cardiovascular:      Rate and Rhythm: Normal rate and regular rhythm.      Heart sounds: Normal heart sounds. No murmur heard.  Pulmonary:      Effort: Pulmonary effort is normal. No accessory muscle usage or respiratory distress.      Breath sounds: Normal breath sounds.   Abdominal:      Palpations: Abdomen is soft.      Tenderness: There is no abdominal tenderness. There is no right CVA tenderness or left CVA tenderness.   Musculoskeletal:         General: No tenderness. Normal range of motion.      Cervical back: Normal range of motion and neck supple. No rigidity or tenderness. No pain with movement. Normal range of motion.      Right lower leg: No edema.      Left lower leg: No edema.   Lymphadenopathy:      Cervical: No cervical adenopathy.   Skin:     General: Skin is warm and dry.      Coloration: Skin is not cyanotic, jaundiced or pale.      Findings: No lesion or rash.   Neurological:      Mental Status: She is alert and oriented to person, place, and time.      Gait: Gait normal.   Psychiatric:         Attention and Perception: Attention normal.         Mood and Affect: Mood and affect normal.         Speech: Speech normal.         Behavior: Behavior is cooperative.         Chart reviewed for screenings  Last Pap Smear: 11/08/2023 NILM, -HPV   Last Mammogram: Begin screening at age 40, neg family hx of breast cancer   Last Colonoscopy: Begin screening at 45, neg family hx of colon  cancer  Last Bone Density Scan: Begin screening within 5 years of onset of menopause          Assessment & Plan   Diagnoses and all orders for this visit:    1. Pelvic pain (Primary)  Discussed possible sources such as GI, , musculoskeletal, or unknown. Heat as needed for comfort. May use NSAIDs also. Discussed signs to report.     2. History of recurrent miscarriages  Cycle day 21. Discussed other labs which could include fertility testing, genetic counseling. Will check progesterone today while she considers the other labs.   -     Progesterone         Return to the clinic with an ultrasound for pelvic pain along with a well woman examination and as needed with concerns.         This note has been signed electronically.    Nichol Nova, DNP, APRN, CNM, RNC-OB  10/23/2024

## 2024-10-24 LAB — PROGEST SERPL-MCNC: 5.2 NG/ML

## 2025-01-09 ENCOUNTER — OFFICE VISIT (OUTPATIENT)
Age: 35
End: 2025-01-09

## 2025-01-09 VITALS
DIASTOLIC BLOOD PRESSURE: 84 MMHG | SYSTOLIC BLOOD PRESSURE: 124 MMHG | WEIGHT: 217.3 LBS | HEIGHT: 63 IN | BODY MASS INDEX: 38.5 KG/M2

## 2025-01-09 DIAGNOSIS — N91.2 AMENORRHEA: Primary | ICD-10-CM

## 2025-01-09 RX ORDER — CEFDINIR 300 MG/1
300 CAPSULE ORAL 2 TIMES DAILY
COMMUNITY

## 2025-01-09 NOTE — PROGRESS NOTES
"Sosa Armando is a 34 y.o. female    History of Present Illness  Arrived for a gyne visit for amenorrhea. She is 20 days last for her menses. She did take a urine pregnancy test on 1/4/2025, which was negative. She cancelled her MRI with questions if she could possibly be pregnant. She desires pregnancy but questions ability as she feels she was told the cyst removed from her head was caused by her hormones. She would like assistance if possible to get her MRI sooner than March for when it was rescheduled. She relates she has a pinching or tugging sensation in her head and is fearful of another cyst.         /84 (BP Location: Left arm, Patient Position: Sitting, Cuff Size: Large Adult)   Ht 160 cm (63\")   Wt 98.6 kg (217 lb 4.8 oz)   LMP 01/06/2025 (Exact Date)   Breastfeeding No   BMI 38.49 kg/m²     Outpatient Encounter Medications as of 1/9/2025   Medication Sig Dispense Refill    cefdinir (OMNICEF) 300 MG capsule Take 1 capsule by mouth 2 (Two) Times a Day.      metFORMIN (GLUCOPHAGE) 500 MG tablet Take 1 tablet by mouth 2 (Two) Times a Day With Meals. 60 tablet 3     No facility-administered encounter medications on file as of 1/9/2025.       Surgical History  Past Surgical History:   Procedure Laterality Date    CRANIOTOMY N/A 05/17/2024    Procedure: CRANIOTOMY SUBOCCIPITAL WITH STEALTH, LUMBAR DRAIN INSERTION EXTERNAL;  Surgeon: Rohith Alexander MD;  Location:  [facility];  Service: Neurosurgery;  Laterality: N/A;    INCISION AND DRAINAGE OF WOUND N/A 06/05/2024    Procedure: Lumbar Drain and Suboccipital Wound revision, possible removal of bone, possible revision of dural graft. HOLD ANTIBIOTICS UNTIL CSF OBTAINED;  Surgeon: Rohith Alexander MD;  Location:  [facility];  Service: Neurosurgery;  Laterality: N/A;    LUMBAR DRAIN INSERTION EXTERNAL N/A 05/17/2024    Procedure: LUMBAR DRAIN INSERTION EXTERNAL;  Surgeon: Rohith Alexander MD;  Location:  [facility];  " Service: Neurosurgery;  Laterality: N/A;    LUMBAR DRAIN INSERTION EXTERNAL N/A 06/05/2024    Procedure: LUMBAR DRAIN INSERTION EXTERNAL;  Surgeon: Rohith Alexander MD;  Location: [facility];  Service: Neurosurgery;  Laterality: N/A;       Family History  History reviewed. No pertinent family history.    The following portions of the patient's history were reviewed and updated as appropriate: allergies, current medications, past family history, past medical history, past social history, past surgical history, and problem list.    Review of Systems   Constitutional:  Positive for fatigue.   Respiratory:  Negative for shortness of breath.    Cardiovascular:  Negative for chest pain and leg swelling.   Gastrointestinal:  Negative for abdominal pain.   Genitourinary:  Positive for amenorrhea and menstrual problem. Negative for difficulty urinating, dysuria, pelvic pain, vaginal bleeding and vaginal discharge.   Musculoskeletal:  Negative for back pain.   Skin:  Negative for rash and wound.   Neurological:  Positive for headache. Negative for dizziness.   Psychiatric/Behavioral:  Negative for self-injury, suicidal ideas and depressed mood. The patient is not nervous/anxious.        Objective   Physical Exam  Vitals and nursing note reviewed.   Constitutional:       General: She is not in acute distress.     Appearance: Normal appearance. She is well-developed and well-groomed. She is morbidly obese. She is not ill-appearing or diaphoretic.   HENT:      Head: Normocephalic and atraumatic.   Eyes:      General: No scleral icterus.  Neck:      Trachea: Phonation normal.   Cardiovascular:      Rate and Rhythm: Normal rate and regular rhythm.      Heart sounds: Normal heart sounds. No murmur heard.  Pulmonary:      Effort: Pulmonary effort is normal. No accessory muscle usage or respiratory distress.      Breath sounds: Normal breath sounds.   Abdominal:      Palpations: Abdomen is soft.      Tenderness: There is no  abdominal tenderness. There is no right CVA tenderness or left CVA tenderness.   Musculoskeletal:         General: No tenderness. Normal range of motion.      Cervical back: Normal range of motion and neck supple. No tenderness.      Right lower leg: No edema.      Left lower leg: No edema.   Lymphadenopathy:      Cervical: No cervical adenopathy.   Skin:     General: Skin is warm and dry.      Coloration: Skin is not cyanotic, jaundiced or pale.      Findings: No rash.   Neurological:      Mental Status: She is alert and oriented to person, place, and time.      Gait: Gait normal.   Psychiatric:         Attention and Perception: Attention normal.         Mood and Affect: Mood and affect normal.         Speech: Speech normal.         Behavior: Behavior normal. Behavior is cooperative.         Chart reviewed for screenings  Last Pap Smear: 11/8/2023 NILM; HPV not detected   Last Mammogram: Begin screening age 40.   Last Colonoscopy: Begin screening age 45   Last Bone Density Scan: Begin screening by 5 years after the onset of menopause.          Assessment & Plan   Diagnoses and all orders for this visit:    1. Amenorrhea (Primary)  -     HCG, B-subunit, Quantitative ([facility] Only)  -     T3, Free  -     T3, Uptake  -     T4, Free  -     TSH  -     Hemoglobin A1c  -     metFORMIN (GLUCOPHAGE) 500 MG tablet; Take 1 tablet by mouth 2 (Two) Times a Day With Meals.  Dispense: 60 tablet; Refill: 3       Urine pregnancy test today and reviewed: negative. Will also draw Hcg today. Discussed amenorrhea can be related to endocrine disorders and vitamin D deficiency. Labs today. Also discussed relationship of BMI with cycles and encouraged diet and exercise. Metformin also discussed for previous prediabetes hemoglobin A1c levels and menses regulation. Side effects, including GI upset and weight loss discussed. Prescription sent to pharmacy. To also take another home pregnancy test if amenorrhea persists.         Refer to AVS  instructions for additional education provided.         Return to the clinic in 3 months for surveillance of menses and as needed with concerns.         This note has been signed electronically.    Nichol Nova, RAFA, APRN, CNM  1/9/2025

## 2025-01-10 LAB
HBA1C MFR BLD: 6 % (ref 4.8–5.6)
HCG INTACT+B SERPL-ACNC: <1 MIU/ML
T3FREE SERPL-MCNC: 2.9 PG/ML (ref 2–4.4)
T3RU NFR SERPL: 20 % (ref 24–39)
T4 FREE SERPL-MCNC: 0.78 NG/DL (ref 0.82–1.77)
TSH SERPL DL<=0.005 MIU/L-ACNC: 1.62 UIU/ML (ref 0.45–4.5)

## 2025-01-12 ENCOUNTER — HOSPITAL ENCOUNTER (EMERGENCY)
Facility: HOSPITAL | Age: 35
Discharge: HOME | End: 2025-01-13
Attending: STUDENT IN AN ORGANIZED HEALTH CARE EDUCATION/TRAINING PROGRAM | Admitting: STUDENT IN AN ORGANIZED HEALTH CARE EDUCATION/TRAINING PROGRAM

## 2025-01-12 ENCOUNTER — APPOINTMENT (OUTPATIENT)
Dept: CT IMAGING | Facility: HOSPITAL | Age: 35
End: 2025-01-12

## 2025-01-12 DIAGNOSIS — R51.9 ACUTE NONINTRACTABLE HEADACHE, UNSPECIFIED HEADACHE TYPE: Primary | ICD-10-CM

## 2025-01-12 LAB — B-HCG UR QL: NEGATIVE

## 2025-01-12 PROCEDURE — 96372 THER/PROPH/DIAG INJ SC/IM: CPT

## 2025-01-12 PROCEDURE — 25010000002 PROCHLORPERAZINE 10 MG/2ML SOLUTION: Performed by: STUDENT IN AN ORGANIZED HEALTH CARE EDUCATION/TRAINING PROGRAM

## 2025-01-12 PROCEDURE — 63710000001 DIPHENHYDRAMINE PER 50 MG: Performed by: STUDENT IN AN ORGANIZED HEALTH CARE EDUCATION/TRAINING PROGRAM

## 2025-01-12 PROCEDURE — 99284 EMERGENCY DEPT VISIT MOD MDM: CPT

## 2025-01-12 PROCEDURE — 81025 URINE PREGNANCY TEST: CPT | Performed by: STUDENT IN AN ORGANIZED HEALTH CARE EDUCATION/TRAINING PROGRAM

## 2025-01-12 PROCEDURE — 70450 CT HEAD/BRAIN W/O DYE: CPT

## 2025-01-12 RX ORDER — DIPHENHYDRAMINE HCL 25 MG
25 CAPSULE ORAL ONCE
Status: COMPLETED | OUTPATIENT
Start: 2025-01-12 | End: 2025-01-12

## 2025-01-12 RX ORDER — PROCHLORPERAZINE EDISYLATE 5 MG/ML
10 INJECTION INTRAMUSCULAR; INTRAVENOUS ONCE
Status: COMPLETED | OUTPATIENT
Start: 2025-01-12 | End: 2025-01-12

## 2025-01-12 RX ORDER — ACETAMINOPHEN 500 MG
1000 TABLET ORAL ONCE
Status: COMPLETED | OUTPATIENT
Start: 2025-01-12 | End: 2025-01-12

## 2025-01-12 RX ADMIN — DIPHENHYDRAMINE HYDROCHLORIDE 25 MG: 25 CAPSULE ORAL at 23:10

## 2025-01-12 RX ADMIN — PROCHLORPERAZINE EDISYLATE 10 MG: 5 INJECTION INTRAMUSCULAR; INTRAVENOUS at 23:11

## 2025-01-12 RX ADMIN — ACETAMINOPHEN 1000 MG: 500 TABLET, FILM COATED ORAL at 23:11

## 2025-01-13 VITALS
SYSTOLIC BLOOD PRESSURE: 122 MMHG | BODY MASS INDEX: 43.19 KG/M2 | HEART RATE: 68 BPM | DIASTOLIC BLOOD PRESSURE: 81 MMHG | TEMPERATURE: 98 F | HEIGHT: 60 IN | OXYGEN SATURATION: 97 % | WEIGHT: 220 LBS | RESPIRATION RATE: 16 BRPM

## 2025-01-13 NOTE — ED NOTES
Contacted registration Estephanie to assist in finding pt chart to link with this chart. This nurse unable to find previous chart

## 2025-01-13 NOTE — ED PROVIDER NOTES
Subjective   History of Present Illness  This is a 34-year-old female [place]ic-speaking history of brain cancer, with surgery, no other significant past medical history in the emergency room for chief complaint occipital headache x 2 days.  No photophobia, nausea, vomiting, neck pain or stiffness.  Negative for vision loss or vision changes.  Negative for numbness or focal weakness.  Patient has follow-up with neurosurgery.  Her next appointment is in March in which they were going to do an MRI for follow-up        Review of Systems   Neurological:  Positive for headaches.   All other systems reviewed and are negative.      No past medical history on file.    No Known Allergies    No past surgical history on file.    No family history on file.    Social History     Socioeconomic History    Marital status:            Objective   Physical Exam  HENT:      Head: Normocephalic and atraumatic.      Right Ear: Tympanic membrane normal.      Left Ear: Tympanic membrane normal.      Nose: No congestion.   Eyes:      Extraocular Movements: Extraocular movements intact.      Pupils: Pupils are equal, round, and reactive to light.   Neck:      Comments: Surgical scar present  Cardiovascular:      Rate and Rhythm: Normal rate and regular rhythm.      Heart sounds: No murmur heard.  Pulmonary:      Effort: Pulmonary effort is normal. No respiratory distress.      Breath sounds: Normal breath sounds. No wheezing, rhonchi or rales.   Abdominal:      General: Bowel sounds are normal. There is no distension.      Palpations: Abdomen is soft.      Tenderness: There is no abdominal tenderness. There is no guarding or rebound.   Musculoskeletal:         General: Normal range of motion.      Cervical back: Normal range of motion. No rigidity or tenderness.   Lymphadenopathy:      Cervical: No cervical adenopathy.   Skin:     General: Skin is warm.      Capillary Refill: Capillary refill takes less than 2 seconds.   Neurological:       General: No focal deficit present.      Mental Status: She is oriented to person, place, and time. Mental status is at baseline.      Cranial Nerves: No cranial nerve deficit.      Sensory: No sensory deficit.      Motor: No weakness.      Coordination: Coordination normal.      Gait: Gait normal.   Psychiatric:         Mood and Affect: Mood normal.         Procedures           ED Course                                                       Medical Decision Making  This is a 34-year-old female history of brain cancer, with history of surgery.  She comes to the emergency room for occipital headache x 2 days.  No neck pain, neck stiffness, LOC, focal deficits, no vision changes, CC triage statement not accurate. [place] SAH negative. She is well appearing, afebrile. Because of hx of CT non contrast brain ordered, HCG . Pending treatment with compazine 10mg IV, Benadryl 25 mg p.o., Tylenol 1 g p.o.  Patient is in agreement with plan    hCG negative, CT brain positive for postsurgical changes, no complications no acute findings.  On reassessment at 12:30 PM patient is feeling much better.  She is stable for discharge with no neurological deficits, no alarming signs of headache.  No concerns for cluster or migraine headaches.  Encouraged to take Tylenol and ibuprofen over-the-counter.  Strict turn precautions for worsening symptoms discussed    Amount and/or Complexity of Data Reviewed  Radiology: ordered.    Risk  OTC drugs.  Prescription drug management.        Final diagnoses:   Acute nonintractable headache, unspecified headache type       ED Disposition  ED Disposition       ED Disposition   Discharge    Condition   Stable    Comment   --               Patricia Washburn, APRN  [address]  [facility] [address]  [phone]    In 1 week           Medication List      No changes were made to your prescriptions during this visit.            John Grajeda MD  01/13/25 0107

## 2025-01-15 ENCOUNTER — LAB (OUTPATIENT)
Dept: LAB | Facility: HOSPITAL | Age: 35
End: 2025-01-15

## 2025-01-15 ENCOUNTER — OFFICE VISIT (OUTPATIENT)
Dept: NEUROSURGERY | Facility: CLINIC | Age: 35
End: 2025-01-15

## 2025-01-15 ENCOUNTER — TELEPHONE (OUTPATIENT)
Dept: NEUROSURGERY | Facility: CLINIC | Age: 35
End: 2025-01-15

## 2025-01-15 VITALS — HEIGHT: 60 IN | BODY MASS INDEX: 43.19 KG/M2 | WEIGHT: 220 LBS

## 2025-01-15 DIAGNOSIS — G93.0 EPIDERMOID CYST OF BRAIN: ICD-10-CM

## 2025-01-15 DIAGNOSIS — M54.2 MUSCULOSKELETAL NECK PAIN: ICD-10-CM

## 2025-01-15 DIAGNOSIS — Z98.890 HISTORY OF CRANIOTOMY: ICD-10-CM

## 2025-01-15 DIAGNOSIS — E66.01 CLASS 3 SEVERE OBESITY DUE TO EXCESS CALORIES WITH SERIOUS COMORBIDITY AND BODY MASS INDEX (BMI) OF 40.0 TO 44.9 IN ADULT: ICD-10-CM

## 2025-01-15 DIAGNOSIS — E66.813 CLASS 3 SEVERE OBESITY DUE TO EXCESS CALORIES WITH SERIOUS COMORBIDITY AND BODY MASS INDEX (BMI) OF 40.0 TO 44.9 IN ADULT: ICD-10-CM

## 2025-01-15 DIAGNOSIS — R23.8 SCALP IRRITATION: Primary | ICD-10-CM

## 2025-01-15 DIAGNOSIS — M54.2 NECK PAIN: ICD-10-CM

## 2025-01-15 LAB
ALBUMIN SERPL-MCNC: 4.3 G/DL (ref 3.5–5.2)
ALBUMIN/GLOB SERPL: 1.2 G/DL
ALP SERPL-CCNC: 119 U/L (ref 39–117)
ALT SERPL W P-5'-P-CCNC: 59 U/L (ref 1–33)
ANION GAP SERPL CALCULATED.3IONS-SCNC: 10 MMOL/L (ref 5–15)
AST SERPL-CCNC: 45 U/L (ref 1–32)
BASOPHILS # BLD AUTO: 0.03 10*3/MM3 (ref 0–0.2)
BASOPHILS NFR BLD AUTO: 0.4 % (ref 0–1.5)
BILIRUB SERPL-MCNC: 0.3 MG/DL (ref 0–1.2)
BUN SERPL-MCNC: 13 MG/DL (ref 6–20)
BUN/CREAT SERPL: 26 (ref 7–25)
CALCIUM SPEC-SCNC: 9.9 MG/DL (ref 8.6–10.5)
CHLORIDE SERPL-SCNC: 101 MMOL/L (ref 98–107)
CO2 SERPL-SCNC: 27 MMOL/L (ref 22–29)
CREAT SERPL-MCNC: 0.5 MG/DL (ref 0.57–1)
CRP SERPL-MCNC: 1.58 MG/DL (ref 0–0.5)
DEPRECATED RDW RBC AUTO: 41.5 FL (ref 37–54)
EGFRCR SERPLBLD CKD-EPI 2021: 126.4 ML/MIN/1.73
EOSINOPHIL # BLD AUTO: 0.07 10*3/MM3 (ref 0–0.4)
EOSINOPHIL NFR BLD AUTO: 0.9 % (ref 0.3–6.2)
ERYTHROCYTE [DISTWIDTH] IN BLOOD BY AUTOMATED COUNT: 13.5 % (ref 12.3–15.4)
ERYTHROCYTE [SEDIMENTATION RATE] IN BLOOD: 24 MM/HR (ref 0–20)
GLOBULIN UR ELPH-MCNC: 3.7 GM/DL
GLUCOSE SERPL-MCNC: 88 MG/DL (ref 65–99)
HCT VFR BLD AUTO: 41.5 % (ref 34–46.6)
HGB BLD-MCNC: 13.2 G/DL (ref 12–15.9)
IMM GRANULOCYTES # BLD AUTO: 0.02 10*3/MM3 (ref 0–0.05)
IMM GRANULOCYTES NFR BLD AUTO: 0.3 % (ref 0–0.5)
LYMPHOCYTES # BLD AUTO: 2.83 10*3/MM3 (ref 0.7–3.1)
LYMPHOCYTES NFR BLD AUTO: 35.6 % (ref 19.6–45.3)
MCH RBC QN AUTO: 26.8 PG (ref 26.6–33)
MCHC RBC AUTO-ENTMCNC: 31.8 G/DL (ref 31.5–35.7)
MCV RBC AUTO: 84.2 FL (ref 79–97)
MONOCYTES # BLD AUTO: 0.57 10*3/MM3 (ref 0.1–0.9)
MONOCYTES NFR BLD AUTO: 7.2 % (ref 5–12)
NEUTROPHILS NFR BLD AUTO: 4.42 10*3/MM3 (ref 1.7–7)
NEUTROPHILS NFR BLD AUTO: 55.6 % (ref 42.7–76)
NRBC BLD AUTO-RTO: 0 /100 WBC (ref 0–0.2)
PLATELET # BLD AUTO: 307 10*3/MM3 (ref 140–450)
PMV BLD AUTO: 10.2 FL (ref 6–12)
POTASSIUM SERPL-SCNC: 4.1 MMOL/L (ref 3.5–5.2)
PROT SERPL-MCNC: 8 G/DL (ref 6–8.5)
RBC # BLD AUTO: 4.93 10*6/MM3 (ref 3.77–5.28)
SODIUM SERPL-SCNC: 138 MMOL/L (ref 136–145)
WBC NRBC COR # BLD AUTO: 7.94 10*3/MM3 (ref 3.4–10.8)

## 2025-01-15 PROCEDURE — 80053 COMPREHEN METABOLIC PANEL: CPT

## 2025-01-15 PROCEDURE — 86140 C-REACTIVE PROTEIN: CPT

## 2025-01-15 PROCEDURE — 85652 RBC SED RATE AUTOMATED: CPT

## 2025-01-15 PROCEDURE — 36415 COLL VENOUS BLD VENIPUNCTURE: CPT

## 2025-01-15 PROCEDURE — 85025 COMPLETE CBC W/AUTO DIFF WBC: CPT

## 2025-01-15 RX ORDER — MELOXICAM 15 MG/1
15 TABLET ORAL DAILY
Qty: 30 TABLET | Refills: 0 | Status: SHIPPED | OUTPATIENT
Start: 2025-01-15

## 2025-01-15 RX ORDER — CYCLOBENZAPRINE HCL 10 MG
10 TABLET ORAL NIGHTLY
Qty: 30 TABLET | Refills: 0 | Status: SHIPPED | OUTPATIENT
Start: 2025-01-15

## 2025-01-15 RX ORDER — MUPIROCIN 20 MG/G
1 OINTMENT TOPICAL 3 TIMES DAILY
Qty: 1 EACH | Refills: 0 | Status: SHIPPED | OUTPATIENT
Start: 2025-01-15

## 2025-01-15 NOTE — PATIENT INSTRUCTIONS
"DASH Eating Plan  DASH stands for Dietary Approaches to Stop Hypertension. The DASH eating plan is a healthy eating plan that has been shown to:  Lower high blood pressure (hypertension).  Reduce your risk for type 2 diabetes, heart disease, and stroke.  Help with weight loss.  What are tips for following this plan?  Reading food labels  Check food labels for the amount of salt (sodium) per serving. Choose foods with less than 5 percent of the Daily Value (DV) of sodium. In general, foods with less than 300 milligrams (mg) of sodium per serving fit into this eating plan.  To find whole grains, look for the word \"whole\" as the first word in the ingredient list.  Shopping  Buy products labeled as \"low-sodium\" or \"no salt added.\"  Buy fresh foods. Avoid canned foods and pre-made or frozen meals.  Cooking  Try not to add salt when you cook. Use salt-free seasonings or herbs instead of table salt or sea salt. Check with your health care provider or pharmacist before using salt substitutes.  Do not bishop foods. Cook foods in healthy ways, such as baking, boiling, grilling, roasting, or broiling.  Cook using oils that are good for your heart. These include olive, canola, avocado, soybean, and sunflower oil.  Meal planning    Eat a balanced diet. This should include:  4 or more servings of fruits and 4 or more servings of vegetables each day. Try to fill half of your plate with fruits and vegetables.  6-8 servings of whole grains each day.  6 or less servings of lean meat, poultry, or fish each day. 1 oz is 1 serving. A 3 oz (85 g) serving of meat is about the same size as the palm of your hand. One egg is 1 oz (28 g).  2-3 servings of low-fat dairy each day. One serving is 1 cup (237 mL).  1 serving of nuts, seeds, or beans 5 times each week.  2-3 servings of heart-healthy fats. Healthy fats called omega-3 fatty acids are found in foods such as walnuts, flaxseeds, fortified milks, and eggs. These fats are also found in " cold-water fish, such as sardines, salmon, and mackerel.  Limit how much you eat of:  Canned or prepackaged foods.  Food that is high in trans fat, such as fried foods.  Food that is high in saturated fat, such as fatty meat.  Desserts and other sweets, sugary drinks, and other foods with added sugar.  Full-fat dairy products.  Do not salt foods before eating.  Do not eat more than 4 egg yolks a week.  Try to eat at least 2 vegetarian meals a week.  Eat more home-cooked food and less restaurant, buffet, and fast food.  Lifestyle  When eating at a restaurant, ask if your food can be made with less salt or no salt.  If you drink alcohol:  Limit how much you have to:  0-1 drink a day if you are female.  0-2 drinks a day if you are male.  Know how much alcohol is in your drink. In the [place] one drink is one 12 oz bottle of beer (355 mL), one 5 oz glass of wine (148 mL), or one 1½ oz glass of hard liquor (44 mL).  General information  Avoid eating more than 2,300 mg of salt a day. If you have hypertension, you may need to reduce your sodium intake to 1,500 mg a day.  Work with your provider to stay at a healthy body weight or lose weight. Ask what the best weight range is for you.  On most days of the week, get at least 30 minutes of exercise that causes your heart to beat faster. This may include walking, swimming, or biking.  Work with your provider or dietitian to adjust your eating plan to meet your specific calorie needs.  What foods should I eat?  Fruits  All fresh, dried, or frozen fruit. Canned fruits that are in their natural juice and do not have sugar added to them.  Vegetables  Fresh or frozen vegetables that are raw, steamed, roasted, or grilled. Low-sodium or reduced-sodium tomato and vegetable juice. Low-sodium or reduced-sodium tomato sauce and tomato paste. Low-sodium or reduced-sodium canned vegetables.  Grains  Whole-grain or whole-wheat bread. Whole-grain or whole-wheat pasta. Brown rice. Oatmeal.  Quinoa. Bulgur. Whole-grain and low-sodium cereals. Abi bread. Low-fat, low-sodium crackers. Whole-wheat flour tortillas.  Meats and other proteins  Skinless chicken or turkey. Ground chicken or turkey. Pork with fat trimmed off. Fish and seafood. Egg whites. Dried beans, peas, or lentils. Unsalted nuts, nut butters, and seeds. Unsalted canned beans. Lean cuts of beef with fat trimmed off. Low-sodium, lean precooked or cured meat, such as sausages or meat loaves.  Dairy  Low-fat (1%) or fat-free (skim) milk. Reduced-fat, low-fat, or fat-free cheeses. Nonfat, low-sodium ricotta or cottage cheese. Low-fat or nonfat yogurt. Low-fat, low-sodium cheese.  Fats and oils  Soft margarine without trans fats. Vegetable oil. Reduced-fat, low-fat, or light mayonnaise and salad dressings (reduced-sodium). Canola, safflower, olive, avocado, soybean, and sunflower oils. Avocado.  Seasonings and condiments  Herbs. Spices. Seasoning mixes without salt.  Other foods  Unsalted popcorn and pretzels. Fat-free sweets.  The items listed above may not be all the foods and drinks you can have. Talk to a dietitian to learn more.  What foods should I avoid?  Fruits  Canned fruit in a light or heavy syrup. Fried fruit. Fruit in cream or butter sauce.  Vegetables  Creamed or fried vegetables. Vegetables in a cheese sauce. Regular canned vegetables that are not marked as low-sodium or reduced-sodium. Regular canned tomato sauce and paste that are not marked as low-sodium or reduced-sodium. Regular tomato and vegetable juices that are not marked as low-sodium or reduced-sodium. Pickles. Olives.  Grains  Baked goods made with fat, such as croissants, muffins, or some breads. Dry pasta or rice meal packs.  Meats and other proteins  Fatty cuts of meat. Ribs. Fried meat. Plaza. Bologna, salami, and other precooked or cured meats, such as sausages or meat loaves, that are not lean and low in sodium. Fat from the back of a pig (fatback). Viry.  Salted nuts and seeds. Canned beans with added salt. Canned or smoked fish. Whole eggs or egg yolks. Chicken or turkey with skin.  Dairy  Whole or 2% milk, cream, and half-and-half. Whole or full-fat cream cheese. Whole-fat or sweetened yogurt. Full-fat cheese. Nondairy creamers. Whipped toppings. Processed cheese and cheese spreads.  Fats and oils  Butter. Stick margarine. Lard. Shortening. Ghee. Plaza fat. Tropical oils, such as coconut, palm kernel, or palm oil.  Seasonings and condiments  Onion salt, garlic salt, seasoned salt, table salt, and sea salt. Worcestershire sauce. Tartar sauce. Barbecue sauce. Teriyaki sauce. Soy sauce, including reduced-sodium soy sauce. Steak sauce. Canned and packaged gravies. Fish sauce. Oyster sauce. Cocktail sauce. Store-bought horseradish. Ketchup. Mustard. Meat flavorings and tenderizers. Bouillon cubes. Hot sauces. Pre-made or packaged marinades. Pre-made or packaged taco seasonings. Relishes. Regular salad dressings.  Other foods  Salted popcorn and pretzels.  The items listed above may not be all the foods and drinks you should avoid. Talk to a dietitian to learn more.  Where to find more information  National Heart, Lung, and Blood [place] (NHLBI): nhlbi.nih.gov  American Heart Association (AHA): heart.org  Academy of Nutrition and Dietetics: eatright.org  National Kidney Foundation (NKF): kidney.org  This information is not intended to replace advice given to you by your health care provider. Make sure you discuss any questions you have with your health care provider.  Document Revised: 01/04/2024 Document Reviewed: 01/04/2024  Elsevier Patient Education © 2024 [employer]DASH Eating Plan  DASH stands for Dietary Approaches to Stop Hypertension. The DASH eating plan is a healthy eating plan that has been shown to:  Lower high blood pressure (hypertension).  Reduce your risk for type 2 diabetes, heart disease, and stroke.  Help with weight loss.  What are tips for  "following this plan?  Reading food labels  Check food labels for the amount of salt (sodium) per serving. Choose foods with less than 5 percent of the Daily Value (DV) of sodium. In general, foods with less than 300 milligrams (mg) of sodium per serving fit into this eating plan.  To find whole grains, look for the word \"whole\" as the first word in the ingredient list.  Shopping  Buy products labeled as \"low-sodium\" or \"no salt added.\"  Buy fresh foods. Avoid canned foods and pre-made or frozen meals.  Cooking  Try not to add salt when you cook. Use salt-free seasonings or herbs instead of table salt or sea salt. Check with your health care provider or pharmacist before using salt substitutes.  Do not bishop foods. Cook foods in healthy ways, such as baking, boiling, grilling, roasting, or broiling.  Cook using oils that are good for your heart. These include olive, canola, avocado, soybean, and sunflower oil.  Meal planning    Eat a balanced diet. This should include:  4 or more servings of fruits and 4 or more servings of vegetables each day. Try to fill half of your plate with fruits and vegetables.  6-8 servings of whole grains each day.  6 or less servings of lean meat, poultry, or fish each day. 1 oz is 1 serving. A 3 oz (85 g) serving of meat is about the same size as the palm of your hand. One egg is 1 oz (28 g).  2-3 servings of low-fat dairy each day. One serving is 1 cup (237 mL).  1 serving of nuts, seeds, or beans 5 times each week.  2-3 servings of heart-healthy fats. Healthy fats called omega-3 fatty acids are found in foods such as walnuts, flaxseeds, fortified milks, and eggs. These fats are also found in cold-water fish, such as sardines, salmon, and mackerel.  Limit how much you eat of:  Canned or prepackaged foods.  Food that is high in trans fat, such as fried foods.  Food that is high in saturated fat, such as fatty meat.  Desserts and other sweets, sugary drinks, and other foods with added " sugar.  Full-fat dairy products.  Do not salt foods before eating.  Do not eat more than 4 egg yolks a week.  Try to eat at least 2 vegetarian meals a week.  Eat more home-cooked food and less restaurant, buffet, and fast food.  Lifestyle  When eating at a restaurant, ask if your food can be made with less salt or no salt.  If you drink alcohol:  Limit how much you have to:  0-1 drink a day if you are female.  0-2 drinks a day if you are male.  Know how much alcohol is in your drink. In the [place] one drink is one 12 oz bottle of beer (355 mL), one 5 oz glass of wine (148 mL), or one 1½ oz glass of hard liquor (44 mL).  General information  Avoid eating more than 2,300 mg of salt a day. If you have hypertension, you may need to reduce your sodium intake to 1,500 mg a day.  Work with your provider to stay at a healthy body weight or lose weight. Ask what the best weight range is for you.  On most days of the week, get at least 30 minutes of exercise that causes your heart to beat faster. This may include walking, swimming, or biking.  Work with your provider or dietitian to adjust your eating plan to meet your specific calorie needs.  What foods should I eat?  Fruits  All fresh, dried, or frozen fruit. Canned fruits that are in their natural juice and do not have sugar added to them.  Vegetables  Fresh or frozen vegetables that are raw, steamed, roasted, or grilled. Low-sodium or reduced-sodium tomato and vegetable juice. Low-sodium or reduced-sodium tomato sauce and tomato paste. Low-sodium or reduced-sodium canned vegetables.  Grains  Whole-grain or whole-wheat bread. Whole-grain or whole-wheat pasta. Brown rice. Oatmeal. Quinoa. Bulgur. Whole-grain and low-sodium cereals. Abi bread. Low-fat, low-sodium crackers. Whole-wheat flour tortillas.  Meats and other proteins  Skinless chicken or turkey. Ground chicken or turkey. Pork with fat trimmed off. Fish and seafood. Egg whites. Dried beans, peas, or lentils.  Unsalted nuts, nut butters, and seeds. Unsalted canned beans. Lean cuts of beef with fat trimmed off. Low-sodium, lean precooked or cured meat, such as sausages or meat loaves.  Dairy  Low-fat (1%) or fat-free (skim) milk. Reduced-fat, low-fat, or fat-free cheeses. Nonfat, low-sodium ricotta or cottage cheese. Low-fat or nonfat yogurt. Low-fat, low-sodium cheese.  Fats and oils  Soft margarine without trans fats. Vegetable oil. Reduced-fat, low-fat, or light mayonnaise and salad dressings (reduced-sodium). Canola, safflower, olive, avocado, soybean, and sunflower oils. Avocado.  Seasonings and condiments  Herbs. Spices. Seasoning mixes without salt.  Other foods  Unsalted popcorn and pretzels. Fat-free sweets.  The items listed above may not be all the foods and drinks you can have. Talk to a dietitian to learn more.  What foods should I avoid?  Fruits  Canned fruit in a light or heavy syrup. Fried fruit. Fruit in cream or butter sauce.  Vegetables  Creamed or fried vegetables. Vegetables in a cheese sauce. Regular canned vegetables that are not marked as low-sodium or reduced-sodium. Regular canned tomato sauce and paste that are not marked as low-sodium or reduced-sodium. Regular tomato and vegetable juices that are not marked as low-sodium or reduced-sodium. Pickles. Olives.  Grains  Baked goods made with fat, such as croissants, muffins, or some breads. Dry pasta or rice meal packs.  Meats and other proteins  Fatty cuts of meat. Ribs. Fried meat. Plaza. Bologna, salami, and other precooked or cured meats, such as sausages or meat loaves, that are not lean and low in sodium. Fat from the back of a pig (fatback). Bratwurst. Salted nuts and seeds. Canned beans with added salt. Canned or smoked fish. Whole eggs or egg yolks. Chicken or turkey with skin.  Dairy  Whole or 2% milk, cream, and half-and-half. Whole or full-fat cream cheese. Whole-fat or sweetened yogurt. Full-fat cheese. Nondairy creamers. Whipped  toppings. Processed cheese and cheese spreads.  Fats and oils  Butter. Stick margarine. Lard. Shortening. Ghee. Plaza fat. Tropical oils, such as coconut, palm kernel, or palm oil.  Seasonings and condiments  Onion salt, garlic salt, seasoned salt, table salt, and sea salt. [facility] Hospital[facility] sauce. Tartar sauce. Barbecue sauce. Teriyaki sauce. Soy sauce, including reduced-sodium soy sauce. Steak sauce. Canned and packaged gravies. Fish sauce. Oyster sauce. Cocktail sauce. Store-bought horseradish. Ketchup. Mustard. Meat flavorings and tenderizers. Bouillon cubes. Hot sauces. Pre-made or packaged marinades. Pre-made or packaged taco seasonings. Relishes. Regular salad dressings.  Other foods  Salted popcorn and pretzels.  The items listed above may not be all the foods and drinks you should avoid. Talk to a dietitian to learn more.  Where to find more information  National Heart, Lung, and Blood [place] (NHLBI): nhlbi.nih.gov  American Heart Association (AHA): heart.org  Academy of Nutrition and Dietetics: eatright.org  National Kidney Foundation (NKF): kidney.org  This information is not intended to replace advice given to you by your health care provider. Make sure you discuss any questions you have with your health care provider.  Document Revised: 01/04/2024 Document Reviewed: 01/04/2024  Elsevier Patient Education © 2024 [employer]

## 2025-01-15 NOTE — PROGRESS NOTES
"Chief complaint:   Chief Complaint   Patient presents with    Wound Check     Pt is here with complaints of small tender bumps on scalp     Subjective      services used during this encounter,  Ashia, #[id number]    HPI:   Zina Armando is a 34 y.o. non-English-speaking  female who presents today for evaluation of multiple complaints.    History of a suboccipital craniotomy for tumor on 5/17/2024 with pathology consistent with a epidermoid cyst, followed by an incision and drainage for wound washout on 6/5/2024 with wound culture positive for scant growth, 1+ Serratia.  She was followed by ID for postoperative antibiotics.    Last evaluated by neurosurgery 10/3/2024.    Ms. Armando states she was evaluated in the emergency department on 1/12/2025-1/13/2025 with complaints of a right-sided headache, right ear discomfort, and the feeling of right eye \"heaviness\".  CT head was obtained and she was later discharged home.  These symptoms have since resolved and have yet to recur.      She additionally endorses a remote history of a bilateral ear infection which was treated to resolve with antibiotics.    She currently endorses small \"painful lumps\" to her occipital scalp.  No reports of pruritus, drainage, or discharge from these lesions, only discomfort with palpation.  She additionally complains of right-sided neck stiffness that worsens with palpation.  No complaints of upper extremity radicular pain, weakness, numbness, or tingling.  Again, her prior complaints of a right-sided headache and right eye \"heaviness\" has resolved and have not recurred.  Zina additionally denies fevers, dizziness, visual disturbances, difficulty with words or word finding, facial asymmetry or dysesthesias, unilateral weakness, difficulty with gait or balance, nausea, and vomiting.  She currently rates the severity of her symptoms 7/10.    ROS  Review of Systems   Constitutional: Negative.    HENT: Negative.   "   Eyes: Negative.    Respiratory: Negative.     Cardiovascular: Negative.    Gastrointestinal: Negative.    Endocrine: Negative.    Genitourinary: Negative.    Musculoskeletal:  Positive for neck stiffness.   Skin:  Positive for rash.   Allergic/Immunologic: Negative.    Neurological: Negative.    Hematological: Negative.    Psychiatric/Behavioral: Negative.     All other systems reviewed and are negative.      PFSH:  Past Medical History:   Diagnosis Date    Cerebellar mass 05/2024    Headache     Miscarriage 04/2024    Miscarriage 02/2023    Personal history of COVID-19 2021     Past Surgical History:   Procedure Laterality Date    CRANIOTOMY N/A 05/17/2024    Procedure: CRANIOTOMY SUBOCCIPITAL WITH STEALTH, LUMBAR DRAIN INSERTION EXTERNAL;  Surgeon: Rohith Alexander MD;  Location: [facility] OR;  Service: Neurosurgery;  Laterality: N/A;    INCISION AND DRAINAGE OF WOUND N/A 06/05/2024    Procedure: Lumbar Drain and Suboccipital Wound revision, possible removal of bone, possible revision of dural graft. HOLD ANTIBIOTICS UNTIL CSF OBTAINED;  Surgeon: Rohith Alexander MD;  Location:  [facility] OR;  Service: Neurosurgery;  Laterality: N/A;    LUMBAR DRAIN INSERTION EXTERNAL N/A 05/17/2024    Procedure: LUMBAR DRAIN INSERTION EXTERNAL;  Surgeon: Rohith Alexander MD;  Location:  [facility] OR;  Service: Neurosurgery;  Laterality: N/A;    LUMBAR DRAIN INSERTION EXTERNAL N/A 06/05/2024    Procedure: LUMBAR DRAIN INSERTION EXTERNAL;  Surgeon: Rohith Alexander MD;  Location:  [facility] OR;  Service: Neurosurgery;  Laterality: N/A;     Objective      Current Outpatient Medications   Medication Sig Dispense Refill    cefdinir (OMNICEF) 300 MG capsule Take 1 capsule by mouth 2 (Two) Times a Day.      metFORMIN (GLUCOPHAGE) 500 MG tablet Take 1 tablet by mouth 2 (Two) Times a Day With Meals. 60 tablet 3    cyclobenzaprine (FLEXERIL) 10 MG tablet Take 1 tablet by mouth Every Night. 30 tablet 0    meloxicam (Mobic) 15  "MG tablet Take 1 tablet by mouth Daily. 30 tablet 0    mupirocin (BACTROBAN) 2 % ointment Apply 1 Application topically to the appropriate area as directed 3 (Three) Times a Day. Apply to red areas on scalp twice daily for 10 days 1 each 0     No current facility-administered medications for this visit.     Vital Signs  Ht 152.4 cm (60\")   Wt 99.8 kg (220 lb)   LMP  (LMP Unknown)   BMI 42.97 kg/m²   Physical Exam  Vitals and nursing note reviewed.   Constitutional:       General: She is not in acute distress.     Appearance: Normal appearance. She is well-developed and well-groomed. She is morbidly obese. She is not ill-appearing, toxic-appearing or diaphoretic.      Comments: BMI 42.97   HENT:      Head: Normocephalic and atraumatic.        Right Ear: Hearing normal.      Left Ear: Hearing normal.   Eyes:      General: Lids are normal.      Extraocular Movements: Extraocular movements intact.      Conjunctiva/sclera: Conjunctivae normal.      Pupils: Pupils are equal, round, and reactive to light.   Neck:      Trachea: Trachea normal.     Cardiovascular:      Rate and Rhythm: Normal rate and regular rhythm.   Pulmonary:      Effort: Pulmonary effort is normal. No tachypnea, bradypnea, accessory muscle usage or respiratory distress.   Abdominal:      Palpations: Abdomen is soft.   Musculoskeletal:      Cervical back: Full passive range of motion without pain and neck supple.   Skin:     General: Skin is warm and dry.   Neurological:      GCS: GCS eye subscore is 4. GCS verbal subscore is 5. GCS motor subscore is 6.      Deep Tendon Reflexes:      Reflex Scores:       Tricep reflexes are 2+ on the right side and 2+ on the left side.       Bicep reflexes are 2+ on the right side and 2+ on the left side.       Brachioradialis reflexes are 2+ on the right side and 2+ on the left side.       Patellar reflexes are 2+ on the right side and 2+ on the left side.       Achilles reflexes are 2+ on the right side and 2+ on " the left side.  Psychiatric:         Speech: Speech normal.         Behavior: Behavior normal. Behavior is cooperative.     Neurological Exam  Mental Status  Awake, alert and oriented to person, place and time. Speech is normal. Language is fluent with no aphasia. Attention and concentration are normal.    Cranial Nerves  CN I: Sense of smell is normal.  CN II: Visual acuity is normal.  CN III, IV, VI: Extraocular movements intact bilaterally. Normal lids and orbits bilaterally. Pupils equal round and reactive to light bilaterally.  CN V: Facial sensation is normal.  CN VII: Full and symmetric facial movement.  CN IX, X: Palate elevates symmetrically  CN XI: Shoulder shrug strength is normal.  CN XII: Tongue midline without atrophy or fasciculations.    Motor  Normal muscle bulk throughout. Normal muscle tone. No pronator drift.                                             Right                     Left  Toe extension                        5                          5                                             Right                     Left  Deltoid                                   5                          5   Biceps                                   5                          5   Triceps                                  5                          5   Wrist extensor                       5                          5   Iliopsoas                               5                          5   Quadriceps                           5                          5   Anterior tibialis                      5                          5   Posterior tibialis                    5                          5    Sensory  Sensation is intact to light touch, pinprick, vibration and proprioception in all four extremities.    Reflexes                                            Right                      Left  Brachioradialis                    2+                         2+  Biceps                                 2+                        "  2+  Triceps                                2+                         2+  Patellar                                2+                         2+  Achilles                                2+                         2+  Right Plantar: downgoing  Left Plantar: downgoing    Right pathological reflexes: Jacinta's absent. Ankle clonus absent.  Left pathological reflexes: Jacinta's absent. Ankle clonus absent.    Coordination  Right: Finger-to-nose normal.    Gait  Casual gait is normal including stance, stride, and arm swing.    (12 bullet pts)    Results Review:   1/12/2025      CT Head Without Contrast    Result Date: 1/13/2025  1. No acute intracranial abnormality. Postsurgical changes of the posterior fossa.  The above study was initially reviewed and reported by [facility]. I do not find any discrepancies.             This report was signed and finalized on 1/13/2025 5:49 AM by Dr. Simon Holland MD.         Assessment/Plan:   Zina Armando is a 34 y.o. non-English-speaking  female with significant medical comorbidities to include Craniotomy Suboccipital With Stealth, Lumbar Drain Insertion External and Lumbar Drain Insertion External on 5/17/2024, recurrent miscarriages, diabetes, and morbid obesity.  She presents with a new problem of small \"painful lumps\" to her occipital scalp, and palpable tenderness to the lateral right neck and trapezius muscle. Physical exam findings of posterior cervical incision appears to have healed completely without further complication, cluster of subcentimeter erythematous lesions without drainage or discharge which are painful to palpation, and palpable tenderness to the right lateral neck and trapezius muscle, no signs of otitis or lymphadenopathy, otherwise neurologically intact. Her imaging shows no acute intracranial abnormalities.    Recommendations:  Scalp irritation  Ms. Armando presents today with a complaint of small painful \"lumps\" to the occipital scalp.  Upon " exam, there is clusters of erythematous lesions to the occipital scalp which are painful to palpation.  She denies pruritus, drainage, or discharge.  I recommended she avoid touching the areas.  I will provide her with a prescription for mupirocin ointment to place 3 times daily.  Follow-up with PCP for further evaluation.    Musculoskeletal neck pain  Given her history of craniotomy and need for wound revision, despite her postoperative incision showing no signs of infection, we will obtain labs today to include a CBC, CMP, CRP, and sed rate.  I will also provide her with a prescription for Mobic and Flexeril for her musculoskeletal right neck pain.  Any significant abnormalities in her labs will notify the patient.   If her symptoms do not improve in the next several days she may contact the neurosurgical clinic to return for reevaluation, otherwise I recommended she keep her previously scheduled appointment.  MRI of the brain prior to arrival.    Class 3 obesity (BMI >= 40) due to excessive calories with serious comorbidities BMI of 40.0-44.9 in adult  Body mass index is 42.97 kg/m². Information on the DASH diet provided in the AVS.  We will continue to provided diet and exercise information with the goal of weight loss at each scheduled appointment.     Diagnoses and all orders for this visit:    1. Scalp irritation (Primary)  -     mupirocin (BACTROBAN) 2 % ointment; Apply 1 Application topically to the appropriate area as directed 3 (Three) Times a Day. Apply to red areas on scalp twice daily for 10 days  Dispense: 1 each; Refill: 0    2. Musculoskeletal neck pain  -     meloxicam (Mobic) 15 MG tablet; Take 1 tablet by mouth Daily.  Dispense: 30 tablet; Refill: 0  -     cyclobenzaprine (FLEXERIL) 10 MG tablet; Take 1 tablet by mouth Every Night.  Dispense: 30 tablet; Refill: 0    3. Epidermoid cyst of brain    4. History of craniotomy  -     Sedimentation Rate; Future  -     C-reactive Protein; Future  -      Comprehensive Metabolic Panel; Future  -     CBC Auto Differential; Future    5. Class 3 severe obesity due to excess calories with serious comorbidity and body mass index (BMI) of 40.0 to 44.9 in adult      Return in about 3 weeks (around 2/5/2025).    Thank you, for allowing me to continue to participate in the care of this patient.    Sincerely,  TERRENCE Gutiérrez

## 2025-02-05 ENCOUNTER — OFFICE VISIT (OUTPATIENT)
Dept: NEUROSURGERY | Facility: CLINIC | Age: 35
End: 2025-02-05

## 2025-02-05 VITALS — HEIGHT: 60 IN | WEIGHT: 220 LBS | BODY MASS INDEX: 43.19 KG/M2

## 2025-02-05 DIAGNOSIS — E66.813 CLASS 3 SEVERE OBESITY DUE TO EXCESS CALORIES WITH SERIOUS COMORBIDITY AND BODY MASS INDEX (BMI) OF 40.0 TO 44.9 IN ADULT: ICD-10-CM

## 2025-02-05 DIAGNOSIS — Z98.890 HISTORY OF CRANIOTOMY: ICD-10-CM

## 2025-02-05 DIAGNOSIS — E66.01 CLASS 3 SEVERE OBESITY DUE TO EXCESS CALORIES WITH SERIOUS COMORBIDITY AND BODY MASS INDEX (BMI) OF 40.0 TO 44.9 IN ADULT: ICD-10-CM

## 2025-02-05 DIAGNOSIS — R23.8 SCALP IRRITATION: Primary | ICD-10-CM

## 2025-02-05 DIAGNOSIS — G93.0 EPIDERMOID CYST OF BRAIN: ICD-10-CM

## 2025-02-05 NOTE — PROGRESS NOTES
Chief complaint:   Chief Complaint   Patient presents with    Headache     Pt is here for followup on headache.     Subjective      services used during this exam.   # [id number]    HPI:   Zina Armando is a 34 y.o. non-English-speaking  female who presents today for follow-up.  She was last evaluated on 10/3/2024 with complaints of nonpruritic erythremic scalp irritation, right sided head and neck tension, right ear discomfort, and the feeling of right eye heaviness.  Prior to this encounter she was evaluated by an outside clinician and diagnosed with a bilateral ear infection and treated with antibiotics.    She utilized mupirocin ointment, Mobic, and Flexeril as prescribed with complete resolve of her scalp irritation and musculoskeletal neck pain.  Her headache and right ear discomfort have also resolved.      Her primary complaint today is occipital numbness, just above her suboccipital incision.  She additionally endorses intermittent brief episodes of dizziness with position change which last seconds and resolved, otherwise she denies fevers, chills, night sweats, unexplained weight loss, visual disturbances, decline in dexterity, unilateral weakness, nausea, or vomiting.  She rates the severity of her symptoms 0/10.    ROS  Review of Systems   Constitutional: Negative.    HENT: Negative.     Eyes: Negative.    Respiratory: Negative.     Cardiovascular: Negative.    Gastrointestinal: Negative.    Endocrine: Negative.    Genitourinary: Negative.    Musculoskeletal: Negative.    Skin: Negative.    Allergic/Immunologic: Negative.    Neurological:  Positive for dizziness (brief) and numbness.   Hematological: Negative.    Psychiatric/Behavioral: Negative.     All other systems reviewed and are negative.    PFSH:  Past Medical History:   Diagnosis Date    Cerebellar mass 05/2024    Headache     Miscarriage 04/2024    Miscarriage 02/2023    Personal history of COVID-19 2021     Past  "Surgical History:   Procedure Laterality Date    CRANIOTOMY N/A 05/17/2024    Procedure: CRANIOTOMY SUBOCCIPITAL WITH STEALTH, LUMBAR DRAIN INSERTION EXTERNAL;  Surgeon: Rohtih Alexander MD;  Location:  [facility] OR;  Service: Neurosurgery;  Laterality: N/A;    INCISION AND DRAINAGE OF WOUND N/A 06/05/2024    Procedure: Lumbar Drain and Suboccipital Wound revision, possible removal of bone, possible revision of dural graft. HOLD ANTIBIOTICS UNTIL CSF OBTAINED;  Surgeon: Rohith Alexander MD;  Location:  [facility] OR;  Service: Neurosurgery;  Laterality: N/A;    LUMBAR DRAIN INSERTION EXTERNAL N/A 05/17/2024    Procedure: LUMBAR DRAIN INSERTION EXTERNAL;  Surgeon: Rohith Alexander MD;  Location:  [facility] OR;  Service: Neurosurgery;  Laterality: N/A;    LUMBAR DRAIN INSERTION EXTERNAL N/A 06/05/2024    Procedure: LUMBAR DRAIN INSERTION EXTERNAL;  Surgeon: Rohith Alexander MD;  Location:  [facility] OR;  Service: Neurosurgery;  Laterality: N/A;     Objective      Current Outpatient Medications   Medication Sig Dispense Refill    cefdinir (OMNICEF) 300 MG capsule Take 1 capsule by mouth 2 (Two) Times a Day.      cyclobenzaprine (FLEXERIL) 10 MG tablet Take 1 tablet by mouth Every Night. 30 tablet 0    meloxicam (Mobic) 15 MG tablet Take 1 tablet by mouth Daily. 30 tablet 0    metFORMIN (GLUCOPHAGE) 500 MG tablet Take 1 tablet by mouth 2 (Two) Times a Day With Meals. 60 tablet 3    mupirocin (BACTROBAN) 2 % ointment Apply 1 Application topically to the appropriate area as directed 3 (Three) Times a Day. Apply to red areas on scalp twice daily for 10 days 1 each 0     No current facility-administered medications for this visit.     Vital Signs  Ht 152.4 cm (60\")   Wt 99.8 kg (220 lb)   LMP  (LMP Unknown)   BMI 42.97 kg/m²   Physical Exam  Vitals and nursing note reviewed.   Constitutional:       General: She is not in acute distress.     Appearance: Normal appearance. She is well-developed and well-groomed. " She is morbidly obese. She is not ill-appearing, toxic-appearing or diaphoretic.      Comments: BMI 42.97   HENT:      Head: Normocephalic and atraumatic.      Right Ear: Hearing normal.      Left Ear: Hearing normal.   Eyes:      General: Lids are normal.      Extraocular Movements: Extraocular movements intact.      Conjunctiva/sclera: Conjunctivae normal.      Pupils: Pupils are equal, round, and reactive to light.   Neck:      Trachea: Trachea normal.     Cardiovascular:      Rate and Rhythm: Normal rate and regular rhythm.   Pulmonary:      Effort: Pulmonary effort is normal. No tachypnea, bradypnea, accessory muscle usage or respiratory distress.   Abdominal:      Palpations: Abdomen is soft.   Musculoskeletal:      Cervical back: Full passive range of motion without pain and neck supple.   Skin:     General: Skin is warm and dry.   Neurological:      GCS: GCS eye subscore is 4. GCS verbal subscore is 5. GCS motor subscore is 6.      Deep Tendon Reflexes:      Reflex Scores:       Tricep reflexes are 2+ on the right side and 2+ on the left side.       Bicep reflexes are 2+ on the right side and 2+ on the left side.       Brachioradialis reflexes are 2+ on the right side and 2+ on the left side.       Patellar reflexes are 2+ on the right side and 2+ on the left side.       Achilles reflexes are 2+ on the right side and 2+ on the left side.  Psychiatric:         Speech: Speech normal.         Behavior: Behavior normal. Behavior is cooperative.       Neurological Exam  Mental Status  Awake, alert and oriented to person, place and time. Speech is normal. Language is fluent with no aphasia. Attention and concentration are normal.    Cranial Nerves  CN I: Sense of smell is normal.  CN II: Visual acuity is normal.  CN III, IV, VI: Extraocular movements intact bilaterally. Normal lids and orbits bilaterally. Pupils equal round and reactive to light bilaterally.  CN V: Facial sensation is normal.  CN VII: Full and  symmetric facial movement.  CN IX, X: Palate elevates symmetrically  CN XI: Shoulder shrug strength is normal.  CN XII: Tongue midline without atrophy or fasciculations.    Motor  Normal muscle bulk throughout. Normal muscle tone. No pronator drift.                                             Right                     Left  Toe extension                        5                          5                                             Right                     Left  Deltoid                                   5                          5   Biceps                                   5                          5   Triceps                                  5                          5   Wrist extensor                       5                          5   Iliopsoas                               5                          5   Quadriceps                           5                          5   Anterior tibialis                      5                          5   Posterior tibialis                    5                          5    Sensory  Sensation is intact to light touch, pinprick, vibration and proprioception in all four extremities.    Reflexes                                            Right                      Left  Brachioradialis                    2+                         2+  Biceps                                 2+                         2+  Triceps                                2+                         2+  Patellar                                2+                         2+  Achilles                                2+                         2+  Right Plantar: downgoing  Left Plantar: downgoing    Right pathological reflexes: Jacinta's absent. Ankle clonus absent.  Left pathological reflexes: Jacinta's absent. Ankle clonus absent.    Coordination  Right: Finger-to-nose normal.    Gait  Casual gait is normal including stance, stride, and arm swing.  (12 bullet pts)    Results Review:   1/12/2025       CT Head  "Without Contrast     Result Date: 1/13/2025  1. No acute intracranial abnormality. Postsurgical changes of the posterior fossa.  The above study was initially reviewed and reported by [facility]. I do not find any discrepancies.             This report was signed and finalized on 1/13/2025 5:49 AM by Dr. Simon Holland MD.         Assessment/Plan:   Zina Armando is a 34 y.o. female with significant medical comorbidities to include Craniotomy Suboccipital With Stealth, Lumbar Drain Insertion External and Lumbar Drain Insertion External on 5/17/2024, recurrent miscarriages, diabetes, and morbid obesity.  She presents today for follow-up and reevaluation of small \"painful lumps\" to her occipital scalp, and palpable tenderness to the lateral right neck and trapezius muscle, both of which have completely resolved. Physical exam findings of posterior cervical incision appears to have healed completely without further complication, no evidence of erythematous scalp lesions on today's exam, numbness noted upon palpation to the occipital scalp, otherwise neurologically intact.  Prior imaging from 1/13/2025 showed no acute abnormalities.      Recommendations:  Scalp irritation/lesions, resolved  Musculoskeletal neck pain, resolved    Occipital scalp numbness  No signs of scalp lesions on today's exam.  Her operative incision  appears to have healed completely without complication.  Numbness and tingling noted just above her incision from her suboccipital craniotomy which is expected.  This information was relayed to patient.  No additional testing needed.    History of epidermoid cyst status post craniotomy  Keep scheduled follow-up for reevaluation as scheduled on 3/24/2025.  MRI brain prior to arrival.    Class 3 obesity (BMI >= 40) due to excessive calories with serious comorbidities BMI of 40.0-44.9 in adult  Body mass index is 42.97 kg/m². Information on the DASH diet provided in the AVS.  We will continue to provided " diet and exercise information with the goal of weight loss at each scheduled appointment.     Diagnoses and all orders for this visit:    1. Scalp irritation (Primary)    2. Epidermoid cyst of brain    3. History of craniotomy    4. Class 3 severe obesity due to excess calories with serious comorbidity and body mass index (BMI) of 40.0 to 44.9 in adult      Return for KEEP SCHEDULED APPT WITH DR. PARKS.    Thank you, for allowing me to continue to participate in the care of this patient.    Sincerely,  Dominguez Narvaez, APRN

## 2025-02-05 NOTE — PATIENT INSTRUCTIONS
"DASH Eating Plan  DASH stands for Dietary Approaches to Stop Hypertension. The DASH eating plan is a healthy eating plan that has been shown to:  Lower high blood pressure (hypertension).  Reduce your risk for type 2 diabetes, heart disease, and stroke.  Help with weight loss.  What are tips for following this plan?  Reading food labels  Check food labels for the amount of salt (sodium) per serving. Choose foods with less than 5 percent of the Daily Value (DV) of sodium. In general, foods with less than 300 milligrams (mg) of sodium per serving fit into this eating plan.  To find whole grains, look for the word \"whole\" as the first word in the ingredient list.  Shopping  Buy products labeled as \"low-sodium\" or \"no salt added.\"  Buy fresh foods. Avoid canned foods and pre-made or frozen meals.  Cooking  Try not to add salt when you cook. Use salt-free seasonings or herbs instead of table salt or sea salt. Check with your health care provider or pharmacist before using salt substitutes.  Do not bishop foods. Cook foods in healthy ways, such as baking, boiling, grilling, roasting, or broiling.  Cook using oils that are good for your heart. These include olive, canola, avocado, soybean, and sunflower oil.  Meal planning    Eat a balanced diet. This should include:  4 or more servings of fruits and 4 or more servings of vegetables each day. Try to fill half of your plate with fruits and vegetables.  6-8 servings of whole grains each day.  6 or less servings of lean meat, poultry, or fish each day. 1 oz is 1 serving. A 3 oz (85 g) serving of meat is about the same size as the palm of your hand. One egg is 1 oz (28 g).  2-3 servings of low-fat dairy each day. One serving is 1 cup (237 mL).  1 serving of nuts, seeds, or beans 5 times each week.  2-3 servings of heart-healthy fats. Healthy fats called omega-3 fatty acids are found in foods such as walnuts, flaxseeds, fortified milks, and eggs. These fats are also found in " cold-water fish, such as sardines, salmon, and mackerel.  Limit how much you eat of:  Canned or prepackaged foods.  Food that is high in trans fat, such as fried foods.  Food that is high in saturated fat, such as fatty meat.  Desserts and other sweets, sugary drinks, and other foods with added sugar.  Full-fat dairy products.  Do not salt foods before eating.  Do not eat more than 4 egg yolks a week.  Try to eat at least 2 vegetarian meals a week.  Eat more home-cooked food and less restaurant, buffet, and fast food.  Lifestyle  When eating at a restaurant, ask if your food can be made with less salt or no salt.  If you drink alcohol:  Limit how much you have to:  0-1 drink a day if you are female.  0-2 drinks a day if you are male.  Know how much alcohol is in your drink. In the [place] one drink is one 12 oz bottle of beer (355 mL), one 5 oz glass of wine (148 mL), or one 1½ oz glass of hard liquor (44 mL).  General information  Avoid eating more than 2,300 mg of salt a day. If you have hypertension, you may need to reduce your sodium intake to 1,500 mg a day.  Work with your provider to stay at a healthy body weight or lose weight. Ask what the best weight range is for you.  On most days of the week, get at least 30 minutes of exercise that causes your heart to beat faster. This may include walking, swimming, or biking.  Work with your provider or dietitian to adjust your eating plan to meet your specific calorie needs.  What foods should I eat?  Fruits  All fresh, dried, or frozen fruit. Canned fruits that are in their natural juice and do not have sugar added to them.  Vegetables  Fresh or frozen vegetables that are raw, steamed, roasted, or grilled. Low-sodium or reduced-sodium tomato and vegetable juice. Low-sodium or reduced-sodium tomato sauce and tomato paste. Low-sodium or reduced-sodium canned vegetables.  Grains  Whole-grain or whole-wheat bread. Whole-grain or whole-wheat pasta. Brown rice. Oatmeal.  Quinoa. Bulgur. Whole-grain and low-sodium cereals. Abi bread. Low-fat, low-sodium crackers. Whole-wheat flour tortillas.  Meats and other proteins  Skinless chicken or turkey. Ground chicken or turkey. Pork with fat trimmed off. Fish and seafood. Egg whites. Dried beans, peas, or lentils. Unsalted nuts, nut butters, and seeds. Unsalted canned beans. Lean cuts of beef with fat trimmed off. Low-sodium, lean precooked or cured meat, such as sausages or meat loaves.  Dairy  Low-fat (1%) or fat-free (skim) milk. Reduced-fat, low-fat, or fat-free cheeses. Nonfat, low-sodium ricotta or cottage cheese. Low-fat or nonfat yogurt. Low-fat, low-sodium cheese.  Fats and oils  Soft margarine without trans fats. Vegetable oil. Reduced-fat, low-fat, or light mayonnaise and salad dressings (reduced-sodium). Canola, safflower, olive, avocado, soybean, and sunflower oils. Avocado.  Seasonings and condiments  Herbs. Spices. Seasoning mixes without salt.  Other foods  Unsalted popcorn and pretzels. Fat-free sweets.  The items listed above may not be all the foods and drinks you can have. Talk to a dietitian to learn more.  What foods should I avoid?  Fruits  Canned fruit in a light or heavy syrup. Fried fruit. Fruit in cream or butter sauce.  Vegetables  Creamed or fried vegetables. Vegetables in a cheese sauce. Regular canned vegetables that are not marked as low-sodium or reduced-sodium. Regular canned tomato sauce and paste that are not marked as low-sodium or reduced-sodium. Regular tomato and vegetable juices that are not marked as low-sodium or reduced-sodium. Pickles. Olives.  Grains  Baked goods made with fat, such as croissants, muffins, or some breads. Dry pasta or rice meal packs.  Meats and other proteins  Fatty cuts of meat. Ribs. Fried meat. Plaza. Bologna, salami, and other precooked or cured meats, such as sausages or meat loaves, that are not lean and low in sodium. Fat from the back of a pig (fatback). Viry.  Salted nuts and seeds. Canned beans with added salt. Canned or smoked fish. Whole eggs or egg yolks. Chicken or turkey with skin.  Dairy  Whole or 2% milk, cream, and half-and-half. Whole or full-fat cream cheese. Whole-fat or sweetened yogurt. Full-fat cheese. Nondairy creamers. Whipped toppings. Processed cheese and cheese spreads.  Fats and oils  Butter. Stick margarine. Lard. Shortening. Ghee. Plaza fat. Tropical oils, such as coconut, palm kernel, or palm oil.  Seasonings and condiments  Onion salt, garlic salt, seasoned salt, table salt, and sea salt. Worcestershire sauce. Tartar sauce. Barbecue sauce. Teriyaki sauce. Soy sauce, including reduced-sodium soy sauce. Steak sauce. Canned and packaged gravies. Fish sauce. Oyster sauce. Cocktail sauce. Store-bought horseradish. Ketchup. Mustard. Meat flavorings and tenderizers. Bouillon cubes. Hot sauces. Pre-made or packaged marinades. Pre-made or packaged taco seasonings. Relishes. Regular salad dressings.  Other foods  Salted popcorn and pretzels.  The items listed above may not be all the foods and drinks you should avoid. Talk to a dietitian to learn more.  Where to find more information  National Heart, Lung, and Blood [place] (NHLBI): nhlbi.nih.gov  American Heart Association (AHA): heart.org  Academy of Nutrition and Dietetics: eatright.org  National Kidney Foundation (NKF): kidney.org  This information is not intended to replace advice given to you by your health care provider. Make sure you discuss any questions you have with your health care provider.  Document Revised: 01/04/2024 Document Reviewed: 01/04/2024  Elsevier Patient Education © 2024 [employer] Inc.

## 2025-03-21 ENCOUNTER — TELEPHONE (OUTPATIENT)
Dept: NEUROSURGERY | Facility: CLINIC | Age: 35
End: 2025-03-21

## 2025-03-21 NOTE — TELEPHONE ENCOUNTER
Called patient with  listed below and confirmed the patients upcoming appointment and MRI prior for Monday. Pt voiced understanding and call ended.       Dary   ID # [id number]   Call Time: 5 minutes 30 seconds

## 2025-03-24 ENCOUNTER — HOSPITAL ENCOUNTER (OUTPATIENT)
Dept: MRI IMAGING | Facility: HOSPITAL | Age: 35
Discharge: HOME | End: 2025-03-24
Admitting: NEUROLOGICAL SURGERY

## 2025-03-24 ENCOUNTER — OFFICE VISIT (OUTPATIENT)
Dept: NEUROSURGERY | Facility: CLINIC | Age: 35
End: 2025-03-24

## 2025-03-24 VITALS — BODY MASS INDEX: 42.96 KG/M2 | WEIGHT: 218.8 LBS | HEIGHT: 60 IN

## 2025-03-24 DIAGNOSIS — G93.0 EPIDERMOID CYST OF BRAIN: ICD-10-CM

## 2025-03-24 DIAGNOSIS — Z75.8 SPANISH LANGUAGE INTERPRETER NEEDED: ICD-10-CM

## 2025-03-24 DIAGNOSIS — E66.01 CLASS 3 SEVERE OBESITY DUE TO EXCESS CALORIES WITH SERIOUS COMORBIDITY AND BODY MASS INDEX (BMI) OF 40.0 TO 44.9 IN ADULT: ICD-10-CM

## 2025-03-24 DIAGNOSIS — Z98.890 HISTORY OF CRANIOTOMY: ICD-10-CM

## 2025-03-24 DIAGNOSIS — Z98.890 STATUS POST CRANIOTOMY: ICD-10-CM

## 2025-03-24 DIAGNOSIS — E66.813 CLASS 3 SEVERE OBESITY DUE TO EXCESS CALORIES WITH SERIOUS COMORBIDITY AND BODY MASS INDEX (BMI) OF 40.0 TO 44.9 IN ADULT: ICD-10-CM

## 2025-03-24 DIAGNOSIS — G93.0 EPIDERMOID CYST OF BRAIN: Primary | ICD-10-CM

## 2025-03-24 PROCEDURE — 25510000001 GADOPICLENOL 0.5 MMOL/ML SOLUTION: Performed by: NEUROLOGICAL SURGERY

## 2025-03-24 PROCEDURE — 70553 MRI BRAIN STEM W/O & W/DYE: CPT

## 2025-03-24 PROCEDURE — 99213 OFFICE O/P EST LOW 20 MIN: CPT | Performed by: NEUROLOGICAL SURGERY

## 2025-03-24 PROCEDURE — A9579 GAD-BASE MR CONTRAST NOS,1ML: HCPCS | Performed by: NEUROLOGICAL SURGERY

## 2025-03-24 RX ADMIN — GADOPICLENOL 10 ML: 485.1 INJECTION INTRAVENOUS at 16:09

## 2025-03-24 NOTE — PATIENT INSTRUCTIONS
"PATIENT TO CONTINUE TO FOLLOW UP WITH HER PRIMARY CARE PROVIDER FOR YEARLY PHYSICAL EXAMS TO ENSURE COMPLETE HEALTH MAINTENANCE    BMI for Adults  Body mass index (BMI) is a number found using a person's weight and height. BMI can help tell how much of a person's weight is made up of fat. BMI does not measure body fat directly. It is used instead of tests that directly measure body fat, which can be difficult and expensive.  What are BMI measurements used for?  BMI is useful to:  Find out if your weight puts you at higher risk for medical problems.  Help recommend changes, such as in diet and exercise. This can help you reach a healthy weight. BMI screening can be done again to see if these changes are working.  How is BMI calculated?  Your height and weight are measured. The BMI is found from those numbers. This can be done with [place] or metric measurements. Note that charts and online BMI calculators are available to help you find your BMI quickly and easily without doing these calculations.  To calculate your BMI in [place] measurements:  Measure your weight in pounds (lb).  Multiply the number of pounds by 703.  So, for an adult who weighs 150 lb, multiply that number by 703: 150 x 703, which equals 105,450.  Measure your height in inches. Then multiply that number by itself to get a measurement called \"inches squared.\"  So, for an adult who is 70 inches tall, the \"inches squared\" measurement is 70 inches x 70 inches, which equals 4,900 inches squared.  Divide the total from step 2 (number of lb x 703) by the total from step 3 (inches squared): 105,450 ÷ 4,900 = 21.5. This is your BMI.  To calculate your BMI in metric measurements:    Measure your weight in kilograms (kg).  For this example, the weight is 70 kg.  Measure your height in meters (m). Then multiply that number by itself to get a measurement called \"meters squared.\"  So, for an adult who is 1.75 m tall, the \"meters squared\" measurement is 1.75 m x 1.75 " m, which equals 3.1 meters squared.  Divide the number of kilograms (your weight) by the meters squared number. In this example: 70 ÷ 3.1 = 22.6. This is your BMI.  What do the results mean?  BMI charts are used to see if you are underweight, normal weight, overweight, or obese. The following guidelines will be used:  Underweight: BMI less than 18.5.  Normal weight: BMI between 18.5 and 24.9.  Overweight: BMI between 25 and 29.9.  Obese: BMI of 30 or above.  BMI is a tool and cannot diagnose a condition. Talk with your health care provider about what your BMI means for you. Keep these notes in mind:  Weight includes fat and muscle. Someone with a muscular build, such as an athlete, may have a BMI that is higher than 24.9. In cases like these, BMI is not a correct measure of body fat.  If you have a BMI of 25 or higher, your provider may need to do more testing to find out if excess body fat is the cause.  BMI is measured the same way for males and females. Females usually have more body fat than males of the same height and weight.  Where to find more information  For more information about BMI, including tools to quickly find your BMI, go to:  Centers for Disease Control and Prevention: cdc.gov  American Heart Association: heart.org  National Heart, Lung, and Blood [place]: nhlbi.nih.gov  This information is not intended to replace advice given to you by your health care provider. Make sure you discuss any questions you have with your health care provider.  Document Revised: 09/07/2023 Document Reviewed: 08/31/2023  [employer] Patient Education © 2024 [employer]DASH Eating Plan  DASH stands for Dietary Approaches to Stop Hypertension. The DASH eating plan is a healthy eating plan that has been shown to:  Lower high blood pressure (hypertension).  Reduce your risk for type 2 diabetes, heart disease, and stroke.  Help with weight loss.  What are tips for following this plan?  Reading food labels  Check food labels  "for the amount of salt (sodium) per serving. Choose foods with less than 5 percent of the Daily Value (DV) of sodium. In general, foods with less than 300 milligrams (mg) of sodium per serving fit into this eating plan.  To find whole grains, look for the word \"whole\" as the first word in the ingredient list.  Shopping  Buy products labeled as \"low-sodium\" or \"no salt added.\"  Buy fresh foods. Avoid canned foods and pre-made or frozen meals.  Cooking  Try not to add salt when you cook. Use salt-free seasonings or herbs instead of table salt or sea salt. Check with your health care provider or pharmacist before using salt substitutes.  Do not bishop foods. Cook foods in healthy ways, such as baking, boiling, grilling, roasting, or broiling.  Cook using oils that are good for your heart. These include olive, canola, avocado, soybean, and sunflower oil.  Meal planning    Eat a balanced diet. This should include:  4 or more servings of fruits and 4 or more servings of vegetables each day. Try to fill half of your plate with fruits and vegetables.  6-8 servings of whole grains each day.  6 or less servings of lean meat, poultry, or fish each day. 1 oz is 1 serving. A 3 oz (85 g) serving of meat is about the same size as the palm of your hand. One egg is 1 oz (28 g).  2-3 servings of low-fat dairy each day. One serving is 1 cup (237 mL).  1 serving of nuts, seeds, or beans 5 times each week.  2-3 servings of heart-healthy fats. Healthy fats called omega-3 fatty acids are found in foods such as walnuts, flaxseeds, fortified milks, and eggs. These fats are also found in cold-water fish, such as sardines, salmon, and mackerel.  Limit how much you eat of:  Canned or prepackaged foods.  Food that is high in trans fat, such as fried foods.  Food that is high in saturated fat, such as fatty meat.  Desserts and other sweets, sugary drinks, and other foods with added sugar.  Full-fat dairy products.  Do not salt foods before " eating.  Do not eat more than 4 egg yolks a week.  Try to eat at least 2 vegetarian meals a week.  Eat more home-cooked food and less restaurant, buffet, and fast food.  Lifestyle  When eating at a restaurant, ask if your food can be made with less salt or no salt.  If you drink alcohol:  Limit how much you have to:  0-1 drink a day if you are female.  0-2 drinks a day if you are male.  Know how much alcohol is in your drink. In the [place] one drink is one 12 oz bottle of beer (355 mL), one 5 oz glass of wine (148 mL), or one 1½ oz glass of hard liquor (44 mL).  General information  Avoid eating more than 2,300 mg of salt a day. If you have hypertension, you may need to reduce your sodium intake to 1,500 mg a day.  Work with your provider to stay at a healthy body weight or lose weight. Ask what the best weight range is for you.  On most days of the week, get at least 30 minutes of exercise that causes your heart to beat faster. This may include walking, swimming, or biking.  Work with your provider or dietitian to adjust your eating plan to meet your specific calorie needs.  What foods should I eat?  Fruits  All fresh, dried, or frozen fruit. Canned fruits that are in their natural juice and do not have sugar added to them.  Vegetables  Fresh or frozen vegetables that are raw, steamed, roasted, or grilled. Low-sodium or reduced-sodium tomato and vegetable juice. Low-sodium or reduced-sodium tomato sauce and tomato paste. Low-sodium or reduced-sodium canned vegetables.  Grains  Whole-grain or whole-wheat bread. Whole-grain or whole-wheat pasta. Brown rice. Oatmeal. Quinoa. Bulgur. Whole-grain and low-sodium cereals. Abi bread. Low-fat, low-sodium crackers. Whole-wheat flour tortillas.  Meats and other proteins  Skinless chicken or turkey. Ground chicken or turkey. Pork with fat trimmed off. Fish and seafood. Egg whites. Dried beans, peas, or lentils. Unsalted nuts, nut butters, and seeds. Unsalted canned beans. Lean  cuts of beef with fat trimmed off. Low-sodium, lean precooked or cured meat, such as sausages or meat loaves.  Dairy  Low-fat (1%) or fat-free (skim) milk. Reduced-fat, low-fat, or fat-free cheeses. Nonfat, low-sodium ricotta or cottage cheese. Low-fat or nonfat yogurt. Low-fat, low-sodium cheese.  Fats and oils  Soft margarine without trans fats. Vegetable oil. Reduced-fat, low-fat, or light mayonnaise and salad dressings (reduced-sodium). Canola, safflower, olive, avocado, soybean, and sunflower oils. Avocado.  Seasonings and condiments  Herbs. Spices. Seasoning mixes without salt.  Other foods  Unsalted popcorn and pretzels. Fat-free sweets.  The items listed above may not be all the foods and drinks you can have. Talk to a dietitian to learn more.  What foods should I avoid?  Fruits  Canned fruit in a light or heavy syrup. Fried fruit. Fruit in cream or butter sauce.  Vegetables  Creamed or fried vegetables. Vegetables in a cheese sauce. Regular canned vegetables that are not marked as low-sodium or reduced-sodium. Regular canned tomato sauce and paste that are not marked as low-sodium or reduced-sodium. Regular tomato and vegetable juices that are not marked as low-sodium or reduced-sodium. Pickles. Olives.  Grains  Baked goods made with fat, such as croissants, muffins, or some breads. Dry pasta or rice meal packs.  Meats and other proteins  Fatty cuts of meat. Ribs. Fried meat. Plaza. Bologna, salami, and other precooked or cured meats, such as sausages or meat loaves, that are not lean and low in sodium. Fat from the back of a pig (fatback). Bratwurst. Salted nuts and seeds. Canned beans with added salt. Canned or smoked fish. Whole eggs or egg yolks. Chicken or turkey with skin.  Dairy  Whole or 2% milk, cream, and half-and-half. Whole or full-fat cream cheese. Whole-fat or sweetened yogurt. Full-fat cheese. Nondairy creamers. Whipped toppings. Processed cheese and cheese spreads.  Fats and oils  Butter.  Stick margarine. Lard. Shortening. Ghee. Plaza fat. Tropical oils, such as coconut, palm kernel, or palm oil.  Seasonings and condiments  Onion salt, garlic salt, seasoned salt, table salt, and sea salt. Worcestershire sauce. Tartar sauce. Barbecue sauce. Teriyaki sauce. Soy sauce, including reduced-sodium soy sauce. Steak sauce. Canned and packaged gravies. Fish sauce. Oyster sauce. Cocktail sauce. Store-bought horseradish. Ketchup. Mustard. Meat flavorings and tenderizers. Bouillon cubes. Hot sauces. Pre-made or packaged marinades. Pre-made or packaged taco seasonings. Relishes. Regular salad dressings.  Other foods  Salted popcorn and pretzels.  The items listed above may not be all the foods and drinks you should avoid. Talk to a dietitian to learn more.  Where to find more information  National Heart, Lung, and Blood [place] (NHLBI): nhlbi.nih.gov  American Heart Association (AHA): heart.org  Academy of Nutrition and Dietetics: eatright.org  National Kidney Foundation (NKF): kidney.org  This information is not intended to replace advice given to you by your health care provider. Make sure you discuss any questions you have with your health care provider.  Document Revised: 01/04/2024 Document Reviewed: 01/04/2024  [employer] Patient Education © 2024 [employer].

## 2025-03-24 NOTE — LETTER
March 24, 2025     Patient: Zina Armando   YOB: 1990   Date of Visit: 3/24/2025       To Whom It May Concern:    It is my medical opinion that Zina Armando may return to work without restrictions on Monday 3/31/2025.    If you have any questions regarding this matter please contact my office.           Sincerely,    Rohith Alexander MD

## 2025-03-24 NOTE — PROGRESS NOTES
Chief complaint:   Chief Complaint   Patient presents with    Post-op     Post craniotomy f/u   MRI prior       Subjective      services used during this exam.   # [id number], MOHINI Meyer:     History of Present Illness  The patient presents for evaluation of postoperative pain. She is accompanied by an .    She reports a general sense of well-being but experiences discomfort on the right side, specifically at the surgical site and in the neck region, following strenuous activities. She is not experiencing any episodes of vomiting or visual disturbances. She occasionally suffers from headaches but does not have any weakness in her hands or feet. There have been no instances of falls or balance issues. However, she notes that rapid spinning or turning movements induce nausea. She expresses a desire to return to work and seeks clarification on whether it is safe to do so. Additionally, she inquires about the potential impact of her condition on future childbirth experiences.           ROS  Review of Systems   Constitutional: Negative.    HENT: Negative.     Eyes: Negative.    Respiratory: Negative.     Cardiovascular: Negative.    Gastrointestinal: Negative.    Endocrine: Negative.    Genitourinary: Negative.    Musculoskeletal: Negative.    Skin: Negative.    Allergic/Immunologic: Negative.    Neurological:  Positive for dizziness (brief) and numbness.   Hematological: Negative.    Psychiatric/Behavioral: Negative.     All other systems reviewed and are negative.    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 HOLD ANTIBIOTICS UNTIL CSF OBTAINED;  Surgeon: Rohith Alexander MD;  Location: [facility] OR;  Service: Neurosurgery;  Laterality: N/A;    LUMBAR DRAIN INSERTION EXTERNAL N/A 05/17/2024    Procedure: LUMBAR DRAIN INSERTION EXTERNAL;  Surgeon: Rohith Alexander MD;  Location:  [facility] OR;  Service: Neurosurgery;  Laterality: N/A;    LUMBAR DRAIN INSERTION EXTERNAL N/A 06/05/2024    Procedure: LUMBAR DRAIN INSERTION EXTERNAL;  Surgeon: Rohith Alexander MD;  Location:  [facility] OR;  Service: Neurosurgery;  Laterality: N/A;     Objective      Current Outpatient Medications   Medication Sig Dispense Refill    cefdinir (OMNICEF) 300 MG capsule Take 1 capsule by mouth 2 (Two) Times a Day.      cyclobenzaprine (FLEXERIL) 10 MG tablet Take 1 tablet by mouth Every Night. 30 tablet 0    meloxicam (Mobic) 15 MG tablet Take 1 tablet by mouth Daily. 30 tablet 0    metFORMIN (GLUCOPHAGE) 500 MG tablet Take 1 tablet by mouth 2 (Two) Times a Day With Meals. 60 tablet 3    mupirocin (BACTROBAN) 2 % ointment Apply 1 Application topically to the appropriate area as directed 3 (Three) Times a Day. Apply to red areas on scalp twice daily for 10 days 1 each 0     No current facility-administered medications for this visit.     Vital Signs  Ht 152.4 cm (60\")   Wt 99.2 kg (218 lb 12.8 oz)   BMI 42.73 kg/m²   Physical Exam  Vitals and nursing note reviewed.   Constitutional:       General: She is not in acute distress.     Appearance: Normal appearance. She is well-developed and well-groomed. She is morbidly obese. She is not ill-appearing, toxic-appearing or diaphoretic.      Comments: BMI 42.97   HENT:      Head: Normocephalic and atraumatic.      Right Ear: Hearing normal.      Left Ear: Hearing normal.   Eyes:      General: Lids are normal. "      Extraocular Movements: Extraocular movements intact.      Conjunctiva/sclera: Conjunctivae normal.      Pupils: Pupils are equal, round, and reactive to light.   Neck:      Trachea: Trachea normal.     Cardiovascular:      Rate and Rhythm: Normal rate and regular rhythm.   Pulmonary:      Effort: Pulmonary effort is normal. No tachypnea, bradypnea, accessory muscle usage or respiratory distress.   Abdominal:      Palpations: Abdomen is soft.   Musculoskeletal:      Cervical back: Full passive range of motion without pain and neck supple.   Skin:     General: Skin is warm and dry.   Neurological:      GCS: GCS eye subscore is 4. GCS verbal subscore is 5. GCS motor subscore is 6.      Deep Tendon Reflexes:      Reflex Scores:       Tricep reflexes are 2+ on the right side and 2+ on the left side.       Bicep reflexes are 2+ on the right side and 2+ on the left side.       Brachioradialis reflexes are 2+ on the right side and 2+ on the left side.       Patellar reflexes are 2+ on the right side and 2+ on the left side.       Achilles reflexes are 2+ on the right side and 2+ on the left side.  Psychiatric:         Speech: Speech normal.         Behavior: Behavior normal. Behavior is cooperative.       Neurological Exam  Mental Status  Awake, alert and oriented to person, place and time. Speech is normal. Language is fluent with no aphasia. Attention and concentration are normal.    Cranial Nerves  CN I: Sense of smell is normal.  CN II: Visual acuity is normal.  CN III, IV, VI: Extraocular movements intact bilaterally. Normal lids and orbits bilaterally. Pupils equal round and reactive to light bilaterally.  CN V: Facial sensation is normal.  CN VII: Full and symmetric facial movement.  CN IX, X: Palate elevates symmetrically  CN XI: Shoulder shrug strength is normal.  CN XII: Tongue midline without atrophy or fasciculations.    Motor  Normal muscle bulk throughout. Normal muscle tone. No pronator drift.                                              Right                     Left  Toe extension                        5                          5                                             Right                     Left  Deltoid                                   5                          5   Biceps                                   5                          5   Triceps                                  5                          5   Wrist extensor                       5                          5   Iliopsoas                               5                          5   Quadriceps                           5                          5   Anterior tibialis                      5                          5   Posterior tibialis                    5                          5    Sensory  Sensation is intact to light touch, pinprick, vibration and proprioception in all four extremities.    Reflexes                                            Right                      Left  Brachioradialis                    2+                         2+  Biceps                                 2+                         2+  Triceps                                2+                         2+  Patellar                                2+                         2+  Achilles                                2+                         2+  Right Plantar: downgoing  Left Plantar: downgoing    Right pathological reflexes: Jacinta's absent. Ankle clonus absent.  Left pathological reflexes: Jacinta's absent. Ankle clonus absent.    Coordination  Right: Finger-to-nose normal.    Gait  Casual gait is normal including stance, stride, and arm swing.  (12 bullet pts)    Incision:      Results Review:   5/2024      7/2024        1/12/2025      3/2025       Results  Imaging  MRI done today shows no evidence of epidermoid cyst.        Assessment/Plan:   Zina Armando is a 34 y.o. female with significant medical comorbidities to include Craniotomy Suboccipital With  "Stealth, Lumbar Drain Insertion External and Lumbar Drain Insertion External on 5/17/2024, recurrent miscarriages, diabetes, and morbid obesity.  She presents today for follow-up and reevaluation of small \"painful lumps\" to her occipital scalp, and palpable tenderness to the lateral right neck and trapezius muscle, both of which have completely resolved. Physical exam findings of posterior cervical incision appears to have healed completely without further complication, no evidence of erythematous scalp lesions on today's exam, numbness noted upon palpation to the occipital scalp, otherwise neurologically intact.  Prior imaging from 1/13/2025 showed no acute abnormalities.      Recommendations:  Assessment & Plan  1. Postoperative pain.  Status post suboccipital craniotomy for epidermoid cyst  She reports pain on the right side where the surgery was performed and in the neck, especially after significant activity. This is expected due to the extensive nature of the surgery and subsequent wound closure. No evidence of tumor recurrence was found on today's MRI. She is reassured that her condition will continue to improve over time. She is informed that these types of tumors are almost completely cured when excised and do not require radiation or chemotherapy. She is cleared to resume work without any restrictions. A follow-up appointment is scheduled for 2 years from now, which will include another brain MRI. If any issues arise before then, she is encouraged to contact the office.        Class 3 obesity (BMI >= 40) due to excessive calories with serious comorbidities BMI of 40.0-44.9 in adult  Body mass index is 42.97 kg/m². Information on the DASH diet provided in the AVS.  We will continue to provided diet and exercise information with the goal of weight loss at each scheduled appointment.     Diagnoses and all orders for this visit:    1. Epidermoid cyst of brain (Primary)    2. History of craniotomy    3. Class 3 " severe obesity due to excess calories with serious comorbidity and body mass index (BMI) of 40.0 to 44.9 in adult    4. Vincentian  needed        Return for 2 yr f/u - [place] to take care of.    Thank you, for allowing me to continue to participate in the care of this patient.    I spent 24 minutes caring for Zina on this date of service. This time includes time spent by me in the following activities: preparing for the visit, reviewing tests, obtaining and/or reviewing a separately obtained history, performing a medically appropriate examination and/or evaluation, counseling and educating the patient/family/caregiver, ordering medications, tests, or procedures, referring and communicating with other health care professionals, documenting information in the medical record, independently interpreting results and communicating that information with the patient/family/caregiver, and/or care coordination.         Sincerely,  Rohith Alexander MD

## 2025-04-09 ENCOUNTER — OFFICE VISIT (OUTPATIENT)
Age: 35
End: 2025-04-09

## 2025-04-09 VITALS
HEIGHT: 60 IN | BODY MASS INDEX: 43.11 KG/M2 | DIASTOLIC BLOOD PRESSURE: 74 MMHG | SYSTOLIC BLOOD PRESSURE: 118 MMHG | WEIGHT: 219.6 LBS

## 2025-04-09 DIAGNOSIS — N91.2 AMENORRHEA: Primary | ICD-10-CM

## 2025-04-09 DIAGNOSIS — Z31.9 PATIENT DESIRES PREGNANCY: ICD-10-CM

## 2025-04-09 NOTE — PROGRESS NOTES
"Subjective     Zina Armando is a 34 y.o. female    History of Present Illness  The patient is a 34-year-old female who presents for a gynecologic visit to discuss amenorrhea.    Her last menstrual period was recorded in February 2025. She has been taking metformin and reports no gastrointestinal disturbances such as upset stomach or diarrhea. She has been tolerating her current medication regimen well, with only minor side effects. Desires pregnancy.         /74 (BP Location: Left arm, Patient Position: Sitting, Cuff Size: Large Adult)   Ht 152.4 cm (60\")   Wt 99.6 kg (219 lb 9.6 oz)   LMP 02/14/2025 (Exact Date)   Breastfeeding No   BMI 42.89 kg/m²     Outpatient Encounter Medications as of 4/9/2025   Medication Sig Dispense Refill    [DISCONTINUED] metFORMIN (GLUCOPHAGE) 500 MG tablet Take 1 tablet by mouth 2 (Two) Times a Day With Meals. 60 tablet 3    [DISCONTINUED] metFORMIN (GLUCOPHAGE) 500 MG tablet Take 1 tablet by mouth 2 (Two) Times a Day With Meals. 60 tablet 3    [DISCONTINUED] cefdinir (OMNICEF) 300 MG capsule Take 1 capsule by mouth 2 (Two) Times a Day.      [DISCONTINUED] cyclobenzaprine (FLEXERIL) 10 MG tablet Take 1 tablet by mouth Every Night. 30 tablet 0    [DISCONTINUED] meloxicam (Mobic) 15 MG tablet Take 1 tablet by mouth Daily. 30 tablet 0    [DISCONTINUED] mupirocin (BACTROBAN) 2 % ointment Apply 1 Application topically to the appropriate area as directed 3 (Three) Times a Day. Apply to red areas on scalp twice daily for 10 days 1 each 0     No facility-administered encounter medications on file as of 4/9/2025.       Surgical History  Past Surgical History:   Procedure Laterality Date    CRANIOTOMY N/A 05/17/2024    Procedure: CRANIOTOMY SUBOCCIPITAL WITH STEALTH, LUMBAR DRAIN INSERTION EXTERNAL;  Surgeon: Rohith Alexander MD;  Location: [facility] OR;  Service: Neurosurgery;  Laterality: N/A;    INCISION AND DRAINAGE OF WOUND N/A 06/05/2024    Procedure: Lumbar Drain and " Suboccipital Wound revision, possible removal of bone, possible revision of dural graft. HOLD ANTIBIOTICS UNTIL CSF OBTAINED;  Surgeon: Rohith Alexander MD;  Location:  [facility] OR;  Service: Neurosurgery;  Laterality: N/A;    LUMBAR DRAIN INSERTION EXTERNAL N/A 05/17/2024    Procedure: LUMBAR DRAIN INSERTION EXTERNAL;  Surgeon: Rohith Alexander MD;  Location:  [facility] OR;  Service: Neurosurgery;  Laterality: N/A;    LUMBAR DRAIN INSERTION EXTERNAL N/A 06/05/2024    Procedure: LUMBAR DRAIN INSERTION EXTERNAL;  Surgeon: Rohith Alexander MD;  Location:  [facility] OR;  Service: Neurosurgery;  Laterality: N/A;       Family History  History reviewed. No pertinent family history.    The following portions of the patient's history were reviewed and updated as appropriate: allergies, current medications, past family history, past medical history, past social history, past surgical history, and problem list.    Review of Systems   Constitutional:  Negative for fatigue.   Respiratory:  Negative for shortness of breath.    Cardiovascular:  Negative for chest pain and leg swelling.   Gastrointestinal:  Negative for abdominal pain.   Endocrine: Negative for cold intolerance and heat intolerance.   Genitourinary:  Positive for amenorrhea and menstrual problem. Negative for difficulty urinating, dysuria, pelvic pain, vaginal bleeding and vaginal discharge.   Musculoskeletal:  Negative for back pain.   Skin:  Negative for rash.   Neurological:  Positive for headache. Negative for dizziness.   Psychiatric/Behavioral:  Negative for self-injury, suicidal ideas and depressed mood. The patient is not nervous/anxious.        Objective   Physical Exam  Vitals and nursing note reviewed.   Constitutional:       General: She is not in acute distress.     Appearance: Normal appearance. She is well-developed. She is obese. She is not ill-appearing or diaphoretic.   HENT:      Head: Normocephalic and atraumatic.   Eyes:       General: No scleral icterus.  Neck:      Trachea: Phonation normal.   Cardiovascular:      Rate and Rhythm: Normal rate and regular rhythm.      Heart sounds: Normal heart sounds. No murmur heard.  Pulmonary:      Effort: Pulmonary effort is normal. No accessory muscle usage or respiratory distress.      Breath sounds: Normal breath sounds.   Abdominal:      Palpations: Abdomen is soft.      Tenderness: There is no abdominal tenderness. There is no right CVA tenderness or left CVA tenderness.   Musculoskeletal:         General: No tenderness. Normal range of motion.      Cervical back: Normal range of motion and neck supple. No tenderness.      Right lower leg: No edema.      Left lower leg: No edema.   Lymphadenopathy:      Cervical: No cervical adenopathy.   Skin:     General: Skin is warm and dry.      Coloration: Skin is not cyanotic, jaundiced or pale.      Findings: No rash.   Neurological:      Mental Status: She is alert and oriented to person, place, and time.      Gait: Gait normal.   Psychiatric:         Attention and Perception: Attention normal.         Mood and Affect: Mood and affect normal.         Speech: Speech normal.         Behavior: Behavior normal. Behavior is cooperative.         Chart reviewed for screenings  Cervical Cancer Screenin2023 NILM; HPV not detected  HPV vaccine: not completed  Breast Cancer Screening: begin age 40  Colon Cancer Screening: begin age 45  Osteoporosis Screening: begin by 5 years after the onset of menopause        Assessment & Plan   Diagnoses and all orders for this visit:    1. Amenorrhea (Primary)  -     Hemoglobin A1c  -     Antimullerian Hormone (AMH)  -     Prolactin  -     POC Pregnancy, Urine  -     Discontinue: metFORMIN (GLUCOPHAGE) 500 MG tablet; Take 1 tablet by mouth 2 (Two) Times a Day With Meals.  Dispense: 60 tablet; Refill: 3    2. Patient desires pregnancy  -     Hemoglobin A1c  -     Antimullerian Hormone (AMH)  -     Prolactin          Assessment & Plan  1. Amenorrhea.  Her amenorrhea is being addressed with a comprehensive plan. An ultrasound will be scheduled to assess the health of the uterus and ovaries. She is advised to use ovulation test kits from day 10 to 12 through day 15 to 18 of her cycle. On day 21, a progesterone level test can be conducted to determine if ovulation is occurring. She is also advised to take a prenatal vitamin containing folic acid or L-methylfolate and DHA, coenzyme Q10 100 mg daily, a baby aspirin, and a vitamin D supplement. Her  is encouraged to undergo a physical examination to rule out any conditions that could impact his semen quality. If the anti-Mullerian hormone level is satisfactory, a prescription for Provera may be issued, to be taken for either 5 or 10 days depending on the level. She should menstruate within 10 days post-treatment. If the 21-day progesterone test indicates anovulation, Provera can be prescribed to induce menstruation, followed by Clomid or Femara to stimulate ovulation. Discussed also referral for reproductive endocrinology specialist referral.     If necessary, further interventions such as follicle monitoring via ultrasound, trigger shots to induce ovulation, or intrauterine insemination procedures may be considered. Prior to these procedures, a semen analysis will be required from her .     A hemoglobin A1c test will be ordered to monitor her levels. An anti-Mullerian hormone test will be ordered to evaluate her ovarian reserve. A prolactin test will be ordered as assessment for possible cause associated with amenorrhea.      This note has been signed electronically.    Nichol Nova DNP, APRN, CNM      Patient or patient representative verbalized consent for the use of Ambient Listening during the visit with  TERRENCE Cunningham for chart documentation. 4/9/2025  10:54 CDT

## 2025-04-12 LAB
HBA1C MFR BLD: 5.8 % (ref 4.8–5.6)
MIS SERPL-MCNC: 0.91 NG/ML
PROLACTIN SERPL-MCNC: 25 NG/ML (ref 4.8–33.4)

## 2025-04-21 ENCOUNTER — TELEPHONE (OUTPATIENT)
Dept: OBSTETRICS AND GYNECOLOGY | Age: 35
End: 2025-04-21

## 2025-04-21 NOTE — TELEPHONE ENCOUNTER
Pt pharmacy calling to confirm metformin medication. Pharmacy states they do not carry 750 MG tablet unless it is ER. Pharmacy reports they also have regular release 850 and can order 1.5 tablets at 500 MG to have pt have correct dose. Please advise and I can return call and inform.

## 2025-04-22 NOTE — TELEPHONE ENCOUNTER
Pharmacy called and given verbal order for Metformin  MG for twice per day per HUGH Gandara. Voices understanding.

## 2025-05-07 ENCOUNTER — OFFICE VISIT (OUTPATIENT)
Age: 35
End: 2025-05-07

## 2025-05-07 VITALS
BODY MASS INDEX: 42.29 KG/M2 | DIASTOLIC BLOOD PRESSURE: 80 MMHG | SYSTOLIC BLOOD PRESSURE: 110 MMHG | WEIGHT: 215.4 LBS | HEIGHT: 60 IN

## 2025-05-07 DIAGNOSIS — N91.4 SECONDARY OLIGOMENORRHEA: Primary | ICD-10-CM

## 2025-05-07 DIAGNOSIS — R73.03 PREDIABETES: ICD-10-CM

## 2025-05-07 NOTE — PROGRESS NOTES
"Sosa Armando is a 34 y.o. female    History of Present Illness  The patient presents for a GYN visit due to irregular bleeding and amenorrhea.    She recently started metformin therapy, which appears to have a positive effect on her menstrual cycle regulation. Her last menstrual period began with spotting around 04/16/2025 or 04/17/2025, followed by heavy bleeding on 04/26/2025. She continues to experience menstrual bleeding. She expresses concern about the inconsistency of her menstrual cycles and queries about the potential impact of her condition on her ability to conceive.        /80 (BP Location: Left arm, Patient Position: Sitting, Cuff Size: Large Adult)   Ht 152.4 cm (60\")   Wt 97.7 kg (215 lb 6.4 oz)   LMP 04/17/2025 (Exact Date)   Breastfeeding No   BMI 42.07 kg/m²     Outpatient Encounter Medications as of 5/7/2025   Medication Sig Dispense Refill    metFORMIN 750 MG tablet Take 750 mg by mouth 2 (Two) Times a Day With Meals. 60 tablet 3     No facility-administered encounter medications on file as of 5/7/2025.       Surgical History  Past Surgical History:   Procedure Laterality Date    CRANIOTOMY N/A 05/17/2024    Procedure: CRANIOTOMY SUBOCCIPITAL WITH STEALTH, LUMBAR DRAIN INSERTION EXTERNAL;  Surgeon: Rohith Alexander MD;  Location: [facility] OR;  Service: Neurosurgery;  Laterality: N/A;    INCISION AND DRAINAGE OF WOUND N/A 06/05/2024    Procedure: Lumbar Drain and Suboccipital Wound revision, possible removal of bone, possible revision of dural graft. HOLD ANTIBIOTICS UNTIL CSF OBTAINED;  Surgeon: Rohith Alexander MD;  Location:  [facility] OR;  Service: Neurosurgery;  Laterality: N/A;    LUMBAR DRAIN INSERTION EXTERNAL N/A 05/17/2024    Procedure: LUMBAR DRAIN INSERTION EXTERNAL;  Surgeon: Rohith Alexander MD;  Location:  [facility] OR;  Service: Neurosurgery;  Laterality: N/A;    LUMBAR DRAIN INSERTION EXTERNAL N/A 06/05/2024    Procedure: LUMBAR DRAIN INSERTION " EXTERNAL;  Surgeon: Rohith Alexander MD;  Location: [facility];  Service: Neurosurgery;  Laterality: N/A;       Family History  History reviewed. No pertinent family history.    The following portions of the patient's history were reviewed and updated as appropriate: allergies, current medications, past family history, past medical history, past social history, past surgical history, and problem list.    Review of Systems   Constitutional:  Negative for fatigue.   Respiratory:  Negative for shortness of breath.    Cardiovascular:  Negative for chest pain and leg swelling.   Gastrointestinal:  Negative for abdominal pain.   Endocrine: Negative for cold intolerance and heat intolerance.   Genitourinary:  Positive for menstrual problem and vaginal bleeding. Negative for difficulty urinating, dysuria, pelvic pain and vaginal discharge.   Musculoskeletal:  Negative for back pain.   Skin:  Negative for rash.   Neurological:  Negative for dizziness and headache.   Psychiatric/Behavioral:  Negative for self-injury, suicidal ideas and depressed mood. The patient is not nervous/anxious.        Objective   Physical Exam  Vitals and nursing note reviewed.   Constitutional:       General: She is not in acute distress.     Appearance: Normal appearance. She is well-developed and well-groomed. She is morbidly obese. She is not ill-appearing or diaphoretic.   HENT:      Head: Normocephalic and atraumatic.   Eyes:      General: No scleral icterus.  Neck:      Trachea: Phonation normal.   Cardiovascular:      Rate and Rhythm: Normal rate and regular rhythm.      Heart sounds: Normal heart sounds. No murmur heard.  Pulmonary:      Effort: Pulmonary effort is normal. No accessory muscle usage or respiratory distress.      Breath sounds: Normal breath sounds.   Abdominal:      Palpations: Abdomen is soft.      Tenderness: There is no abdominal tenderness. There is no right CVA tenderness or left CVA tenderness.   Musculoskeletal:          General: No tenderness. Normal range of motion.      Cervical back: Normal range of motion and neck supple. No tenderness.      Right lower leg: No edema.      Left lower leg: No edema.   Lymphadenopathy:      Cervical: No cervical adenopathy.   Skin:     General: Skin is warm and dry.      Coloration: Skin is not cyanotic, jaundiced or pale.      Findings: No rash.   Neurological:      Mental Status: She is alert and oriented to person, place, and time.      Gait: Gait normal.   Psychiatric:         Attention and Perception: Attention normal.         Mood and Affect: Mood and affect normal.         Speech: Speech normal.         Behavior: Behavior normal. Behavior is cooperative.         Ultrasound today: Anteverted uterus measures 8.2 x 5.6 x 4.4 cm with endometrium measuring 6.5 mm. Normal-appearing right ovary with a 23 mm follicular cyst noted. Left ovary with a 3 x 2.8 cm simple cyst.         Chart reviewed for screenings  Cervical Cancer Screenin2023 NILM with negative hPV  HPV vaccine: not completed  Breast Cancer Screening: begin age 40  Colon Cancer Screening: begin age 45  Osteoporosis Screening: begin by 5 years after the onset of menopause        Assessment & Plan   Diagnoses and all orders for this visit:    1. Secondary oligomenorrhea (Primary)    2. Insulin resistance       Assessment & Plan  1. Irregular menstrual bleeding.  Her body mass index (BMI) and hemoglobin A1c levels may be contributing to irregular menstrual cycles. The initiation of metformin therapy appears to have positively impacted menstrual regularity. Ultrasound findings today indicate a developing follicle in the right ovary, suggesting imminent ovulation.     - Maintain metformin regimen  - Commence prenatal vitamins containing methylfolate  - Recommend intercourse at least every other day to increase the likelihood of conception  - Conduct a pregnancy test if menstruation does not occur by 2025  - Inform if  the pregnancy test is negative to schedule a 21-day progesterone test during the next menstrual cycle to assess ovulation  - Consider medication to induce ovulation in the subsequent cycle if ovulation is not confirmed    Follow-up  Follow up in 2 months for oligomenorrhea and prediabetes.       This note has been signed electronically.    Nichol Nova DNP, TERRENCE, CNM      Patient or patient representative verbalized consent for the use of Ambient Listening during the visit with  TERRENCE Cunningham for chart documentation. 5/7/2025  10:55 CDT

## 2025-05-12 PROBLEM — E88.819 INSULIN RESISTANCE: Status: ACTIVE | Noted: 2025-05-12

## 2025-05-12 PROBLEM — E88.819 INSULIN RESISTANCE: Status: RESOLVED | Noted: 2025-05-12 | Resolved: 2025-05-12

## 2025-05-12 PROBLEM — R73.03 PREDIABETES: Status: ACTIVE | Noted: 2025-05-12

## 2025-05-12 PROBLEM — N91.4 SECONDARY OLIGOMENORRHEA: Status: ACTIVE | Noted: 2025-05-12

## 2025-07-08 ENCOUNTER — OFFICE VISIT (OUTPATIENT)
Age: 35
End: 2025-07-08

## 2025-07-08 VITALS
DIASTOLIC BLOOD PRESSURE: 78 MMHG | BODY MASS INDEX: 39.72 KG/M2 | SYSTOLIC BLOOD PRESSURE: 104 MMHG | WEIGHT: 202.3 LBS | HEIGHT: 60 IN

## 2025-07-08 DIAGNOSIS — N91.4 SECONDARY OLIGOMENORRHEA: ICD-10-CM

## 2025-07-08 DIAGNOSIS — Z79.899 MEDICATION MANAGEMENT: Primary | ICD-10-CM

## 2025-07-08 DIAGNOSIS — R73.03 PREDIABETES: ICD-10-CM

## 2025-07-08 NOTE — PROGRESS NOTES
"Sosa Armando is a 34 y.o. female    History of Present Illness  The patient is a 34-year-old female who presents for a GYN visit for medication follow-up management. She is accompanied by her daughter.    Her last menstrual cycle began 3 days ago, and she continues to experience bleeding today. Menstrual cycles have been irregular, with the most recent one starting on 06/27/2025 and resuming after an 8-day break. She reports that her period initially stops for 2 days before returning to normal. She has not used any ovulation test kits.    She has lost 13 pounds since her last visit. She has reduced her sugar intake and is still taking metformin.        /78 (BP Location: Right arm, Patient Position: Sitting, Cuff Size: Large Adult)   Ht 152.4 cm (60\")   Wt 91.8 kg (202 lb 4.8 oz)   LMP 07/05/2025   Breastfeeding No   BMI 39.51 kg/m²     Outpatient Encounter Medications as of 7/8/2025   Medication Sig Dispense Refill    metFORMIN 750 MG tablet Take 750 mg by mouth 2 (Two) Times a Day With Meals. 60 tablet 3     No facility-administered encounter medications on file as of 7/8/2025.       Surgical History  Past Surgical History:   Procedure Laterality Date    CRANIOTOMY N/A 05/17/2024    Procedure: CRANIOTOMY SUBOCCIPITAL WITH STEALTH, LUMBAR DRAIN INSERTION EXTERNAL;  Surgeon: Rohith Alexander MD;  Location: [facility] OR;  Service: Neurosurgery;  Laterality: N/A;    INCISION AND DRAINAGE OF WOUND N/A 06/05/2024    Procedure: Lumbar Drain and Suboccipital Wound revision, possible removal of bone, possible revision of dural graft. HOLD ANTIBIOTICS UNTIL CSF OBTAINED;  Surgeon: Rohith Alexander MD;  Location:  [facility] OR;  Service: Neurosurgery;  Laterality: N/A;    LUMBAR DRAIN INSERTION EXTERNAL N/A 05/17/2024    Procedure: LUMBAR DRAIN INSERTION EXTERNAL;  Surgeon: Rohith Alexander MD;  Location:  [facility] OR;  Service: Neurosurgery;  Laterality: N/A;    LUMBAR DRAIN INSERTION " EXTERNAL N/A 06/05/2024    Procedure: LUMBAR DRAIN INSERTION EXTERNAL;  Surgeon: Rohith Alexander MD;  Location: [facility] OR;  Service: Neurosurgery;  Laterality: N/A;       Family History  History reviewed. No pertinent family history.    The following portions of the patient's history were reviewed and updated as appropriate: allergies, current medications, past family history, past medical history, past social history, past surgical history, and problem list.    Review of Systems   Constitutional:  Negative for fatigue.   Respiratory:  Negative for shortness of breath.    Cardiovascular:  Negative for chest pain and leg swelling.   Gastrointestinal:  Negative for abdominal pain.   Endocrine: Negative for cold intolerance and heat intolerance.   Genitourinary:  Negative for difficulty urinating, dysuria, menstrual problem, pelvic pain, vaginal bleeding and vaginal discharge.   Musculoskeletal:  Negative for back pain.   Skin:  Negative for rash and wound.   Neurological:  Negative for dizziness and headache.   Hematological:  Does not bruise/bleed easily.   Psychiatric/Behavioral:  Negative for self-injury, suicidal ideas and depressed mood. The patient is nervous/anxious.        Objective   Physical Exam  Vitals and nursing note reviewed.   Constitutional:       General: She is not in acute distress.     Appearance: Normal appearance. She is well-developed. She is obese. She is not ill-appearing or diaphoretic.   HENT:      Head: Normocephalic and atraumatic.   Eyes:      General: No scleral icterus.  Neck:      Trachea: Phonation normal.   Cardiovascular:      Rate and Rhythm: Normal rate and regular rhythm.      Heart sounds: Normal heart sounds. No murmur heard.  Pulmonary:      Effort: Pulmonary effort is normal. No accessory muscle usage or respiratory distress.      Breath sounds: Normal breath sounds.   Abdominal:      Palpations: Abdomen is soft.      Tenderness: There is no abdominal tenderness.  There is no right CVA tenderness or left CVA tenderness.   Musculoskeletal:         General: No tenderness. Normal range of motion.      Cervical back: Normal range of motion and neck supple. No tenderness.      Right lower leg: No edema.      Left lower leg: No edema.   Lymphadenopathy:      Cervical: No cervical adenopathy.   Skin:     General: Skin is warm and dry.      Coloration: Skin is not cyanotic, jaundiced or pale.      Findings: No rash.   Neurological:      Mental Status: She is alert and oriented to person, place, and time.      Gait: Gait normal.   Psychiatric:         Attention and Perception: Attention normal.         Mood and Affect: Mood and affect normal.         Speech: Speech normal.         Behavior: Behavior normal. Behavior is cooperative.         Chart reviewed for screenings  Cervical Cancer Screenin2023 NILM negative HPV  HPV vaccine: not completed  Breast Cancer Screening: begin age 40  Colon Cancer Screening: begin age 45  Osteoporosis Screening: begin by 5 years after the onset of menopause.        Assessment & Plan   Diagnoses and all orders for this visit:    1. Medication management (Primary)  -     Hemoglobin A1c  -     Follicle Stimulating Hormone    2. Prediabetes  -     Hemoglobin A1c    3. Secondary oligomenorrhea  -     Follicle Stimulating Hormone  -     Progesterone            Assessment & Plan  1. Irregular menstrual cycles.  - Reports irregular menstrual cycles, with the last period starting on 2025 and the current period starting 8 days later.  - Labs will be ordered to check hemoglobin A1c and follicle-stimulating hormone levels.  - Advised to take a prenatal vitamin daily, ensuring it contains DHA, along with baby aspirin, vitamin D, and CoQ10 100 mg daily.   - Discussed progesterone level at day 21 to check ovulation and also reviewed ovulation test kits recommended. If the labs on 2025 indicate no ovulation, medication will be discussed and may be  prescribed to start on day 3 of her next menstrual cycle to aid in ovulation.    2. Prediabetes  - Has lost 13 pounds since the last visit.  - Hemoglobin A1c test will be conducted to monitor blood sugar levels.  - Currently taking metformin and has reduced sugar intake. Modifications to metformin if indicated.     Follow-up: A follow-up visit is scheduled in 3 weeks.      This note has been signed electronically.    Nichol Nova DNP, TERRENCE, HUGH  7/8/2025    Patient or patient representative verbalized consent for the use of Ambient Listening during the visit with  TERRENCE Cunningham for chart documentation. 7/8/2025  09:52 CDT

## 2025-07-09 LAB
FSH SERPL-ACNC: 6.8 MIU/ML
HBA1C MFR BLD: 5.7 % (ref 4.8–5.6)

## 2025-07-30 ENCOUNTER — OFFICE VISIT (OUTPATIENT)
Age: 35
End: 2025-07-30

## 2025-07-30 VITALS
HEIGHT: 60 IN | BODY MASS INDEX: 38.93 KG/M2 | DIASTOLIC BLOOD PRESSURE: 74 MMHG | SYSTOLIC BLOOD PRESSURE: 114 MMHG | WEIGHT: 198.3 LBS

## 2025-07-30 DIAGNOSIS — R73.03 PREDIABETES: ICD-10-CM

## 2025-07-30 DIAGNOSIS — N91.4 SECONDARY OLIGOMENORRHEA: ICD-10-CM

## 2025-07-30 RX ORDER — METFORMIN HYDROCHLORIDE 750 MG/1
750 TABLET ORAL 2 TIMES DAILY WITH MEALS
Qty: 60 TABLET | Refills: 3 | Status: SHIPPED | OUTPATIENT
Start: 2025-07-30

## 2025-07-30 RX ORDER — MULTIPLE VITAMINS W/ MINERALS TAB 9MG-400MCG
1 TAB ORAL DAILY
COMMUNITY

## (undated) DEVICE — DRSNG SURESITE123 8X12IN

## (undated) DEVICE — BANDAGE,GAUZE,BULKEE II,4.5"X4.1YD,STRL: Brand: MEDLINE

## (undated) DEVICE — APPL DURAPREP IODOPHOR APL 26ML

## (undated) DEVICE — LIMITORR™ VOLUME LIMITING EXTERNAL CSF DRAINAGE AND MONITORING SYSTEM: Brand: LIMITORR™

## (undated) DEVICE — STAINLESS STEEL SKULL PIN: Brand: SKULL PINS

## (undated) DEVICE — 3.0MM PRECISION NEURO (MATCH HEAD)

## (undated) DEVICE — CONTAINER,SPECIMEN,OR STERILE,4OZ: Brand: MEDLINE

## (undated) DEVICE — ANTIBACTERIAL UNDYED BRAIDED (POLYGLACTIN 910), SYNTHETIC ABSORBABLE SUTURE: Brand: COATED VICRYL

## (undated) DEVICE — CATH IV ANGIO FEP 12G 3IN LTBLU 10PK

## (undated) DEVICE — CLTH CLENS READYCLEANSE PERI CARE PK/5

## (undated) DEVICE — PK CRANI 30

## (undated) DEVICE — TOTAL TRAY, 16FR 10ML SIL FOLEY, URN: Brand: MEDLINE

## (undated) DEVICE — 12CM IQ STANDARD: Brand: SONOPET IQ

## (undated) DEVICE — ANTIBACTERIAL VIOLET BRAIDED (POLYGLACTIN 910), SYNTHETIC ABSORBABLE SUTURE: Brand: COATED VICRYL

## (undated) DEVICE — SUT NUROLON 4/0 TF18 CR8 I8IN C584D

## (undated) DEVICE — INTENDED FOR TISSUE SEPARATION, AND OTHER PROCEDURES THAT REQUIRE A SHARP SURGICAL BLADE TO PUNCTURE OR CUT.: Brand: BARD-PARKER ® STAINLESS STEEL BLADES

## (undated) DEVICE — PACK,UNIVERSAL,NO GOWNS: Brand: MEDLINE

## (undated) DEVICE — BAPTIST TURNOVER KIT: Brand: MEDLINE INDUSTRIES, INC.

## (undated) DEVICE — GAUZE,SPONGE,4"X4",12PLY,STERILE,LF,2'S: Brand: MEDLINE

## (undated) DEVICE — SCRWDRVR SMARTO BATRY/PWR TORQ/45NCM 210RPM DISP STRL

## (undated) DEVICE — PK SPINE POST 30

## (undated) DEVICE — GLV SURG BIOGEL LTX PF 6 1/2

## (undated) DEVICE — CATH CSF EXT EDV VENTRICLEAR TRANSL 35CM

## (undated) DEVICE — COVER,MAYO STAND,STERILE: Brand: MEDLINE

## (undated) DEVICE — SPHR MARKR STEALTH STATION

## (undated) DEVICE — HERMETIC™ LUMBAR CATHETER OPEN TIP: Brand: HERMETIC™

## (undated) DEVICE — ELECTRD BLD EZ CLN MOD XLNG 2.75IN

## (undated) DEVICE — PROXIMATE RH ROTATING HEAD SKIN STAPLERS (35 WIDE) CONTAINS 35 STAINLESS STEEL STAPLES: Brand: PROXIMATE

## (undated) DEVICE — IRRIGATION SUCTION CASSETTE: Brand: SONOPET IQ

## (undated) DEVICE — SUT SILK 3/0 SH CR8 18IN C013D

## (undated) DEVICE — GLV SURG DERMASSURE GRN LF PF 8.0

## (undated) DEVICE — GLV SURG DERMASSURE GRN LF PF 7.0

## (undated) DEVICE — TOWEL,OR,DSP,ST,BLUE,STD,4/PK,20PK/CS: Brand: MEDLINE

## (undated) DEVICE — PROXIMATE RH ROTATING HEAD SKIN STAPLERS (35 REGULAR) CONTAINS 35 STAINLESS STEEL STAPLES: Brand: PROXIMATE

## (undated) DEVICE — DRAPE,TOWEL,LARGE,INVISISHIELD: Brand: MEDLINE

## (undated) DEVICE — SYRINGE,CONTROL,LL,FINGER,GRIP: Brand: MEDLINE INDUSTRIES, INC.

## (undated) DEVICE — TRAP FLD MINIVAC MEGADYNE 100ML

## (undated) DEVICE — GAUZE,SPONGE,4"X4",16PLY,XRAY,STRL,LF: Brand: MEDLINE

## (undated) DEVICE — BIOPATCH™ ANTIMICROBIAL DRESSING WITH CHLORHEXIDINE GLUCONATE IS A HYDROPHILLIC POLYURETHANE ABSORPTIVE FOAM WITH CHLORHEXIDINE GLUCONATE (CHG) WHICH INHIBITS BACTERIAL GROWTH UNDER THE DRESSING. THE DRESSING IS INTENDED TO BE USED TO ABSORB EXUDATE, COVER A WOUND CAUSED BY VASCULAR AND NONVASCULAR PERCUTANEOUS MEDICAL DEVICES DURING SURGERY, AS WELL AS REDUCE LOCAL INFECTION AND COLONIZATION OF MICROORGANISMS.: Brand: BIOPATCH

## (undated) DEVICE — EDMS 46913 DUET INTERLINK SITES: Brand: DUET®

## (undated) DEVICE — ADHS SKIN PREMIERPRO EXOFIN TOPICAL HI/VISC .5ML

## (undated) DEVICE — PCH SURG INVISISHIELD FLD/COL W/DRN/PRT 20X6IN

## (undated) DEVICE — NEEDLE,22GX1.5",REG,BEVEL: Brand: MEDLINE

## (undated) DEVICE — CVR UNIV C/ARM

## (undated) DEVICE — SUT MNCRYL 4/0 PS2 27IN UD MCP426H

## (undated) DEVICE — CULT AER/ANAEROB FASTIDIOUS BACT

## (undated) DEVICE — CODMAN® SURGICAL PATTIES 1/4" X 1/4" (0.64CM X 0.64CM): Brand: CODMAN®

## (undated) DEVICE — GLV SURG SENSICARE W/ALOE PF LF 7.5 STRL

## (undated) DEVICE — CONN FLX BREATHE CIRCT

## (undated) DEVICE — PROTECTOR ULNA NERV

## (undated) DEVICE — SUT VIC 0 MO4 CR8 18IN VCP701D

## (undated) DEVICE — RETR STAY ELNG BLNT 12MM CA/PK/4

## (undated) DEVICE — 3M™ IOBAN™ 2 ANTIMICROBIAL INCISE DRAPE 6650EZ: Brand: IOBAN™ 2